# Patient Record
Sex: MALE | Race: WHITE | NOT HISPANIC OR LATINO | Employment: OTHER | URBAN - METROPOLITAN AREA
[De-identification: names, ages, dates, MRNs, and addresses within clinical notes are randomized per-mention and may not be internally consistent; named-entity substitution may affect disease eponyms.]

---

## 2017-01-10 ENCOUNTER — OFFICE VISIT (OUTPATIENT)
Dept: SURGERY | Facility: CLINIC | Age: 67
End: 2017-01-10

## 2017-01-10 VITALS — WEIGHT: 200 LBS | HEIGHT: 70 IN | HEART RATE: 71 BPM | BODY MASS INDEX: 28.63 KG/M2 | OXYGEN SATURATION: 98 %

## 2017-01-10 DIAGNOSIS — D35.1 PARATHYROID ADENOMA: Primary | ICD-10-CM

## 2017-01-10 PROCEDURE — 99203 OFFICE O/P NEW LOW 30 MIN: CPT | Performed by: SURGERY

## 2017-01-10 NOTE — MR AVS SNAPSHOT
[facility] GENERAL SURGERY  [phone]                    [facility] Val[facility]   1/10/2017 10:30 AM   Office Visit    Dept Phone:  [phone]   Encounter #:  [id number]    Provider:  Sarah Matos MD   Department:  [facility] GENERAL SURGERY                Your Full Care Plan              Today's Medication Changes          These changes are accurate as of: 1/10/17 11:35 AM.  If you have any questions, ask your nurse or doctor.               Stop taking medication(s)listed here:     methylphenidate 10 MG tablet   Commonly known as:  RITALIN   Stopped by:  aSrah Matos MD                      Your Updated Medication List          This list is accurate as of: 1/10/17 11:35 AM.  Always use your most recent med list.                aspirin 81 MG tablet       busPIRone 15 MG tablet   Commonly known as:  BUSPAR       ergocalciferol 55445 UNITS capsule   Commonly known as:  DRISDOL   Take 1 capsule by mouth 1 (One) Time Per Week.       fish oil 1000 MG capsule capsule       glucosamine-chondroitin 500-400 MG capsule capsule       lamoTRIgine 100 MG tablet   Commonly known as:  LaMICtal       levothyroxine 100 MCG tablet   Commonly known as:  SYNTHROID   Take 1 tablet by mouth Daily. On empty stomach       omeprazole 20 MG capsule   Commonly known as:  priLOSEC   TAKE ONE CAPSULE BY MOUTH DAILY       QUEtiapine 25 MG tablet   Commonly known as:  SEROquel       sertraline 100 MG tablet   Commonly known as:  ZOLOFT       TESTIM 50 MG/5GM (1%) gel gel   Generic drug:  testosterone   Place 100 mg on the skin Daily.               We Performed the Following     Basic Metabolic Panel     Case Request     CBC (No Diff)     Obtain Informed Consent     Provide NPO Instructions to Patient     PTH, Intact     US Thyroid       You Were Diagnosed With        Codes Comments    Parathyroid adenoma    -  Primary ICD-10-CM: D35.1  ICD-9-CM: 227.1       Instructions     None    Patient  Instructions History      Upcoming Appointments     Visit Type Date Time Department    OFFICE VISIT 1/10/2017 10:30 AM [facility]    OFFICE VISIT 2017  9:40 AM [facility]    [facility] 3/28/2017 10:10 AM [facility]    OFFICE VISIT 2017 10:40 AM [facility]    LAB 2017 10:00 AM [facility] CBC LAB [facility]    FOLLOW UP 1 UNIT 2017 11:20 AM [facility] CBC [facility]    VITALS ONLY 2017 11:10 AM [facility] CBC LAB [facility]      MyChart Signup     [facility] [employer] allows you to send messages to your doctor, view your test results, renew your prescriptions, schedule appointments, and more. To sign up, go to [email or web address] and click on the Sign Up Now link in the New User? box. Enter your [employer] Activation Code exactly as it appears below along with the last four digits of your Social Security Number and your Date of Birth () to complete the sign-up process. If you do not sign up before the expiration date, you must request a new code.    [employer] Activation Code: [email or web address]  Expires: 2017 11:35 AM    If you have questions, you can email [email or web address] or call [phone] to talk to our [employer] staff. Remember, [employer] is NOT to be used for urgent needs. For medical emergencies, dial 911.               Other Info from Your Visit           Your Appointments     2017  9:40 AM EST   Office Visit with Yousuf Greene MD   [facility] ENDOCRINOLOGY (--)    [address] [place]  [facility] [address] [phone]           Arrive 15 minutes prior to appointment.            Mar 28, 2017 10:10 AM EDT   [facility] with [facility], [facility]   [facility] ENDOCRINOLOGY (--)    [address] [place] [place] [address] [phone]            2017 10:40 AM EDT   Office Visit with MONIKA Prajapati   [facility] ENDOCRINOLOGY (--)    [address]  [address]  [facility] [address]   [phone]           Arrive 15 minutes prior to appointment.            Apr 25, 2017 10:00 AM EDT   Lab with LAB CHAIR 5 [facility] ONCOLOGY CBC LAB ([place])    [address] [place] [address] [address]  [facility] [id number] [phone]            May 02, 2017 11:20 AM EDT   FOLLOW UP with rAmani Painting MD   [facility] MEDICAL GROUP CBC GROUP: CONSULTANTS IN BLOOD DISORDERS AND CANCER ([facility])    [address] [facility] [address]  [facility] [id number] [phone]              Allergies     Codeine      nausea      Reason for Visit     Hyperparathyroidism           Vital Signs     Pulse Height Weight Oxygen Saturation Body Mass Index Smoking Status    71 70\" (177.8 cm) 200 lb (90.7 kg) 98% 28.7 kg/m2 Never Smoker      Problems and Diagnoses Noted     Parathyroid adenoma    -  Primary        "

## 2017-01-11 ENCOUNTER — OFFICE VISIT (OUTPATIENT)
Dept: ENDOCRINOLOGY | Age: 67
End: 2017-01-11

## 2017-01-11 VITALS
BODY MASS INDEX: 29.81 KG/M2 | HEIGHT: 70 IN | RESPIRATION RATE: 16 BRPM | DIASTOLIC BLOOD PRESSURE: 76 MMHG | WEIGHT: 208.2 LBS | SYSTOLIC BLOOD PRESSURE: 118 MMHG

## 2017-01-11 DIAGNOSIS — R53.82 CHRONIC FATIGUE: ICD-10-CM

## 2017-01-11 DIAGNOSIS — E78.1 HYPERTRIGLYCERIDEMIA: ICD-10-CM

## 2017-01-11 DIAGNOSIS — E21.0 PRIMARY HYPERPARATHYROIDISM (HCC): ICD-10-CM

## 2017-01-11 DIAGNOSIS — E03.9 ACQUIRED HYPOTHYROIDISM: Primary | ICD-10-CM

## 2017-01-11 DIAGNOSIS — E55.9 VITAMIN D DEFICIENCY: ICD-10-CM

## 2017-01-11 DIAGNOSIS — E83.52 HYPERCALCEMIA: ICD-10-CM

## 2017-01-11 PROCEDURE — 99214 OFFICE O/P EST MOD 30 MIN: CPT | Performed by: INTERNAL MEDICINE

## 2017-01-11 RX ORDER — ERGOCALCIFEROL 1.25 MG/1
50000 CAPSULE ORAL 2 TIMES WEEKLY
Qty: 26 CAPSULE | Refills: 3 | Status: SHIPPED | OUTPATIENT
Start: 2017-01-12 | End: 2017-01-31 | Stop reason: SDUPTHER

## 2017-01-11 NOTE — MR AVS SNAPSHOT
[facility] Val[facility]   1/11/2017 9:40 AM   Office Visit    Dept Phone:  [phone]   Encounter #:  [id number]    Provider:  Yousuf Greene MD   Department:  [facility] ENDOCRINOLOGY                Your Full Care Plan              Today's Medication Changes          These changes are accurate as of: 1/11/17 11:15 AM.  If you have any questions, ask your nurse or doctor.               New Medication(s)Ordered:     testosterone 4 MG/24HR patch 24 hour 24 hour patch   Commonly known as:  ANDRODERM   Place 2 patches on the skin Daily.   Replaces:  TESTIM 50 MG/5GM (1%) gel gel   Started by:  Yousuf Greene MD         Medication(s)that have changed:     ergocalciferol 42393 UNITS capsule   Commonly known as:  DRISDOL   Take 1 capsule by mouth 2 (Two) Times a Week.   Start taking on:  1/12/2017   What changed:  when to take this   Changed by:  Yousuf Greene MD         Stop taking medication(s)listed here:     TESTIM 50 MG/5GM (1%) gel gel   Generic drug:  testosterone   Replaced by:  testosterone 4 MG/24HR patch 24 hour 24 hour patch   Stopped by:  Yousuf Greene MD                Where to Get Your Medications      These medications were sent to [facility] [address] AT [address] [facility] - [phone]  - [phone] [place] [address]     Phone:  [phone]     ergocalciferol 48943 UNITS capsule         You can get these medications from any pharmacy     Bring a paper prescription for each of these medications     testosterone 4 MG/24HR patch 24 hour 24 hour patch                  Your Updated Medication List          This list is accurate as of: 1/11/17 11:15 AM.  Always use your most recent med list.                aspirin 81 MG tablet       busPIRone 15 MG tablet   Commonly known as:  BUSPAR       ergocalciferol 30355 UNITS capsule   Commonly known as:  DRISDOL   Take 1 capsule by mouth 2  (Two) Times a Week.   Start taking on:  1/12/2017       fish oil 1000 MG capsule capsule       glucosamine-chondroitin 500-400 MG capsule capsule       lamoTRIgine 100 MG tablet   Commonly known as:  LaMICtal       levothyroxine 100 MCG tablet   Commonly known as:  SYNTHROID   Take 1 tablet by mouth Daily. On empty stomach       omeprazole 20 MG capsule   Commonly known as:  priLOSEC   TAKE ONE CAPSULE BY MOUTH DAILY       QUEtiapine 25 MG tablet   Commonly known as:  SEROquel       sertraline 100 MG tablet   Commonly known as:  ZOLOFT       testosterone 4 MG/24HR patch 24 hour 24 hour patch   Commonly known as:  ANDRODERM   Place 2 patches on the skin Daily.               You Were Diagnosed With        Codes Comments    Acquired hypothyroidism    -  Primary ICD-10-CM: E03.9  ICD-9-CM: 244.9     Primary hyperparathyroidism     ICD-10-CM: E21.0  ICD-9-CM: 252.01     Hypertriglyceridemia     ICD-10-CM: E78.1  ICD-9-CM: 272.1     Hypercalcemia     ICD-10-CM: E83.52  ICD-9-CM: 275.42     Chronic fatigue     ICD-10-CM: R53.82  ICD-9-CM: 780.79     Vitamin D deficiency     ICD-10-CM: E55.9  ICD-9-CM: 268.9       Instructions     None    Patient Instructions History      Upcoming Appointments     Visit Type Date Time Department    OFFICE VISIT 1/11/2017  9:40 AM [facility]    [facility] THYROID 1/12/2017  1:00 PM [facility] US    [facility] 3/28/2017 10:10 AM [facility]    OFFICE VISIT 4/11/2017 10:40 AM [facility]    LAB 4/25/2017 10:00 AM [facility] CBC LAB [facility]    FOLLOW UP 1 UNIT 5/2/2017 11:20 AM [facility] CBC [facility]    VITALS ONLY 5/2/2017 11:10 AM [facility] CBC LAB [facility]      [employer] Signup     [employer] allows you to send messages to your doctor, view your test results, renew your prescriptions, schedule appointments, and more. To sign up, go to [email or web address] and click on the Sign Up Now link in the New User? box. Enter your [employer] Activation Code exactly as it appears below  "along with the last four digits of your Social Security Number and your Date of Birth () to complete the sign-up process. If you do not sign up before the expiration date, you must request a new code.    [employer] Activation Code: [email or web address]  Expires: 2017 11:35 AM    If you have questions, you can email [email or web address] or call [phone] to talk to our [employer] staff. Remember, [employer] is NOT to be used for urgent needs. For medical emergencies, dial 911.               Other Info from Your Visit           Your Appointments     2017  1:00 PM EST   US ragini thyroid with RAGINI US 2   [facility])    [address] [facility] [address] [phone]           No prep. Arrive 15 minutes before appointment. Bring current list of medications.            Mar 28, 2017 10:10 AM EDT   [facility] with [facility] JACKI HARVEY   [facility] ENDOCRINOLOGY (--)    [address] [facility]. [address] [place] [address] [phone]            2017 10:40 AM EDT   Office Visit with MONIKA Prajapati   [facility] ENDOCRINOLOGY (--)    [address] [facility]. [address] [place] [address] [phone]           Arrive 15 minutes prior to appointment.            2017 10:00 AM EDT   Lab with LAB CHAIR 5 [facility] ONCOLOGY CBC LAB ([facility]    [address] [address] [phone]            May 02, 2017 11:20 AM EDT   FOLLOW UP with Armani Painting MD   [facility] CBC GROUP: CONSULTANTS IN BLOOD DISORDERS AND CANCER ([facility] [place])    [address] [place] [address] [phone]              Allergies     Codeine      nausea      Vital Signs     Blood Pressure Respirations Height Weight Body Mass Index Smoking Status    118/76 16 70\" (177.8 cm) 208 lb 3.2 oz (94.4 kg) 29.87 kg/m2 Never Smoker      Problems and Diagnoses Noted     " Acquired underactive thyroid    Chronic fatigue    Serum calcium elevated    Hypertriglyceridemia    Parathyroid hormone excess    Vitamin D deficiency

## 2017-01-11 NOTE — LETTER
January 11, 2017     Jared Noriega MD  [address]  [address]  [place] [id number]    Patient: Kaiser Valente   YOB: 1950   Date of Visit: 1/11/2017       Dear Dr. Hari MD:    Kaiser Valente was in my office today. Below are the relevant portions of my assessment and plan of care.      Diagnoses and all orders for this visit:    Acquired hypothyroidism  -     T3, Free; Future  -     T4 & TSH ([facility]); Future  -     T4, Free; Future  -     TestT+TestF+SHBG; Future  -     Uric Acid; Future  -     Vitamin D 25 Hydroxy; Future  -     Comprehensive Metabolic Panel; Future  -     Follicle Stimulating Hormone; Future  -     Lipid Panel; Future  -     Luteinizing Hormone; Future  -     Calcium, Ionized; Future  -     Phosphorus; Future  -     PTH, Intact; Future    Primary hyperparathyroidism  -     T3, Free; Future  -     T4 & TSH ([facility]); Future  -     T4, Free; Future  -     TestT+TestF+SHBG; Future  -     Uric Acid; Future  -     Vitamin D 25 Hydroxy; Future  -     Comprehensive Metabolic Panel; Future  -     Follicle Stimulating Hormone; Future  -     Lipid Panel; Future  -     Luteinizing Hormone; Future  -     Calcium, Ionized; Future  -     Phosphorus; Future  -     PTH, Intact; Future    Hypertriglyceridemia  -     T3, Free; Future  -     T4 & TSH ([facility]); Future  -     T4, Free; Future  -     TestT+TestF+SHBG; Future  -     Uric Acid; Future  -     Vitamin D 25 Hydroxy; Future  -     Comprehensive Metabolic Panel; Future  -     Follicle Stimulating Hormone; Future  -     Lipid Panel; Future  -     Luteinizing Hormone; Future  -     Calcium, Ionized; Future  -     Phosphorus; Future  -     PTH, Intact; Future    Hypercalcemia  -     T3, Free; Future  -     T4 & TSH ([facility]); Future  -     T4, Free; Future  -     TestT+TestF+SHBG; Future  -     Uric Acid; Future  -     Vitamin D 25 Hydroxy; Future  -     Comprehensive Metabolic Panel; Future  -     Follicle Stimulating Hormone; Future  -      Lipid Panel; Future  -     Luteinizing Hormone; Future  -     Calcium, Ionized; Future  -     Phosphorus; Future  -     PTH, Intact; Future    Chronic fatigue  -     T3, Free; Future  -     T4 & TSH ([facility]); Future  -     T4, Free; Future  -     TestT+TestF+SHBG; Future  -     Uric Acid; Future  -     Vitamin D 25 Hydroxy; Future  -     Comprehensive Metabolic Panel; Future  -     Follicle Stimulating Hormone; Future  -     Lipid Panel; Future  -     Luteinizing Hormone; Future  -     Calcium, Ionized; Future  -     Phosphorus; Future  -     PTH, Intact; Future    Vitamin D deficiency  -     ergocalciferol (DRISDOL) 59063 UNITS capsule; Take 1 capsule by mouth 2 (Two) Times a Week.    Other orders  -     testosterone (ANDRODERM) 4 MG/24HR patch 24 hour 24 hour patch; Place 2 patches on the skin Daily.                  If you have questions, please do not hesitate to call me. I look forward to following Kaiser along with you.         Sincerely,        Yousuf Greene MD        CC: No Recipients

## 2017-01-11 NOTE — LETTER
January 11, 2017     Jared Noriega MD  [address]  [address]  [place] [id number]    Patient: Kaiser Valente   YOB: 1950   Date of Visit: 1/11/2017       Dear Dr. Hari MD:    Thank you for referring Kaiser Valente to me for evaluation. Below are the relevant portions of my assessment and plan of care.      Diagnoses and all orders for this visit:    Acquired hypothyroidism  -     T3, Free; Future  -     T4 & TSH ([facility]); Future  -     T4, Free; Future  -     TestT+TestF+SHBG; Future  -     Uric Acid; Future  -     Vitamin D 25 Hydroxy; Future  -     Comprehensive Metabolic Panel; Future  -     Follicle Stimulating Hormone; Future  -     Lipid Panel; Future  -     Luteinizing Hormone; Future  -     Calcium, Ionized; Future  -     Phosphorus; Future  -     PTH, Intact; Future    Primary hyperparathyroidism  -     T3, Free; Future  -     T4 & TSH ([facility]); Future  -     T4, Free; Future  -     TestT+TestF+SHBG; Future  -     Uric Acid; Future  -     Vitamin D 25 Hydroxy; Future  -     Comprehensive Metabolic Panel; Future  -     Follicle Stimulating Hormone; Future  -     Lipid Panel; Future  -     Luteinizing Hormone; Future  -     Calcium, Ionized; Future  -     Phosphorus; Future  -     PTH, Intact; Future    Hypertriglyceridemia  -     T3, Free; Future  -     T4 & TSH ([facility]); Future  -     T4, Free; Future  -     TestT+TestF+SHBG; Future  -     Uric Acid; Future  -     Vitamin D 25 Hydroxy; Future  -     Comprehensive Metabolic Panel; Future  -     Follicle Stimulating Hormone; Future  -     Lipid Panel; Future  -     Luteinizing Hormone; Future  -     Calcium, Ionized; Future  -     Phosphorus; Future  -     PTH, Intact; Future    Hypercalcemia  -     T3, Free; Future  -     T4 & TSH ([facility]); Future  -     T4, Free; Future  -     TestT+TestF+SHBG; Future  -     Uric Acid; Future  -     Vitamin D 25 Hydroxy; Future  -     Comprehensive Metabolic Panel; Future  -     Follicle  Stimulating Hormone; Future  -     Lipid Panel; Future  -     Luteinizing Hormone; Future  -     Calcium, Ionized; Future  -     Phosphorus; Future  -     PTH, Intact; Future    Chronic fatigue  -     T3, Free; Future  -     T4 & TSH ([facility]); Future  -     T4, Free; Future  -     TestT+TestF+SHBG; Future  -     Uric Acid; Future  -     Vitamin D 25 Hydroxy; Future  -     Comprehensive Metabolic Panel; Future  -     Follicle Stimulating Hormone; Future  -     Lipid Panel; Future  -     Luteinizing Hormone; Future  -     Calcium, Ionized; Future  -     Phosphorus; Future  -     PTH, Intact; Future    Vitamin D deficiency  -     ergocalciferol (DRISDOL) 81706 UNITS capsule; Take 1 capsule by mouth 2 (Two) Times a Week.    Other orders  -     testosterone (ANDRODERM) 4 MG/24HR patch 24 hour 24 hour patch; Place 2 patches on the skin Daily.                          If you have questions, please do not hesitate to call me. I look forward to following Kaiser along with you.         Sincerely,        Yousuf Greene MD        CC: No Recipients

## 2017-01-11 NOTE — PROGRESS NOTES
Subjective   Kaiser Valente is a 66 y.o. male seen for follow up for hypothyroidism, hyperparathyroidism, hypercalcemia, lab review. Patient denies any problems or concerns. Patient saw Dr. Matos 01/10/2017 and was told that his parathyroid growth was not malignant.      History of Present Illness is a 66-year-old gentleman known patient with primary hypothyroidism as well as hyperparathyroidism and hypercalcemia with hypogonadism and vitamin D deficiency.  He R the has seen a surgeon who will be arranging his parathyroidectomy surgery.  He also has not been able to start testosterone replacement therapy because of the very high cost.  He still complains of being tired and fatigued.    The following portions of the patient's history were reviewed and updated as appropriate: allergies, current medications, past family history, past medical history, past social history, past surgical history and problem list.    Review of Systems   Constitutional: Negative.    HENT: Negative.    Eyes: Negative.    Respiratory: Negative.    Cardiovascular: Negative.    Gastrointestinal: Negative.    Endocrine: Negative.    Genitourinary: Negative.    Musculoskeletal: Negative.    Skin: Negative.    Allergic/Immunologic: Negative.    Neurological: Negative.    Hematological: Negative.    Psychiatric/Behavioral: Negative.        Objective   Physical Exam   Constitutional: He is oriented to person, place, and time. He appears well-developed and well-nourished. No distress.   Somewhat pale and chronically ill looking   HENT:   Head: Normocephalic and atraumatic.   Right Ear: External ear normal.   Nose: Nose normal.   Mouth/Throat: Oropharynx is clear and moist. No oropharyngeal exudate.   Eyes: Conjunctivae and EOM are normal. Pupils are equal, round, and reactive to light. Right eye exhibits no discharge. Left eye exhibits no discharge. No scleral icterus.   Neck: Normal range of motion. Neck supple. No JVD present. No tracheal  deviation present. No thyromegaly present.   Cardiovascular: Normal rate, regular rhythm, normal heart sounds and intact distal pulses.  Exam reveals no gallop and no friction rub.    No murmur heard.  Pulmonary/Chest: Effort normal and breath sounds normal. No stridor. No respiratory distress. He has no wheezes. He has no rales. He exhibits no tenderness.   Abdominal: Soft. Bowel sounds are normal. He exhibits no distension and no mass. There is no tenderness. There is no rebound and no guarding. No hernia.   Musculoskeletal: Normal range of motion. He exhibits no edema, tenderness or deformity.   Healed the scar of the open reduction of a broken right wrist in the medial aspect of right lower forearm.   Lymphadenopathy:     He has no cervical adenopathy.   Neurological: He is alert and oriented to person, place, and time. He has normal reflexes. He displays normal reflexes. No cranial nerve deficit. He exhibits normal muscle tone. Coordination normal.   Skin: Skin is warm and dry. No rash noted. He is not diaphoretic. No erythema. No pallor.   Psychiatric: He has a normal mood and affect. His behavior is normal. Judgment and thought content normal.   Nursing note and vitals reviewed.        Assessment/Plan   Diagnoses and all orders for this visit:    Acquired hypothyroidism  -     T3, Free; Future  -     T4 & TSH ([facility]); Future  -     T4, Free; Future  -     TestT+TestF+SHBG; Future  -     Uric Acid; Future  -     Vitamin D 25 Hydroxy; Future  -     Comprehensive Metabolic Panel; Future  -     Follicle Stimulating Hormone; Future  -     Lipid Panel; Future  -     Luteinizing Hormone; Future  -     Calcium, Ionized; Future  -     Phosphorus; Future  -     PTH, Intact; Future    Primary hyperparathyroidism  -     T3, Free; Future  -     T4 & TSH ([facility]); Future  -     T4, Free; Future  -     TestT+TestF+SHBG; Future  -     Uric Acid; Future  -     Vitamin D 25 Hydroxy; Future  -     Comprehensive Metabolic  Panel; Future  -     Follicle Stimulating Hormone; Future  -     Lipid Panel; Future  -     Luteinizing Hormone; Future  -     Calcium, Ionized; Future  -     Phosphorus; Future  -     PTH, Intact; Future    Hypertriglyceridemia  -     T3, Free; Future  -     T4 & TSH ([facility]); Future  -     T4, Free; Future  -     TestT+TestF+SHBG; Future  -     Uric Acid; Future  -     Vitamin D 25 Hydroxy; Future  -     Comprehensive Metabolic Panel; Future  -     Follicle Stimulating Hormone; Future  -     Lipid Panel; Future  -     Luteinizing Hormone; Future  -     Calcium, Ionized; Future  -     Phosphorus; Future  -     PTH, Intact; Future    Hypercalcemia  -     T3, Free; Future  -     T4 & TSH ([facility]); Future  -     T4, Free; Future  -     TestT+TestF+SHBG; Future  -     Uric Acid; Future  -     Vitamin D 25 Hydroxy; Future  -     Comprehensive Metabolic Panel; Future  -     Follicle Stimulating Hormone; Future  -     Lipid Panel; Future  -     Luteinizing Hormone; Future  -     Calcium, Ionized; Future  -     Phosphorus; Future  -     PTH, Intact; Future    Chronic fatigue  -     T3, Free; Future  -     T4 & TSH ([facility]); Future  -     T4, Free; Future  -     TestT+TestF+SHBG; Future  -     Uric Acid; Future  -     Vitamin D 25 Hydroxy; Future  -     Comprehensive Metabolic Panel; Future  -     Follicle Stimulating Hormone; Future  -     Lipid Panel; Future  -     Luteinizing Hormone; Future  -     Calcium, Ionized; Future  -     Phosphorus; Future  -     PTH, Intact; Future    Vitamin D deficiency  -     ergocalciferol (DRISDOL) 73621 UNITS capsule; Take 1 capsule by mouth 2 (Two) Times a Week.    Other orders  -     testosterone (ANDRODERM) 4 MG/24HR patch 24 hour 24 hour patch; Place 2 patches on the skin Daily.              in summary I saw and examined this 66-year-old gentleman for above-mentioned problems.  I reviewed his laboratory evaluation of the 12/28/2016 and provided him with a hard copy of it.  I also  reviewed his a parathyroid scan that was performed at the [facility] on 11/22/2016.  He clearly suffering from primary hyperparathyroidism and and adenoma has been identified in the left lower thyroid pole.  She already has seen the surgeon and awaiting a date for parathyroidectomy.  He has not been able to start testosterone replacement therapy and therefore his testosterone level is very low with the elevated levels of LH and FSH consistent with primary hypogonadism.  We will go ahead and give him Androderm her milligrams 2 patches daily.  Also because of his low vitamin D we will increase his dose to 50,000 unit twice weekly.  He will see Ms. Vaishali De La Cruz in 4 months and myself in 8 months or sooner if needed with laboratory evaluation prior to each office visit.

## 2017-01-12 ENCOUNTER — HOSPITAL ENCOUNTER (OUTPATIENT)
Dept: ULTRASOUND IMAGING | Facility: HOSPITAL | Age: 67
Discharge: HOME | End: 2017-01-12
Attending: SURGERY | Admitting: SURGERY

## 2017-01-12 PROCEDURE — 76536 US EXAM OF HEAD AND NECK: CPT

## 2017-01-12 NOTE — PROGRESS NOTES
General Surgery    History and Physical    CC: Parathyroid adenoma, hypercalcemia    HPI: The patient is a pleasant 66 y.o. year-old gentleman who presents today to my office to discuss recent findings of hypercalcemia as well as hyperparathyroidism.  On routine labs in the past, he was found to have elevated calcium levels as high as 12.0, but most recently measured at 11.4.  Further workup revealed a normal phosphorus level, low vitamin D level at 21, and elevated parathyroid hormone level at 212.  I SPECT/CT scan revealed a 1.1 cm hyperfunctioning left inferior parathyroid adenoma adjacent to the esophagus.  He has been sent to see me for discussion of possible parathyroidectomy.  He denies any history of chronic abdominal pain, constipation, or kidney stones.  He does however have a history of chronic depression which has been ongoing for many years now.  He also has chronic joint pain within his bilateral shoulders and knees, and has right hand paresthesias intermittently due to a distal radius fracture.  He describes the tingling sensation occurring only in the lateral 3 fingers of the right hand.  He has a known history of irritable bowel syndrome and has mostly diarrhea predominant stools.  He denies any family history of any neuro endocrine tumors.  He has had no prior radiation to the neck.    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[employer], has 2 sons    ROS: A comprehensive 14-system review was conducted and significant only for  the following positives: Sleep apnea, hearing loss, constipation, diarrhea, reflux, urinary urgency, joint pain, back pain, agitation, decreased concentration, nervousness, anxiety, and depression    Physical Exam:  Vitals:    01/10/17 1026   Pulse: 71   SpO2: 98%     General: No acute distress, well-nourished & well-developed  HEAD: normocephalic, atraumatic  EYES: normal conjunctiva, sclera anicteric  EARS: grossly normal hearing  NECK: supple, no thyromegaly, no palpable thyroid nodules  CARDIOVASCULAR: regular rate and rhythm  RESPIRATORY: clear to auscultation bilaterally  GASTROINTESTINAL: soft, nontender, non-distended  PSYCHIATRIC: oriented x3, normal mood and affect    Laboratory analysis:  Calcium: 11.4 mg/dL  Phosphorus: 2.9 mg/dL  Vitamin D: 21 ng/mL  PTH: 212 pg/mL    Imaging studies:  Sestamibi SPECT/CT:   Parathyroid adenoma along the posterior aspect of the lower left thyroid pole and adjacent to the left margin of the esophagus measuring 1.1 cm in greatest dimension    ASSESSMENT & PLAN  66-year-old gentleman with primary hyperparathyroidism due to left inferior parathyroid adenoma    I personally reviewed his labs and imaging report from [facility], and think he would be a good candidate for left inferior parathyroidectomy with intraoperative PTH monitoring for control of his hypercalcemia.  I discussed the risks and benefits of the surgical procedure at length with the patient, and discussed the risk of bleeding, possible recurrent laryngeal nerve injury, possibility of multiple parathyroid adenomas being the etiology of his hypercalcemia which would then require a bilateral neck exploration.  He understands these risks, had time to ask questions which were all answered, and he has consented to proceed.  I would like to check a thyroid ultrasound preoperatively to ensure that there are no suspicious thyroid nodules requiring further workup.    Sarah Matos MD  General  and Endoscopic Surgery  [facility]    [address], [address]  [place], [place], [address]  P: [phone]  F: [phone]

## 2017-01-13 RX ORDER — SYRINGE W-NEEDLE,DISPOSAB,3 ML 25GX5/8"
SYRINGE, EMPTY DISPOSABLE MISCELLANEOUS
Qty: 10 EACH | Refills: 1 | Status: SHIPPED | OUTPATIENT
Start: 2017-01-13 | End: 2017-09-15 | Stop reason: SDUPTHER

## 2017-01-13 RX ORDER — TESTOSTERONE CYPIONATE 200 MG/ML
200 INJECTION, SOLUTION INTRAMUSCULAR
Qty: 2 ML | Refills: 5 | Status: SHIPPED | OUTPATIENT
Start: 2017-01-13 | End: 2017-01-31 | Stop reason: SDUPTHER

## 2017-01-16 ENCOUNTER — TELEPHONE (OUTPATIENT)
Dept: SURGERY | Facility: CLINIC | Age: 67
End: 2017-01-16

## 2017-01-16 NOTE — TELEPHONE ENCOUNTER
It is my understanding that we were waiting to schedule the surgery until we have a better understanding of the group's call schedule for February onward through 2017, and this is being discussed this evening at the group partners dinner/POC meeting. Shakira or Wendy will call him in the next 1-2 weeks to get the surgery scheduled for him once our partner's call schedule for February has been established. Could you call him and explain this to him, and let him know we will contact him as soon as the call schedule has been finalized to be able to schedule his parathyroidectomy surgery?

## 2017-01-31 ENCOUNTER — APPOINTMENT (OUTPATIENT)
Dept: PREADMISSION TESTING | Facility: HOSPITAL | Age: 67
End: 2017-01-31

## 2017-01-31 VITALS
BODY MASS INDEX: 29.2 KG/M2 | RESPIRATION RATE: 20 BRPM | DIASTOLIC BLOOD PRESSURE: 76 MMHG | HEART RATE: 62 BPM | TEMPERATURE: 97.6 F | OXYGEN SATURATION: 98 % | SYSTOLIC BLOOD PRESSURE: 137 MMHG | WEIGHT: 204 LBS | HEIGHT: 70 IN

## 2017-01-31 LAB
ANION GAP SERPL CALCULATED.3IONS-SCNC: 11.9 MMOL/L
BUN BLD-MCNC: 28 MG/DL (ref 8–23)
BUN/CREAT SERPL: 19.3 (ref 7–25)
CALCIUM SPEC-SCNC: 11.7 MG/DL (ref 8.6–10.5)
CHLORIDE SERPL-SCNC: 102 MMOL/L (ref 98–107)
CO2 SERPL-SCNC: 25.1 MMOL/L (ref 22–29)
CREAT BLD-MCNC: 1.45 MG/DL (ref 0.76–1.27)
DEPRECATED RDW RBC AUTO: 47.2 FL (ref 37–54)
ERYTHROCYTE [DISTWIDTH] IN BLOOD BY AUTOMATED COUNT: 14.5 % (ref 11.5–14.5)
GFR SERPL CREATININE-BSD FRML MDRD: 48 ML/MIN/1.73
GLUCOSE BLD-MCNC: 84 MG/DL (ref 65–99)
HCT VFR BLD AUTO: 40 % (ref 40.4–52.2)
HGB BLD-MCNC: 13.4 G/DL (ref 13.7–17.6)
MCH RBC QN AUTO: 30 PG (ref 27–32.7)
MCHC RBC AUTO-ENTMCNC: 33.5 G/DL (ref 32.6–36.4)
MCV RBC AUTO: 89.5 FL (ref 79.8–96.2)
PLATELET # BLD AUTO: 100 10*3/MM3 (ref 140–500)
PMV BLD AUTO: 12.5 FL (ref 6–12)
POTASSIUM BLD-SCNC: 3.8 MMOL/L (ref 3.5–5.2)
PTH-INTACT SERPL-MCNC: 142.5 PG/ML (ref 15–65)
RBC # BLD AUTO: 4.47 10*6/MM3 (ref 4.6–6)
SODIUM BLD-SCNC: 139 MMOL/L (ref 136–145)
WBC NRBC COR # BLD: 6.35 10*3/MM3 (ref 4.5–10.7)

## 2017-01-31 PROCEDURE — 36415 COLL VENOUS BLD VENIPUNCTURE: CPT | Performed by: SURGERY

## 2017-01-31 PROCEDURE — 93010 ELECTROCARDIOGRAM REPORT: CPT | Performed by: INTERNAL MEDICINE

## 2017-01-31 PROCEDURE — 93005 ELECTROCARDIOGRAM TRACING: CPT

## 2017-01-31 PROCEDURE — 80048 BASIC METABOLIC PNL TOTAL CA: CPT | Performed by: SURGERY

## 2017-01-31 PROCEDURE — 83970 ASSAY OF PARATHORMONE: CPT | Performed by: SURGERY

## 2017-01-31 PROCEDURE — 85027 COMPLETE CBC AUTOMATED: CPT | Performed by: SURGERY

## 2017-01-31 RX ORDER — OMEPRAZOLE 20 MG/1
20 CAPSULE, DELAYED RELEASE ORAL EVERY MORNING
COMMUNITY
End: 2017-09-25

## 2017-01-31 RX ORDER — ERGOCALCIFEROL 1.25 MG/1
50000 CAPSULE ORAL
COMMUNITY
End: 2018-03-29

## 2017-01-31 RX ORDER — LEVOTHYROXINE SODIUM 0.1 MG/1
100 TABLET ORAL EVERY MORNING
Status: ON HOLD | COMMUNITY
End: 2017-02-03

## 2017-01-31 RX ORDER — MODAFINIL 100 MG/1
150 TABLET ORAL EVERY MORNING
COMMUNITY
End: 2018-10-01 | Stop reason: ALTCHOICE

## 2017-01-31 RX ORDER — LAMOTRIGINE 100 MG/1
50 TABLET ORAL EVERY MORNING
COMMUNITY
End: 2017-04-21 | Stop reason: HOSPADM

## 2017-02-03 ENCOUNTER — ANESTHESIA (OUTPATIENT)
Dept: PERIOP | Facility: HOSPITAL | Age: 67
End: 2017-02-03

## 2017-02-03 ENCOUNTER — HOSPITAL ENCOUNTER (OUTPATIENT)
Facility: HOSPITAL | Age: 67
Setting detail: HOSPITAL OUTPATIENT SURGERY
Discharge: HOME | End: 2017-02-03
Attending: SURGERY | Admitting: SURGERY

## 2017-02-03 ENCOUNTER — ANESTHESIA EVENT (OUTPATIENT)
Dept: PERIOP | Facility: HOSPITAL | Age: 67
End: 2017-02-03

## 2017-02-03 VITALS
SYSTOLIC BLOOD PRESSURE: 143 MMHG | RESPIRATION RATE: 16 BRPM | HEART RATE: 60 BPM | HEIGHT: 70 IN | TEMPERATURE: 97.8 F | OXYGEN SATURATION: 97 % | WEIGHT: 202.31 LBS | DIASTOLIC BLOOD PRESSURE: 91 MMHG | BODY MASS INDEX: 28.96 KG/M2

## 2017-02-03 DIAGNOSIS — D35.1 PARATHYROID ADENOMA: ICD-10-CM

## 2017-02-03 LAB
PTH-INTACT SERPL-SCNC: 193.2 PG/ML (ref 15–65)
PTH-INTACT SERPL-SCNC: 43.7 PG/ML (ref 15–65)

## 2017-02-03 PROCEDURE — 25010000002 FENTANYL CITRATE (PF) 100 MCG/2ML SOLUTION: Performed by: NURSE ANESTHETIST, CERTIFIED REGISTERED

## 2017-02-03 PROCEDURE — 88331 PATH CONSLTJ SURG 1 BLK 1SPC: CPT | Performed by: SURGERY

## 2017-02-03 PROCEDURE — 25010000002 DEXAMETHASONE PER 1 MG: Performed by: NURSE ANESTHETIST, CERTIFIED REGISTERED

## 2017-02-03 PROCEDURE — 88305 TISSUE EXAM BY PATHOLOGIST: CPT | Performed by: SURGERY

## 2017-02-03 PROCEDURE — 83970 ASSAY OF PARATHORMONE: CPT | Performed by: SURGERY

## 2017-02-03 PROCEDURE — 25010000003 CEFAZOLIN IN DEXTROSE 2-4 GM/100ML-% SOLUTION

## 2017-02-03 PROCEDURE — 25010000002 FENTANYL CITRATE (PF) 100 MCG/2ML SOLUTION: Performed by: ANESTHESIOLOGY

## 2017-02-03 PROCEDURE — 25010000002 MIDAZOLAM PER 1 MG: Performed by: ANESTHESIOLOGY

## 2017-02-03 PROCEDURE — 25010000002 HYDROMORPHONE PER 4 MG: Performed by: NURSE ANESTHETIST, CERTIFIED REGISTERED

## 2017-02-03 PROCEDURE — 25010000002 PROPOFOL 10 MG/ML EMULSION: Performed by: NURSE ANESTHETIST, CERTIFIED REGISTERED

## 2017-02-03 PROCEDURE — 25010000002 SUCCINYLCHOLINE PER 20 MG: Performed by: NURSE ANESTHETIST, CERTIFIED REGISTERED

## 2017-02-03 PROCEDURE — 25010000002 ONDANSETRON PER 1 MG: Performed by: NURSE ANESTHETIST, CERTIFIED REGISTERED

## 2017-02-03 PROCEDURE — 60500 EXPLORE PARATHYROID GLANDS: CPT | Performed by: SURGERY

## 2017-02-03 RX ORDER — PROPOFOL 10 MG/ML
VIAL (ML) INTRAVENOUS AS NEEDED
Status: DISCONTINUED | OUTPATIENT
Start: 2017-02-03 | End: 2017-02-03 | Stop reason: SURG

## 2017-02-03 RX ORDER — NALOXONE HCL 0.4 MG/ML
0.2 VIAL (ML) INJECTION AS NEEDED
Status: DISCONTINUED | OUTPATIENT
Start: 2017-02-03 | End: 2017-02-03 | Stop reason: HOSPADM

## 2017-02-03 RX ORDER — MAGNESIUM HYDROXIDE 1200 MG/15ML
LIQUID ORAL AS NEEDED
Status: DISCONTINUED | OUTPATIENT
Start: 2017-02-03 | End: 2017-02-03 | Stop reason: HOSPADM

## 2017-02-03 RX ORDER — PROMETHAZINE HYDROCHLORIDE 25 MG/1
25 SUPPOSITORY RECTAL ONCE AS NEEDED
Status: DISCONTINUED | OUTPATIENT
Start: 2017-02-03 | End: 2017-02-03 | Stop reason: HOSPADM

## 2017-02-03 RX ORDER — ONDANSETRON 2 MG/ML
4 INJECTION INTRAMUSCULAR; INTRAVENOUS ONCE AS NEEDED
Status: DISCONTINUED | OUTPATIENT
Start: 2017-02-03 | End: 2017-02-03 | Stop reason: HOSPADM

## 2017-02-03 RX ORDER — HYDRALAZINE HYDROCHLORIDE 20 MG/ML
5 INJECTION INTRAMUSCULAR; INTRAVENOUS
Status: DISCONTINUED | OUTPATIENT
Start: 2017-02-03 | End: 2017-02-03 | Stop reason: HOSPADM

## 2017-02-03 RX ORDER — OXYCODONE HYDROCHLORIDE AND ACETAMINOPHEN 5; 325 MG/1; MG/1
1 TABLET ORAL EVERY 4 HOURS PRN
Qty: 40 TABLET | Refills: 0 | Status: SHIPPED | OUTPATIENT
Start: 2017-02-03 | End: 2017-02-14

## 2017-02-03 RX ORDER — ONDANSETRON 2 MG/ML
INJECTION INTRAMUSCULAR; INTRAVENOUS AS NEEDED
Status: DISCONTINUED | OUTPATIENT
Start: 2017-02-03 | End: 2017-02-03 | Stop reason: SURG

## 2017-02-03 RX ORDER — MIDAZOLAM HYDROCHLORIDE 1 MG/ML
2 INJECTION INTRAMUSCULAR; INTRAVENOUS
Status: DISCONTINUED | OUTPATIENT
Start: 2017-02-03 | End: 2017-02-03 | Stop reason: HOSPADM

## 2017-02-03 RX ORDER — SODIUM CHLORIDE 0.9 % (FLUSH) 0.9 %
1-10 SYRINGE (ML) INJECTION AS NEEDED
Status: DISCONTINUED | OUTPATIENT
Start: 2017-02-03 | End: 2017-02-03 | Stop reason: HOSPADM

## 2017-02-03 RX ORDER — ONDANSETRON 4 MG/1
4 TABLET, FILM COATED ORAL EVERY 6 HOURS PRN
Qty: 30 TABLET | Refills: 1 | Status: SHIPPED | OUTPATIENT
Start: 2017-02-03 | End: 2017-02-14

## 2017-02-03 RX ORDER — FENTANYL CITRATE 50 UG/ML
50 INJECTION, SOLUTION INTRAMUSCULAR; INTRAVENOUS
Status: DISCONTINUED | OUTPATIENT
Start: 2017-02-03 | End: 2017-02-03 | Stop reason: HOSPADM

## 2017-02-03 RX ORDER — CEFAZOLIN SODIUM 2 G/100ML
2 INJECTION, SOLUTION INTRAVENOUS
Status: DISCONTINUED | OUTPATIENT
Start: 2017-02-03 | End: 2017-02-03 | Stop reason: HOSPADM

## 2017-02-03 RX ORDER — HYDROMORPHONE HYDROCHLORIDE 1 MG/ML
0.5 INJECTION, SOLUTION INTRAMUSCULAR; INTRAVENOUS; SUBCUTANEOUS
Status: DISCONTINUED | OUTPATIENT
Start: 2017-02-03 | End: 2017-02-03 | Stop reason: HOSPADM

## 2017-02-03 RX ORDER — PROMETHAZINE HYDROCHLORIDE 25 MG/ML
12.5 INJECTION, SOLUTION INTRAMUSCULAR; INTRAVENOUS ONCE AS NEEDED
Status: DISCONTINUED | OUTPATIENT
Start: 2017-02-03 | End: 2017-02-03 | Stop reason: HOSPADM

## 2017-02-03 RX ORDER — LIDOCAINE HYDROCHLORIDE 40 MG/ML
SOLUTION TOPICAL AS NEEDED
Status: DISCONTINUED | OUTPATIENT
Start: 2017-02-03 | End: 2017-02-03 | Stop reason: SURG

## 2017-02-03 RX ORDER — LIDOCAINE HYDROCHLORIDE 20 MG/ML
INJECTION, SOLUTION INFILTRATION; PERINEURAL AS NEEDED
Status: DISCONTINUED | OUTPATIENT
Start: 2017-02-03 | End: 2017-02-03 | Stop reason: SURG

## 2017-02-03 RX ORDER — DEXAMETHASONE SODIUM PHOSPHATE 10 MG/ML
INJECTION INTRAMUSCULAR; INTRAVENOUS AS NEEDED
Status: DISCONTINUED | OUTPATIENT
Start: 2017-02-03 | End: 2017-02-03 | Stop reason: SURG

## 2017-02-03 RX ORDER — HYDROCODONE BITARTRATE AND ACETAMINOPHEN 7.5; 325 MG/1; MG/1
1 TABLET ORAL ONCE AS NEEDED
Status: DISCONTINUED | OUTPATIENT
Start: 2017-02-03 | End: 2017-02-03 | Stop reason: HOSPADM

## 2017-02-03 RX ORDER — DIPHENHYDRAMINE HYDROCHLORIDE 50 MG/ML
12.5 INJECTION INTRAMUSCULAR; INTRAVENOUS
Status: DISCONTINUED | OUTPATIENT
Start: 2017-02-03 | End: 2017-02-03 | Stop reason: HOSPADM

## 2017-02-03 RX ORDER — MIDAZOLAM HYDROCHLORIDE 1 MG/ML
1 INJECTION INTRAMUSCULAR; INTRAVENOUS
Status: DISCONTINUED | OUTPATIENT
Start: 2017-02-03 | End: 2017-02-03 | Stop reason: HOSPADM

## 2017-02-03 RX ORDER — PROMETHAZINE HYDROCHLORIDE 25 MG/1
25 TABLET ORAL ONCE AS NEEDED
Status: DISCONTINUED | OUTPATIENT
Start: 2017-02-03 | End: 2017-02-03 | Stop reason: HOSPADM

## 2017-02-03 RX ORDER — FAMOTIDINE 10 MG/ML
20 INJECTION, SOLUTION INTRAVENOUS ONCE
Status: COMPLETED | OUTPATIENT
Start: 2017-02-03 | End: 2017-02-03

## 2017-02-03 RX ORDER — CEFAZOLIN SODIUM 2 G/100ML
INJECTION, SOLUTION INTRAVENOUS
Status: COMPLETED
Start: 2017-02-03 | End: 2017-02-03

## 2017-02-03 RX ORDER — SUCCINYLCHOLINE CHLORIDE 20 MG/ML
INJECTION INTRAMUSCULAR; INTRAVENOUS AS NEEDED
Status: DISCONTINUED | OUTPATIENT
Start: 2017-02-03 | End: 2017-02-03 | Stop reason: SURG

## 2017-02-03 RX ORDER — SODIUM CHLORIDE, SODIUM LACTATE, POTASSIUM CHLORIDE, CALCIUM CHLORIDE 600; 310; 30; 20 MG/100ML; MG/100ML; MG/100ML; MG/100ML
9 INJECTION, SOLUTION INTRAVENOUS CONTINUOUS
Status: DISCONTINUED | OUTPATIENT
Start: 2017-02-03 | End: 2017-02-03 | Stop reason: HOSPADM

## 2017-02-03 RX ORDER — OXYCODONE AND ACETAMINOPHEN 7.5; 325 MG/1; MG/1
1 TABLET ORAL ONCE AS NEEDED
Status: COMPLETED | OUTPATIENT
Start: 2017-02-03 | End: 2017-02-03

## 2017-02-03 RX ORDER — HYDROMORPHONE HYDROCHLORIDE 1 MG/ML
0.25 INJECTION, SOLUTION INTRAMUSCULAR; INTRAVENOUS; SUBCUTANEOUS
Status: DISCONTINUED | OUTPATIENT
Start: 2017-02-03 | End: 2017-02-03 | Stop reason: HOSPADM

## 2017-02-03 RX ORDER — LABETALOL HYDROCHLORIDE 5 MG/ML
5 INJECTION, SOLUTION INTRAVENOUS
Status: DISCONTINUED | OUTPATIENT
Start: 2017-02-03 | End: 2017-02-03 | Stop reason: HOSPADM

## 2017-02-03 RX ORDER — FLUMAZENIL 0.1 MG/ML
0.2 INJECTION INTRAVENOUS AS NEEDED
Status: DISCONTINUED | OUTPATIENT
Start: 2017-02-03 | End: 2017-02-03 | Stop reason: HOSPADM

## 2017-02-03 RX ORDER — BUPIVACAINE HYDROCHLORIDE AND EPINEPHRINE 5; 5 MG/ML; UG/ML
INJECTION, SOLUTION PERINEURAL AS NEEDED
Status: DISCONTINUED | OUTPATIENT
Start: 2017-02-03 | End: 2017-02-03 | Stop reason: HOSPADM

## 2017-02-03 RX ORDER — PROMETHAZINE HYDROCHLORIDE 25 MG/1
12.5 TABLET ORAL ONCE AS NEEDED
Status: DISCONTINUED | OUTPATIENT
Start: 2017-02-03 | End: 2017-02-03 | Stop reason: HOSPADM

## 2017-02-03 RX ADMIN — FENTANYL CITRATE 50 MCG: 50 INJECTION INTRAMUSCULAR; INTRAVENOUS at 12:26

## 2017-02-03 RX ADMIN — MIDAZOLAM HYDROCHLORIDE 1 MG: 1 INJECTION, SOLUTION INTRAMUSCULAR; INTRAVENOUS at 12:55

## 2017-02-03 RX ADMIN — FENTANYL CITRATE 50 MCG: 50 INJECTION INTRAMUSCULAR; INTRAVENOUS at 14:47

## 2017-02-03 RX ADMIN — FAMOTIDINE 20 MG: 10 INJECTION, SOLUTION INTRAVENOUS at 11:21

## 2017-02-03 RX ADMIN — PROPOFOL 200 MG: 10 INJECTION, EMULSION INTRAVENOUS at 12:26

## 2017-02-03 RX ADMIN — ONDANSETRON 4 MG: 2 INJECTION INTRAMUSCULAR; INTRAVENOUS at 13:19

## 2017-02-03 RX ADMIN — FENTANYL CITRATE 50 MCG: 50 INJECTION INTRAMUSCULAR; INTRAVENOUS at 14:28

## 2017-02-03 RX ADMIN — HYDROMORPHONE HYDROCHLORIDE 0.5 MG: 1 INJECTION, SOLUTION INTRAMUSCULAR; INTRAVENOUS; SUBCUTANEOUS at 15:19

## 2017-02-03 RX ADMIN — FENTANYL CITRATE 50 MCG: 50 INJECTION INTRAMUSCULAR; INTRAVENOUS at 12:46

## 2017-02-03 RX ADMIN — SODIUM CHLORIDE, POTASSIUM CHLORIDE, SODIUM LACTATE AND CALCIUM CHLORIDE: 600; 310; 30; 20 INJECTION, SOLUTION INTRAVENOUS at 13:41

## 2017-02-03 RX ADMIN — SUCCINYLCHOLINE CHLORIDE 60 MG: 20 INJECTION, SOLUTION INTRAMUSCULAR; INTRAVENOUS; PARENTERAL at 12:26

## 2017-02-03 RX ADMIN — DEXAMETHASONE SODIUM PHOSPHATE 10 MG: 10 INJECTION INTRAMUSCULAR; INTRAVENOUS at 12:37

## 2017-02-03 RX ADMIN — LIDOCAINE HYDROCHLORIDE 1 EACH: 40 SPRAY LARYNGEAL; TRANSTRACHEAL at 12:31

## 2017-02-03 RX ADMIN — OXYCODONE HYDROCHLORIDE AND ACETAMINOPHEN 1 TABLET: 7.5; 325 TABLET ORAL at 15:26

## 2017-02-03 RX ADMIN — CEFAZOLIN SODIUM 2 G: 2 INJECTION, SOLUTION INTRAVENOUS at 12:24

## 2017-02-03 RX ADMIN — SODIUM CHLORIDE, POTASSIUM CHLORIDE, SODIUM LACTATE AND CALCIUM CHLORIDE: 600; 310; 30; 20 INJECTION, SOLUTION INTRAVENOUS at 12:22

## 2017-02-03 RX ADMIN — MIDAZOLAM 2 MG: 1 INJECTION INTRAMUSCULAR; INTRAVENOUS at 11:22

## 2017-02-03 RX ADMIN — HYDROMORPHONE HYDROCHLORIDE 0.5 MG: 1 INJECTION, SOLUTION INTRAMUSCULAR; INTRAVENOUS; SUBCUTANEOUS at 15:08

## 2017-02-03 RX ADMIN — SODIUM CHLORIDE, POTASSIUM CHLORIDE, SODIUM LACTATE AND CALCIUM CHLORIDE 9 ML/HR: 600; 310; 30; 20 INJECTION, SOLUTION INTRAVENOUS at 11:21

## 2017-02-03 RX ADMIN — LIDOCAINE HYDROCHLORIDE 80 MG: 20 INJECTION, SOLUTION INFILTRATION; PERINEURAL at 12:26

## 2017-02-03 NOTE — H&P (VIEW-ONLY)
General Surgery    History and Physical    CC: Parathyroid adenoma, hypercalcemia    HPI: The patient is a pleasant 66 y.o. year-old gentleman who presents today to my office to discuss recent findings of hypercalcemia as well as hyperparathyroidism.  On routine labs in the past, he was found to have elevated calcium levels as high as 12.0, but most recently measured at 11.4.  Further workup revealed a normal phosphorus level, low vitamin D level at 21, and elevated parathyroid hormone level at 212.  I SPECT/CT scan revealed a 1.1 cm hyperfunctioning left inferior parathyroid adenoma adjacent to the esophagus.  He has been sent to see me for discussion of possible parathyroidectomy.  He denies any history of chronic abdominal pain, constipation, or kidney stones.  He does however have a history of chronic depression which has been ongoing for many years now.  He also has chronic joint pain within his bilateral shoulders and knees, and has right hand paresthesias intermittently due to a distal radius fracture.  He describes the tingling sensation occurring only in the lateral 3 fingers of the right hand.  He has a known history of irritable bowel syndrome and has mostly diarrhea predominant stools.  He denies any family history of any neuro endocrine tumors.  He has had no prior radiation to the neck.    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[employer], has 2 sons    ROS: A comprehensive 14-system review was conducted and significant only for  the following positives: Sleep apnea, hearing loss, constipation, diarrhea, reflux, urinary urgency, joint pain, back pain, agitation, decreased concentration, nervousness, anxiety, and depression    Physical Exam:  Vitals:    01/10/17 1026   Pulse: 71   SpO2: 98%     General: No acute distress, well-nourished & well-developed  HEAD: normocephalic, atraumatic  EYES: normal conjunctiva, sclera anicteric  EARS: grossly normal hearing  NECK: supple, no thyromegaly, no palpable thyroid nodules  CARDIOVASCULAR: regular rate and rhythm  RESPIRATORY: clear to auscultation bilaterally  GASTROINTESTINAL: soft, nontender, non-distended  PSYCHIATRIC: oriented x3, normal mood and affect    Laboratory analysis:  Calcium: 11.4 mg/dL  Phosphorus: 2.9 mg/dL  Vitamin D: 21 ng/mL  PTH: 212 pg/mL    Imaging studies:  Sestamibi SPECT/CT:   Parathyroid adenoma along the posterior aspect of the lower left thyroid pole and adjacent to the left margin of the esophagus measuring 1.1 cm in greatest dimension    ASSESSMENT & PLAN  66-year-old gentleman with primary hyperparathyroidism due to left inferior parathyroid adenoma    I personally reviewed his labs and imaging report from [facility], and think he would be a good candidate for left inferior parathyroidectomy with intraoperative PTH monitoring for control of his hypercalcemia.  I discussed the risks and benefits of the surgical procedure at length with the patient, and discussed the risk of bleeding, possible recurrent laryngeal nerve injury, possibility of multiple parathyroid adenomas being the etiology of his hypercalcemia which would then require a bilateral neck exploration.  He understands these risks, had time to ask questions which were all answered, and he has consented to proceed.  I would like to check a thyroid ultrasound preoperatively to ensure that there are no suspicious thyroid nodules requiring further workup.    Sarah Matos MD  General  and Endoscopic Surgery  [facility]    [address], [address]  [place], [place], [address]  P: [phone]  F: [phone]

## 2017-02-03 NOTE — INTERVAL H&P NOTE
H&P reviewed. The patient was examined and there are no changes to the H&P. Hypercalcemia due to parathyroid adenoma, likely 1.1 cm left inferior parathyroid gland based on Sestamibi SPECT/CT. Plan for left inferior parathyroidectomy with intra-operative PTH monitoring. Baseline intra-op PTH value drawn already in pre-op for comparison. Patient understands all of the risks and benefits and has consented to proceed.    Sarah Matos MD  General and Endoscopic Surgery  [facility]    [address], [address]  [place], [place], [address]  P: [phone]  F: [phone]

## 2017-02-03 NOTE — ANESTHESIA POSTPROCEDURE EVALUATION
Patient: Kaiser Valente    Procedure Summary     Date Anesthesia Start Anesthesia Stop Room / Location    02/03/17 1222 1358 [facility] OR 20 / [facility] OR       Procedure Diagnosis Surgeon Provider    Left inferior parathyroidectomy with intraoperative PTH monitoring (Left Neck) Parathyroid adenoma  (Parathyroid adenoma [D35.1]) MD Ashish Lopez MD          Anesthesia Type: general  Last vitals  /84 (02/03/17 1540)    Temp 36.6 °C (97.8 °F) (02/03/17 1535)    Pulse 63 (02/03/17 1540)   Resp 16 (02/03/17 1540)    SpO2 95 % (02/03/17 1540)      Post Anesthesia Care and Evaluation    Patient location during evaluation: bedside  Pain management: adequate  Airway patency: patent  Anesthetic complications: No anesthetic complications    Cardiovascular status: acceptable  Respiratory status: acceptable  Hydration status: acceptable

## 2017-02-03 NOTE — OP NOTE
Operative Note :  Sarah Matos MD      Kaiser Valente  1950    Procedure Date: 02/03/17    Pre-op Diagnosis:  · Parathyroid adenoma, primary hyperparathyroidism with hypercalcemia    Post-op Diagnosis:  · Left inferior parathyroid adenoma, primary hyperparathyroidism with resulting hypercalcemia    Procedure:   · Left inferior parathyroidectomy with intraoperative PTH monitoring    Surgeon: Sarah Matos MD    Assistant: Anna Spence    Anesthesia:  General (general endotracheal tube)    Estimated Blood Loss: 10 cc    Specimens: Left inferior parathyroid gland, intraoperative PTH blood draw    Complications: None    Indications:  · The patient is a pleasant 67 year-old gentleman with incidentally discovered hypercalcemia and was found to have signs of hyperparathyroidism on laboratory analysis. Further workup revealed a left inferior parathyroid adenoma on Sestamibi SPECT/CT. He presents today for elective parathyroidectomy and has been counseled on the risks and benefits of the procedure.  In particular, he understands the risks of postoperative bleeding, possible wound infection, and possible recurrent laryngeal nerve injury.  Despite these risks, he has consented to proceed.  His preoperative PTH level was 193.    Findings:   · Hypercellular left inferior parathyroid gland    Description of procedure:  The patient was brought to the operating room placed on the OR table in supine position.  An endotracheal tube was inserted and general anesthesia was induced.  His arms were tucked at his side and a shoulder roll was positioned posterior to his scapulas.  His anterior neck and chest were prepped and draped in sterile fashion.  A surgical timeout was then completed.  A lower neck natural skin crease was identified and injected with 10 cc of half percent Marcaine with epinephrine transversely.  A corresponding skin incision was then made here and the underlying subcutaneous tissues were divided with  Bovie electrocautery down to the level of the platysma.  The platysma was guided with electrocautery and superior and inferior flaps were raised just beneath the muscle.  The strap muscles were then divided in the midline with electrocautery, revealing the thyroid gland beneath.  I began by dissecting out laterally to the left where the previously identified parathyroid adenoma was present on SPECT-CT.  The thyroid gland was raised off of the underlying pharyngeal muscles and soft tissues with 2 Allis clamps.  The middle thyroid vein was identified and preserved as was the recurrent laryngeal nerve.  A large, hypercellular appearing parathyroid gland was immediately evident just posterior to the midportion of the left thyroid lobe.  This was very carefully dissected away from its surrounding soft tissue attachments and easily eviscerated from its position within the left neck.  The vascular pedicle leading up to this parathyroid gland was easily dissected using the bipolar cautery device.  The removed gland was submerged in saline and immediately sunk to the bottom, consistent with a parathyroid gland as opposed to subcutaneous fat.  This gland was sent off to pathology as a frozen specimen labeled left inferior parathyroid gland.  The remainder of the left neck was inspected and again the recurrent laryngeal nerve was found to be free of harm and well away from the dissection plane.  Pathology did call back and approximately 20 minutes later to notify me that the frozen section was consistent with a hypercellular parathyroid adenoma.  20 minutes after the removal of the gland, 5 cc of venous blood was aspirated from the adjacent left internal jugular vein and passed off as an intraoperative PTH measurement.  Remainder of the neck was inspected and hemostasis was achieved with the bipolar device.  A piece of Surgicel was placed within the left neck after holding pressure for 10 minutes on the internal jugular vein.   Meticulous hemostasis was achieved.  The neck was then closed in multiple layers by reapproximating the strap muscles in the midline using a running 3-0 Vicryl stitch, reapproximating the platysma transversely with an additional 3-0 Vicryl running stitch, and closure of the skin with a running 4-0 Vicryl subcuticular suture.  Dermabond was then placed over the skin incision and the patient was extubated successfully.  He was then transferred to PACU in stable condition.  The intraoperative PTH measurement dropped to 43 following the removal of the hypercellular parathyroid gland, in comparison to 193 preoperative value.  All counts were correct per nursing at the completion of the case and the patient tolerated the procedure quite well.    Sarah Matos MD  General and Endoscopic Surgery  [facility]    [address] [place] [address], [address]  [place], [place], [address]  P: [phone]  F: [phone]

## 2017-02-03 NOTE — PERIOPERATIVE NURSING NOTE
SPOKE WITH DR. WANG . AWARE OF PLATELET COUNT 100 AND .2. STATED SHE WOULD PUT IN  ANTIBIOTIC ORDERS.

## 2017-02-03 NOTE — PLAN OF CARE
Problem: Patient Care Overview (Adult)  Goal: Plan of Care Review  Outcome: Ongoing (interventions implemented as appropriate)    02/03/17 1051   Coping/Psychosocial Response Interventions   Plan Of Care Reviewed With patient;spouse   Patient Care Overview   Progress progress toward functional goals as expected       Goal: Adult Individualization and Mutuality  Outcome: Ongoing (interventions implemented as appropriate)  Goal: Discharge Needs Assessment  Outcome: Ongoing (interventions implemented as appropriate)    Problem: Perioperative Period (Adult)  Goal: Signs and Symptoms of Listed Potential Problems Will be Absent or Manageable (Perioperative Period)  Outcome: Ongoing (interventions implemented as appropriate)    02/03/17 1051   Perioperative Period   Problems Assessed (Perioperative Period) all

## 2017-02-03 NOTE — ANESTHESIA PROCEDURE NOTES
Airway  Urgency: elective    Date/Time: 2/3/2017 12:31 PM  Airway not difficult    General Information and Staff    Patient location during procedure: OR  Anesthesiologist: GISEL MERCADO  CRNA: ALEX MCARTHUR    Indications and Patient Condition  Indications for airway management: airway protection    Preoxygenated: yes  Mask difficulty assessment: 1 - vent by mask    Final Airway Details  Final airway type: endotracheal airway      Successful airway: ETT and reinforced tube  Cuffed: yes   Successful intubation technique: direct laryngoscopy  Facilitating devices/methods: intubating stylet  Endotracheal tube insertion site: oral  Blade: Jiménez  Blade size: #2  ETT size: 8.0 mm  Cormack-Lehane Classification: grade I - full view of glottis  Placement verified by: chest auscultation   Cuff volume (mL): 7  Measured from: lips  ETT to lips (cm): 22  Number of attempts at approach: 1    Additional Comments  Teeth & mucosa intact

## 2017-02-03 NOTE — PLAN OF CARE
Problem: Patient Care Overview (Adult)  Goal: Plan of Care Review  Outcome: Outcome(s) achieved Date Met:  02/03/17 02/03/17 1616   Coping/Psychosocial Response Interventions   Plan Of Care Reviewed With patient;spouse   Patient Care Overview   Progress improving   Outcome Evaluation   Outcome Summary/Follow up Plan ready for discharge       Goal: Adult Individualization and Mutuality  Outcome: Outcome(s) achieved Date Met:  02/03/17  Goal: Discharge Needs Assessment  Outcome: Outcome(s) achieved Date Met:  02/03/17    Problem: Perioperative Period (Adult)  Goal: Signs and Symptoms of Listed Potential Problems Will be Absent or Manageable (Perioperative Period)  Outcome: Outcome(s) achieved Date Met:  02/03/17

## 2017-02-03 NOTE — ANESTHESIA PREPROCEDURE EVALUATION
Anesthesia Evaluation     Patient summary reviewed and Nursing notes reviewed    No history of anesthetic complications   Airway   Mallampati: II  TM distance: >3 FB  Neck ROM: full  no difficulty expected  Dental - normal exam     Pulmonary - normal exam   (+) sleep apnea on CPAP,   Cardiovascular - negative cardio ROS and normal exam  Exercise tolerance: good (4-7 METS)    ECG reviewed  Rhythm: regular  Rate: normal    Neuro/Psych- negative ROS  GI/Hepatic/Renal/Endo    (+)  GERD well controlled, hypothyroidism,     Musculoskeletal (-) negative ROS    Abdominal  - normal exam   Substance History - negative use     OB/GYN          Other - negative ROS                            Anesthesia Plan    ASA 2     general     intravenous induction   Anesthetic plan and risks discussed with patient.    Plan discussed with CRNA and attending.

## 2017-02-04 LAB
CYTO UR: NORMAL
LAB AP CASE REPORT: NORMAL
Lab: NORMAL
Lab: NORMAL
PATH REPORT.FINAL DX SPEC: NORMAL
PATH REPORT.GROSS SPEC: NORMAL

## 2017-02-07 ENCOUNTER — TELEPHONE (OUTPATIENT)
Dept: SURGERY | Facility: CLINIC | Age: 67
End: 2017-02-07

## 2017-02-07 NOTE — TELEPHONE ENCOUNTER
----- Message from Sarah Matos MD sent at 2/6/2017 10:10 PM EST -----  Regarding: Lab instructions  Please contact Kaiser Valente Tuesday 02/07 to remind him to come in to the [facility] lab at any point Wednesday 02/08 for labs to be drawn following his parathyroidectomy last week.

## 2017-02-08 ENCOUNTER — LAB (OUTPATIENT)
Dept: LAB | Facility: HOSPITAL | Age: 67
End: 2017-02-08

## 2017-02-08 DIAGNOSIS — D35.1 PARATHYROID ADENOMA: ICD-10-CM

## 2017-02-08 LAB
CALCIUM SPEC-SCNC: 9.2 MG/DL (ref 8.6–10.5)
MAGNESIUM SERPL-MCNC: 2.1 MG/DL (ref 1.6–2.4)
PHOSPHATE SERPL-MCNC: 2.8 MG/DL (ref 2.5–4.5)
PTH-INTACT SERPL-MCNC: 62.1 PG/ML (ref 15–65)

## 2017-02-08 PROCEDURE — 36415 COLL VENOUS BLD VENIPUNCTURE: CPT

## 2017-02-08 PROCEDURE — 82310 ASSAY OF CALCIUM: CPT

## 2017-02-08 PROCEDURE — 84100 ASSAY OF PHOSPHORUS: CPT

## 2017-02-08 PROCEDURE — 83970 ASSAY OF PARATHORMONE: CPT

## 2017-02-08 PROCEDURE — 83735 ASSAY OF MAGNESIUM: CPT

## 2017-02-09 ENCOUNTER — TELEPHONE (OUTPATIENT)
Dept: SURGERY | Facility: CLINIC | Age: 67
End: 2017-02-09

## 2017-02-09 PROBLEM — D35.1 PARATHYROID ADENOMA: Status: RESOLVED | Noted: 2017-02-03 | Resolved: 2017-02-09

## 2017-02-09 NOTE — TELEPHONE ENCOUNTER
I called and left a voicemail for Mr. Valente and instructed him to discontinue the Oscal-D supplementation following his parathyroid adenoma removal, as his Calcium levels are normal and PTH levels have decreased down to a normal range as well. I also told him to take 1-2 tablets of calcium via either Oscal-D or Tums in the future for any signs of hypoglycemia.

## 2017-02-14 ENCOUNTER — OFFICE VISIT (OUTPATIENT)
Dept: SURGERY | Facility: CLINIC | Age: 67
End: 2017-02-14

## 2017-02-14 DIAGNOSIS — D35.1 PARATHYROID ADENOMA: Primary | ICD-10-CM

## 2017-02-14 PROBLEM — Z86.39 HISTORY OF PRIMARY HYPERPARATHYROIDISM: Status: ACTIVE | Noted: 2017-02-14

## 2017-02-14 PROCEDURE — 99024 POSTOP FOLLOW-UP VISIT: CPT | Performed by: SURGERY

## 2017-02-15 NOTE — PROGRESS NOTES
"CHIEF COMPLAINT:   Chief Complaint   Patient presents with   • Post-op     Left inferior parathyroidectomy with intraoperative PTH monitoring 2/3/17       HISTORY OF PRESENT ILLNESS:  This is a 67 y.o. male  who presents for a post-operative visit after undergoing a parathyroidectomy on 2/3/17 for a left inferior parathyroid adenoma. His preop PTH values were as high as 212 and post-op dropped down to 62. His calcium levels post-op have remained normal (9.2 from 11.7 preop). He was discharged home on Oscal-d but I contacted him last week and instructed him to discontinue taking the calcium supplementation after his labs returned normal. He denies any perioral or distal fingertip paresthesias or tingling. He has a slightly hoarse/raspy voice that he says has worsened in the last 2 days due to a cold with congestion and a productive cough. He has had no trouble swallowing and no dyspnea. There is some persistent swelling of the lower aspect of his incision today but this has not enlarged.    Pathology:   \"LEFT INFERIOR PARATHYROID\":  ENLARGED HYPERCELLULAR PARATHYROID CONSISTENT WITH ADENOMA IF REMAINING  PARATHYROIDS ARE WITHIN NORMAL LIMITS.   DIMENSIONS: 2.6 X 1.5 X 1.0 CM.  WEIGHT: 2.17 GRAMS.    PHYSICAL EXAM:  Neck: Incision healing well with slight edema inferiorly  Lungs: Clear  Heart: RRR  ABD: soft, nontender  Ext: no significant edema, calves nontender    A/P:  This is a 67 y.o. male patient who is S/P left inferior parathyroidectomy for a parathyroid adenoma with resulting primary hyperparathyroidism.    His calcium levels are normal post-op and I have reinforced that from here on he only needs to take Oscal-D or Tums by mouth for any symptoms of hypocalcemia. 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 He knows to contact me in the future for any new neck swelling, dyspnea, or " signs of hypocalcemia. He can follow-up with me as needed.    Sarah Matos MD  General and Endoscopic Surgery  [facility]    [address], [address]  [place], [place], [address]  P: [phone]  F: [phone]

## 2017-03-22 DIAGNOSIS — E83.52 HYPERCALCEMIA: ICD-10-CM

## 2017-03-22 DIAGNOSIS — E03.9 ACQUIRED HYPOTHYROIDISM: Primary | ICD-10-CM

## 2017-03-22 DIAGNOSIS — Z86.39 HISTORY OF PRIMARY HYPERPARATHYROIDISM: ICD-10-CM

## 2017-03-28 DIAGNOSIS — K21.9 GASTROESOPHAGEAL REFLUX DISEASE WITHOUT ESOPHAGITIS: ICD-10-CM

## 2017-03-28 RX ORDER — OMEPRAZOLE 20 MG/1
CAPSULE, DELAYED RELEASE ORAL
Qty: 30 CAPSULE | Refills: 5 | Status: SHIPPED | OUTPATIENT
Start: 2017-03-28 | End: 2017-04-21 | Stop reason: HOSPADM

## 2017-04-11 ENCOUNTER — OFFICE VISIT (OUTPATIENT)
Dept: ENDOCRINOLOGY | Age: 67
End: 2017-04-11

## 2017-04-11 VITALS
RESPIRATION RATE: 16 BRPM | SYSTOLIC BLOOD PRESSURE: 128 MMHG | BODY MASS INDEX: 29.26 KG/M2 | WEIGHT: 204.4 LBS | DIASTOLIC BLOOD PRESSURE: 84 MMHG | HEIGHT: 70 IN

## 2017-04-11 DIAGNOSIS — M79.675 TOE PAIN, LEFT: ICD-10-CM

## 2017-04-11 DIAGNOSIS — E78.1 HYPERTRIGLYCERIDEMIA: ICD-10-CM

## 2017-04-11 DIAGNOSIS — E83.52 HYPERCALCEMIA: ICD-10-CM

## 2017-04-11 DIAGNOSIS — R53.82 CHRONIC FATIGUE: ICD-10-CM

## 2017-04-11 DIAGNOSIS — Z86.39 HISTORY OF PRIMARY HYPERPARATHYROIDISM: ICD-10-CM

## 2017-04-11 DIAGNOSIS — E29.1 HYPOGONADISM IN MALE: ICD-10-CM

## 2017-04-11 DIAGNOSIS — R60.0 LOCALIZED EDEMA: ICD-10-CM

## 2017-04-11 DIAGNOSIS — E03.9 ACQUIRED HYPOTHYROIDISM: Primary | ICD-10-CM

## 2017-04-11 PROCEDURE — 99214 OFFICE O/P EST MOD 30 MIN: CPT | Performed by: NURSE PRACTITIONER

## 2017-04-11 NOTE — PROGRESS NOTES
"Young Valente is a 67 y.o. male is here today for follow-up.  Chief Complaint   Patient presents with   • Hypothyroidism     nipple tenderness   • Abnormal Calcium     /84  Resp 16  Ht 70\" (177.8 cm)  Wt 204 lb 6.4 oz (92.7 kg)  BMI 29.33 kg/m2  Current Outpatient Prescriptions on File Prior to Visit   Medication Sig   • aspirin 81 MG tablet Take 81 mg by mouth Daily. HOLD PRIOR TO SURG   • busPIRone (BUSPAR) 15 MG tablet Take 30 mg by mouth 2 (Two) Times a Day.   • calcium-vitamin D (OSCAL 500/200 D-3) 500-200 MG-UNIT per tablet Take 2 tablets by mouth 2 (Two) Times a Day.   • ergocalciferol (ERGOCALCIFEROL) 07796 UNITS capsule Take 50,000 Units by mouth. TWO TIMES WEEK   • glucosamine-chondroitin 500-400 MG capsule capsule Take 1 capsule by mouth 3 (Three) Times a Day With Meals. HOLD PRIOR TO SURG   • lamoTRIgine (LaMICtal) 100 MG tablet Take 100 mg by mouth Every Evening.   • lamoTRIgine (LAMICTAL) 100 MG tablet Take 50 mg by mouth Every Morning.   • levothyroxine (SYNTHROID) 100 MCG tablet Take 1 tablet by mouth Daily. On empty stomach   • modafinil (PROVIGIL) 100 MG tablet Take 150 mg by mouth Every Morning.   • Omega-3 Fatty Acids (FISH OIL) 1000 MG capsule capsule Take 1,000 mg by mouth Daily With Breakfast. HOLD PRIOR TO SURG   • omeprazole (priLOSEC) 20 MG capsule Take 20 mg by mouth Every Morning.   • omeprazole (priLOSEC) 20 MG capsule TAKE ONE CAPSULE BY MOUTH DAILY   • QUEtiapine (SEROquel) 25 MG tablet Take 25 mg by mouth 3 (Three) Times a Day.   • sertraline (ZOLOFT) 100 MG tablet Take 200 mg by mouth Every Morning.   • Syringe 23G X 1\" 3 ML misc Use every 2 weeks for testosterone injection   • Testosterone Cypionate 200 MG/ML kit Inject 1 mL into the shoulder, thigh, or buttocks Every 14 (Fourteen) Days.   • TURMERIC PO Take 1 capsule by mouth Daily. HOLD PRIOR TO SURG     No current facility-administered medications on file prior to visit.      Family History   Problem " Relation Age of Onset   • Breast cancer Mother    • Heart disease Father    • Hypertension Father    • Colon cancer Sister      Social History   Substance Use Topics   • Smoking status: Never Smoker   • Smokeless tobacco: None   • Alcohol use Yes      Comment: 1 DAILY DRINK         History of Present Illness  Encounter Diagnoses   Name Primary?   • Acquired hypothyroidism Yes   • History of primary hyperparathyroidism    • Hypercalcemia    • Hypertriglyceridemia    • Hypogonadism in male    • Chronic fatigue    • Localized edema      This is a 67-year-old male patient here today for routine follow-up visit.  He is being seen for the above-mentioned problems.  He has a history of a recent parathyroidectomy.  I reviewed his previous labs and note in the chart.  He is taking all his medications as prescribed.  He is complaining of pain in his left fourth toe.  I examined both his feet and his left foot is red and swollen especially his fourth toe.  He states he does have a history of gouty arthritis.  He denies any previous injury to the foot.  He is a fifth degree  in [employer] however he states he has not been working out recently.  He is a had any recent labs done.  He does have a history of renal insufficiency and has to avoid non-steroidal's.  His blood pressure is in good range and his weight is stable at today's visit.  The following portions of the patient's history were reviewed and updated as appropriate: allergies, current medications, past family history, past medical history, past social history, past surgical history and problem list.     Review of Systems   Constitutional: Negative for fatigue and unexpected weight change.   Endocrine: Negative for cold intolerance and heat intolerance.   Psychiatric/Behavioral: Negative for confusion and decreased concentration.       Objective   Physical Exam   Constitutional: He is oriented to person, place, and time. He appears well-developed and  well-nourished. No distress.   Somewhat pale and chronically ill looking   HENT:   Head: Normocephalic and atraumatic.   Right Ear: External ear normal.   Nose: Nose normal.   Mouth/Throat: Oropharynx is clear and moist. No oropharyngeal exudate.   Eyes: Conjunctivae and EOM are normal. Pupils are equal, round, and reactive to light. Right eye exhibits no discharge. Left eye exhibits no discharge. No scleral icterus.   Neck: Normal range of motion. Neck supple. No JVD present. No tracheal deviation present. No thyromegaly present.   Cardiovascular: Normal rate, regular rhythm, normal heart sounds and intact distal pulses.  Exam reveals no gallop and no friction rub.    No murmur heard.  Pulmonary/Chest: Effort normal and breath sounds normal. No stridor. No respiratory distress. He has no wheezes. He has no rales. He exhibits no tenderness.   Abdominal: Soft. Bowel sounds are normal. He exhibits no distension and no mass. There is no tenderness. There is no rebound and no guarding. No hernia.   Musculoskeletal: Normal range of motion. He exhibits no edema, tenderness or deformity.   Healed the scar of the open reduction of a broken right wrist in the medial aspect of right lower forearm.   Lymphadenopathy:     He has no cervical adenopathy.   Neurological: He is alert and oriented to person, place, and time. He has normal reflexes. He displays normal reflexes. No cranial nerve deficit. He exhibits normal muscle tone. Coordination normal.   Skin: Skin is warm and dry. No rash noted. He is not diaphoretic. No erythema. No pallor.   Psychiatric: He has a normal mood and affect. His behavior is normal. Judgment and thought content normal.   Nursing note and vitals reviewed.    Lab Results   Component Value Date    GLUCOSE 84 01/31/2017    BUN 28 (H) 01/31/2017    CREATININE 1.45 (H) 01/31/2017    EGFRIFNONA 48 (L) 01/31/2017    EGFRIFAFRI 63 12/28/2016    BCR 19.3 01/31/2017    K 3.8 01/31/2017    CO2 25.1 01/31/2017     CALCIUM 9.2 02/08/2017    PROTENTOTREF 6.7 12/28/2016    ALBUMIN 4.60 12/28/2016    LABIL2 2.2 12/28/2016    AST 24 12/28/2016    ALT 26 12/28/2016     Lab Results   Component Value Date    TSH 1.610 12/28/2016     Lab Results   Component Value Date    PTH 62.1 02/08/2017    CALCIUM 9.2 02/08/2017    CAION 6.6 (C) 12/28/2016    PHOS 2.8 02/08/2017         Assessment/Plan   Kaiser was seen today for hypothyroidism and abnormal calcium.    Diagnoses and all orders for this visit:    Acquired hypothyroidism  -     Comprehensive Metabolic Panel  -     Comprehensive Thyroglobulin  -     T3, Free  -     T4, Free  -     Phosphorus  -     PTH, Intact  -     Thyroid Panel With TSH  -     Uric Acid  -     Vitamin D 25 Hydroxy  -     Lipid Panel  -     Calcium, Ionized  -     TestT+TestF+SHBG    History of primary hyperparathyroidism  -     Comprehensive Metabolic Panel  -     Comprehensive Thyroglobulin  -     T3, Free  -     T4, Free  -     Phosphorus  -     PTH, Intact  -     Thyroid Panel With TSH  -     Uric Acid  -     Vitamin D 25 Hydroxy  -     Lipid Panel  -     Calcium, Ionized  -     TestT+TestF+SHBG    Hypercalcemia  -     Comprehensive Metabolic Panel  -     Comprehensive Thyroglobulin  -     T3, Free  -     T4, Free  -     Phosphorus  -     PTH, Intact  -     Thyroid Panel With TSH  -     Uric Acid  -     Vitamin D 25 Hydroxy  -     Lipid Panel  -     Calcium, Ionized  -     TestT+TestF+SHBG    Hypertriglyceridemia  -     Comprehensive Metabolic Panel  -     Comprehensive Thyroglobulin  -     T3, Free  -     T4, Free  -     Phosphorus  -     PTH, Intact  -     Thyroid Panel With TSH  -     Uric Acid  -     Vitamin D 25 Hydroxy  -     Lipid Panel  -     Calcium, Ionized  -     TestT+TestF+SHBG    Hypogonadism in male  -     Comprehensive Metabolic Panel  -     Comprehensive Thyroglobulin  -     T3, Free  -     T4, Free  -     Phosphorus  -     PTH, Intact  -     Thyroid Panel With TSH  -     Uric Acid  -      Vitamin D 25 Hydroxy  -     Lipid Panel  -     Calcium, Ionized  -     TestT+TestF+SHBG    Chronic fatigue  -     Comprehensive Metabolic Panel  -     Comprehensive Thyroglobulin  -     T3, Free  -     T4, Free  -     Phosphorus  -     PTH, Intact  -     Thyroid Panel With TSH  -     Uric Acid  -     Vitamin D 25 Hydroxy  -     Lipid Panel  -     Calcium, Ionized  -     TestT+TestF+SHBG    Localized edema  -     Comprehensive Metabolic Panel  -     Comprehensive Thyroglobulin  -     T3, Free  -     T4, Free  -     Phosphorus  -     PTH, Intact  -     Thyroid Panel With TSH  -     Uric Acid  -     Vitamin D 25 Hydroxy  -     Lipid Panel  -     Calcium, Ionized  -     TestT+TestF+SHBG        In summary, patient was seen and examined.  He will have extensive laboratory evaluation done today and will be notified of the results.  He is to continue on his current medications as prescribed. I will evaluate his uric acid level.  No medications were changed at today's visit.  I've encouraged him to contact his primary care doctor for further follow-up on his foot pain and redness.

## 2017-04-13 ENCOUNTER — OFFICE VISIT (OUTPATIENT)
Dept: FAMILY MEDICINE CLINIC | Facility: CLINIC | Age: 67
End: 2017-04-13

## 2017-04-13 VITALS
TEMPERATURE: 98.2 F | OXYGEN SATURATION: 98 % | RESPIRATION RATE: 16 BRPM | DIASTOLIC BLOOD PRESSURE: 82 MMHG | WEIGHT: 206.9 LBS | HEART RATE: 91 BPM | HEIGHT: 70 IN | BODY MASS INDEX: 29.62 KG/M2 | SYSTOLIC BLOOD PRESSURE: 130 MMHG

## 2017-04-13 DIAGNOSIS — R21 RASH: Primary | ICD-10-CM

## 2017-04-13 LAB
HCT VFR BLDA CALC: 42.3 %
HGB BLDA-MCNC: 14 G/DL
MCH, POC: 29.4
MCHC, POC: 33.1
MCV, POC: 88.8
PLATELET # BLD AUTO: 107 10*3/MM3
PMV BLD: 8.3 FL
RBC, POC: 4.76
RDW, POC: 15.8
WBC # BLD: 12.4 10*3/UL

## 2017-04-13 PROCEDURE — 85027 COMPLETE CBC AUTOMATED: CPT | Performed by: FAMILY MEDICINE

## 2017-04-13 PROCEDURE — 36415 COLL VENOUS BLD VENIPUNCTURE: CPT | Performed by: FAMILY MEDICINE

## 2017-04-13 PROCEDURE — 99214 OFFICE O/P EST MOD 30 MIN: CPT | Performed by: FAMILY MEDICINE

## 2017-04-13 RX ORDER — CEPHALEXIN 500 MG/1
500 CAPSULE ORAL 4 TIMES DAILY
Qty: 28 CAPSULE | Refills: 0 | Status: SHIPPED | OUTPATIENT
Start: 2017-04-13 | End: 2017-04-21 | Stop reason: HOSPADM

## 2017-04-13 RX ORDER — PREDNISONE 20 MG/1
60 TABLET ORAL DAILY
Qty: 15 TABLET | Refills: 0 | Status: ON HOLD | OUTPATIENT
Start: 2017-04-13 | End: 2017-04-21

## 2017-04-13 NOTE — PATIENT INSTRUCTIONS
Take the cephalexin antibiotic 4 times a day as directed.    Take the prednisone 3 pills a day every day.  Start this afternoon and then again tomorrow morning.    See me tomorrow for follow-up.    To the emergency room with worsening symptoms.

## 2017-04-13 NOTE — PROGRESS NOTES
"Young Valente is a 67 y.o. male.     Chief Complaint   Patient presents with   • Gout        History of Present Illness    Patient states he has a history of gout.  No flare up of some years.  Few days ago, about 48 hours ago, noted swelling of the left fourth toe.  Now the entire forefoot is swollen and painful and red.  It seems to him like previous gout.  He states the joints are prickly painful.  There is been no fever or symptoms.  No flu symptoms.  And no recent injury.  He had a uric acid drawn by the endocrinologist recently.  Results are still pending.  Recent parathyroid adenoma surgery.      The following portions of the patient's history were reviewed and updated as appropriate: allergies, current medications, past family history, past medical history, past social history, past surgical history and problem list.          Review of Systems   Constitutional: Negative for fever.   Respiratory: Negative.    Cardiovascular: Negative.    Musculoskeletal: Positive for arthralgias and joint swelling.   Skin: Positive for color change and rash.   Neurological: Negative.    Psychiatric/Behavioral: Negative.        Objective   Blood pressure 130/82, pulse 91, temperature 98.2 °F (36.8 °C), temperature source Oral, resp. rate 16, height 70\" (177.8 cm), weight 206 lb 14.4 oz (93.8 kg), SpO2 98 %.  Physical Exam   Constitutional:   Looks uncomfortable because of pain while walking but otherwise nontoxic   Musculoskeletal:   Examination left foot reveals some generalized erythema of the dorsum of the forefoot only.  He the left fourth toe is erythematous and swollen.  He has some splotchy erythema throughout the rest of the forefoot.  It appears to be mostly over the joints.  In particular the great toe at the IP joint and the metatarsal phalangeal joint.  It is warm to touch but not hot.  There is no lymphangitis.  There is no obvious tendon involvement.  He does have some maceration of the skin between " the toes.  No abscess or sinus tract seen.     White blood cell count moderately elevated at 12,000.    Assessment/Plan   Kaiser was seen today for gout.    Diagnoses and all orders for this visit:    Rash  -     POC CBC    Other orders  -     predniSONE (DELTASONE) 20 MG tablet; Take 3 tablets by mouth Daily.  -     cephalexin (KEFLEX) 500 MG capsule; Take 1 capsule by mouth 4 (Four) Times a Day.      Probable gout.  However I cannot clearly rule out evolving cellulitis.  No evidence of lymphangitis.  The joint only nature of the rash and swelling is more consistent with gout.  However I'm recommending coverage with antibiotics.  Prednisone 60 mg a for 5 days.  Cephalexin 4 times a day.  I'm seeing him back tomorrow for recheck.  Patient understands to go to the emergency room with severe symptoms.  If not drastically improving tomorrow, to consider hospitalization.

## 2017-04-14 ENCOUNTER — OFFICE VISIT (OUTPATIENT)
Dept: FAMILY MEDICINE CLINIC | Facility: CLINIC | Age: 67
End: 2017-04-14

## 2017-04-14 ENCOUNTER — HOSPITAL ENCOUNTER (OUTPATIENT)
Dept: GENERAL RADIOLOGY | Facility: HOSPITAL | Age: 67
Discharge: HOME | End: 2017-04-14
Admitting: FAMILY MEDICINE

## 2017-04-14 VITALS
TEMPERATURE: 97.6 F | SYSTOLIC BLOOD PRESSURE: 130 MMHG | HEART RATE: 72 BPM | RESPIRATION RATE: 16 BRPM | DIASTOLIC BLOOD PRESSURE: 82 MMHG | WEIGHT: 207.5 LBS | OXYGEN SATURATION: 98 % | HEIGHT: 70 IN | BODY MASS INDEX: 29.71 KG/M2

## 2017-04-14 DIAGNOSIS — R21 RASH: Primary | ICD-10-CM

## 2017-04-14 DIAGNOSIS — M10.00 IDIOPATHIC GOUT, UNSPECIFIED CHRONICITY, UNSPECIFIED SITE: Primary | ICD-10-CM

## 2017-04-14 LAB
25(OH)D3+25(OH)D2 SERPL-MCNC: 33.6 NG/ML (ref 30–100)
ALBUMIN SERPL-MCNC: 4.7 G/DL (ref 3.5–5.2)
ALBUMIN/GLOB SERPL: 1.8 G/DL
ALP SERPL-CCNC: 117 U/L (ref 39–117)
ALT SERPL-CCNC: 15 U/L (ref 1–41)
AST SERPL-CCNC: 20 U/L (ref 1–40)
BILIRUB SERPL-MCNC: 0.3 MG/DL (ref 0.1–1.2)
BUN SERPL-MCNC: 24 MG/DL (ref 8–23)
BUN/CREAT SERPL: 16.6 (ref 7–25)
CA-I SERPL ISE-MCNC: 5.4 MG/DL (ref 4.5–5.6)
CALCIUM SERPL-MCNC: 9.7 MG/DL (ref 8.6–10.5)
CHLORIDE SERPL-SCNC: 102 MMOL/L (ref 98–107)
CHOLEST SERPL-MCNC: 226 MG/DL (ref 0–200)
CO2 SERPL-SCNC: 23.1 MMOL/L (ref 22–29)
CONV COMMENT: ABNORMAL
CREAT SERPL-MCNC: 1.45 MG/DL (ref 0.76–1.27)
FT4I SERPL CALC-MCNC: 0.9 (ref 1.2–4.9)
GLOBULIN SER CALC-MCNC: 2.6 GM/DL
GLUCOSE SERPL-MCNC: 99 MG/DL (ref 65–99)
HDLC SERPL-MCNC: 45 MG/DL (ref 40–60)
LDLC SERPL CALC-MCNC: 136 MG/DL (ref 0–100)
PHOSPHATE SERPL-MCNC: 2.9 MG/DL (ref 2.5–4.5)
POTASSIUM SERPL-SCNC: 4 MMOL/L (ref 3.5–5.2)
PROT SERPL-MCNC: 7.3 G/DL (ref 6–8.5)
PTH-INTACT SERPL-MCNC: 71 PG/ML (ref 15–65)
SHBG SERPL-SCNC: 87 NMOL/L (ref 19.3–76.4)
SODIUM SERPL-SCNC: 141 MMOL/L (ref 136–145)
T3FREE SERPL-MCNC: 2.5 PG/ML (ref 2–4.4)
T3RU NFR SERPL: 29 % (ref 24–39)
T4 FREE SERPL-MCNC: 0.72 NG/DL (ref 0.93–1.7)
T4 SERPL-MCNC: 3.2 UG/DL (ref 4.5–12)
TESTOST FREE SERPL-MCNC: 2.4 PG/ML (ref 6.6–18.1)
TESTOST SERPL-MCNC: 334 NG/DL (ref 348–1197)
THYROGLOB AB SERPL-ACNC: <1 IU/ML
THYROGLOB SERPL-MCNC: 5.1 NG/ML
THYROGLOB SERPL-MCNC: NORMAL NG/ML
TRIGL SERPL-MCNC: 224 MG/DL (ref 0–150)
TSH SERPL DL<=0.005 MIU/L-ACNC: 4.03 UIU/ML (ref 0.45–4.5)
URATE SERPL-MCNC: 7.5 MG/DL (ref 3.4–7)
VLDLC SERPL CALC-MCNC: 44.8 MG/DL (ref 5–40)

## 2017-04-14 PROCEDURE — 96372 THER/PROPH/DIAG INJ SC/IM: CPT | Performed by: FAMILY MEDICINE

## 2017-04-14 PROCEDURE — 99213 OFFICE O/P EST LOW 20 MIN: CPT | Performed by: FAMILY MEDICINE

## 2017-04-14 RX ORDER — CEFTRIAXONE 500 MG/1
500 INJECTION, POWDER, FOR SOLUTION INTRAMUSCULAR; INTRAVENOUS EVERY 24 HOURS
Status: COMPLETED | OUTPATIENT
Start: 2017-04-14 | End: 2017-04-14

## 2017-04-14 RX ADMIN — CEFTRIAXONE 500 MG: 500 INJECTION, POWDER, FOR SOLUTION INTRAMUSCULAR; INTRAVENOUS at 12:05

## 2017-04-14 NOTE — PROGRESS NOTES
"Subjective   Kaiser Valente is a 67 y.o. male.     Chief Complaint   Patient presents with   • Gout        History of Present Illness    Seen here yesterday for gout flareup versus cellulitis.  He was started on prednisone 60 mg a day.  Also cephalexin 4 times a day.  He is kept off of it more.  He states the redness seems like it's a little worse but the swelling is down and the pain is somewhat better.  He's had no fevers or chills or other systemic symptoms.  He is tolerating the prednisone which he took last night and this morning.      The following portions of the patient's history were reviewed and updated as appropriate: allergies, current medications, past family history, past medical history, past social history, past surgical history and problem list.          Review of Systems   Constitutional: Negative for chills and fever.   Musculoskeletal: Positive for joint swelling.   Skin: Positive for color change and rash.       Objective   Blood pressure 130/82, pulse 72, temperature 97.6 °F (36.4 °C), temperature source Oral, resp. rate 16, height 70\" (177.8 cm), weight 207 lb 8 oz (94.1 kg), SpO2 98 %.  Physical Exam   Constitutional: No distress.   Musculoskeletal:   Examination left foot reveals definite decreased swelling since yesterday.  The erythema overall is also less.  The first interphalangeal joint is more inflamed.  He has joint line tenderness there.  The fourth toe shows decreased erythema and decreased swelling.  There is no lymphangitis.  The foot is warm to the touch but not hot.  There is no tendon involvement.  He is still ambulating with some pain.       Assessment/Plan   Kaiser was seen today for gout.    Diagnoses and all orders for this visit:    Rash  -     cefTRIAXone (ROCEPHIN) injection 500 mg; Inject 500 mg into the shoulder, thigh, or buttocks Daily.    Gout versus cellulitis.  At this point still equivocal.  Overall I suspect this is gout.  The joint tenderness is now much " less.  However given the downside of having cellulitis worsen percent asleep, I do recommend continuing cephalexin and adding Rocephin 500 mg IM today.  I will see him back Monday for recheck.  He'll keep the foot elevated this weekend.  He will stay off it as much as possible.  He can use a heating pad.  With severe symptoms such as fever or chills red streaks or worsening discomfort, seek medical attention immediately at an emergency room.  He can also contact me with questions if needed.  I reviewed labs from the endocrinologist.  Recent uric acid was mildly elevated.  However uric acid levels do not always correlate with symptomatology.

## 2017-04-15 ENCOUNTER — HOSPITAL ENCOUNTER (INPATIENT)
Facility: HOSPITAL | Age: 67
LOS: 6 days | Discharge: HOME | End: 2017-04-21
Attending: HOSPITALIST | Admitting: HOSPITALIST

## 2017-04-15 ENCOUNTER — HOSPITAL ENCOUNTER (EMERGENCY)
Facility: HOSPITAL | Age: 67
Discharge: LEFT BEFORE DISCHARGE | End: 2017-04-15

## 2017-04-15 PROBLEM — F32.A DEPRESSION: Status: ACTIVE | Noted: 2017-04-15

## 2017-04-15 PROBLEM — L03.116 CELLULITIS OF LEFT LOWER EXTREMITY: Status: ACTIVE | Noted: 2017-04-15

## 2017-04-15 PROBLEM — L03.116 CELLULITIS OF FOOT, LEFT: Status: ACTIVE | Noted: 2017-04-15

## 2017-04-15 LAB
ALBUMIN SERPL-MCNC: 4 G/DL (ref 3.5–5.2)
ALBUMIN/GLOB SERPL: 1.2 G/DL
ALP SERPL-CCNC: 101 U/L (ref 39–117)
ALT SERPL W P-5'-P-CCNC: 23 U/L (ref 1–41)
ANION GAP SERPL CALCULATED.3IONS-SCNC: 12.4 MMOL/L
AST SERPL-CCNC: 22 U/L (ref 1–40)
BASOPHILS # BLD AUTO: 0.02 10*3/MM3 (ref 0–0.2)
BASOPHILS NFR BLD AUTO: 0.2 % (ref 0–1.5)
BILIRUB SERPL-MCNC: 0.2 MG/DL (ref 0.1–1.2)
BUN BLD-MCNC: 19 MG/DL (ref 8–23)
BUN/CREAT SERPL: 16.5 (ref 7–25)
CALCIUM SPEC-SCNC: 9.3 MG/DL (ref 8.6–10.5)
CHLORIDE SERPL-SCNC: 102 MMOL/L (ref 98–107)
CO2 SERPL-SCNC: 25.6 MMOL/L (ref 22–29)
CREAT BLD-MCNC: 1.15 MG/DL (ref 0.76–1.27)
DEPRECATED RDW RBC AUTO: 48.3 FL (ref 37–54)
EOSINOPHIL # BLD AUTO: 0.04 10*3/MM3 (ref 0–0.7)
EOSINOPHIL NFR BLD AUTO: 0.4 % (ref 0.3–6.2)
ERYTHROCYTE [DISTWIDTH] IN BLOOD BY AUTOMATED COUNT: 14.7 % (ref 11.5–14.5)
GFR SERPL CREATININE-BSD FRML MDRD: 63 ML/MIN/1.73
GLOBULIN UR ELPH-MCNC: 3.3 GM/DL
GLUCOSE BLD-MCNC: 114 MG/DL (ref 65–99)
HCT VFR BLD AUTO: 38.7 % (ref 40.4–52.2)
HGB BLD-MCNC: 13.1 G/DL (ref 13.7–17.6)
IMM GRANULOCYTES # BLD: 0.02 10*3/MM3 (ref 0–0.03)
IMM GRANULOCYTES NFR BLD: 0.2 % (ref 0–0.5)
LYMPHOCYTES # BLD AUTO: 0.68 10*3/MM3 (ref 0.9–4.8)
LYMPHOCYTES NFR BLD AUTO: 6.3 % (ref 19.6–45.3)
MCH RBC QN AUTO: 30.4 PG (ref 27–32.7)
MCHC RBC AUTO-ENTMCNC: 33.9 G/DL (ref 32.6–36.4)
MCV RBC AUTO: 89.8 FL (ref 79.8–96.2)
MONOCYTES # BLD AUTO: 0.2 10*3/MM3 (ref 0.2–1.2)
MONOCYTES NFR BLD AUTO: 1.9 % (ref 5–12)
NEUTROPHILS # BLD AUTO: 9.78 10*3/MM3 (ref 1.9–8.1)
NEUTROPHILS NFR BLD AUTO: 91 % (ref 42.7–76)
PLATELET # BLD AUTO: 115 10*3/MM3 (ref 140–500)
PMV BLD AUTO: 11.9 FL (ref 6–12)
POTASSIUM BLD-SCNC: 4.2 MMOL/L (ref 3.5–5.2)
PROT SERPL-MCNC: 7.3 G/DL (ref 6–8.5)
RBC # BLD AUTO: 4.31 10*6/MM3 (ref 4.6–6)
SODIUM BLD-SCNC: 140 MMOL/L (ref 136–145)
WBC NRBC COR # BLD: 10.74 10*3/MM3 (ref 4.5–10.7)

## 2017-04-15 PROCEDURE — 85025 COMPLETE CBC W/AUTO DIFF WBC: CPT | Performed by: HOSPITALIST

## 2017-04-15 PROCEDURE — 25010000002 VANCOMYCIN: Performed by: HOSPITALIST

## 2017-04-15 PROCEDURE — 25010000002 ENOXAPARIN PER 10 MG: Performed by: HOSPITALIST

## 2017-04-15 PROCEDURE — 80053 COMPREHEN METABOLIC PANEL: CPT | Performed by: HOSPITALIST

## 2017-04-15 PROCEDURE — 25010000002 PIPERACILLIN SOD-TAZOBACTAM PER 1 G: Performed by: HOSPITALIST

## 2017-04-15 RX ORDER — LAMOTRIGINE 100 MG/1
100 TABLET ORAL NIGHTLY
Status: DISCONTINUED | OUTPATIENT
Start: 2017-04-15 | End: 2017-04-21 | Stop reason: HOSPADM

## 2017-04-15 RX ORDER — MODAFINIL 100 MG/1
150 TABLET ORAL EVERY MORNING
Status: DISCONTINUED | OUTPATIENT
Start: 2017-04-16 | End: 2017-04-21 | Stop reason: HOSPADM

## 2017-04-15 RX ORDER — MORPHINE SULFATE 2 MG/ML
1 INJECTION, SOLUTION INTRAMUSCULAR; INTRAVENOUS EVERY 4 HOURS PRN
Status: DISCONTINUED | OUTPATIENT
Start: 2017-04-15 | End: 2017-04-21 | Stop reason: HOSPADM

## 2017-04-15 RX ORDER — BUSPIRONE HYDROCHLORIDE 15 MG/1
30 TABLET ORAL EVERY 12 HOURS SCHEDULED
Status: DISCONTINUED | OUTPATIENT
Start: 2017-04-15 | End: 2017-04-21 | Stop reason: HOSPADM

## 2017-04-15 RX ORDER — VANCOMYCIN HYDROCHLORIDE 1 G/200ML
1000 INJECTION, SOLUTION INTRAVENOUS EVERY 12 HOURS
Status: DISCONTINUED | OUTPATIENT
Start: 2017-04-16 | End: 2017-04-20

## 2017-04-15 RX ORDER — BUSPIRONE HYDROCHLORIDE 15 MG/1
30 TABLET ORAL 2 TIMES DAILY
Status: DISCONTINUED | OUTPATIENT
Start: 2017-04-15 | End: 2017-04-15

## 2017-04-15 RX ORDER — ONDANSETRON 2 MG/ML
4 INJECTION INTRAMUSCULAR; INTRAVENOUS EVERY 6 HOURS PRN
Status: DISCONTINUED | OUTPATIENT
Start: 2017-04-15 | End: 2017-04-21 | Stop reason: HOSPADM

## 2017-04-15 RX ORDER — QUETIAPINE FUMARATE 50 MG/1
50 TABLET, FILM COATED ORAL NIGHTLY
Status: DISCONTINUED | OUTPATIENT
Start: 2017-04-15 | End: 2017-04-21 | Stop reason: HOSPADM

## 2017-04-15 RX ORDER — ASPIRIN 81 MG/1
81 TABLET, CHEWABLE ORAL DAILY
Status: DISCONTINUED | OUTPATIENT
Start: 2017-04-15 | End: 2017-04-21 | Stop reason: HOSPADM

## 2017-04-15 RX ORDER — PANTOPRAZOLE SODIUM 40 MG/1
40 TABLET, DELAYED RELEASE ORAL
Status: DISCONTINUED | OUTPATIENT
Start: 2017-04-16 | End: 2017-04-21 | Stop reason: HOSPADM

## 2017-04-15 RX ORDER — SERTRALINE HYDROCHLORIDE 100 MG/1
200 TABLET, FILM COATED ORAL EVERY MORNING
Status: DISCONTINUED | OUTPATIENT
Start: 2017-04-16 | End: 2017-04-21 | Stop reason: HOSPADM

## 2017-04-15 RX ORDER — ASPIRIN 81 MG/1
81 TABLET, CHEWABLE ORAL DAILY
Status: DISCONTINUED | OUTPATIENT
Start: 2017-04-15 | End: 2017-04-15

## 2017-04-15 RX ORDER — LAMOTRIGINE 25 MG/1
50 TABLET ORAL EVERY MORNING
Status: DISCONTINUED | OUTPATIENT
Start: 2017-04-16 | End: 2017-04-21 | Stop reason: HOSPADM

## 2017-04-15 RX ORDER — HYDROCODONE BITARTRATE AND ACETAMINOPHEN 5; 325 MG/1; MG/1
1 TABLET ORAL EVERY 4 HOURS PRN
Status: DISCONTINUED | OUTPATIENT
Start: 2017-04-15 | End: 2017-04-21 | Stop reason: HOSPADM

## 2017-04-15 RX ORDER — QUETIAPINE FUMARATE 25 MG/1
25 TABLET, FILM COATED ORAL
Status: DISCONTINUED | OUTPATIENT
Start: 2017-04-16 | End: 2017-04-21 | Stop reason: HOSPADM

## 2017-04-15 RX ORDER — QUETIAPINE FUMARATE 25 MG/1
25 TABLET, FILM COATED ORAL 3 TIMES DAILY
Status: DISCONTINUED | OUTPATIENT
Start: 2017-04-15 | End: 2017-04-15

## 2017-04-15 RX ORDER — LAMOTRIGINE 25 MG/1
50 TABLET ORAL 2 TIMES DAILY
Status: DISCONTINUED | OUTPATIENT
Start: 2017-04-15 | End: 2017-04-15

## 2017-04-15 RX ORDER — NALOXONE HCL 0.4 MG/ML
0.4 VIAL (ML) INJECTION
Status: DISCONTINUED | OUTPATIENT
Start: 2017-04-15 | End: 2017-04-21 | Stop reason: HOSPADM

## 2017-04-15 RX ORDER — SODIUM CHLORIDE 0.9 % (FLUSH) 0.9 %
1-10 SYRINGE (ML) INJECTION AS NEEDED
Status: DISCONTINUED | OUTPATIENT
Start: 2017-04-15 | End: 2017-04-21 | Stop reason: HOSPADM

## 2017-04-15 RX ORDER — LAMOTRIGINE 25 MG/1
50 TABLET ORAL EVERY MORNING
Status: DISCONTINUED | OUTPATIENT
Start: 2017-04-16 | End: 2017-04-15

## 2017-04-15 RX ORDER — LEVOTHYROXINE SODIUM 0.1 MG/1
100 TABLET ORAL DAILY
Status: DISCONTINUED | OUTPATIENT
Start: 2017-04-15 | End: 2017-04-21 | Stop reason: HOSPADM

## 2017-04-15 RX ORDER — LAMOTRIGINE 25 MG/1
25 TABLET ORAL EVERY MORNING
Status: DISCONTINUED | OUTPATIENT
Start: 2017-04-16 | End: 2017-04-15

## 2017-04-15 RX ORDER — ACETAMINOPHEN 325 MG/1
650 TABLET ORAL EVERY 4 HOURS PRN
Status: DISCONTINUED | OUTPATIENT
Start: 2017-04-15 | End: 2017-04-21 | Stop reason: HOSPADM

## 2017-04-15 RX ADMIN — QUETIAPINE FUMARATE 50 MG: 50 TABLET, FILM COATED ORAL at 22:19

## 2017-04-15 RX ADMIN — HYDROCODONE BITARTRATE AND ACETAMINOPHEN 1 TABLET: 5; 325 TABLET ORAL at 19:48

## 2017-04-15 RX ADMIN — VANCOMYCIN HYDROCHLORIDE 1750 MG: 1 INJECTION, POWDER, LYOPHILIZED, FOR SOLUTION INTRAVENOUS at 19:48

## 2017-04-15 RX ADMIN — ASPIRIN 81 MG: 81 TABLET, CHEWABLE ORAL at 21:08

## 2017-04-15 RX ADMIN — TAZOBACTAM SODIUM AND PIPERACILLIN SODIUM 3.38 G: 375; 3 INJECTION, SOLUTION INTRAVENOUS at 15:41

## 2017-04-15 RX ADMIN — BUSPIRONE HYDROCHLORIDE 30 MG: 15 TABLET ORAL at 21:08

## 2017-04-15 RX ADMIN — ENOXAPARIN SODIUM 40 MG: 40 INJECTION SUBCUTANEOUS at 14:53

## 2017-04-15 RX ADMIN — LAMOTRIGINE 100 MG: 100 TABLET ORAL at 22:37

## 2017-04-15 RX ADMIN — HYDROCODONE BITARTRATE AND ACETAMINOPHEN 1 TABLET: 5; 325 TABLET ORAL at 23:33

## 2017-04-15 RX ADMIN — TAZOBACTAM SODIUM AND PIPERACILLIN SODIUM 3.38 G: 375; 3 INJECTION, SOLUTION INTRAVENOUS at 23:33

## 2017-04-15 RX ADMIN — HYDROCODONE BITARTRATE AND ACETAMINOPHEN 1 TABLET: 5; 325 TABLET ORAL at 14:53

## 2017-04-15 NOTE — PROGRESS NOTES
"Pharmacy to Dose Vancomycin IV    Day 1  Consult for Dr. TAMMIE Mclaughlin  Treating: SSTI/LLE cellulitis  Goal trough: 15-20 mcg/mL    IV Anti-Infectives     Ordered     Dose/Rate Route Frequency Start Stop    04/15/17 1329  piperacillin-tazobactam (ZOSYN) 3.375 g in iso-osmotic dextrose 50 ml (premix)     Ordering Provider:  Zelalem Mclaughlin MD    3.375 g Intravenous Every 6 Hours 04/15/17 1400      04/15/17 1329  Pharmacy to dose vancomycin     Ordering Provider:  Zelalem Mclaughlin MD     Does not apply Continuous PRN 04/15/17 1328        Relevant clinical data and objective history reviewed:  67 y.o. male 70\" (177.8 cm) 210 lb 14.4 oz (95.7 kg)  Body mass index is 30.26 kg/(m^2).    Results from last 7 days  Lab Units 04/11/17  1237   CREATININE mg/dL 1.45*     Estimated Creatinine Clearance: 55.8 mL/min (by C-G formula based on Cr of 1.45).    No results found for: WBC  Temp:  [97.1 °F (36.2 °C)] 97.1 °F (36.2 °C)  Heart Rate:  [73] 73  Resp:  [18] 18  BP: (158)/(86) 158/86  No intake or output data in the 24 hours ending 04/15/17 1423    Baseline cultures/labs/radiology:  4/14 XR Lt foot: No acute fracture or erosion is identified. Gouty tophi.    Assessment/Plan:   PMH: Anxiety; Depression; GERD (gastroesophageal reflux disease); Hypercalcemia; Hypothyroidism; Inverted T wave; Osteoarthritis; Parathyroid disease; PONV (postoperative nausea and vomiting); PTSD (post-traumatic stress disorder); and Sleep apnea.    Start Vancomycin 1750mg IV x 1 Loading dose (18 mg/Kg) then 1000 mg IV q12hrs (20.9 mg/Kg/day) to follow w levels.    PK parameters:  [place]den: 0.046 hr-1,  T1/2: 15.1 hr, Vd 67 L  (0.7 L/kg)    Thank you for your consult,    Rosa Delatorre, [facility]  04/15/17 2:23 PM                  "

## 2017-04-15 NOTE — ED NOTES
"Dr. Tomas from [facility] called report. He reported he has been treating patient for two days for \"cellulitis vs. Gout\" and he is not getting better. He attempted to make patient a direct admit but had not heard back from hospitalist yet.      Ariadne Calderón RN  04/15/17 1143    "

## 2017-04-15 NOTE — PLAN OF CARE
Problem: Patient Care Overview (Adult)  Goal: Adult Individualization and Mutuality  Outcome: Ongoing (interventions implemented as appropriate)  Goal: Discharge Needs Assessment  Outcome: Ongoing (interventions implemented as appropriate)    Problem: Pain, Acute (Adult)  Goal: Identify Related Risk Factors and Signs and Symptoms  Outcome: Ongoing (interventions implemented as appropriate)  Goal: Acceptable Pain Control/Comfort Level  Outcome: Ongoing (interventions implemented as appropriate)

## 2017-04-15 NOTE — H&P
HISTORY AND PHYSICAL   [facility]        Patient Identification:  Name: Kaiser Valente  Age: 67 y.o.  Sex: male  :  1950  MRN: [id number]                     Primary Care Physician: Jared Noriega MD    Chief Complaint:  Left foot pain    History of Present Illness:        The patient is 67-year-old white male with history of anxiety and depression, GERD, hypothyroidism, history of recent parathyroidectomy and PTSD who is admitted with a few day history of having some pain swelling and redness in the left foot.  He been to see his primary care doctor in thought it may have been gout.  He taken some steroids and return for follow-up and it was not getting any better.  He was started on some oral antibiotics and had another follow-up visit with worsening pain and he was referred in for direct admission for probable cellulitis and left foot.  He not had any fevers or chills.  He denies  chest pain or shortness of air.  He's not had any nausea or vomiting.    Past Medical History:  Past Medical History:   Diagnosis Date   • Anxiety    • Depression    • GERD (gastroesophageal reflux disease)    • Hypercalcemia    • Hypothyroidism    • Inverted T wave     PT HAD STRESS ECHO DONE 4 YR AGO AND WAS NORMAL   • Osteoarthritis     Knees and ankles   • Parathyroid disease    • PONV (postoperative nausea and vomiting)    • PTSD (post-traumatic stress disorder)    • Sleep apnea     NO MACHINE     Past Surgical History:  Past Surgical History:   Procedure Laterality Date   • COLONOSCOPY  2014   • ENDOSCOPY      several years ago, normal   • HEMORRHOIDECTOMY     • THYROIDECTOMY Left 2/3/2017    Procedure: Left inferior parathyroidectomy with intraoperative PTH monitoring;  Surgeon: Sarah Matos MD;  Location: [facility];  Service:    • WRIST FUSION Right       Home Meds:  Prescriptions Prior to Admission   Medication Sig Dispense Refill Last Dose   • aspirin 81 MG tablet Take 81 mg by mouth  "Every Night. HOLD PRIOR TO SURG   Taking   • busPIRone (BUSPAR) 15 MG tablet Take 30 mg by mouth 2 (Two) Times a Day.   Taking   • cephalexin (KEFLEX) 500 MG capsule Take 1 capsule by mouth 4 (Four) Times a Day. 28 capsule 0 Taking   • ergocalciferol (ERGOCALCIFEROL) 43798 UNITS capsule Take 50,000 Units by mouth. TWO TIMES WEEK   Taking   • glucosamine-chondroitin 500-400 MG capsule capsule Take 1 capsule by mouth 2 (Two) Times a Day With Meals. HOLD PRIOR TO SURG   Taking   • lamoTRIgine (LaMICtal) 100 MG tablet Take 50 mg by mouth 2 (Two) Times a Day.   Taking   • levothyroxine (SYNTHROID) 100 MCG tablet Take 1 tablet by mouth Daily. On empty stomach 30 tablet 5 Taking   • modafinil (PROVIGIL) 100 MG tablet Take 150 mg by mouth Every Morning.   Taking   • Omega-3 Fatty Acids (FISH OIL) 1000 MG capsule capsule Take 1,000 mg by mouth Daily With Breakfast. HOLD PRIOR TO SURG   Taking   • omeprazole (priLOSEC) 20 MG capsule Take 20 mg by mouth Every Morning.   Taking   • omeprazole (priLOSEC) 20 MG capsule TAKE ONE CAPSULE BY MOUTH DAILY 30 capsule 5 Taking   • predniSONE (DELTASONE) 20 MG tablet Take 3 tablets by mouth Daily. 15 tablet 0 Taking   • QUEtiapine (SEROquel) 25 MG tablet Take 25 mg by mouth 3 (Three) Times a Day.   Taking   • sertraline (ZOLOFT) 100 MG tablet Take 200 mg by mouth Every Morning.   Taking   • Syringe 23G X 1\" 3 ML misc Use every 2 weeks for testosterone injection 10 each 1 Taking   • Testosterone Cypionate 200 MG/ML kit Inject 1 mL into the shoulder, thigh, or buttocks Every 14 (Fourteen) Days.   Taking   • TURMERIC PO Take 1 capsule by mouth 2 (Two) Times a Day. HOLD PRIOR TO SURG    Taking   • calcium-vitamin D (OSCAL 500/200 D-3) 500-200 MG-UNIT per tablet Take 2 tablets by mouth 2 (Two) Times a Day. 180 tablet 3 Taking   • lamoTRIgine (LAMICTAL) 100 MG tablet Take 50 mg by mouth Every Morning.   Taking       Allergies:  Allergies   Allergen Reactions   • Codeine      nausea "     Immunizations:  Immunization History   Administered Date(s) Administered   • Pneumococcal Conjugate 13-Valent 2016     Social History:   Social History     Social History Narrative     Social History     Social History   • Marital status:      Spouse name: N/A   • Number of children: N/A   • Years of education: N/A     Occupational History   •  Retired     Social History Main Topics   • Smoking status: Never Smoker   • Smokeless tobacco: Not on file   • Alcohol use Yes      Comment: 1 DAILY DRINK   • Drug use: No   • Sexual activity: Not on file     Other Topics Concern   • Not on file     Social History Narrative       Family History:  Family History   Problem Relation Age of Onset   • Breast cancer Mother    • Heart disease Father    • Hypertension Father    • Colon cancer Sister         Review of Systems  See history of present illness and past medical history.  Patient denies headache, dizziness, syncope, falls, trauma, change in vision, change in hearing, change in taste, changes in weight, changes in appetite, focal weakness, numbness, or paresthesia.  Patient denies chest pain, palpitations, dyspnea, orthopnea, PND, cough, sinus pressure, rhinorrhea, epistaxis, hemoptysis, nausea, vomiting,hematemesis, diarrhea, constipation or hematchezia.  Denies cold or heat intolerance, polydipsia, polyuria, polyphagia. Denies hematuria, pyuria, dysuria, hesitancy, frequency or urgency. Denies consumption of raw and under cooked meats foods or change in water source.  Denies fever, chills, sweats, night sweats.  Denies missing any routine medications. Remainder of ROS is negative.    Objective:  tMax 24 hrs: Temp (24hrs), Av.1 °F (36.2 °C), Min:97.1 °F (36.2 °C), Max:97.1 °F (36.2 °C)    Vitals Ranges:   Temp:  [97.1 °F (36.2 °C)] 97.1 °F (36.2 °C)  Heart Rate:  [73] 73  Resp:  [18] 18  BP: (158)/(86) 158/86      Exam:  /86 (BP Location: Right arm, Patient Position: Lying)   "Pulse 73  Temp 97.1 °F (36.2 °C) (Oral)   Resp 18  Ht 70\" (177.8 cm)  Wt 210 lb 14.4 oz (95.7 kg)  SpO2 98%  BMI 30.26 kg/m2    General Appearance:    Alert, cooperative, no distress, appears stated age   Head:    Normocephalic, without obvious abnormality, atraumatic   Eyes:    PERRL, conjunctiva/corneas clear, EOM's intact, both eyes   Ears:    Normal external ear canals, both ears   Nose:   Nares normal, septum midline, mucosa normal, no drainage    or sinus tenderness   Throat:   Lips, mucosa, and tongue normal   Neck:   Supple, symmetrical, trachea midline, no adenopathy;     thyroid:  no enlargement/tenderness/nodules; no carotid    bruit or JVD   Back:     Symmetric, no curvature, ROM normal, no CVA tenderness   Lungs:     Clear to auscultation bilaterally, respirations unlabored   Chest Wall:    No tenderness or deformity    Heart:    Regular rate and rhythm, S1 and S2 normal, no murmur, rub   or gallop   Abdomen:     Soft, non-tender, bowel sounds active all four quadrants,     no masses, no hepatomegaly, no splenomegaly   Extremities:   Extremities normal, atraumatic, no cyanosis , left foot red and swollen with cellulitis   Pulses:   2+ and symmetric all extremities   Skin:   Skin color, texture, turgor normal, no rashes or lesions   Lymph nodes:   Cervical, supraclavicular, and axillary nodes normal   Neurologic:   CNII-XII intact, normal strength, sensation intact throughout      .    Data Review:  Lab Results (last 72 hours)     ** No results found for the last 72 hours. **            Results from last 7 days  Lab Units 04/11/17  1237   TSH uIU/mL 4.030   T3 FREE pg/mL 2.5   FREE T4 ng/dL 0.72*          Imaging Results (all)     None        Patient Active Problem List   Diagnosis Code   • Acquired hypothyroidism E03.9   • Osteoarthritis of both knees M17.0   • Gastroesophageal reflux disease without esophagitis K21.9   • Hypertriglyceridemia E78.1   • Monoclonal gammopathy of undetermined " significance D47.2   • Hypercalcemia E83.52   • Chronic fatigue R53.82   • Thrombocytopenia D69.6   • History of primary hyperparathyroidism Z86.39   • Hypogonadism in male E29.1   • Localized edema R60.0   • Toe pain, left M79.675   • Cellulitis of left lower extremity L03.116   • Depression F32.9   • Cellulitis of foot, left L03.116       Assessment:  Principal Problem:    Cellulitis of left lower extremity  Active Problems:    Acquired hypothyroidism    Osteoarthritis of both knees    Gastroesophageal reflux disease without esophagitis    Hypertriglyceridemia    Monoclonal gammopathy of undetermined significance    Chronic fatigue    History of primary hyperparathyroidism    Depression    Cellulitis of foot, left      Plan:  The patient's admitted to hospital and will start him on some broad-spectrum IV antibiotics for cellulitis since he has failed outpatient treatment.    Zelalem Mclaughlin MD  4/15/2017  1:32 PM

## 2017-04-16 PROBLEM — M10.9 GOUT: Status: ACTIVE | Noted: 2017-04-16

## 2017-04-16 LAB
ANION GAP SERPL CALCULATED.3IONS-SCNC: 9.9 MMOL/L
BASOPHILS # BLD AUTO: 0.01 10*3/MM3 (ref 0–0.2)
BASOPHILS NFR BLD AUTO: 0.1 % (ref 0–1.5)
BUN BLD-MCNC: 17 MG/DL (ref 8–23)
BUN/CREAT SERPL: 15.5 (ref 7–25)
CALCIUM SPEC-SCNC: 8.7 MG/DL (ref 8.6–10.5)
CHLORIDE SERPL-SCNC: 106 MMOL/L (ref 98–107)
CO2 SERPL-SCNC: 26.1 MMOL/L (ref 22–29)
CREAT BLD-MCNC: 1.1 MG/DL (ref 0.76–1.27)
DEPRECATED RDW RBC AUTO: 49.5 FL (ref 37–54)
EOSINOPHIL # BLD AUTO: 0.06 10*3/MM3 (ref 0–0.7)
EOSINOPHIL NFR BLD AUTO: 0.7 % (ref 0.3–6.2)
ERYTHROCYTE [DISTWIDTH] IN BLOOD BY AUTOMATED COUNT: 14.6 % (ref 11.5–14.5)
GFR SERPL CREATININE-BSD FRML MDRD: 66 ML/MIN/1.73
GLUCOSE BLD-MCNC: 108 MG/DL (ref 65–99)
HCT VFR BLD AUTO: 37.4 % (ref 40.4–52.2)
HGB BLD-MCNC: 12.1 G/DL (ref 13.7–17.6)
IMM GRANULOCYTES # BLD: 0.03 10*3/MM3 (ref 0–0.03)
IMM GRANULOCYTES NFR BLD: 0.3 % (ref 0–0.5)
LYMPHOCYTES # BLD AUTO: 1.41 10*3/MM3 (ref 0.9–4.8)
LYMPHOCYTES NFR BLD AUTO: 15.4 % (ref 19.6–45.3)
MCH RBC QN AUTO: 30 PG (ref 27–32.7)
MCHC RBC AUTO-ENTMCNC: 32.4 G/DL (ref 32.6–36.4)
MCV RBC AUTO: 92.6 FL (ref 79.8–96.2)
MONOCYTES # BLD AUTO: 0.48 10*3/MM3 (ref 0.2–1.2)
MONOCYTES NFR BLD AUTO: 5.2 % (ref 5–12)
NEUTROPHILS # BLD AUTO: 7.19 10*3/MM3 (ref 1.9–8.1)
NEUTROPHILS NFR BLD AUTO: 78.3 % (ref 42.7–76)
PLATELET # BLD AUTO: 114 10*3/MM3 (ref 140–500)
PMV BLD AUTO: 12.4 FL (ref 6–12)
POTASSIUM BLD-SCNC: 4.4 MMOL/L (ref 3.5–5.2)
RBC # BLD AUTO: 4.04 10*6/MM3 (ref 4.6–6)
SODIUM BLD-SCNC: 142 MMOL/L (ref 136–145)
WBC NRBC COR # BLD: 9.18 10*3/MM3 (ref 4.5–10.7)

## 2017-04-16 PROCEDURE — 25010000002 VANCOMYCIN PER 500 MG: Performed by: HOSPITALIST

## 2017-04-16 PROCEDURE — 25010000002 PIPERACILLIN SOD-TAZOBACTAM PER 1 G: Performed by: HOSPITALIST

## 2017-04-16 PROCEDURE — 25010000002 ENOXAPARIN PER 10 MG: Performed by: HOSPITALIST

## 2017-04-16 PROCEDURE — 85025 COMPLETE CBC W/AUTO DIFF WBC: CPT | Performed by: HOSPITALIST

## 2017-04-16 PROCEDURE — 80048 BASIC METABOLIC PNL TOTAL CA: CPT | Performed by: HOSPITALIST

## 2017-04-16 RX ORDER — COLCHICINE 0.6 MG/1
0.6 TABLET ORAL EVERY 12 HOURS SCHEDULED
Status: DISCONTINUED | OUTPATIENT
Start: 2017-04-16 | End: 2017-04-21 | Stop reason: HOSPADM

## 2017-04-16 RX ADMIN — PANTOPRAZOLE SODIUM 40 MG: 40 TABLET, DELAYED RELEASE ORAL at 07:27

## 2017-04-16 RX ADMIN — BUSPIRONE HYDROCHLORIDE 30 MG: 15 TABLET ORAL at 21:00

## 2017-04-16 RX ADMIN — HYDROCODONE BITARTRATE AND ACETAMINOPHEN 1 TABLET: 5; 325 TABLET ORAL at 09:29

## 2017-04-16 RX ADMIN — VANCOMYCIN HYDROCHLORIDE 1000 MG: 1 INJECTION, SOLUTION INTRAVENOUS at 16:39

## 2017-04-16 RX ADMIN — TAZOBACTAM SODIUM AND PIPERACILLIN SODIUM 3.38 G: 375; 3 INJECTION, SOLUTION INTRAVENOUS at 12:39

## 2017-04-16 RX ADMIN — QUETIAPINE FUMARATE 25 MG: 50 TABLET, FILM COATED ORAL at 09:30

## 2017-04-16 RX ADMIN — TAZOBACTAM SODIUM AND PIPERACILLIN SODIUM 3.38 G: 375; 3 INJECTION, SOLUTION INTRAVENOUS at 07:27

## 2017-04-16 RX ADMIN — LAMOTRIGINE 100 MG: 100 TABLET ORAL at 20:59

## 2017-04-16 RX ADMIN — HYDROCODONE BITARTRATE AND ACETAMINOPHEN 1 TABLET: 5; 325 TABLET ORAL at 20:59

## 2017-04-16 RX ADMIN — QUETIAPINE FUMARATE 50 MG: 50 TABLET, FILM COATED ORAL at 20:59

## 2017-04-16 RX ADMIN — VANCOMYCIN HYDROCHLORIDE 1000 MG: 1 INJECTION, SOLUTION INTRAVENOUS at 05:12

## 2017-04-16 RX ADMIN — HYDROCODONE BITARTRATE AND ACETAMINOPHEN 1 TABLET: 5; 325 TABLET ORAL at 16:35

## 2017-04-16 RX ADMIN — COLCHICINE 0.6 MG: 0.6 TABLET, FILM COATED ORAL at 16:35

## 2017-04-16 RX ADMIN — ENOXAPARIN SODIUM 40 MG: 40 INJECTION SUBCUTANEOUS at 09:30

## 2017-04-16 RX ADMIN — MODAFINIL 150 MG: 100 TABLET ORAL at 07:27

## 2017-04-16 RX ADMIN — TAZOBACTAM SODIUM AND PIPERACILLIN SODIUM 3.38 G: 375; 3 INJECTION, SOLUTION INTRAVENOUS at 20:59

## 2017-04-16 RX ADMIN — BUSPIRONE HYDROCHLORIDE 30 MG: 15 TABLET ORAL at 09:30

## 2017-04-16 RX ADMIN — SERTRALINE 200 MG: 100 TABLET, FILM COATED ORAL at 07:26

## 2017-04-16 RX ADMIN — LAMOTRIGINE 50 MG: 25 TABLET ORAL at 07:26

## 2017-04-16 RX ADMIN — LEVOTHYROXINE SODIUM 100 MCG: 100 TABLET ORAL at 09:30

## 2017-04-16 NOTE — PROGRESS NOTES
"DAILY PROGRESS NOTE  [facility]    Patient Identification:  Name: Kaiser Valente  Age: 67 y.o.  Sex: male  :  1950  MRN: [id number]         Primary Care Physician: Jared Noriega MD    Subjective:  Interval History:Pain in left foot better.    Objective:    Scheduled Meds:    aspirin 81 mg Oral Daily   busPIRone 30 mg Oral Q12H   colchicine 0.6 mg Oral Q12H   enoxaparin 40 mg Subcutaneous Daily   lamoTRIgine 100 mg Oral Nightly   lamoTRIgine 50 mg Oral QAM   levothyroxine 100 mcg Oral Daily   modafinil 150 mg Oral QAM   pantoprazole 40 mg Oral Q AM   piperacillin-tazobactam 3.375 g Intravenous Q6H   QUEtiapine 25 mg Oral Daily With Breakfast   QUEtiapine 50 mg Oral Nightly   sertraline 200 mg Oral QAM   vancomycin 1,000 mg Intravenous Q12H     Continuous Infusions:    Pharmacy to dose vancomycin        Vital signs in last 24 hours:  Temp:  [97.1 °F (36.2 °C)-97.5 °F (36.4 °C)] 97.4 °F (36.3 °C)  Heart Rate:  [72-73] 73  Resp:  [18] 18  BP: (143-158)/(79-86) 143/82    Intake/Output:    Intake/Output Summary (Last 24 hours) at 17 1122  Last data filed at 17 0727   Gross per 24 hour   Intake              800 ml   Output              300 ml   Net              500 ml       Exam:  /82 (BP Location: Right arm, Patient Position: Lying)  Pulse 73  Temp 97.4 °F (36.3 °C) (Oral)   Resp 18  Ht 70\" (177.8 cm)  Wt 210 lb 14.4 oz (95.7 kg)  SpO2 97%  BMI 30.26 kg/m2    General Appearance:    Alert, cooperative, no distress   Head:    Normocephalic, without obvious abnormality, atraumatic   Eyes:       Throat:   Lips, tongue, gums normal   Neck:   Supple, symmetrical, trachea midline, no JVD   Lungs:     Clear to auscultation bilaterally, respirations unlabored   Chest Wall:    No tenderness or deformity    Heart:    Regular rate and rhythm, S1 and S2 normal, no murmur,no  Rub or gallop   Abdomen:     Soft, non-tender, bowel sounds active, no masses, no organomegaly  "   Extremities:   Extremities normal, atraumatic, no cyanosis , cellulitis and edema left foot   Pulses:      Skin:   Skin is warm and dry,  no rashes or palpable lesions   Neurologic:   no focal deficits noted      [unfilled]  Data Review:    Results from last 7 days  Lab Units 04/16/17  0608 04/15/17  1458  04/11/17  1237   SODIUM mmol/L 142 140  --  141   POTASSIUM mmol/L 4.4 4.2  --  4.0   CHLORIDE mmol/L 106 102  --  102   TOTAL CO2 mmol/L 26.1 25.6  --   --    TOTAL CO2, ARTERIAL mmol/L  --   --   --  23.1   BUN mg/dL 17 19  --  24*   CREATININE mg/dL 1.10 1.15  --  1.45*   GLUCOSE mg/dL 108* 114*  < >  --    CALCIUM mg/dL 8.7 9.3  --  9.7   < > = values in this interval not displayed.    Results from last 7 days  Lab Units 04/16/17  0608 04/15/17  1458 04/13/17  1116   WBC 10*3/mm3 9.18 10.74*  --    HEMOGLOBIN g/dL 12.1* 13.1*  --    HEMOGLOBIN, POC g/dL  --   --  14.0   HEMATOCRIT % 37.4* 38.7*  --    HEMATOCRIT POC %  --   --  42.3   PLATELETS 10*3/mm3 114* 115*  --        Results from last 7 days  Lab Units 04/11/17  1237   TSH uIU/mL 4.030   T3 FREE pg/mL 2.5   FREE T4 ng/dL 0.72*         No results found for: TROPONINT    Results from last 7 days  Lab Units 04/11/17  1237   TRIGLYCERIDES mg/dL 224*   HDL CHOL mg/dL 45   LDL CHOL mg/dL 136*       Results from last 7 days  Lab Units 04/15/17  1458 04/11/17  1237   ALK PHOS U/L 101 117   BILIRUBIN mg/dL 0.2 0.3   ALT (SGPT) U/L 23 15   AST (SGOT) U/L 22 20       Results from last 7 days  Lab Units 04/11/17  1237   TSH uIU/mL 4.030   T3 FREE pg/mL 2.5   FREE T4 ng/dL 0.72*         No results found for: POCGLU        Patient Active Problem List   Diagnosis Code   • Acquired hypothyroidism E03.9   • Osteoarthritis of both knees M17.0   • Gastroesophageal reflux disease without esophagitis K21.9   • Hypertriglyceridemia E78.1   • Monoclonal gammopathy of undetermined significance D47.2   • Hypercalcemia E83.52   • Chronic fatigue R53.82   • Thrombocytopenia  D69.6   • History of primary hyperparathyroidism Z86.39   • Hypogonadism in male E29.1   • Localized edema R60.0   • Toe pain, left M79.675   • Cellulitis of left lower extremity L03.116   • Depression F32.9   • Cellulitis of foot, left L03.116   • Gout M10.9       Assessment:  Principal Problem:    Cellulitis of left lower extremity  Active Problems:    Acquired hypothyroidism    Osteoarthritis of both knees    Gastroesophageal reflux disease without esophagitis    Hypertriglyceridemia    Monoclonal gammopathy of undetermined significance    Chronic fatigue    History of primary hyperparathyroidism    Depression    Cellulitis of foot, left    Gout      Plan:  Continue antibiotics and add colchicine    Zelalem Mclaughlin MD  4/16/2017  11:22 AM

## 2017-04-16 NOTE — PLAN OF CARE
Problem: Patient Care Overview (Adult)  Goal: Plan of Care Review  Outcome: Ongoing (interventions implemented as appropriate)    04/16/17 1443   Outcome Evaluation   Outcome Summary/Follow up Plan cont antibiotics good pain relief with lortab started on cochaine today        Goal: Adult Individualization and Mutuality  Outcome: Ongoing (interventions implemented as appropriate)  Goal: Discharge Needs Assessment  Outcome: Ongoing (interventions implemented as appropriate)    Problem: Pain, Acute (Adult)  Goal: Acceptable Pain Control/Comfort Level  Outcome: Ongoing (interventions implemented as appropriate)

## 2017-04-16 NOTE — PLAN OF CARE
Problem: Patient Care Overview (Adult)  Goal: Plan of Care Review  Outcome: Ongoing (interventions implemented as appropriate)    04/16/17 0147   Coping/Psychosocial Response Interventions   Plan Of Care Reviewed With patient   Patient Care Overview   Progress no change   Outcome Evaluation   Outcome Summary/Follow up Plan Iv Antibiotics continue. Norco given x 2 tonight for foot pain. Lovenox vte protocal         Problem: Pain, Acute (Adult)  Goal: Identify Related Risk Factors and Signs and Symptoms  Outcome: Ongoing (interventions implemented as appropriate)

## 2017-04-17 LAB
ANION GAP SERPL CALCULATED.3IONS-SCNC: 13.4 MMOL/L
BASOPHILS # BLD AUTO: 0.04 10*3/MM3 (ref 0–0.2)
BASOPHILS NFR BLD AUTO: 0.5 % (ref 0–1.5)
BUN BLD-MCNC: 16 MG/DL (ref 8–23)
BUN/CREAT SERPL: 13.1 (ref 7–25)
CALCIUM SPEC-SCNC: 8.5 MG/DL (ref 8.6–10.5)
CHLORIDE SERPL-SCNC: 104 MMOL/L (ref 98–107)
CO2 SERPL-SCNC: 23.6 MMOL/L (ref 22–29)
CREAT BLD-MCNC: 1.22 MG/DL (ref 0.76–1.27)
DEPRECATED RDW RBC AUTO: 49.3 FL (ref 37–54)
EOSINOPHIL # BLD AUTO: 0.13 10*3/MM3 (ref 0–0.7)
EOSINOPHIL NFR BLD AUTO: 1.5 % (ref 0.3–6.2)
ERYTHROCYTE [DISTWIDTH] IN BLOOD BY AUTOMATED COUNT: 14.5 % (ref 11.5–14.5)
GFR SERPL CREATININE-BSD FRML MDRD: 59 ML/MIN/1.73
GLUCOSE BLD-MCNC: 94 MG/DL (ref 65–99)
HCT VFR BLD AUTO: 37.9 % (ref 40.4–52.2)
HGB BLD-MCNC: 12.4 G/DL (ref 13.7–17.6)
IMM GRANULOCYTES # BLD: 0.03 10*3/MM3 (ref 0–0.03)
IMM GRANULOCYTES NFR BLD: 0.3 % (ref 0–0.5)
LYMPHOCYTES # BLD AUTO: 1.86 10*3/MM3 (ref 0.9–4.8)
LYMPHOCYTES NFR BLD AUTO: 21.5 % (ref 19.6–45.3)
MCH RBC QN AUTO: 30.1 PG (ref 27–32.7)
MCHC RBC AUTO-ENTMCNC: 32.7 G/DL (ref 32.6–36.4)
MCV RBC AUTO: 92 FL (ref 79.8–96.2)
MONOCYTES # BLD AUTO: 0.41 10*3/MM3 (ref 0.2–1.2)
MONOCYTES NFR BLD AUTO: 4.7 % (ref 5–12)
NEUTROPHILS # BLD AUTO: 6.2 10*3/MM3 (ref 1.9–8.1)
NEUTROPHILS NFR BLD AUTO: 71.5 % (ref 42.7–76)
PLATELET # BLD AUTO: 108 10*3/MM3 (ref 140–500)
PMV BLD AUTO: 11.5 FL (ref 6–12)
POTASSIUM BLD-SCNC: 4.3 MMOL/L (ref 3.5–5.2)
RBC # BLD AUTO: 4.12 10*6/MM3 (ref 4.6–6)
SODIUM BLD-SCNC: 141 MMOL/L (ref 136–145)
VANCOMYCIN TROUGH SERPL-MCNC: 10.4 MCG/ML (ref 5–20)
WBC NRBC COR # BLD: 8.67 10*3/MM3 (ref 4.5–10.7)

## 2017-04-17 PROCEDURE — 63710000001 PREDNISONE PER 1 MG: Performed by: HOSPITALIST

## 2017-04-17 PROCEDURE — 85025 COMPLETE CBC W/AUTO DIFF WBC: CPT | Performed by: HOSPITALIST

## 2017-04-17 PROCEDURE — 25010000002 VANCOMYCIN PER 500 MG: Performed by: HOSPITALIST

## 2017-04-17 PROCEDURE — 25010000002 ENOXAPARIN PER 10 MG: Performed by: HOSPITALIST

## 2017-04-17 PROCEDURE — 80202 ASSAY OF VANCOMYCIN: CPT | Performed by: HOSPITALIST

## 2017-04-17 PROCEDURE — 80048 BASIC METABOLIC PNL TOTAL CA: CPT | Performed by: HOSPITALIST

## 2017-04-17 PROCEDURE — 25010000002 PIPERACILLIN SOD-TAZOBACTAM PER 1 G

## 2017-04-17 PROCEDURE — 25010000002 PIPERACILLIN SOD-TAZOBACTAM PER 1 G: Performed by: HOSPITALIST

## 2017-04-17 RX ORDER — LEVOTHYROXINE SODIUM 0.1 MG/1
TABLET ORAL
Qty: 30 TABLET | Refills: 4 | Status: SHIPPED | OUTPATIENT
Start: 2017-04-17 | End: 2017-04-19

## 2017-04-17 RX ORDER — PREDNISONE 20 MG/1
40 TABLET ORAL
Status: DISCONTINUED | OUTPATIENT
Start: 2017-04-17 | End: 2017-04-21 | Stop reason: HOSPADM

## 2017-04-17 RX ADMIN — MODAFINIL 150 MG: 100 TABLET ORAL at 06:54

## 2017-04-17 RX ADMIN — PANTOPRAZOLE SODIUM 40 MG: 40 TABLET, DELAYED RELEASE ORAL at 06:54

## 2017-04-17 RX ADMIN — HYDROCODONE BITARTRATE AND ACETAMINOPHEN 1 TABLET: 5; 325 TABLET ORAL at 02:57

## 2017-04-17 RX ADMIN — ASPIRIN 81 MG: 81 TABLET, CHEWABLE ORAL at 10:17

## 2017-04-17 RX ADMIN — HYDROCODONE BITARTRATE AND ACETAMINOPHEN 1 TABLET: 5; 325 TABLET ORAL at 18:36

## 2017-04-17 RX ADMIN — COLCHICINE 0.6 MG: 0.6 TABLET, FILM COATED ORAL at 21:07

## 2017-04-17 RX ADMIN — LAMOTRIGINE 50 MG: 25 TABLET ORAL at 06:53

## 2017-04-17 RX ADMIN — COLCHICINE 0.6 MG: 0.6 TABLET, FILM COATED ORAL at 10:16

## 2017-04-17 RX ADMIN — BUSPIRONE HYDROCHLORIDE 30 MG: 15 TABLET ORAL at 10:16

## 2017-04-17 RX ADMIN — LEVOTHYROXINE SODIUM 100 MCG: 100 TABLET ORAL at 10:16

## 2017-04-17 RX ADMIN — BUSPIRONE HYDROCHLORIDE 30 MG: 15 TABLET ORAL at 21:07

## 2017-04-17 RX ADMIN — ENOXAPARIN SODIUM 40 MG: 40 INJECTION SUBCUTANEOUS at 10:22

## 2017-04-17 RX ADMIN — LAMOTRIGINE 100 MG: 100 TABLET ORAL at 21:07

## 2017-04-17 RX ADMIN — TAZOBACTAM SODIUM AND PIPERACILLIN SODIUM 3.38 G: 375; 3 INJECTION, SOLUTION INTRAVENOUS at 12:51

## 2017-04-17 RX ADMIN — SERTRALINE 200 MG: 100 TABLET, FILM COATED ORAL at 06:53

## 2017-04-17 RX ADMIN — TAZOBACTAM SODIUM AND PIPERACILLIN SODIUM 3.38 G: 375; 3 INJECTION, SOLUTION INTRAVENOUS at 21:06

## 2017-04-17 RX ADMIN — TAZOBACTAM SODIUM AND PIPERACILLIN SODIUM 3.38 G: 375; 3 INJECTION, SOLUTION INTRAVENOUS at 02:57

## 2017-04-17 RX ADMIN — PREDNISONE 40 MG: 20 TABLET ORAL at 18:16

## 2017-04-17 RX ADMIN — HYDROCODONE BITARTRATE AND ACETAMINOPHEN 1 TABLET: 5; 325 TABLET ORAL at 10:23

## 2017-04-17 RX ADMIN — VANCOMYCIN HYDROCHLORIDE 1000 MG: 1 INJECTION, SOLUTION INTRAVENOUS at 18:16

## 2017-04-17 RX ADMIN — QUETIAPINE FUMARATE 25 MG: 50 TABLET, FILM COATED ORAL at 10:15

## 2017-04-17 RX ADMIN — QUETIAPINE FUMARATE 50 MG: 50 TABLET, FILM COATED ORAL at 21:07

## 2017-04-17 RX ADMIN — HYDROCODONE BITARTRATE AND ACETAMINOPHEN 1 TABLET: 5; 325 TABLET ORAL at 06:54

## 2017-04-17 RX ADMIN — VANCOMYCIN HYDROCHLORIDE 1000 MG: 1 INJECTION, SOLUTION INTRAVENOUS at 07:19

## 2017-04-17 NOTE — PROGRESS NOTES
"DAILY PROGRESS NOTE  [facility]    Patient Identification:  Name: Kaiser Valente  Age: 67 y.o.  Sex: male  :  1950  MRN: [id number]         Primary Care Physician: Jared Noriega MD    Subjective:  Interval History:Pain in left foot better.    Objective:    Scheduled Meds:    aspirin 81 mg Oral Daily   busPIRone 30 mg Oral Q12H   colchicine 0.6 mg Oral Q12H   enoxaparin 40 mg Subcutaneous Daily   lamoTRIgine 100 mg Oral Nightly   lamoTRIgine 50 mg Oral QAM   levothyroxine 100 mcg Oral Daily   modafinil 150 mg Oral QAM   pantoprazole 40 mg Oral Q AM   piperacillin-tazobactam 3.375 g Intravenous Q8H   QUEtiapine 25 mg Oral Daily With Breakfast   QUEtiapine 50 mg Oral Nightly   sertraline 200 mg Oral QAM   vancomycin 1,000 mg Intravenous Q12H     Continuous Infusions:    Pharmacy to dose vancomycin        Vital signs in last 24 hours:  Temp:  [97 °F (36.1 °C)-97.9 °F (36.6 °C)] 97 °F (36.1 °C)  Heart Rate:  [66-75] 66  Resp:  [18] 18  BP: (122-164)/(68-97) 149/97    Intake/Output:    Intake/Output Summary (Last 24 hours) at 17 1256  Last data filed at 17 1028   Gross per 24 hour   Intake              728 ml   Output              650 ml   Net               78 ml       Exam:  /97 (BP Location: Right arm, Patient Position: Lying)  Pulse 66  Temp 97 °F (36.1 °C) (Oral)   Resp 18  Ht 70\" (177.8 cm)  Wt 210 lb 14.4 oz (95.7 kg)  SpO2 97%  BMI 30.26 kg/m2    General Appearance:    Alert, cooperative, no distress   Head:    Normocephalic, without obvious abnormality, atraumatic   Eyes:       Throat:   Lips, tongue, gums normal   Neck:   Supple, symmetrical, trachea midline, no JVD   Lungs:     Clear to auscultation bilaterally, respirations unlabored   Chest Wall:    No tenderness or deformity    Heart:    Regular rate and rhythm, S1 and S2 normal, no murmur,no  Rub or gallop   Abdomen:     Soft, non-tender, bowel sounds active, no masses, no organomegaly    Extremities:   " Extremities normal, atraumatic, no cyanosis , cellulitis and edema left foot   Pulses:      Skin:   Skin is warm and dry,  no rashes or palpable lesions   Neurologic:   no focal deficits noted      [unfilled]  Data Review:    Results from last 7 days  Lab Units 04/17/17  0534 04/16/17  0608 04/15/17  1458   SODIUM mmol/L 141 142 140   POTASSIUM mmol/L 4.3 4.4 4.2   CHLORIDE mmol/L 104 106 102   TOTAL CO2 mmol/L 23.6 26.1 25.6   BUN mg/dL 16 17 19   CREATININE mg/dL 1.22 1.10 1.15   GLUCOSE mg/dL 94 108* 114*   CALCIUM mg/dL 8.5* 8.7 9.3       Results from last 7 days  Lab Units 04/17/17  0534 04/16/17  0608 04/15/17  1458   WBC 10*3/mm3 8.67 9.18 10.74*   HEMOGLOBIN g/dL 12.4* 12.1* 13.1*   HEMATOCRIT % 37.9* 37.4* 38.7*   PLATELETS 10*3/mm3 108* 114* 115*       Results from last 7 days  Lab Units 04/11/17  1237   TSH uIU/mL 4.030   T3 FREE pg/mL 2.5   FREE T4 ng/dL 0.72*         No results found for: TROPONINT    Results from last 7 days  Lab Units 04/11/17  1237   TRIGLYCERIDES mg/dL 224*   HDL CHOL mg/dL 45   LDL CHOL mg/dL 136*       Results from last 7 days  Lab Units 04/15/17  1458 04/11/17  1237   ALK PHOS U/L 101 117   BILIRUBIN mg/dL 0.2 0.3   ALT (SGPT) U/L 23 15   AST (SGOT) U/L 22 20       Results from last 7 days  Lab Units 04/11/17  1237   TSH uIU/mL 4.030   T3 FREE pg/mL 2.5   FREE T4 ng/dL 0.72*         No results found for: POCGLU        Patient Active Problem List   Diagnosis Code   • Acquired hypothyroidism E03.9   • Osteoarthritis of both knees M17.0   • Gastroesophageal reflux disease without esophagitis K21.9   • Hypertriglyceridemia E78.1   • Monoclonal gammopathy of undetermined significance D47.2   • Hypercalcemia E83.52   • Chronic fatigue R53.82   • Thrombocytopenia D69.6   • History of primary hyperparathyroidism Z86.39   • Hypogonadism in male E29.1   • Localized edema R60.0   • Toe pain, left M79.675   • Cellulitis of left lower extremity L03.116   • Depression F32.9   • Cellulitis of  foot, left L03.116   • Gout M10.9       Assessment:  Principal Problem:    Cellulitis of left lower extremity  Active Problems:    Acquired hypothyroidism    Osteoarthritis of both knees    Gastroesophageal reflux disease without esophagitis    Hypertriglyceridemia    Monoclonal gammopathy of undetermined significance    Chronic fatigue    History of primary hyperparathyroidism    Depression    Cellulitis of foot, left    Gout      Plan:  Continue antibiotics and  Colchicine. Add prednisone.    Zelalem Mclaughlin MD  4/17/2017  12:56 PM

## 2017-04-17 NOTE — PROGRESS NOTES
"Pharmacokinetic Consult - Vancomycin Dosing (Follow-up Note)    Kaiser Valente is a 67 y.o. male who is on day 3 pharmacy to dose vancomycin for SSTI/ LLE cellulitis. Was on keflex prior to admission for (2-3 days before being admitted)  Pharmacy dosing vancomycin per Dr. Mclaughlin's request.   Other antimicrobials: zosyn q8h extended infusion  Goal trough: 10-20 mg/L    Current Vancomycin dose:1000mg q12h    Relevant clinical data and objective history reviewed:  70\" (177.8 cm)  210 lb 14.4 oz (95.7 kg)  Body mass index is 30.26 kg/(m^2).     He  has a past medical history of Anxiety; Depression; GERD (gastroesophageal reflux disease); Hypercalcemia; Hypothyroidism; Inverted T wave; Osteoarthritis; Parathyroid disease; PONV (postoperative nausea and vomiting); PTSD (post-traumatic stress disorder); and Sleep apnea.    Allergies as of 04/15/2017 - Robby as Reviewed 04/15/2017   Allergen Reaction Noted   • Codeine  03/21/2016     Vital Signs (last 24 hours)       04/16 0700  -  04/17 0659 04/17 0700  -  04/17 0915   Most Recent    Temp (°F) 97 -  97.9       97 (36.1)    Heart Rate 67 -  75       67    Resp   18       18    /68 -  164/78       164/78    SpO2 (%) 96 -  97       97          Estimated Creatinine Clearance: 66.4 mL/min (by C-G formula based on Cr of 1.22).    Results from last 7 days  Lab Units 04/17/17  0534 04/16/17  0608 04/15/17  1458   CREATININE mg/dL 1.22 1.10 1.15     Lab Results   Component Value Date    WBC 8.67 04/17/2017       Labs:  4/17: Scr 1.22  4/17 WBC 8.67    4/17 @ 0534: vancomycin trough: 10.4mg/L   -last dose is 1639 (~11hrs apart); pt is s/p loading dose and 2 doses of 1000mg q12h (not at steady state)    Assessment/Plan  67 y.o.,male, 210 lb 14.4 oz (95.7 kg), treating SSTI/cellulitis with vancomycin and zosyn. Pt trough this morning was within goal of 10-20mg/L for cellulitis. Trough was borderline in goal however it was not at steady state and expect to have more " accumulation. Will plan to continue same dose for now. Continue to monitor renal function closely. If patient still on vancomycin or renal function changes will draw a trough in 2-3 days.     1. Vancomycin 1000mg q12h  2. Daily bmp already ordered      Pharmacy will continue to follow daily while on vancomycin and adjust as needed.     Clyde Claudio, Pharm.D  4/17/2017 9:15 AM

## 2017-04-17 NOTE — PLAN OF CARE
Problem: Patient Care Overview (Adult)  Goal: Plan of Care Review  Outcome: Ongoing (interventions implemented as appropriate)    04/17/17 0219   Coping/Psychosocial Response Interventions   Plan Of Care Reviewed With patient   Patient Care Overview   Progress improving   Outcome Evaluation   Outcome Summary/Follow up Plan VSS. walks w/cane. pain managed w/lortab. lortab x 1 at bedtime. cont IV zosyn & vanc.         Problem: Pain, Acute (Adult)  Goal: Identify Related Risk Factors and Signs and Symptoms  Outcome: Outcome(s) achieved Date Met:  04/17/17  Goal: Acceptable Pain Control/Comfort Level  Outcome: Ongoing (interventions implemented as appropriate)

## 2017-04-17 NOTE — PROGRESS NOTES
Discharge Planning Assessment  [facility]     Patient Name: Kaiser Valente  MRN: [id number]  Today's Date: 4/17/2017    Admit Date: 4/15/2017          Discharge Needs Assessment       04/17/17 1635    Living Environment    Lives With spouse    Living Arrangements house    Primary Care Provided By spouse/significant other    Quality Of Family Relationships supportive;helpful;involved    Able to Return to Prior Living Arrangements yes    Discharge Needs Assessment    Concerns To Be Addressed no discharge needs identified;denies needs/concerns at this time    Community Agency Name(S) Denies using home health or rehab in the past.    Equipment Currently Used at Home none    Equipment Needed After Discharge none    Transportation Available car;family or friend will provide    Discharge Disposition home or self-care    Discharge Contact Information if Applicable Tri Valente/wife  ([phone]).    Discharge Planning Comments Spoke with patient & wife at bedside. Confirmed information on facesheet. IMM  given 4/17/17.            Discharge Plan       04/17/17 1657    Case Management/Social Work Plan    Plan Plans dc home with spouse. No needs noted. Continue to follow.    Patient/Family In Agreement With Plan yes        Discharge Placement     No information found                Demographic Summary       04/17/17 1620    Referral Information    Admission Type inpatient    Referral Source admission list    Reason For Consult discharge planning            Functional Status       04/17/17 1626    Functional Status Current    Ambulation 2-->assistive person    Transferring 2-->assistive person    Toileting 2-->assistive person    Bathing 2-->assistive person    Dressing 2-->assistive person    Eating 0-->independent    Communication 0-->understands/communicates without difficulty    Swallowing (if score 2 or more for any item, consult Rehab Services) 0-->swallows foods/liquids without difficulty    Change in Functional  Status Since Onset of Current Illness/Injury no    Functional Status Prior    Ambulation 0-->independent    Transferring 0-->independent    Toileting 0-->independent    Bathing 0-->independent    Dressing 0-->independent    Eating 0-->independent    Communication 0-->understands/communicates without difficulty    Swallowing 0-->swallows foods/liquids without difficulty    IADL    Medications independent    Meal Preparation independent    Housekeeping independent    Laundry independent    Shopping independent    Oral Care independent    Activity Tolerance    Current Activity Limitations none    Usual Activity Tolerance good    Current Activity Tolerance moderate            Psychosocial     None            Abuse/Neglect     None            Legal     None            Substance Abuse     None            Patient Forms     None          Swati Le, RN

## 2017-04-18 ENCOUNTER — APPOINTMENT (OUTPATIENT)
Dept: MRI IMAGING | Facility: HOSPITAL | Age: 67
End: 2017-04-18
Attending: HOSPITALIST

## 2017-04-18 LAB
ANION GAP SERPL CALCULATED.3IONS-SCNC: 13.3 MMOL/L
BASOPHILS # BLD AUTO: 0.01 10*3/MM3 (ref 0–0.2)
BASOPHILS NFR BLD AUTO: 0.1 % (ref 0–1.5)
BUN BLD-MCNC: 14 MG/DL (ref 8–23)
BUN/CREAT SERPL: 11.6 (ref 7–25)
CALCIUM SPEC-SCNC: 9.3 MG/DL (ref 8.6–10.5)
CHLORIDE SERPL-SCNC: 102 MMOL/L (ref 98–107)
CO2 SERPL-SCNC: 26.7 MMOL/L (ref 22–29)
CREAT BLD-MCNC: 1.21 MG/DL (ref 0.76–1.27)
DEPRECATED RDW RBC AUTO: 47.3 FL (ref 37–54)
EOSINOPHIL # BLD AUTO: 0.02 10*3/MM3 (ref 0–0.7)
EOSINOPHIL NFR BLD AUTO: 0.2 % (ref 0.3–6.2)
ERYTHROCYTE [DISTWIDTH] IN BLOOD BY AUTOMATED COUNT: 14.3 % (ref 11.5–14.5)
GFR SERPL CREATININE-BSD FRML MDRD: 59 ML/MIN/1.73
GLUCOSE BLD-MCNC: 161 MG/DL (ref 65–99)
HCT VFR BLD AUTO: 41 % (ref 40.4–52.2)
HGB BLD-MCNC: 13.3 G/DL (ref 13.7–17.6)
IMM GRANULOCYTES # BLD: 0.03 10*3/MM3 (ref 0–0.03)
IMM GRANULOCYTES NFR BLD: 0.3 % (ref 0–0.5)
LYMPHOCYTES # BLD AUTO: 0.79 10*3/MM3 (ref 0.9–4.8)
LYMPHOCYTES NFR BLD AUTO: 8.1 % (ref 19.6–45.3)
MCH RBC QN AUTO: 29.6 PG (ref 27–32.7)
MCHC RBC AUTO-ENTMCNC: 32.4 G/DL (ref 32.6–36.4)
MCV RBC AUTO: 91.3 FL (ref 79.8–96.2)
MONOCYTES # BLD AUTO: 0.26 10*3/MM3 (ref 0.2–1.2)
MONOCYTES NFR BLD AUTO: 2.7 % (ref 5–12)
NEUTROPHILS # BLD AUTO: 8.7 10*3/MM3 (ref 1.9–8.1)
NEUTROPHILS NFR BLD AUTO: 88.6 % (ref 42.7–76)
PLATELET # BLD AUTO: 134 10*3/MM3 (ref 140–500)
PMV BLD AUTO: 12 FL (ref 6–12)
POTASSIUM BLD-SCNC: 4.3 MMOL/L (ref 3.5–5.2)
RBC # BLD AUTO: 4.49 10*6/MM3 (ref 4.6–6)
SODIUM BLD-SCNC: 142 MMOL/L (ref 136–145)
WBC NRBC COR # BLD: 9.81 10*3/MM3 (ref 4.5–10.7)

## 2017-04-18 PROCEDURE — 25010000002 ENOXAPARIN PER 10 MG: Performed by: HOSPITALIST

## 2017-04-18 PROCEDURE — 85025 COMPLETE CBC W/AUTO DIFF WBC: CPT | Performed by: HOSPITALIST

## 2017-04-18 PROCEDURE — 0 GADOBENATE DIMEGLUMINE 529 MG/ML SOLUTION: Performed by: HOSPITALIST

## 2017-04-18 PROCEDURE — A9577 INJ MULTIHANCE: HCPCS | Performed by: HOSPITALIST

## 2017-04-18 PROCEDURE — 80048 BASIC METABOLIC PNL TOTAL CA: CPT | Performed by: HOSPITALIST

## 2017-04-18 PROCEDURE — 25010000002 VANCOMYCIN PER 500 MG: Performed by: HOSPITALIST

## 2017-04-18 PROCEDURE — 63710000001 PREDNISONE PER 1 MG: Performed by: HOSPITALIST

## 2017-04-18 PROCEDURE — 25010000002 PIPERACILLIN SOD-TAZOBACTAM PER 1 G

## 2017-04-18 PROCEDURE — 73720 MRI LWR EXTREMITY W/O&W/DYE: CPT

## 2017-04-18 RX ADMIN — PANTOPRAZOLE SODIUM 40 MG: 40 TABLET, DELAYED RELEASE ORAL at 07:06

## 2017-04-18 RX ADMIN — BUSPIRONE HYDROCHLORIDE 30 MG: 15 TABLET ORAL at 09:07

## 2017-04-18 RX ADMIN — TAZOBACTAM SODIUM AND PIPERACILLIN SODIUM 3.38 G: 375; 3 INJECTION, SOLUTION INTRAVENOUS at 19:37

## 2017-04-18 RX ADMIN — GADOBENATE DIMEGLUMINE 19 ML: 529 INJECTION, SOLUTION INTRAVENOUS at 16:00

## 2017-04-18 RX ADMIN — GADOBENATE DIMEGLUMINE 19 ML: 529 INJECTION, SOLUTION INTRAVENOUS at 16:45

## 2017-04-18 RX ADMIN — MODAFINIL 150 MG: 100 TABLET ORAL at 07:06

## 2017-04-18 RX ADMIN — BUSPIRONE HYDROCHLORIDE 30 MG: 15 TABLET ORAL at 22:42

## 2017-04-18 RX ADMIN — SERTRALINE 200 MG: 100 TABLET, FILM COATED ORAL at 07:06

## 2017-04-18 RX ADMIN — ASPIRIN 81 MG: 81 TABLET, CHEWABLE ORAL at 09:07

## 2017-04-18 RX ADMIN — COLCHICINE 0.6 MG: 0.6 TABLET, FILM COATED ORAL at 09:08

## 2017-04-18 RX ADMIN — VANCOMYCIN HYDROCHLORIDE 1000 MG: 1 INJECTION, SOLUTION INTRAVENOUS at 07:06

## 2017-04-18 RX ADMIN — QUETIAPINE FUMARATE 25 MG: 50 TABLET, FILM COATED ORAL at 09:07

## 2017-04-18 RX ADMIN — VANCOMYCIN HYDROCHLORIDE 1000 MG: 1 INJECTION, SOLUTION INTRAVENOUS at 17:41

## 2017-04-18 RX ADMIN — COLCHICINE 0.6 MG: 0.6 TABLET, FILM COATED ORAL at 22:43

## 2017-04-18 RX ADMIN — LEVOTHYROXINE SODIUM 100 MCG: 100 TABLET ORAL at 09:07

## 2017-04-18 RX ADMIN — TAZOBACTAM SODIUM AND PIPERACILLIN SODIUM 3.38 G: 375; 3 INJECTION, SOLUTION INTRAVENOUS at 03:32

## 2017-04-18 RX ADMIN — TAZOBACTAM SODIUM AND PIPERACILLIN SODIUM 3.38 G: 375; 3 INJECTION, SOLUTION INTRAVENOUS at 11:51

## 2017-04-18 RX ADMIN — ENOXAPARIN SODIUM 40 MG: 40 INJECTION SUBCUTANEOUS at 09:08

## 2017-04-18 RX ADMIN — LAMOTRIGINE 100 MG: 100 TABLET ORAL at 22:42

## 2017-04-18 RX ADMIN — PREDNISONE 40 MG: 20 TABLET ORAL at 09:08

## 2017-04-18 RX ADMIN — QUETIAPINE FUMARATE 50 MG: 50 TABLET, FILM COATED ORAL at 22:42

## 2017-04-18 RX ADMIN — LAMOTRIGINE 50 MG: 25 TABLET ORAL at 07:07

## 2017-04-18 NOTE — PLAN OF CARE
Problem: Patient Care Overview (Adult)  Goal: Plan of Care Review  Outcome: Ongoing (interventions implemented as appropriate)    04/18/17 0426 04/18/17 1801   Coping/Psychosocial Response Interventions   Plan Of Care Reviewed With --  patient   Patient Care Overview   Progress improving --    Outcome Evaluation   Outcome Summary/Follow up Plan --  Left foot has remained red, warm and tender to touch. Continues to receive IV abx. MRI done to left foot. Results pending.

## 2017-04-18 NOTE — PLAN OF CARE
Problem: Patient Care Overview (Adult)  Goal: Adult Individualization and Mutuality  Outcome: Ongoing (interventions implemented as appropriate)    Problem: Pain, Acute (Adult)  Goal: Acceptable Pain Control/Comfort Level  Outcome: Ongoing (interventions implemented as appropriate)

## 2017-04-18 NOTE — PLAN OF CARE
Problem: Patient Care Overview (Adult)  Goal: Plan of Care Review  Outcome: Ongoing (interventions implemented as appropriate)    04/18/17 0426   Coping/Psychosocial Response Interventions   Plan Of Care Reviewed With patient   Patient Care Overview   Progress improving   Outcome Evaluation   Outcome Summary/Follow up Plan Lortab for pain. Slept well. Left foot very red and sore.         Problem: Pain, Acute (Adult)  Goal: Acceptable Pain Control/Comfort Level  Outcome: Ongoing (interventions implemented as appropriate)

## 2017-04-18 NOTE — PLAN OF CARE
Problem: Pain, Acute (Adult)  Goal: Acceptable Pain Control/Comfort Level  Outcome: Ongoing (interventions implemented as appropriate)    04/17/17 2139   Pain, Acute (Adult)   Acceptable Pain Control/Comfort Level making progress toward outcome

## 2017-04-18 NOTE — PROGRESS NOTES
"DAILY PROGRESS NOTE  [facility]    Patient Identification:  Name: Kaiser Valente  Age: 67 y.o.  Sex: male  :  1950  MRN: [id number]         Primary Care Physician: Jared Noriega MD    Subjective:  Interval History:Pain in left foot better.Still some swelling    Objective:    Scheduled Meds:    aspirin 81 mg Oral Daily   busPIRone 30 mg Oral Q12H   colchicine 0.6 mg Oral Q12H   enoxaparin 40 mg Subcutaneous Daily   lamoTRIgine 100 mg Oral Nightly   lamoTRIgine 50 mg Oral QAM   levothyroxine 100 mcg Oral Daily   modafinil 150 mg Oral QAM   pantoprazole 40 mg Oral Q AM   piperacillin-tazobactam 3.375 g Intravenous Q8H   predniSONE 40 mg Oral Daily With Breakfast   QUEtiapine 25 mg Oral Daily With Breakfast   QUEtiapine 50 mg Oral Nightly   sertraline 200 mg Oral QAM   vancomycin 1,000 mg Intravenous Q12H     Continuous Infusions:    Pharmacy to dose vancomycin        Vital signs in last 24 hours:  Temp:  [97.1 °F (36.2 °C)-98.2 °F (36.8 °C)] 97.1 °F (36.2 °C)  Heart Rate:  [69-97] 97  Resp:  [20] 20  BP: (127-164)/(77-95) 127/77    Intake/Output:    Intake/Output Summary (Last 24 hours) at 17 1159  Last data filed at 17 0518   Gross per 24 hour   Intake              420 ml   Output             1200 ml   Net             -780 ml       Exam:  /77 (BP Location: Right arm, Patient Position: Lying)  Pulse 97  Temp 97.1 °F (36.2 °C) (Oral)   Resp 20  Ht 70\" (177.8 cm)  Wt 210 lb 14.4 oz (95.7 kg)  SpO2 98%  BMI 30.26 kg/m2    General Appearance:    Alert, cooperative, no distress   Head:    Normocephalic, without obvious abnormality, atraumatic   Eyes:       Throat:   Lips, tongue, gums normal   Neck:   Supple, symmetrical, trachea midline, no JVD   Lungs:     Clear to auscultation bilaterally, respirations unlabored   Chest Wall:    No tenderness or deformity    Heart:    Regular rate and rhythm, S1 and S2 normal, no murmur,no  Rub or gallop   Abdomen:     Soft, " non-tender, bowel sounds active, no masses, no organomegaly    Extremities:   Extremities normal, atraumatic, no cyanosis , cellulitis and edema left foot   Pulses:      Skin:   Skin is warm and dry,  no rashes or palpable lesions   Neurologic:   no focal deficits noted      [unfilled]  Data Review:    Results from last 7 days  Lab Units 04/18/17  0709 04/17/17  0534 04/16/17  0608   SODIUM mmol/L 142 141 142   POTASSIUM mmol/L 4.3 4.3 4.4   CHLORIDE mmol/L 102 104 106   TOTAL CO2 mmol/L 26.7 23.6 26.1   BUN mg/dL 14 16 17   CREATININE mg/dL 1.21 1.22 1.10   GLUCOSE mg/dL 161* 94 108*   CALCIUM mg/dL 9.3 8.5* 8.7       Results from last 7 days  Lab Units 04/18/17  0709 04/17/17  0534 04/16/17  0608   WBC 10*3/mm3 9.81 8.67 9.18   HEMOGLOBIN g/dL 13.3* 12.4* 12.1*   HEMATOCRIT % 41.0 37.9* 37.4*   PLATELETS 10*3/mm3 134* 108* 114*       Results from last 7 days  Lab Units 04/11/17  1237   TSH uIU/mL 4.030   T3 FREE pg/mL 2.5   FREE T4 ng/dL 0.72*         No results found for: TROPONINT    Results from last 7 days  Lab Units 04/11/17  1237   TRIGLYCERIDES mg/dL 224*   HDL CHOL mg/dL 45   LDL CHOL mg/dL 136*       Results from last 7 days  Lab Units 04/15/17  1458 04/11/17  1237   ALK PHOS U/L 101 117   BILIRUBIN mg/dL 0.2 0.3   ALT (SGPT) U/L 23 15   AST (SGOT) U/L 22 20       Results from last 7 days  Lab Units 04/11/17  1237   TSH uIU/mL 4.030   T3 FREE pg/mL 2.5   FREE T4 ng/dL 0.72*         No results found for: POCGLU        Patient Active Problem List   Diagnosis Code   • Acquired hypothyroidism E03.9   • Osteoarthritis of both knees M17.0   • Gastroesophageal reflux disease without esophagitis K21.9   • Hypertriglyceridemia E78.1   • Monoclonal gammopathy of undetermined significance D47.2   • Hypercalcemia E83.52   • Chronic fatigue R53.82   • Thrombocytopenia D69.6   • History of primary hyperparathyroidism Z86.39   • Hypogonadism in male E29.1   • Localized edema R60.0   • Toe pain, left M79.675   •  Cellulitis of left lower extremity L03.116   • Depression F32.9   • Cellulitis of foot, left L03.116   • Gout M10.9       Assessment:  Principal Problem:    Cellulitis of left lower extremity  Active Problems:    Acquired hypothyroidism    Osteoarthritis of both knees    Gastroesophageal reflux disease without esophagitis    Hypertriglyceridemia    Monoclonal gammopathy of undetermined significance    Chronic fatigue    History of primary hyperparathyroidism    Depression    Cellulitis of foot, left    Gout      Plan:  Continue antibiotics and  Colchicine. Add prednisone.  Check MRI of left foor    Zelalem Mclaughlin MD  4/18/2017  11:59 AM

## 2017-04-19 LAB
ANION GAP SERPL CALCULATED.3IONS-SCNC: 12.9 MMOL/L
BASOPHILS # BLD AUTO: 0.02 10*3/MM3 (ref 0–0.2)
BASOPHILS NFR BLD AUTO: 0.2 % (ref 0–1.5)
BUN BLD-MCNC: 18 MG/DL (ref 8–23)
BUN/CREAT SERPL: 15.3 (ref 7–25)
CALCIUM SPEC-SCNC: 9 MG/DL (ref 8.6–10.5)
CHLORIDE SERPL-SCNC: 101 MMOL/L (ref 98–107)
CO2 SERPL-SCNC: 26.1 MMOL/L (ref 22–29)
CREAT BLD-MCNC: 1.18 MG/DL (ref 0.76–1.27)
DEPRECATED RDW RBC AUTO: 48.7 FL (ref 37–54)
EOSINOPHIL # BLD AUTO: 0.11 10*3/MM3 (ref 0–0.7)
EOSINOPHIL NFR BLD AUTO: 1.2 % (ref 0.3–6.2)
ERYTHROCYTE [DISTWIDTH] IN BLOOD BY AUTOMATED COUNT: 14.5 % (ref 11.5–14.5)
GFR SERPL CREATININE-BSD FRML MDRD: 61 ML/MIN/1.73
GLUCOSE BLD-MCNC: 133 MG/DL (ref 65–99)
HCT VFR BLD AUTO: 39.5 % (ref 40.4–52.2)
HGB BLD-MCNC: 12.8 G/DL (ref 13.7–17.6)
IMM GRANULOCYTES # BLD: 0.04 10*3/MM3 (ref 0–0.03)
IMM GRANULOCYTES NFR BLD: 0.4 % (ref 0–0.5)
LYMPHOCYTES # BLD AUTO: 1.41 10*3/MM3 (ref 0.9–4.8)
LYMPHOCYTES NFR BLD AUTO: 15.4 % (ref 19.6–45.3)
MCH RBC QN AUTO: 29.6 PG (ref 27–32.7)
MCHC RBC AUTO-ENTMCNC: 32.4 G/DL (ref 32.6–36.4)
MCV RBC AUTO: 91.2 FL (ref 79.8–96.2)
MONOCYTES # BLD AUTO: 0.27 10*3/MM3 (ref 0.2–1.2)
MONOCYTES NFR BLD AUTO: 2.9 % (ref 5–12)
NEUTROPHILS # BLD AUTO: 7.33 10*3/MM3 (ref 1.9–8.1)
NEUTROPHILS NFR BLD AUTO: 79.9 % (ref 42.7–76)
PLATELET # BLD AUTO: 127 10*3/MM3 (ref 140–500)
PMV BLD AUTO: 11.5 FL (ref 6–12)
POTASSIUM BLD-SCNC: 3.8 MMOL/L (ref 3.5–5.2)
RBC # BLD AUTO: 4.33 10*6/MM3 (ref 4.6–6)
SODIUM BLD-SCNC: 140 MMOL/L (ref 136–145)
VANCOMYCIN TROUGH SERPL-MCNC: 13.4 MCG/ML (ref 5–20)
WBC NRBC COR # BLD: 9.18 10*3/MM3 (ref 4.5–10.7)

## 2017-04-19 PROCEDURE — 63710000001 PREDNISONE PER 1 MG: Performed by: HOSPITALIST

## 2017-04-19 PROCEDURE — 80048 BASIC METABOLIC PNL TOTAL CA: CPT | Performed by: HOSPITALIST

## 2017-04-19 PROCEDURE — 25010000002 VANCOMYCIN PER 500 MG: Performed by: HOSPITALIST

## 2017-04-19 PROCEDURE — 25010000002 PIPERACILLIN SOD-TAZOBACTAM PER 1 G

## 2017-04-19 PROCEDURE — 85025 COMPLETE CBC W/AUTO DIFF WBC: CPT | Performed by: HOSPITALIST

## 2017-04-19 PROCEDURE — 80202 ASSAY OF VANCOMYCIN: CPT | Performed by: HOSPITALIST

## 2017-04-19 PROCEDURE — 25010000002 ENOXAPARIN PER 10 MG: Performed by: HOSPITALIST

## 2017-04-19 RX ORDER — LEVOTHYROXINE SODIUM 0.12 MG/1
125 TABLET ORAL DAILY
Qty: 30 TABLET | Refills: 5 | Status: SHIPPED | OUTPATIENT
Start: 2017-04-19 | End: 2017-10-16

## 2017-04-19 RX ORDER — ATORVASTATIN CALCIUM 10 MG/1
10 TABLET, FILM COATED ORAL DAILY
Qty: 30 TABLET | Refills: 5 | Status: SHIPPED | OUTPATIENT
Start: 2017-04-19 | End: 2017-04-28

## 2017-04-19 RX ORDER — TESTOSTERONE CYPIONATE 200 MG/ML
INJECTION, SOLUTION INTRAMUSCULAR
Qty: 10 ML | Refills: 0
Start: 2017-04-19 | End: 2017-06-02 | Stop reason: SDUPTHER

## 2017-04-19 RX ORDER — ALLOPURINOL 100 MG/1
100 TABLET ORAL DAILY
Qty: 30 TABLET | Refills: 5 | Status: SHIPPED | OUTPATIENT
Start: 2017-04-19 | End: 2017-10-14 | Stop reason: SDUPTHER

## 2017-04-19 RX ADMIN — HYDROCODONE BITARTRATE AND ACETAMINOPHEN 1 TABLET: 5; 325 TABLET ORAL at 09:59

## 2017-04-19 RX ADMIN — QUETIAPINE FUMARATE 50 MG: 50 TABLET, FILM COATED ORAL at 20:05

## 2017-04-19 RX ADMIN — HYDROCODONE BITARTRATE AND ACETAMINOPHEN 1 TABLET: 5; 325 TABLET ORAL at 20:05

## 2017-04-19 RX ADMIN — BUSPIRONE HYDROCHLORIDE 30 MG: 15 TABLET ORAL at 09:59

## 2017-04-19 RX ADMIN — ENOXAPARIN SODIUM 40 MG: 40 INJECTION SUBCUTANEOUS at 09:59

## 2017-04-19 RX ADMIN — COLCHICINE 0.6 MG: 0.6 TABLET, FILM COATED ORAL at 09:59

## 2017-04-19 RX ADMIN — SERTRALINE 200 MG: 100 TABLET, FILM COATED ORAL at 06:58

## 2017-04-19 RX ADMIN — TAZOBACTAM SODIUM AND PIPERACILLIN SODIUM 3.38 G: 375; 3 INJECTION, SOLUTION INTRAVENOUS at 11:53

## 2017-04-19 RX ADMIN — TAZOBACTAM SODIUM AND PIPERACILLIN SODIUM 3.38 G: 375; 3 INJECTION, SOLUTION INTRAVENOUS at 03:27

## 2017-04-19 RX ADMIN — PREDNISONE 40 MG: 20 TABLET ORAL at 09:59

## 2017-04-19 RX ADMIN — LAMOTRIGINE 100 MG: 100 TABLET ORAL at 20:05

## 2017-04-19 RX ADMIN — HYDROCODONE BITARTRATE AND ACETAMINOPHEN 1 TABLET: 5; 325 TABLET ORAL at 04:21

## 2017-04-19 RX ADMIN — BUSPIRONE HYDROCHLORIDE 30 MG: 15 TABLET ORAL at 20:05

## 2017-04-19 RX ADMIN — COLCHICINE 0.6 MG: 0.6 TABLET, FILM COATED ORAL at 20:05

## 2017-04-19 RX ADMIN — TAZOBACTAM SODIUM AND PIPERACILLIN SODIUM 3.38 G: 375; 3 INJECTION, SOLUTION INTRAVENOUS at 18:03

## 2017-04-19 RX ADMIN — QUETIAPINE FUMARATE 25 MG: 50 TABLET, FILM COATED ORAL at 09:59

## 2017-04-19 RX ADMIN — LEVOTHYROXINE SODIUM 100 MCG: 100 TABLET ORAL at 09:59

## 2017-04-19 RX ADMIN — LAMOTRIGINE 50 MG: 25 TABLET ORAL at 06:59

## 2017-04-19 RX ADMIN — PANTOPRAZOLE SODIUM 40 MG: 40 TABLET, DELAYED RELEASE ORAL at 06:58

## 2017-04-19 RX ADMIN — VANCOMYCIN HYDROCHLORIDE 1000 MG: 1 INJECTION, SOLUTION INTRAVENOUS at 09:59

## 2017-04-19 RX ADMIN — VANCOMYCIN HYDROCHLORIDE 1000 MG: 1 INJECTION, SOLUTION INTRAVENOUS at 18:04

## 2017-04-19 RX ADMIN — MODAFINIL 150 MG: 100 TABLET ORAL at 06:58

## 2017-04-19 RX ADMIN — ASPIRIN 81 MG: 81 TABLET, CHEWABLE ORAL at 09:59

## 2017-04-19 NOTE — PROGRESS NOTES
"DAILY PROGRESS NOTE  [facility]    Patient Identification:  Name: Kaiser Valente  Age: 67 y.o.  Sex: male  :  1950  MRN: [id number]         Primary Care Physician: Jared Noriega MD    Subjective:  Interval History:Pain in left foot better.  Still some swelling. No new complaints    Objective:    Scheduled Meds:    aspirin 81 mg Oral Daily   busPIRone 30 mg Oral Q12H   colchicine 0.6 mg Oral Q12H   enoxaparin 40 mg Subcutaneous Daily   lamoTRIgine 100 mg Oral Nightly   lamoTRIgine 50 mg Oral QAM   levothyroxine 100 mcg Oral Daily   modafinil 150 mg Oral QAM   pantoprazole 40 mg Oral Q AM   piperacillin-tazobactam 3.375 g Intravenous Q8H   predniSONE 40 mg Oral Daily With Breakfast   QUEtiapine 25 mg Oral Daily With Breakfast   QUEtiapine 50 mg Oral Nightly   sertraline 200 mg Oral QAM   vancomycin 1,000 mg Intravenous Q12H     Continuous Infusions:    Pharmacy to dose vancomycin        Vital signs in last 24 hours:  Temp:  [96.8 °F (36 °C)-98.3 °F (36.8 °C)] 96.8 °F (36 °C)  Heart Rate:  [65-79] 65  Resp:  [18-20] 18  BP: (120-161)/(68-90) 158/90    Intake/Output:    Intake/Output Summary (Last 24 hours) at 17 1142  Last data filed at 17 0327   Gross per 24 hour   Intake              410 ml   Output                0 ml   Net              410 ml       Exam:  /90 (BP Location: Right arm, Patient Position: Sitting)  Pulse 65  Temp 96.8 °F (36 °C) (Oral)   Resp 18  Ht 70\" (177.8 cm)  Wt 210 lb 14.4 oz (95.7 kg)  SpO2 98%  BMI 30.26 kg/m2    General Appearance:    Alert, cooperative, no distress   Head:    Normocephalic, without obvious abnormality, atraumatic   Eyes:       Throat:   Lips, tongue, gums normal   Neck:   Supple, symmetrical, trachea midline, no JVD   Lungs:     Clear to auscultation bilaterally, respirations unlabored   Chest Wall:    No tenderness or deformity    Heart:    Regular rate and rhythm, S1 and S2 normal, no murmur,no  Rub or gallop   Abdomen: "     Soft, non-tender, bowel sounds active, no masses, no organomegaly    Extremities:   Extremities normal, atraumatic, no cyanosis , cellulitis and edema left foot is better   Pulses:      Skin:   Skin is warm and dry,  no rashes or palpable lesions   Neurologic:   no focal deficits noted      [unfilled]  Data Review:    Results from last 7 days  Lab Units 04/19/17  0545 04/18/17  0709 04/17/17  0534   SODIUM mmol/L 140 142 141   POTASSIUM mmol/L 3.8 4.3 4.3   CHLORIDE mmol/L 101 102 104   TOTAL CO2 mmol/L 26.1 26.7 23.6   BUN mg/dL 18 14 16   CREATININE mg/dL 1.18 1.21 1.22   GLUCOSE mg/dL 133* 161* 94   CALCIUM mg/dL 9.0 9.3 8.5*       Results from last 7 days  Lab Units 04/19/17  0545 04/18/17  0709 04/17/17  0534   WBC 10*3/mm3 9.18 9.81 8.67   HEMOGLOBIN g/dL 12.8* 13.3* 12.4*   HEMATOCRIT % 39.5* 41.0 37.9*   PLATELETS 10*3/mm3 127* 134* 108*             No results found for: TROPONINT        Results from last 7 days  Lab Units 04/15/17  1458   ALK PHOS U/L 101   BILIRUBIN mg/dL 0.2   ALT (SGPT) U/L 23   AST (SGOT) U/L 22             No results found for: POCGLU        Patient Active Problem List   Diagnosis Code   • Acquired hypothyroidism E03.9   • Osteoarthritis of both knees M17.0   • Gastroesophageal reflux disease without esophagitis K21.9   • Hypertriglyceridemia E78.1   • Monoclonal gammopathy of undetermined significance D47.2   • Hypercalcemia E83.52   • Chronic fatigue R53.82   • Thrombocytopenia D69.6   • History of primary hyperparathyroidism Z86.39   • Hypogonadism in male E29.1   • Localized edema R60.0   • Toe pain, left M79.675   • Cellulitis of left lower extremity L03.116   • Depression F32.9   • Cellulitis of foot, left L03.116   • Gout M10.9       Assessment:  Principal Problem:    Cellulitis of left lower extremity  Active Problems:    Acquired hypothyroidism    Osteoarthritis of both knees    Gastroesophageal reflux disease without esophagitis    Hypertriglyceridemia    Monoclonal  gammopathy of undetermined significance    Chronic fatigue    History of primary hyperparathyroidism    Depression    Cellulitis of foot, left    Gout      Plan:  Continue antibiotics and  Colchicine and prednisone.  Await results of MRI foot    Zelalem Mclaughlin MD  4/19/2017  11:42 AM

## 2017-04-19 NOTE — PLAN OF CARE
Problem: Patient Care Overview (Adult)  Goal: Plan of Care Review  Outcome: Ongoing (interventions implemented as appropriate)    04/19/17 1326   Outcome Evaluation   Outcome Summary/Follow up Plan pain control, iv antibiotics, prednisone, cochicine,        Goal: Adult Individualization and Mutuality  Outcome: Ongoing (interventions implemented as appropriate)  Goal: Discharge Needs Assessment  Outcome: Ongoing (interventions implemented as appropriate)    Problem: Pain, Acute (Adult)  Goal: Acceptable Pain Control/Comfort Level  Outcome: Ongoing (interventions implemented as appropriate)    04/19/17 1326   Pain, Acute (Adult)   Acceptable Pain Control/Comfort Level making progress toward outcome

## 2017-04-19 NOTE — PROGRESS NOTES
"Pharmacokinetic Consult - Vancomycin Dosing (Follow-up Note)    Kaiser Valente is a 67 y.o. male who is on day 5 pharmacy to dose vancomycin for SSTI/LLE cellulitis Was on Keflex prior to admission (2-3 days before being admitted).  Pharmacy dosing vancomycin per Dr. Mclaughlin's request.   Other antimicrobials: Zosyn q8h extended infusion  Goal trough: 10-20 mg/L    Current Vancomycin dose: 1000mg q12h       Relevant clinical data and objective history reviewed:  70\" (177.8 cm)  210 lb 14.4 oz (95.7 kg)  Body mass index is 30.26 kg/(m^2).     He has a past medical history of Anxiety; Depression; GERD (gastroesophageal reflux disease); Hypercalcemia; Hypothyroidism; Inverted T wave; Osteoarthritis; Parathyroid disease; PONV (postoperative nausea and vomiting); PTSD (post-traumatic stress disorder); and Sleep apnea.    Allergies as of 04/15/2017 - Robby as Reviewed 04/15/2017   Allergen Reaction Noted   • Codeine  03/21/2016     Vital Signs (last 24 hours)       04/18 0700  -  04/19 0659 04/19 0700  -  04/19 0822   Most Recent    Temp (°F) 96.9 -  98.3       96.9 (36.1)    Heart Rate 65 -  79       65    Resp   20       20    /68 -  161/85       161/85    SpO2 (%) 95 -  98       95        Estimated Creatinine Clearance: 68.6 mL/min (by C-G formula based on Cr of 1.18).    Results from last 7 days  Lab Units 04/19/17  0545 04/18/17  0709 04/17/17  0534   CREATININE mg/dL 1.18 1.21 1.22       Results from last 7 days  Lab Units 04/19/17  0545 04/18/17  0709 04/17/17  0534   WBC 10*3/mm3 9.18 9.81 8.67       Imaging:  MRI done 4/18, results pending    Labs:  Scr 1.18 (stable)    IV Anti-Infectives     Ordered     Dose/Rate Route Frequency Start Stop    04/17/17 0913  piperacillin-tazobactam (ZOSYN) 3.375 g in iso-osmotic dextrose 50 ml (premix)     Ordering Provider:  Clyde Claudio RPH    3.375 g  over 4 Hours Intravenous Every 8 Hours 04/17/17 1100      04/15/17 1431  vancomycin (VANCOCIN) in iso-osmotic dextrose " IVPB 1 g (premix) 200 mL     Ordering Provider:  Zelalem Mclaughlin MD    1,000 mg  over 90 Minutes Intravenous Every 12 Hours 04/16/17 0600      04/15/17 1431  vancomycin 1750 mg/500 mL 0.9% NS IVPB ([facility])     Ordering Provider:  Zelalem Mclaughlin MD    1,750 mg  over 180 Minutes Intravenous Once 04/15/17 1515 04/15/17 2248    04/15/17 1329  Pharmacy to dose vancomycin     Ordering Provider:  Zelalem Mclaughlin MD     Does not apply Continuous PRN 04/15/17 1328           Lab Results   Component Value Date    [facility] 13.40 04/19/2017       Assessment/Plan  1) Vancomycin trough 13.4 drawn appropriately 12 hours after previous dose. Within goal range of 10-20 mg/L. Continue vancomycin 1000mg q12h.  2) Will continue to monitor for clinical signs of improvement. Pt afebrile with WBC <10, but still with erythema and pain. MRI performed 4/18 but results still pending. May adjust goal trough to 15-20 mg/L if no signs of clinical improvement.  3)Renal function is currently stable with CrCl ~68 mL/min. Will continue to monitor and make adjustments as needed.     Pharmacy will continue to follow daily while on vancomycin and adjust as needed.     Thanks, Rhonda Noe, PharmD, MPA, BCPS

## 2017-04-19 NOTE — PLAN OF CARE
Problem: Patient Care Overview (Adult)  Goal: Plan of Care Review  Outcome: Ongoing (interventions implemented as appropriate)    04/19/17 0510   Coping/Psychosocial Response Interventions   Plan Of Care Reviewed With patient   Patient Care Overview   Progress no change   Outcome Evaluation   Outcome Summary/Follow up Plan Left foot still red and warm to touch. Norco given x1. IV abx given.          Problem: Pain, Acute (Adult)  Goal: Acceptable Pain Control/Comfort Level  Outcome: Ongoing (interventions implemented as appropriate)

## 2017-04-20 LAB
ANION GAP SERPL CALCULATED.3IONS-SCNC: 12.2 MMOL/L
BASOPHILS # BLD AUTO: 0.03 10*3/MM3 (ref 0–0.2)
BASOPHILS NFR BLD AUTO: 0.3 % (ref 0–1.5)
BUN BLD-MCNC: 17 MG/DL (ref 8–23)
BUN/CREAT SERPL: 16.3 (ref 7–25)
CALCIUM SPEC-SCNC: 8.9 MG/DL (ref 8.6–10.5)
CHLORIDE SERPL-SCNC: 105 MMOL/L (ref 98–107)
CO2 SERPL-SCNC: 23.8 MMOL/L (ref 22–29)
CREAT BLD-MCNC: 1.04 MG/DL (ref 0.76–1.27)
CRP SERPL-MCNC: 1.71 MG/DL (ref 0–0.5)
DEPRECATED RDW RBC AUTO: 49 FL (ref 37–54)
EOSINOPHIL # BLD AUTO: 0.08 10*3/MM3 (ref 0–0.7)
EOSINOPHIL NFR BLD AUTO: 0.9 % (ref 0.3–6.2)
ERYTHROCYTE [DISTWIDTH] IN BLOOD BY AUTOMATED COUNT: 14.6 % (ref 11.5–14.5)
ERYTHROCYTE [SEDIMENTATION RATE] IN BLOOD: 47 MM/HR (ref 0–20)
GFR SERPL CREATININE-BSD FRML MDRD: 71 ML/MIN/1.73
GLUCOSE BLD-MCNC: 96 MG/DL (ref 65–99)
HCT VFR BLD AUTO: 38.1 % (ref 40.4–52.2)
HGB BLD-MCNC: 12.5 G/DL (ref 13.7–17.6)
IMM GRANULOCYTES # BLD: 0.06 10*3/MM3 (ref 0–0.03)
IMM GRANULOCYTES NFR BLD: 0.7 % (ref 0–0.5)
LYMPHOCYTES # BLD AUTO: 1.5 10*3/MM3 (ref 0.9–4.8)
LYMPHOCYTES NFR BLD AUTO: 16.6 % (ref 19.6–45.3)
MCH RBC QN AUTO: 30 PG (ref 27–32.7)
MCHC RBC AUTO-ENTMCNC: 32.8 G/DL (ref 32.6–36.4)
MCV RBC AUTO: 91.4 FL (ref 79.8–96.2)
MONOCYTES # BLD AUTO: 0.54 10*3/MM3 (ref 0.2–1.2)
MONOCYTES NFR BLD AUTO: 6 % (ref 5–12)
NEUTROPHILS # BLD AUTO: 6.83 10*3/MM3 (ref 1.9–8.1)
NEUTROPHILS NFR BLD AUTO: 75.5 % (ref 42.7–76)
PLATELET # BLD AUTO: 129 10*3/MM3 (ref 140–500)
PMV BLD AUTO: 11.3 FL (ref 6–12)
POTASSIUM BLD-SCNC: 4 MMOL/L (ref 3.5–5.2)
RBC # BLD AUTO: 4.17 10*6/MM3 (ref 4.6–6)
SODIUM BLD-SCNC: 141 MMOL/L (ref 136–145)
URATE SERPL-MCNC: 3.9 MG/DL (ref 3.4–7)
WBC NRBC COR # BLD: 9.04 10*3/MM3 (ref 4.5–10.7)

## 2017-04-20 PROCEDURE — 80048 BASIC METABOLIC PNL TOTAL CA: CPT | Performed by: HOSPITALIST

## 2017-04-20 PROCEDURE — 85652 RBC SED RATE AUTOMATED: CPT | Performed by: HOSPITALIST

## 2017-04-20 PROCEDURE — 84550 ASSAY OF BLOOD/URIC ACID: CPT | Performed by: HOSPITALIST

## 2017-04-20 PROCEDURE — 63710000001 PREDNISONE PER 1 MG: Performed by: HOSPITALIST

## 2017-04-20 PROCEDURE — 86140 C-REACTIVE PROTEIN: CPT | Performed by: HOSPITALIST

## 2017-04-20 PROCEDURE — 25010000002 VANCOMYCIN PER 500 MG: Performed by: HOSPITALIST

## 2017-04-20 PROCEDURE — 85025 COMPLETE CBC W/AUTO DIFF WBC: CPT | Performed by: HOSPITALIST

## 2017-04-20 PROCEDURE — 25010000002 PIPERACILLIN SOD-TAZOBACTAM PER 1 G

## 2017-04-20 PROCEDURE — 25010000002 ENOXAPARIN PER 10 MG: Performed by: HOSPITALIST

## 2017-04-20 RX ADMIN — TAZOBACTAM SODIUM AND PIPERACILLIN SODIUM 3.38 G: 375; 3 INJECTION, SOLUTION INTRAVENOUS at 03:36

## 2017-04-20 RX ADMIN — BUSPIRONE HYDROCHLORIDE 30 MG: 15 TABLET ORAL at 09:05

## 2017-04-20 RX ADMIN — COLCHICINE 0.6 MG: 0.6 TABLET, FILM COATED ORAL at 21:03

## 2017-04-20 RX ADMIN — ASPIRIN 81 MG: 81 TABLET, CHEWABLE ORAL at 09:05

## 2017-04-20 RX ADMIN — ENOXAPARIN SODIUM 40 MG: 40 INJECTION SUBCUTANEOUS at 09:05

## 2017-04-20 RX ADMIN — SERTRALINE 200 MG: 100 TABLET, FILM COATED ORAL at 06:52

## 2017-04-20 RX ADMIN — LAMOTRIGINE 100 MG: 100 TABLET ORAL at 21:03

## 2017-04-20 RX ADMIN — PREDNISONE 40 MG: 20 TABLET ORAL at 09:05

## 2017-04-20 RX ADMIN — LAMOTRIGINE 50 MG: 25 TABLET ORAL at 06:52

## 2017-04-20 RX ADMIN — MODAFINIL 150 MG: 100 TABLET ORAL at 06:53

## 2017-04-20 RX ADMIN — HYDROCODONE BITARTRATE AND ACETAMINOPHEN 1 TABLET: 5; 325 TABLET ORAL at 07:05

## 2017-04-20 RX ADMIN — TAZOBACTAM SODIUM AND PIPERACILLIN SODIUM 3.38 G: 375; 3 INJECTION, SOLUTION INTRAVENOUS at 12:22

## 2017-04-20 RX ADMIN — VANCOMYCIN HYDROCHLORIDE 1000 MG: 1 INJECTION, SOLUTION INTRAVENOUS at 06:51

## 2017-04-20 RX ADMIN — BUSPIRONE HYDROCHLORIDE 30 MG: 15 TABLET ORAL at 21:03

## 2017-04-20 RX ADMIN — LEVOTHYROXINE SODIUM 100 MCG: 100 TABLET ORAL at 09:04

## 2017-04-20 RX ADMIN — QUETIAPINE FUMARATE 25 MG: 50 TABLET, FILM COATED ORAL at 09:04

## 2017-04-20 RX ADMIN — PANTOPRAZOLE SODIUM 40 MG: 40 TABLET, DELAYED RELEASE ORAL at 06:51

## 2017-04-20 RX ADMIN — HYDROCODONE BITARTRATE AND ACETAMINOPHEN 1 TABLET: 5; 325 TABLET ORAL at 00:15

## 2017-04-20 RX ADMIN — QUETIAPINE FUMARATE 50 MG: 50 TABLET, FILM COATED ORAL at 21:03

## 2017-04-20 RX ADMIN — COLCHICINE 0.6 MG: 0.6 TABLET, FILM COATED ORAL at 09:05

## 2017-04-20 NOTE — PLAN OF CARE
Problem: Patient Care Overview (Adult)  Goal: Plan of Care Review  Outcome: Ongoing (interventions implemented as appropriate)    04/20/17 1340   Outcome Evaluation   Outcome Summary/Follow up Plan iv antibiotics, pain control id to see       Goal: Adult Individualization and Mutuality  Outcome: Ongoing (interventions implemented as appropriate)  Goal: Discharge Needs Assessment  Outcome: Ongoing (interventions implemented as appropriate)    Problem: Pain, Acute (Adult)  Goal: Acceptable Pain Control/Comfort Level  Outcome: Ongoing (interventions implemented as appropriate)    04/20/17 1340   Pain, Acute (Adult)   Acceptable Pain Control/Comfort Level making progress toward outcome

## 2017-04-20 NOTE — CONSULTS
CONSULT NOTE    Infectious Diseases - Brigid York MD  [facility]       Patient Identification:  Name: Kaiser Valente  Age: 67 y.o.  Sex: male  :  1950  MRN: [id number]             Date of Consultation:  2017       Primary Care Physician: Jared Noriega MD                               Requesting Physician: Dr. Mclaughlin  Reason for Consultation: Cellulitis versus gout of the left foot.    Impression: 67-year-old male with conjugated past medical history has been battling with left foot pain swelling and redness since Monday the  of this month.  Has been managed for 2 clinical diagnosis with concomitant antibiotic and steroid treatment since the  of this month with no underlying systemic symptoms of fever chills or decrease in appetite or night sweats during or few weeks prior to the onset of left foot pain and vital on the regimen of steroids and antibiotics shows some improvement in the redness but still have swelling and discomfort upon bearing weight which gets better with activity.  Erythema also gets better with elevation and gets worse with dependent position of the foot.  This presentation in the above context is concerning for:  1-gout versus pseudogout with possible left fourth toe distal interphalangeal joint involvement.  2-possible cellulitis with septic arthritis of the fourth DIP, status post 7 days of aggressive antibiotic regimen consisting of initially oral Keflex followed by IM often and continued use of vancomycin and Zosyn during this hospitalization.  3-history of athlete's feet involving both feet with no obvious evidence of it present at this time  4-other diagnosis:  · Acquired hypothyroidism  · Osteoarthritis of both knees  · Gastroesophageal reflux disease without esophagitis  · Hypertriglyceridemia  · Monoclonal gammopathy of undetermined significance  · Chronic fatigue  · History of primary hyperparathyroidism  · Depression      Recommendations/Discussions: This is a very difficult situation.  On one hand he does not have any systemic signs and symptoms of sepsis during or prior to the onset of discomfort in the left foot.  The fluctuating erythema based on the posture and his inability to bear weight on the left foot which gets better with subsequent movement and activities argues against active infection in the foot in general and specifically to the left fourth toe.  This could very well be due to the effect of steroid and partial treatment with antibiotics and was noted to be sure if it is not infected on infected at this juncture of his clinical course because of the concomitant use of steroid and antibiotics. My recs:     A- The best course of action would be :   -  to discontinue antibiotics now    -  and treat aggressively for gout and pseudogout and    -  Monitor him very closely for any worsening of pain, erythema, and development of any systemic signs and symptoms of sepsis.      B- If he develops any of the signs and symptoms of evolving infection while being aggressively treated for gout and pseudogout , then    -  he needs to be brought back to the hospital for    -  blood cultures repeat MRI of the foot and    -  consideration for arthrocentesis and orthopedic evaluation before initiating antibiotic therapy.    In the meantime I have recommended him to use Ace wrap on his foot when he is ambulating intravenous with dependent and it when he is reclining and elevating his left foot.  He can be discharged on 4/21/2017 with instructions to follow-up with his primary care physician Dr. Tomas as well as follow-up with me on 4/26/2017.  I have given him a phone number to call my office to make appointment which is [phone].  I have instructed him to call me thru my answering service if his condition deteriorates in terms of fever chills increasing redness and swelling of the foot.  Thank you Dr. Mclaughlin for letting me be the  part of the patient care please see above impression and recommendation        History of Present Illness:   Patient is 67-year-old male who has complicated past medical history as detailed below was in his usual state of his health when he woke up Monday morning felt that his left foot was hurting.  Initially it was manageable but in the subsequent 48 hours it became very painful,  he eventually saw his primary care physician and was started on treatment for acute gout of the left fourth toe with concomitant cellulitis.  Steroids and Keflex.  He was instructed to follow-up next day which he did and his symptoms were worse she was given IM injection of Rocephin 500 mg and instructed to continue steroid and antibiotic therapy.  He called his primary care physician on Saturday the 15th of this month was complaining to him that he is not improved and that resulted in him being hospitalized.  Since his admission to the hospital and 15 he has been on IV vancomycin and Zosyn, as well as steroids.  He has some improvement in terms of erythema but he still have pain and discomfort when he bears weight.  Patient is at times vague in his description of his symptoms but his wife and him overall agreed that his redness of the foot is better even though it is still swollen and tender.  During this time and few weeks prior patient never had any systemic fever chills night sweats decrease in appetite or sense of ill health.    Patient does taekowando but she doesn't recall hurting his foot.      Past Medical History:  Past Medical History:   Diagnosis Date   • Anxiety    • Depression    • GERD (gastroesophageal reflux disease)    • Hypercalcemia    • Hypothyroidism    • Inverted T wave     PT HAD STRESS ECHO DONE 4 YR AGO AND WAS NORMAL   • Osteoarthritis     Knees and ankles   • Parathyroid disease    • PONV (postoperative nausea and vomiting)    • PTSD (post-traumatic stress disorder)    • Sleep apnea     NO MACHINE     Past  "Surgical History:  Past Surgical History:   Procedure Laterality Date   • COLONOSCOPY  07/2014   • ENDOSCOPY      several years ago, normal   • HEMORRHOIDECTOMY     • THYROIDECTOMY Left 2/3/2017    Procedure: Left inferior parathyroidectomy with intraoperative PTH monitoring;  Surgeon: Sarah Matos MD;  Location: [facility];  Service:    • WRIST FUSION Right       Home Meds:  Prescriptions Prior to Admission   Medication Sig Dispense Refill Last Dose   • aspirin 81 MG tablet Take 81 mg by mouth Every Night. HOLD PRIOR TO SURG   Taking   • busPIRone (BUSPAR) 15 MG tablet Take 30 mg by mouth 2 (Two) Times a Day.   Taking   • cephalexin (KEFLEX) 500 MG capsule Take 1 capsule by mouth 4 (Four) Times a Day. 28 capsule 0 Taking   • ergocalciferol (ERGOCALCIFEROL) 20896 UNITS capsule Take 50,000 Units by mouth. TWO TIMES WEEK   Taking   • glucosamine-chondroitin 500-400 MG capsule capsule Take 1 capsule by mouth 2 (Two) Times a Day With Meals. HOLD PRIOR TO SURG   Taking   • lamoTRIgine (LaMICtal) 100 MG tablet Take 50 mg by mouth 2 (Two) Times a Day.   Taking   • modafinil (PROVIGIL) 100 MG tablet Take 150 mg by mouth Every Morning.   Taking   • Omega-3 Fatty Acids (FISH OIL) 1000 MG capsule capsule Take 1,000 mg by mouth Daily With Breakfast. HOLD PRIOR TO SURG   Taking   • omeprazole (priLOSEC) 20 MG capsule Take 20 mg by mouth Every Morning.   Taking   • omeprazole (priLOSEC) 20 MG capsule TAKE ONE CAPSULE BY MOUTH DAILY 30 capsule 5 Taking   • predniSONE (DELTASONE) 20 MG tablet Take 3 tablets by mouth Daily. 15 tablet 0 Taking   • QUEtiapine (SEROquel) 25 MG tablet Take 25 mg by mouth 3 (Three) Times a Day.   Taking   • sertraline (ZOLOFT) 100 MG tablet Take 200 mg by mouth Every Morning.   Taking   • Syringe 23G X 1\" 3 ML misc Use every 2 weeks for testosterone injection 10 each 1 Taking   • TURMERIC PO Take 1 capsule by mouth 2 (Two) Times a Day. HOLD PRIOR TO SURG    Taking   • calcium-vitamin D (OSCAL " 500/200 D-3) 500-200 MG-UNIT per tablet Take 2 tablets by mouth 2 (Two) Times a Day. 180 tablet 3 Taking   • lamoTRIgine (LAMICTAL) 100 MG tablet Take 50 mg by mouth Every Morning.   Taking     Current Meds:     Current Facility-Administered Medications:   •  acetaminophen (TYLENOL) tablet 650 mg, 650 mg, Oral, Q4H PRN, Zelalem Mclaughlin MD  •  aspirin chewable tablet 81 mg, 81 mg, Oral, Daily, Zelalem Mclaughlin MD, 81 mg at 04/20/17 0905  •  busPIRone (BUSPAR) tablet 30 mg, 30 mg, Oral, Q12H, Zelalem Mclaughlin MD, 30 mg at 04/20/17 0905  •  colchicine tablet 0.6 mg, 0.6 mg, Oral, Q12H, Zelalem Mclaughlin MD, 0.6 mg at 04/20/17 0905  •  enoxaparin (LOVENOX) syringe 40 mg, 40 mg, Subcutaneous, Daily, Zelalem Mclaughlin MD, 40 mg at 04/20/17 0905  •  HYDROcodone-acetaminophen (NORCO) 5-325 MG per tablet 1 tablet, 1 tablet, Oral, Q4H PRN, Zelalem Mclaughlin MD, 1 tablet at 04/20/17 0705  •  lamoTRIgine (LaMICtal) tablet 100 mg, 100 mg, Oral, Nightly, Zelalem Mclaughlin MD, 100 mg at 04/19/17 2005  •  lamoTRIgine (LaMICtal) tablet 50 mg, 50 mg, Oral, QAM, Zelalem Mclaughlin MD, 50 mg at 04/20/17 0652  •  levothyroxine (SYNTHROID, LEVOTHROID) tablet 100 mcg, 100 mcg, Oral, Daily, Zelalem Mclaughlin MD, 100 mcg at 04/20/17 0904  •  modafinil (PROVIGIL) tablet 150 mg, 150 mg, Oral, QAM, Zelalem Mclaughlin MD, 150 mg at 04/20/17 0653  •  morphine injection 1 mg, 1 mg, Intravenous, Q4H PRN **AND** naloxone (NARCAN) injection 0.4 mg, 0.4 mg, Intravenous, Q5 Min PRN, Zelalem Mclaughlin MD  •  ondansetron (ZOFRAN) injection 4 mg, 4 mg, Intravenous, Q6H PRN, Zelalem Mclaughlin MD  •  pantoprazole (PROTONIX) EC tablet 40 mg, 40 mg, Oral, Q AM, Zelalem Mclaughlin MD, 40 mg at 04/20/17 0651  •  Pharmacy to dose vancomycin, , Does not apply, Continuous PRN, Zelalem Mclaughlin MD  •  piperacillin-tazobactam (ZOSYN) 3.375 g in iso-osmotic dextrose 50 ml (premix), 3.375 g, Intravenous, Q8H, Clyde Claudio [facility], 3.375 g at 04/20/17 1222  •  predniSONE (DELTASONE) tablet 40 mg, 40 mg, Oral, Daily With  "Breakfast, Zelalem Mclaughlin MD, 40 mg at 04/20/17 0905  •  QUEtiapine (SEROquel) tablet 25 mg, 25 mg, Oral, Daily With Breakfast, Zeallem Mclaughlin MD, 25 mg at 04/20/17 0904  •  QUEtiapine (SEROquel) tablet 50 mg, 50 mg, Oral, Nightly, Zelalem Mclaughlin MD, 50 mg at 04/19/17 2005  •  sertraline (ZOLOFT) tablet 200 mg, 200 mg, Oral, QAM, Zelalem Mclaughlin MD, 200 mg at 04/20/17 0652  •  sodium chloride 0.9 % flush 1-10 mL, 1-10 mL, Intravenous, PRN, Zelalem Mclaughlin MD  •  vancomycin 1250 mg/250 mL 0.9% NS IVPB ([facility]), 1,250 mg, Intravenous, Q12H, Zelalem Mclaughlin MD  Allergies:  Allergies   Allergen Reactions   • Codeine      nausea     Social History:   Social History   Substance Use Topics   • Smoking status: Never Smoker   • Smokeless tobacco: Not on file   • Alcohol use Yes      Comment: 1 DAILY DRINK      Family History:  Family History   Problem Relation Age of Onset   • Breast cancer Mother    • Heart disease Father    • Hypertension Father    • Colon cancer Sister           Review of Systems  See history of present illness and past medical history.  Patient denies headache, dizziness, syncope, falls, trauma, change in vision, change in hearing, change in taste, changes in weight, changes in appetite, focal weakness, numbness, or paresthesia.  Patient denies chest pain, palpitations, dyspnea, orthopnea, PND, cough, sinus pressure, rhinorrhea, epistaxis, hemoptysis, nausea, vomiting,hematemesis, diarrhea, constipation or hematchezia.  Denies cold or heat intolerance, polydipsia, polyuria, polyphagia. Denies hematuria, pyuria, dysuria, hesitancy, frequency or urgency. Denies consumption of raw and under cooked meats foods or change in water source.  Denies fever, chills, sweats, night sweats.  Denies missing any routine medications. Remainder of ROS is negative.      Vitals:   /65 (BP Location: Right arm, Patient Position: Sitting)  Pulse 64  Temp 97.2 °F (36.2 °C) (Oral)   Resp 16  Ht 70\" (177.8 cm)  Wt 210 lb 14.4 oz " (95.7 kg)  SpO2 97%  BMI 30.26 kg/m2  I/O:   Intake/Output Summary (Last 24 hours) at 04/20/17 1314  Last data filed at 04/20/17 1036   Gross per 24 hour   Intake              410 ml   Output              900 ml   Net             -490 ml     Exam:  General Appearance:    Alert, cooperative, no distress, appears stated age   Head:    Normocephalic, without obvious abnormality, atraumatic   Eyes:    PERRL, conjunctiva/corneas clear, EOM's intact, both eyes   Ears:    Normal external ear canals, both ears   Nose:   Nares normal, septum midline, mucosa normal, no drainage    or sinus tenderness   Throat:   Lips, tongue, gums normal; oral mucosa pink and moist   Neck:   Supple, symmetrical, trachea midline, no adenopathy;     thyroid:  no enlargement/tenderness/nodules; no carotid    bruit or JVD   Back:     Symmetric, no curvature, ROM normal, no CVA tenderness   Lungs:     Clear to auscultation bilaterally, respirations unlabored   Chest Wall:    No tenderness or deformity    Heart:    Regular rate and rhythm, S1 and S2 normal, no murmur, rub   or gallop   Abdomen:     Soft, non-tender, bowel sounds active all four quadrants,     no masses, no hepatomegaly, no splenomegaly   Extremities:   left forefoot is slightly swollen with dependent erythema but gets better after elevation of the foot.  Mild tenderness of the left fourth toe DIP with range of motion of the metatarsophalangeal joint of all the toes intact.  He has mild erythema on the medial aspect of the first metatarsophalangeal joint of the left foot.     Pulses:   Pulses palpable in all extremities; symmetric all extremities   Skin:   Skin color normal, Skin is warm and dry,  no rashes or palpable lesions   Neurologic:   CNII-XII intact, motor strength grossly intact, sensation grossly intact to light touch, no focal deficits noted       Data Review:    I reviewed the patient's new clinical results.    Results from last 7 days  Lab Units 04/20/17  0086  04/19/17  0545 04/18/17  0709 04/17/17  0534 04/16/17  0608 04/15/17  1458   WBC 10*3/mm3 9.04 9.18 9.81 8.67 9.18 10.74*   HEMOGLOBIN g/dL 12.5* 12.8* 13.3* 12.4* 12.1* 13.1*   PLATELETS 10*3/mm3 129* 127* 134* 108* 114* 115*       Results from last 7 days  Lab Units 04/20/17  0706 04/19/17  0545 04/18/17  0709 04/17/17  0534 04/16/17  0608 04/15/17  1458   SODIUM mmol/L 141 140 142 141 142 140   POTASSIUM mmol/L 4.0 3.8 4.3 4.3 4.4 4.2   CHLORIDE mmol/L 105 101 102 104 106 102   TOTAL CO2 mmol/L 23.8 26.1 26.7 23.6 26.1 25.6   BUN mg/dL 17 18 14 16 17 19   CREATININE mg/dL 1.04 1.18 1.21 1.22 1.10 1.15   CALCIUM mg/dL 8.9 9.0 9.3 8.5* 8.7 9.3   GLUCOSE mg/dL 96 133* 161* 94 108* 114*       Assessment:  Principal Problem:    Cellulitis of left lower extremity  Active Problems:    Acquired hypothyroidism    Osteoarthritis of both knees    Gastroesophageal reflux disease without esophagitis    Hypertriglyceridemia    Monoclonal gammopathy of undetermined significance    Chronic fatigue    History of primary hyperparathyroidism    Depression    Cellulitis of foot, left    Gout      Plan:  See above  Brigid Charlton MD   4/20/2017  1:14 PM    Much of this encounter note is an electronic transcription/translation of spoken language to printed text. The electronic translation of spoken language may permit erroneous, or at times, nonsensical words or phrases to be inadvertently transcribed; Although I have reviewed the note for such errors, some may still exist

## 2017-04-20 NOTE — PROGRESS NOTES
"Pharmacokinetic Consult - Vancomycin Dosing (Follow-up Note)    Kaiser Valente is a 67 y.o. male who is on day 6 pharmacy to dose vancomycin for SSTI/cellulitis.  Pharmacy dosing vancomycin per Dr. Mclaughlin's request.   Other antimicrobials: zosyn q8h extended infusion  Goal trough: 10-20 mg/L    Current Vancomycin dose:1000mg q12h    Relevant clinical data and objective history reviewed:  70\" (177.8 cm)  210 lb 14.4 oz (95.7 kg)  Body mass index is 30.26 kg/(m^2).     He  has a past medical history of Anxiety; Depression; GERD (gastroesophageal reflux disease); Hypercalcemia; Hypothyroidism; Inverted T wave; Osteoarthritis; Parathyroid disease; PONV (postoperative nausea and vomiting); PTSD (post-traumatic stress disorder); and Sleep apnea.    Allergies as of 04/15/2017 - Robby as Reviewed 04/15/2017   Allergen Reaction Noted   • Codeine  03/21/2016     Vital Signs (last 24 hours)       04/19 0700  -  04/20 0659 04/20 0700  -  04/20 1239   Most Recent    Temp (°F) 96.8 -  97.1      97.2     97.2 (36.2)    Heart Rate 65 -  76      64     64    Resp 16 -  18      16     16    /76 -  158/90      156/65     156/65    SpO2 (%) 95 -  98      97     97          Estimated Creatinine Clearance: 77.8 mL/min (by C-G formula based on Cr of 1.04).    Results from last 7 days  Lab Units 04/20/17  0706 04/19/17  0545 04/18/17  0709   CREATININE mg/dL 1.04 1.18 1.21     Lab Results   Component Value Date    WBC 9.04 04/20/2017       Imaging: MRI foot on 4/19  Impression:         1. Bone marrow edema and enhancement surrounding the fourth DIP joint.  This may be associated with active gout arthropathy though septic  arthritis is also in the differential diagnosis. There is no cortical  loss. Followup MRI may be helpful to evaluate for interval change. There  is generalized edema within the subcutaneous fat of the midfoot and  forefoot consistent with cellulitis.   2. Faint soft tissue mineralization medial to the first, second " and  third MTP joints consistent with gouty tophi.     This report was finalized on 4/19/2017 4:56 PM by Dr. Doug Saab MD.            Labs:  Scr 1.04  CRP 1.71  SED 47  4/19 trough: 13.4mg/L- dosing was continue same since it was within goal for SSTI/cellulitis      Assessment/Plan  67 y.o.,male, 210 lb 14.4 oz (95.7 kg), treating SSTI/cellulitis with vancomycin and zosyn. ID has been consulted. MRI mention septic arthritis on differential diagnosis, CRP mild elevated. Pt has been stable, VSS, AF. However given MRI, will increase to 1250mg q12h until further investigation by MDs r/o septic arthritis, which at that point likely can decrease dose back down. New goal trough range is 15-20mg/L. Continue to monitor renal function closely. Trough 4/22 @ 0530     1. Vancomycin 1250mg q12h  2. Vancomycin trough 4/22 @ 0530  3. Bmp already ordered for am      Pharmacy will continue to follow daily while on vancomycin and adjust as needed.     Clyde Claudio, Pharm.D  4/20/2017 12:39 PM

## 2017-04-20 NOTE — PROGRESS NOTES
[facility]     LOS: 5 days     Subjective:  No cp  No soa  Eating  Still with foot better   erythema better    Objective:    Vital Signs:  Temp:  [96.7 °F (35.9 °C)-97.2 °F (36.2 °C)] 96.7 °F (35.9 °C)  Heart Rate:  [64-76] 74  Resp:  [16] 16  BP: (135-156)/(65-90) 152/89    General Appearance: no acute distress, appears comfortable  Heart: regular rate and rhythm  Lungs: clear to auscultation bilaterally, respirations unlabored, good air entry  Abdomen: soft, non-tender, no guarding, no rebound, non-distended  Extremities: no edema, no cyanosis  Neurology: speech normal, CN grossly normal  Psychiatric: normal mood and affect  Left Foot- erythema very mild, 4th toe with erythema  Results Review:    Glucose   Date Value Ref Range Status   04/20/2017 96 65 - 99 mg/dL Final   04/19/2017 133 (H) 65 - 99 mg/dL Final   04/18/2017 161 (H) 65 - 99 mg/dL Final       Results from last 7 days  Lab Units 04/20/17  0706   WBC 10*3/mm3 9.04   HEMOGLOBIN g/dL 12.5*   HEMATOCRIT % 38.1*   PLATELETS 10*3/mm3 129*       Results from last 7 days  Lab Units 04/20/17  0706  04/15/17  1458   SODIUM mmol/L 141  < > 140   POTASSIUM mmol/L 4.0  < > 4.2   CHLORIDE mmol/L 105  < > 102   TOTAL CO2 mmol/L 23.8  < > 25.6   BUN mg/dL 17  < > 19   CREATININE mg/dL 1.04  < > 1.15   CALCIUM mg/dL 8.9  < > 9.3   BILIRUBIN mg/dL  --   --  0.2   ALK PHOS U/L  --   --  101   ALT (SGPT) U/L  --   --  23   AST (SGOT) U/L  --   --  22   GLUCOSE mg/dL 96  < > 114*   < > = values in this interval not displayed.                             I have reviewed daily medications and changes in CPOE    Scheduled meds    aspirin 81 mg Oral Daily   busPIRone 30 mg Oral Q12H   colchicine 0.6 mg Oral Q12H   enoxaparin 40 mg Subcutaneous Daily   lamoTRIgine 100 mg Oral Nightly   lamoTRIgine 50 mg Oral QAM   levothyroxine 100 mcg Oral Daily   modafinil 150 mg Oral QAM   pantoprazole 40 mg Oral Q AM   predniSONE 40 mg Oral Daily With Breakfast    QUEtiapine 25 mg Oral Daily With Breakfast   QUEtiapine 50 mg Oral Nightly   sertraline 200 mg Oral QAM         Pharmacy to dose vancomycin      PRN meds  •  acetaminophen  •  HYDROcodone-acetaminophen  •  Morphine **AND** naloxone  •  ondansetron  •  Pharmacy to dose vancomycin  •  sodium chloride    Assessment:        Cellulitis of left lower extremity- less likely, maybe d/c tomorrow    Acquired hypothyroidism- levothyroxine      Osteoarthritis of both knees      Gastroesophageal reflux disease without esophagitis      Hypertriglyceridemia      Monoclonal gammopathy of undetermined significance      History of primary hyperparathyroidism-feb had parathyroid surgery      Depression-zoloft, buspar, lamotrigine, seroquel, provigil      Cellulitis of foot, left      Gout        Be Iraheta MD  04/20/17  4:26 PM

## 2017-04-20 NOTE — DISCHARGE INSTRUCTIONS
Please call if if you have any fever chills or night sweats and increasing swelling redness and pain of the left foot.  Please call [phone] for these symptoms.  Please call [phone] see me in the office on 4/26/2017.  Follow-up with Dr. Jared Noriega as per his recommendation.

## 2017-04-20 NOTE — PLAN OF CARE
Problem: Patient Care Overview (Adult)  Goal: Plan of Care Review  Outcome: Ongoing (interventions implemented as appropriate)    04/20/17 0344   Coping/Psychosocial Response Interventions   Plan Of Care Reviewed With patient   Patient Care Overview   Progress no change   Outcome Evaluation   Outcome Summary/Follow up Plan Pt given Norco for pain. IV abx and all scheduled meds given. Infectious disease to see pt soon.          Problem: Pain, Acute (Adult)  Goal: Acceptable Pain Control/Comfort Level  Outcome: Ongoing (interventions implemented as appropriate)

## 2017-04-21 VITALS
WEIGHT: 210.9 LBS | HEIGHT: 70 IN | RESPIRATION RATE: 16 BRPM | SYSTOLIC BLOOD PRESSURE: 160 MMHG | OXYGEN SATURATION: 96 % | BODY MASS INDEX: 30.19 KG/M2 | HEART RATE: 70 BPM | DIASTOLIC BLOOD PRESSURE: 89 MMHG | TEMPERATURE: 97.8 F

## 2017-04-21 LAB
ANION GAP SERPL CALCULATED.3IONS-SCNC: 13.8 MMOL/L
BASOPHILS # BLD AUTO: 0.03 10*3/MM3 (ref 0–0.2)
BASOPHILS NFR BLD AUTO: 0.3 % (ref 0–1.5)
BUN BLD-MCNC: 16 MG/DL (ref 8–23)
BUN/CREAT SERPL: 13.6 (ref 7–25)
CALCIUM SPEC-SCNC: 9.1 MG/DL (ref 8.6–10.5)
CHLORIDE SERPL-SCNC: 103 MMOL/L (ref 98–107)
CO2 SERPL-SCNC: 26.2 MMOL/L (ref 22–29)
CREAT BLD-MCNC: 1.18 MG/DL (ref 0.76–1.27)
CRP SERPL-MCNC: 1.04 MG/DL (ref 0–0.5)
DEPRECATED RDW RBC AUTO: 48.8 FL (ref 37–54)
EOSINOPHIL # BLD AUTO: 0.09 10*3/MM3 (ref 0–0.7)
EOSINOPHIL NFR BLD AUTO: 1 % (ref 0.3–6.2)
ERYTHROCYTE [DISTWIDTH] IN BLOOD BY AUTOMATED COUNT: 14.4 % (ref 11.5–14.5)
GFR SERPL CREATININE-BSD FRML MDRD: 61 ML/MIN/1.73
GLUCOSE BLD-MCNC: 92 MG/DL (ref 65–99)
HCT VFR BLD AUTO: 39.5 % (ref 40.4–52.2)
HGB BLD-MCNC: 12.9 G/DL (ref 13.7–17.6)
IMM GRANULOCYTES # BLD: 0.06 10*3/MM3 (ref 0–0.03)
IMM GRANULOCYTES NFR BLD: 0.7 % (ref 0–0.5)
LYMPHOCYTES # BLD AUTO: 1.87 10*3/MM3 (ref 0.9–4.8)
LYMPHOCYTES NFR BLD AUTO: 20.5 % (ref 19.6–45.3)
MCH RBC QN AUTO: 29.9 PG (ref 27–32.7)
MCHC RBC AUTO-ENTMCNC: 32.7 G/DL (ref 32.6–36.4)
MCV RBC AUTO: 91.4 FL (ref 79.8–96.2)
MONOCYTES # BLD AUTO: 0.55 10*3/MM3 (ref 0.2–1.2)
MONOCYTES NFR BLD AUTO: 6 % (ref 5–12)
NEUTROPHILS # BLD AUTO: 6.51 10*3/MM3 (ref 1.9–8.1)
NEUTROPHILS NFR BLD AUTO: 71.5 % (ref 42.7–76)
PLATELET # BLD AUTO: 140 10*3/MM3 (ref 140–500)
PMV BLD AUTO: 11.3 FL (ref 6–12)
POTASSIUM BLD-SCNC: 3.9 MMOL/L (ref 3.5–5.2)
RBC # BLD AUTO: 4.32 10*6/MM3 (ref 4.6–6)
SODIUM BLD-SCNC: 143 MMOL/L (ref 136–145)
WBC NRBC COR # BLD: 9.11 10*3/MM3 (ref 4.5–10.7)

## 2017-04-21 PROCEDURE — 86140 C-REACTIVE PROTEIN: CPT | Performed by: HOSPITALIST

## 2017-04-21 PROCEDURE — 25010000002 ENOXAPARIN PER 10 MG: Performed by: HOSPITALIST

## 2017-04-21 PROCEDURE — 63710000001 PREDNISONE PER 1 MG: Performed by: HOSPITALIST

## 2017-04-21 PROCEDURE — 85025 COMPLETE CBC W/AUTO DIFF WBC: CPT | Performed by: HOSPITALIST

## 2017-04-21 PROCEDURE — 80048 BASIC METABOLIC PNL TOTAL CA: CPT | Performed by: HOSPITALIST

## 2017-04-21 RX ORDER — HYDROCODONE BITARTRATE AND ACETAMINOPHEN 5; 325 MG/1; MG/1
1 TABLET ORAL EVERY 4 HOURS PRN
Qty: 10 TABLET | Refills: 0 | Status: SHIPPED | OUTPATIENT
Start: 2017-04-21 | End: 2017-04-25

## 2017-04-21 RX ORDER — PREDNISONE 20 MG/1
20 TABLET ORAL DAILY
Qty: 5 TABLET | Refills: 0 | Status: SHIPPED | OUTPATIENT
Start: 2017-04-21 | End: 2017-04-28

## 2017-04-21 RX ADMIN — PREDNISONE 40 MG: 20 TABLET ORAL at 08:02

## 2017-04-21 RX ADMIN — HYDROCODONE BITARTRATE AND ACETAMINOPHEN 1 TABLET: 5; 325 TABLET ORAL at 06:00

## 2017-04-21 RX ADMIN — LAMOTRIGINE 50 MG: 25 TABLET ORAL at 06:56

## 2017-04-21 RX ADMIN — PANTOPRAZOLE SODIUM 40 MG: 40 TABLET, DELAYED RELEASE ORAL at 06:00

## 2017-04-21 RX ADMIN — SERTRALINE 200 MG: 100 TABLET, FILM COATED ORAL at 06:56

## 2017-04-21 RX ADMIN — COLCHICINE 0.6 MG: 0.6 TABLET, FILM COATED ORAL at 08:02

## 2017-04-21 RX ADMIN — ASPIRIN 81 MG: 81 TABLET, CHEWABLE ORAL at 08:02

## 2017-04-21 RX ADMIN — LEVOTHYROXINE SODIUM 100 MCG: 100 TABLET ORAL at 08:02

## 2017-04-21 RX ADMIN — MODAFINIL 150 MG: 100 TABLET ORAL at 06:55

## 2017-04-21 RX ADMIN — ENOXAPARIN SODIUM 40 MG: 40 INJECTION SUBCUTANEOUS at 08:02

## 2017-04-21 RX ADMIN — QUETIAPINE FUMARATE 25 MG: 50 TABLET, FILM COATED ORAL at 08:02

## 2017-04-21 RX ADMIN — BUSPIRONE HYDROCHLORIDE 30 MG: 15 TABLET ORAL at 08:02

## 2017-04-21 NOTE — PROGRESS NOTES
"  Infectious Diseases Progress Note    Brigid Charlton MD     [facility]  Los: 6 days  Patient Identification:  Name: Kaiser Valente  Age: 67 y.o.  Sex: male  :  1950  MRN: [id number]         Primary Care Physician: Jaerd Noriega MD            Subjective: No specific complaint left foot feels slightly better or about the same.  He was able to bear weight on it and walk on it.  Interval History: Consultation note    Objective:    Scheduled Meds:  aspirin 81 mg Oral Daily   busPIRone 30 mg Oral Q12H   colchicine 0.6 mg Oral Q12H   enoxaparin 40 mg Subcutaneous Daily   lamoTRIgine 100 mg Oral Nightly   lamoTRIgine 50 mg Oral QAM   levothyroxine 100 mcg Oral Daily   modafinil 150 mg Oral QAM   pantoprazole 40 mg Oral Q AM   predniSONE 40 mg Oral Daily With Breakfast   QUEtiapine 25 mg Oral Daily With Breakfast   QUEtiapine 50 mg Oral Nightly   sertraline 200 mg Oral QAM     Continuous Infusions:     Vital signs in last 24 hours:  Temp:  [96.7 °F (35.9 °C)-98.9 °F (37.2 °C)] 97.7 °F (36.5 °C)  Heart Rate:  [64-76] 64  Resp:  [16] 16  BP: (152-165)/(82-97) 165/97    Intake/Output:    Intake/Output Summary (Last 24 hours) at 17 0944  Last data filed at 17 1036   Gross per 24 hour   Intake              360 ml   Output                0 ml   Net              360 ml       Exam:  /97 (BP Location: Right arm, Patient Position: Sitting)  Pulse 64  Temp 97.7 °F (36.5 °C) (Oral)   Resp 16  Ht 70\" (177.8 cm)  Wt 210 lb 14.4 oz (95.7 kg)  SpO2 98%  BMI 30.26 kg/m2    General Appearance:    Alert, cooperative, no distress, AAOx3                          Head:    Normocephalic, without obvious abnormality, atraumatic                           Eyes:    PERRL, conjunctiva/corneas clear, EOM's intact, both eyes                         Throat:   Lips, tongue, gums normal; oral mucosa pink and moist                           Neck:   Supple, symmetrical, trachea midline, no JVD                 "         Lungs:    Clear to auscultation bilaterally, respirations unlabored                 Chest Wall:    No tenderness or deformity                          Heart:    Regular rate and rhythm, S1 and S2 normal, no murmur,no  Rub                                      or gallop                  Abdomen:     Soft, non-tender, bowel sounds active, no masses, no                                                        organomegaly                  Extremities:   Left Foot swelling is decreased erythema is mainly dependent and disappears when foot is elevated above the level of the heart.  There is no warmth or significant tenderness noted most of the discomfort it is on the medial aspect of the great toe.                        Pulses:   Pulses palpable in all extremities                            Skin:   Skin is warm and dry,  no rashes or palpable lesions                  Neurologic:   CNII-XII intact, motor strength grossly intact, sensation grossly                                         intact to light touch, no focal deficits noted       Data Review:    I reviewed the patient's new clinical results.    Results from last 7 days  Lab Units 04/21/17  0549 04/20/17  0706 04/19/17  0545 04/18/17  0709 04/17/17  0534 04/16/17  0608 04/15/17  1458   WBC 10*3/mm3 9.11 9.04 9.18 9.81 8.67 9.18 10.74*   HEMOGLOBIN g/dL 12.9* 12.5* 12.8* 13.3* 12.4* 12.1* 13.1*   PLATELETS 10*3/mm3 140 129* 127* 134* 108* 114* 115*       Results from last 7 days  Lab Units 04/21/17  0549 04/20/17  0706 04/19/17  0545 04/18/17  0709 04/17/17  0534 04/16/17  0608 04/15/17  1458   SODIUM mmol/L 143 141 140 142 141 142 140   POTASSIUM mmol/L 3.9 4.0 3.8 4.3 4.3 4.4 4.2   CHLORIDE mmol/L 103 105 101 102 104 106 102   TOTAL CO2 mmol/L 26.2 23.8 26.1 26.7 23.6 26.1 25.6   BUN mg/dL 16 17 18 14 16 17 19   CREATININE mg/dL 1.18 1.04 1.18 1.21 1.22 1.10 1.15   CALCIUM mg/dL 9.1 8.9 9.0 9.3 8.5* 8.7 9.3   GLUCOSE mg/dL 92 96 133* 161* 94 108* 114*          Assessment:  Principal Problem:    Cellulitis of left lower extremity  Active Problems:    Acquired hypothyroidism    Osteoarthritis of both knees    Gastroesophageal reflux disease without esophagitis    Hypertriglyceridemia    Monoclonal gammopathy of undetermined significance    Chronic fatigue    History of primary hyperparathyroidism    Depression    Cellulitis of foot, left    Gout      Plan:  Okay to discharge from infectious disease standpoint on treatment for gout.  I have instructed patient to call and make appointment to see me in the office on 4/26/2017.  I have also given him a number for my answering service to call me if he develops systemic fever chills increasing redness and progressive discomfort and worsening of his symptoms on his left foot.  Patient is to follow-up with his primary care physician Dr. Tomas as per his recommendation.  Treatment for gout per primary service.    Brigid Charlton MD  4/21/2017  9:44 AM    Much of this encounter note is an electronic transcription/translation of spoken language to printed text. The electronic translation of spoken language may permit erroneous, or at times, nonsensical words or phrases to be inadvertently transcribed; Although I have reviewed the note for such errors, some may still exist

## 2017-04-21 NOTE — PLAN OF CARE
Problem: Patient Care Overview (Adult)  Goal: Plan of Care Review  Outcome: Outcome(s) achieved Date Met:  04/21/17  Goal: Adult Individualization and Mutuality  Outcome: Ongoing (interventions implemented as appropriate)  Goal: Discharge Needs Assessment  Outcome: Outcome(s) achieved Date Met:  04/21/17    Problem: Pain, Acute (Adult)  Goal: Acceptable Pain Control/Comfort Level  Outcome: Outcome(s) achieved Date Met:  04/21/17

## 2017-04-21 NOTE — DISCHARGE SUMMARY
DISCHARGE DIAGNOSES:   1.  Pseudogout versus gout of the left foot involving the 4th toe. Unable to confirm that it is pseudogout, so it is still possible that this is gout. Involvement of the joint is quite small, so I did not attempt drainage of the distal interphalangeal joint.   2.  Concern for the possibility of cellulitis and possible septic arthritis of the 4th distal interphalangeal joint; however, given the patient's completely normal temperature curve, normal white blood cell counts, on review by Infectious Disease, it is felt observation off of antibiotics is most appropriate.   3.  Hypothyroidism for which the patient is on replacement.    4.  History of osteoarthritis.    5.  Reflux esophagitis.    6.  Hypertriglyceridemia.    7.  Monoclonal gammopathy of undetermined significance.    8.  Depression.    9.  History of primary hyperparathyroidism for which the patient recently underwent removal of 1 parathyroid gland. Preoperative parathyroid hormone levels were 100+. Postoperative parathyroid hormone levels were closer to 50. Preoperative calcium levels were 10+ and calcium throughout the hospitalization had been about 9 here.      HISTORY/HOSPITAL COURSE: This is a 67-year-old gentleman who comes to the hospital because of pain in his left foot and there was concern for the possibility of cellulitis. The patient was given antibiotics and steroids, and patient was continued on antibiotics and steroids during his hospitalization here at [facility]. The patient was admitted on 04/15/2017, as the foot was not improving dramatically and there is still some redness even 4 days into the process. Dr. Charlton was asked to see the patient in consultation. Dr. Charlton felt this was most consistent with gout or pseudogout, so he  recommended stopping the antibiotics. The patient continues to be afebrile off of antibiotics and at this point, the foot is doing much better, so the plan is for the  patient to be discharged on a tapering dose of steroids. We are going to keep the patient off of colchicine for now, but consideration could be was given to reinitiating the colchicine for possible treatment of gout and pseudogout if symptoms recur.     I have discussed with the patient about following up with Orthopedics if his symptoms continue and I have given him the names of Dr. Pop and Dr. Shirley.     CONSULTANT: Brigid Charlton MD from Infectious Disease      DIAGNOSTIC DATA: Radiographic studies done during hospitalization included an x-ray of the foot, and the x-ray of the foot shows no acute fractures or erosions, but it does show gouty tophi. MRI of the left foot shows marrow edema and enhancement surrounding the 4th DIP joint. This may be associated with active gouty arthropathy, though septic arthritis is also in the differential. There is no cortical loss. Follow up MRI may be helpful to evaluate for interval change. There was generalized edema within the subcutaneous foot and the midfoot consistent with cellulitis. Faint soft tissue mineralization medial to the 1st, 2nd, and 3rd MTP joints consistent with gouty tophi.     Micro - None. BMP today sodium 134, potassium 3.9. Liver enzymes are normal during the hospitalization. White blood cell count on admission was 10 and it has remained normal. Hemoglobin is 12.9, platelets 140,000. Differential is unremarkable, except it does show a slight increase in immature cells at 0.06. Sedimentation rate was 47. CRP levels are trending down.      DISCHARGE MEDICATIONS: Would like for the patient to stay on the medications that he came in on with the addition of steroids.   1.  Provigil 150 q.a.m.   2.  Glucosamine.    3.  Tumeric.    4.  Tylenol.    5.  Aspirin 81 p.o. daily.    6.  Norco 5/325, take 1 every 4 hours as needed for pain, #10 have been given to the patient. [facility] report is reviewed.   7.  Testosterone injections.   8.  Buspirone 30 b.i.d.    9.   Lamictal 50 b.i.d.    10.  Zoloft 200 p.o. daily.   11.  Lipitor 10 p.o. daily.   12.  Seroquel 25 t.i.d.   13.  Prednisone 20 mg daily x3 days, then 10 mg p.o. daily x3 days, and then stop.   14.  Allopurinol restart at 100 mg p.o. daily, and I would restart the allopurinol in 1 week as to not precipitate further flare.    15.  Levothyroxine 125 p.o. daily.    16.  Prilosec 20 p.o. daily.    17.  Ergocalciferol 50,000 by mouth 2 times per week.    18.  Fish oil p.o. daily.    19.  Calcium plus vitamin D, take 2 tablets b.i.d.     FOLLOWUP:  1.  Follow up with primary care doctor in a week.    2.  Follow up with Dr. Pop or Dr. Shirley outpatient.     Greater than 30 minutes were spent in the discharge planning.       EMILY Celestin:mattie  D:   04/21/2017 16:07:05  T:   04/21/2017 16:41:52  Job ID:   [id number]  Document ID:   [id number]  cc:

## 2017-04-21 NOTE — PLAN OF CARE
Problem: Patient Care Overview (Adult)  Goal: Plan of Care Review  Outcome: Ongoing (interventions implemented as appropriate)    04/21/17 1403   Outcome Evaluation   Outcome Summary/Follow up Plan pain control, ace wrap to foot when up, home today?       Goal: Adult Individualization and Mutuality  Outcome: Ongoing (interventions implemented as appropriate)  Goal: Discharge Needs Assessment  Outcome: Ongoing (interventions implemented as appropriate)    Problem: Pain, Acute (Adult)  Goal: Acceptable Pain Control/Comfort Level  Outcome: Ongoing (interventions implemented as appropriate)    04/21/17 1403   Pain, Acute (Adult)   Acceptable Pain Control/Comfort Level making progress toward outcome

## 2017-04-21 NOTE — PROGRESS NOTES
Continued Stay Note  [facility]     Patient Name: Kaiser Valente  MRN: [id number]  Today's Date: 4/21/2017    Admit Date: 4/15/2017          Discharge Plan       04/21/17 1508    Case Management/Social Work Plan    Plan Discharge plans unchanged. Plans dc home with spouse. No needs noted. Continue to follow.    Patient/Family In Agreement With Plan yes              Discharge Codes     None            Swati Le RN

## 2017-04-21 NOTE — PLAN OF CARE
Problem: Patient Care Overview (Adult)  Goal: Plan of Care Review    04/20/17 0344 04/20/17 2000 04/21/17 0347   Coping/Psychosocial Response Interventions   Plan Of Care Reviewed With --  patient --    Patient Care Overview   Progress no change --  --    Outcome Evaluation   Outcome Summary/Follow up Plan --  --  Left foot slightly inflammed . Pink color, edematous. On antibiotic. Ace wrap to left foot. , no request for pain meds.        Goal: Adult Individualization and Mutuality  Outcome: Ongoing (interventions implemented as appropriate)  Goal: Discharge Needs Assessment  Outcome: Ongoing (interventions implemented as appropriate)    Problem: Pain, Acute (Adult)  Goal: Acceptable Pain Control/Comfort Level  Outcome: Ongoing (interventions implemented as appropriate)

## 2017-04-22 ENCOUNTER — RESULTS ENCOUNTER (OUTPATIENT)
Dept: ENDOCRINOLOGY | Age: 67
End: 2017-04-22

## 2017-04-22 DIAGNOSIS — Z86.39 HISTORY OF PRIMARY HYPERPARATHYROIDISM: ICD-10-CM

## 2017-04-22 DIAGNOSIS — E03.9 ACQUIRED HYPOTHYROIDISM: ICD-10-CM

## 2017-04-22 DIAGNOSIS — E83.52 HYPERCALCEMIA: ICD-10-CM

## 2017-04-25 ENCOUNTER — APPOINTMENT (OUTPATIENT)
Dept: LAB | Facility: HOSPITAL | Age: 67
End: 2017-04-25

## 2017-04-26 ENCOUNTER — DOCUMENTATION (OUTPATIENT)
Dept: INTERNAL MEDICINE | Facility: HOSPITAL | Age: 67
End: 2017-04-26

## 2017-04-26 ENCOUNTER — OFFICE VISIT (OUTPATIENT)
Dept: ORTHOPEDIC SURGERY | Facility: CLINIC | Age: 67
End: 2017-04-26

## 2017-04-26 VITALS — TEMPERATURE: 98.7 F | BODY MASS INDEX: 29.35 KG/M2 | WEIGHT: 205 LBS | HEIGHT: 70 IN

## 2017-04-26 DIAGNOSIS — M10.9 ACUTE GOUT INVOLVING TOE OF LEFT FOOT, UNSPECIFIED CAUSE: ICD-10-CM

## 2017-04-26 DIAGNOSIS — M79.672 LEFT FOOT PAIN: Primary | ICD-10-CM

## 2017-04-26 PROCEDURE — 73630 X-RAY EXAM OF FOOT: CPT | Performed by: ORTHOPAEDIC SURGERY

## 2017-04-26 PROCEDURE — 99204 OFFICE O/P NEW MOD 45 MIN: CPT | Performed by: ORTHOPAEDIC SURGERY

## 2017-04-26 NOTE — PROGRESS NOTES
Infectious Diseases Progress Note    Brigid Charlton MD     [facility]  Los: 0 days  Patient Identification:  Name: Kaiser Valente  Age: 67 y.o.  Sex: male  :  1950  MRN: [id number]         Primary Care Physician: Jared Noriega MD            Subjective: Feeling about the same no worsening or improvement of his left foot.  Still hurts upon walking.  Denies any fever and chills.  Interval History: Here for the follow-up on the left foot swelling and redness started as discomfort in the left fourth toe and then subsequently spread to involve the great toe.  This progression occurred while being on IV antibiotic therapy consisting of vancomycin and Zosyn as well as concomitant steroid for the treatment of suspected cellulitis and acute gouty arthritis.  Patient was seen in the hospital regarding recommendations for antibiotic therapy and was discharged after infectious disease recommendation is to stop antibiotics and treat underlying gout with close monitoring of his clinical status.  For further details see my in-hospital consultation note.     Objective:    Scheduled Meds:  Continuous Infusions:  No current facility-administered medications for this visit.     Vital signs in last 24 hours:  Temp:  [98.7 °F (37.1 °C)] 98.7 °F (37.1 °C)    Exam:  There were no vitals taken for this visit.    General Appearance:    Alert, cooperative, no distress, AAOx3 and does not appear to be toxic or septic.                          Head:    Normocephalic, without obvious abnormality, atraumatic                           Eyes:    PERRL, conjunctiva/corneas clear, EOM's intact, both eyes                         Throat:   Lips, tongue, gums normal; oral mucosa pink and moist                           Neck:   Supple, symmetrical, trachea midline, no JVD                         Lungs:    Clear to auscultation bilaterally, respirations unlabored                 Chest Wall:    No tenderness or deformity                           Heart:    Regular rate and rhythm, S1 and S2 normal,                  Abdomen:     Soft, non-tender, bowel sounds active, no masses,                  Extremities:   Left forefoot is erythematous and warm and slightly tender with prominence of abnormality on the first metatarsophalangeal joint.                        Pulses:   Pulses palpable in all extremities                            Skin:   Skin is warm and dry,  no rashes or palpable lesions                  Neurologic:   CNII-XII intact, motor strength grossly intact, sensation grossly                                           intact to light touch, no focal deficits noted       Data Review:    I reviewed the patient's new clinical results.    Results from last 7 days  Lab Units 04/21/17  0549 04/20/17  0706   WBC 10*3/mm3 9.11 9.04   HEMOGLOBIN g/dL 12.9* 12.5*   PLATELETS 10*3/mm3 140 129*       Results from last 7 days  Lab Units 04/21/17  0549 04/20/17  0706   SODIUM mmol/L 143 141   POTASSIUM mmol/L 3.9 4.0   CHLORIDE mmol/L 103 105   TOTAL CO2 mmol/L 26.2 23.8   BUN mg/dL 16 17   CREATININE mg/dL 1.18 1.04   CALCIUM mg/dL 9.1 8.9   GLUCOSE mg/dL 92 96         Assessment:  1-gout versus pseudogout with possible left fourth toe distal interphalangeal joint involvement.  2-possible cellulitis with ?septic arthritis of the fourth DIP, and first metatarsophalangeal joint status post aggressive antibiotic regimen consisting of initially oral Keflex followed by IM often and continued use of vancomycin and Zosyn during this hospitalization.  3-history of athlete's feet involving both feet with no obvious evidence of it present at this time  4-other diagnosis:  · Acquired hypothyroidism  · Osteoarthritis of both knees  · Gastroesophageal reflux disease without esophagitis  · Hypertriglyceridemia  · Monoclonal gammopathy of undetermined significance  · Chronic fatigue  · History of primary hyperparathyroidism  · Depression     Plan:  Orthopedic  follow up. Discussed with Dr. Jaramillo, who is gracious and accepted to see him in the office today. In the mean time NO antibiotics and continue course of steroidUntil finished. May need repeat MRI and possible IR guided aspiration of joint - 1st MTP of left foot for culture and crystal studies- will defer to Dr. jaramillo.Further follow-up with myself would depend upon assessment by Dr. Jaramillo.  I have provided Mr. Valente with my answering service number [phone] to call me if his status changes.    Brigid Charlton MD  4/26/2017  12:46 PM    Much of this encounter note is an electronic transcription/translation of spoken language to printed text. The electronic translation of spoken language may permit erroneous, or at times, nonsensical words or phrases to be inadvertently transcribed; Although I have reviewed the note for such errors, some may still exist

## 2017-04-27 NOTE — PROGRESS NOTES
New Patient Complaint      Patient: Kaiser Valente  YOB: 1950 67 y.o. male  Medical Record Number: [id number]    Chief Complaints: My foot hurts    History of Present Illness:  Patient has about a 2 week history of onset of pain swelling and redness in the left forefoot.  It began on his fourth toe which she said now feels much better the pain swelling and redness that extended across the forefoot now to around the first MTP joint.  He was recently hospitalized for this with her was question of inflammatory arthropathy versus cellulitis versus septic arthritis.  He was started on steroids as well as antibiotics.  He was discharged last week and followed up with  and is seen today.  He's been off antibiotics since 4/20/17 and is had no worsening of his symptoms but really hasn't had significant improvement.  He has had a history of gout in the right foot in the past and takes allopurinol.        HPI    Allergies:   Allergies   Allergen Reactions   • Codeine      nausea       Medications:   Current Outpatient Prescriptions on File Prior to Visit   Medication Sig   • allopurinol (ZYLOPRIM) 100 MG tablet Take 1 tablet by mouth Daily.   • aspirin 81 MG tablet Take 81 mg by mouth Every Night. HOLD PRIOR TO SURG   • atorvastatin (LIPITOR) 10 MG tablet Take 1 tablet by mouth Daily.   • busPIRone (BUSPAR) 15 MG tablet Take 30 mg by mouth 2 (Two) Times a Day.   • calcium-vitamin D (OSCAL 500/200 D-3) 500-200 MG-UNIT per tablet Take 2 tablets by mouth 2 (Two) Times a Day.   • ergocalciferol (ERGOCALCIFEROL) 12824 UNITS capsule Take 50,000 Units by mouth. TWO TIMES WEEK   • glucosamine-chondroitin 500-400 MG capsule capsule Take 1 capsule by mouth 2 (Two) Times a Day With Meals. HOLD PRIOR TO SURG   • lamoTRIgine (LaMICtal) 100 MG tablet Take 50 mg by mouth 2 (Two) Times a Day.   • levothyroxine (SYNTHROID) 125 MCG tablet Take 1 tablet by mouth Daily.   • modafinil (PROVIGIL) 100 MG tablet Take 150 mg by  "mouth Every Morning.   • Omega-3 Fatty Acids (FISH OIL) 1000 MG capsule capsule Take 1,000 mg by mouth Daily With Breakfast. HOLD PRIOR TO SURG   • omeprazole (priLOSEC) 20 MG capsule Take 20 mg by mouth Every Morning.   • predniSONE (DELTASONE) 20 MG tablet Take 1 tablet by mouth Daily.   • QUEtiapine (SEROquel) 25 MG tablet Take 25 mg by mouth 3 (Three) Times a Day.   • sertraline (ZOLOFT) 100 MG tablet Take 200 mg by mouth Every Morning.   • Syringe 23G X 1\" 3 ML misc Use every 2 weeks for testosterone injection   • Testosterone Cypionate (DEPOTESTOTERONE CYPIONATE) 200 MG/ML injection 1 cc IM every 10 days   • TURMERIC PO Take 1 capsule by mouth 2 (Two) Times a Day. HOLD PRIOR TO SURG      No current facility-administered medications on file prior to visit.        Past Medical History:   Diagnosis Date   • Anxiety    • Depression    • GERD (gastroesophageal reflux disease)    • Hypercalcemia    • Hypothyroidism    • Inverted T wave     PT HAD STRESS ECHO DONE 4 YR AGO AND WAS NORMAL   • Osteoarthritis     Knees and ankles   • Parathyroid disease    • PONV (postoperative nausea and vomiting)    • PTSD (post-traumatic stress disorder)    • Sleep apnea     NO MACHINE     Past Surgical History:   Procedure Laterality Date   • COLONOSCOPY  07/2014   • ENDOSCOPY      several years ago, normal   • HEMORRHOIDECTOMY     • THYROIDECTOMY Left 2/3/2017    Procedure: Left inferior parathyroidectomy with intraoperative PTH monitoring;  Surgeon: Sarah Matos MD;  Location: [facility];  Service:    • WRIST FUSION Right      Social History     Occupational History   •  Retired     Social History Main Topics   • Smoking status: Never Smoker   • Smokeless tobacco: Not on file   • Alcohol use Yes      Comment: 1 DAILY DRINK   • Drug use: No   • Sexual activity: Not on file      Social History     Social History Narrative     Family History   Problem Relation Age of Onset   • Breast cancer Mother    • " "Heart disease Father    • Hypertension Father    • Colon cancer Sister        Review of Systems: 14 point review of systems performed, positive pertinent findings identified in HPI. All remaining systems negative No fevers chills chest pain or shortness of breath    Review of Systems      Physical Exam:   Vitals:    04/26/17 1108   Temp: 98.7 °F (37.1 °C)   Weight: 205 lb (93 kg)   Height: 70\" (177.8 cm)     Physical Exam   Constitutional: pleasant, well developed   Eyes: sclera non icteric  Hearing : adequate for exam  Cardiovascular: palpable pulses in foot, calf/ thigh NT without sign of DVT  Respiratoy: breathing unlabored   Neurological: grossly sensate to LT throughout LE  Psychiatric: oriented with normal mood and affect.   Lymphatic: No palpable popliteal lymphadenopathy  Skin: intact throughout leg/foot  Musculoskeletal:Left foot shows some mild erythema and swelling around the first MTP joint and toe with tenderness to palpation.  He has about 50° dorsiflexion 30° of plantarflexion with some discomfort.  There is some mild swelling to the forefoot the fourth toe is nontender there was mild discomfort to the second and third MTP joints to palpation.  There was no ascending erythema  Physical Exam  Ortho Exam    Radiology: 3 views of the left foot were reviewed on the [employer] system from 4/14/17 and were repeated today and reviewed.  There does appear to be some soft tissue calcifications consistent with tophi around the third second and first MTP joint and don't see any obvious erosions.  MRI left foot from 4/18/17 was reviewed and showed some enhancement around the fourth DIP joint as well as some apparent mineralization around the first second and third MTP joints.    Assessment/Plan: Left foot pain and swelling.  I really don't feel like this is an infectious process.  Patient is not immunocompromised has had no open wounds or systemic illness and is nondiabetic.  He has a history of known gout in the " other foot I really think this is an exacerbation of an inflammatory arthropathy either gout or pseudogout.  He has had thyroid surgery.  He will continue use of crutches for protected ambulation he's going to finish his steroids don't see any need for antibiotics at this time.  Were going to order a repeat MRI of his left foot as recommended per radiology and we'll also see if we can get a fluoroscopic-guided aspiration of his first MTP joint hopefully to at least check for some crystals and possibly get enough for cell count and Gram stain to helf delineate this one where the other.  Discussed with him that he may need to follow-up with his primary care physician for further management of this inflammatory arthropathy and possible referral by his PCP to a rheumatologist.  I will see him back in one week

## 2017-04-28 ENCOUNTER — OFFICE VISIT (OUTPATIENT)
Dept: FAMILY MEDICINE CLINIC | Facility: CLINIC | Age: 67
End: 2017-04-28

## 2017-04-28 VITALS
SYSTOLIC BLOOD PRESSURE: 130 MMHG | TEMPERATURE: 98 F | BODY MASS INDEX: 29.49 KG/M2 | OXYGEN SATURATION: 98 % | WEIGHT: 206 LBS | HEIGHT: 70 IN | HEART RATE: 78 BPM | DIASTOLIC BLOOD PRESSURE: 74 MMHG

## 2017-04-28 DIAGNOSIS — M11.279 PSEUDOGOUT OF FOOT, LEFT: Primary | ICD-10-CM

## 2017-04-28 PROBLEM — R60.0 LOCALIZED EDEMA: Status: RESOLVED | Noted: 2017-04-11 | Resolved: 2017-04-28

## 2017-04-28 PROBLEM — L03.116 CELLULITIS OF FOOT, LEFT: Status: RESOLVED | Noted: 2017-04-15 | Resolved: 2017-04-28

## 2017-04-28 PROBLEM — L03.116 CELLULITIS OF LEFT LOWER EXTREMITY: Status: RESOLVED | Noted: 2017-04-15 | Resolved: 2017-04-28

## 2017-04-28 PROCEDURE — 99214 OFFICE O/P EST MOD 30 MIN: CPT | Performed by: FAMILY MEDICINE

## 2017-04-28 RX ORDER — COLCHICINE 0.6 MG/1
TABLET ORAL
Qty: 21 TABLET | Refills: 0 | Status: SHIPPED | OUTPATIENT
Start: 2017-04-28 | End: 2017-04-29 | Stop reason: SDUPTHER

## 2017-04-28 RX ORDER — PREDNISONE 10 MG/1
10 TABLET ORAL DAILY
Qty: 7 TABLET | Refills: 0 | Status: SHIPPED | OUTPATIENT
Start: 2017-04-28 | End: 2017-05-19

## 2017-04-28 NOTE — PROGRESS NOTES
"Subjective   Kaiser Valente is a 67 y.o. male.     Chief Complaint   Patient presents with   • Gout        History of Present Illness    A follow-up hospitalization.  Patient had severe foot pain.  At the time we are concerned whether it was gout or sialitis.  He was treated conservatively with steroids and antibiotics.  He then called me on Saturday.  The pain got much worse.  We then directly admitted him for possible cellulitis.  He was in the hospital about 5 days.  He did not respond to treatment with antibiotics.  He was seen by a infectious disease specialist.  Final diagnosis was gout versus pseudogout.  He is here for follow-up.  Uric acid 8 days ago was 3.9.  Kidney function normal.  Discharge white blood cell count 9000.  C-reactive protein was slightly elevated at 1.04.He was sent home on prednisone taper.  Also taking oxycodone.  The pain continues.  Still some redness.  However no fevers chills no myalgias to she symptoms no red streaks.  History of hyperparathyroidism.  And recent parathyroid surgery.      The following portions of the patient's history were reviewed and updated as appropriate: allergies, current medications, past family history, past medical history, past social history, past surgical history and problem list.          Review of Systems   Constitutional: Negative for fever.   HENT: Negative.    Respiratory: Negative.    Musculoskeletal: Positive for joint swelling.   Skin: Positive for color change. Negative for rash.   Neurological: Negative.    Hematological: Negative for adenopathy. Does not bruise/bleed easily.   Psychiatric/Behavioral: Negative.        Objective   Blood pressure 130/74, pulse 78, temperature 98 °F (36.7 °C), temperature source Oral, height 70\" (177.8 cm), weight 206 lb (93.4 kg), SpO2 98 %.  Physical Exam   Constitutional:   Looks uncomfortable but nontoxic   Musculoskeletal:   Examination left foot reveals continuing erythema along the dorsum.  The first " metatarsal tarsal joint is still swollen.  The fourth toe looks less swollen.  There is no tendon involvement.  There is no lymphangitis.  Still painful to walk.  Warm but not hot to the touch.       Assessment/Plan   [facility] was seen today for gout.    Diagnoses and all orders for this visit:    Pseudogout of foot, left  -     Ambulatory Referral to Rheumatology  -     Magnesium    Other orders  -     colchicine 0.6 MG tablet; One po q 6 hours prn gout pain. Max three a day. Stop or reduce dosage with stomach distress  -     predniSONE (DELTASONE) 10 MG tablet; Take 1 tablet by mouth Daily.      Probable pseudogout versus gout.  Less likely infection.  He has been off antibiotic for some time with no worsening of symptoms.  Continue prednisone taper and then when that is finished continue prednisone 10 mg a day for 7 more days.  I'm recommending colchicine 0.6 mg up to 3 times a day.  Patient is aware to stop or reduce dose with stomach distress.  Also recommending referral to rheumatology.  He does have an appointment coming up with a orthotic surgeon.  They're going to repeat a foot MRI.    The history of hyperparathyroidism may be a causative factor of the pseudogout.  He now has normal calcium levels.  I do recommend checking a magnesium level.    He'll see me back next week if not improving.

## 2017-04-28 NOTE — PATIENT INSTRUCTIONS
Continue current prednisone prescription.  Then start the new 10 mg a day prescription for 7 days.  Start colchicine up to 3 times a day.  Stop or reduce dose with stomach upset or diarrhea.  I have referred you to a rheumatologist.  Are referral clerk will contact you.    Calcium Pyrophosphate Deposition   Calcium pyrophosphate deposition (CPPD), which is also called pseudogout, is a type of arthritis that causes pain, swelling, and inflammation in a joint. The joint pain can be severe and may last for days. If it is not treated, the pain may last much longer. Attacks of CPPD may come and go. This condition usually affects one joint at a time. The joints that are affected most commonly are the knees, but this condition can also affect the wrists, elbows, shoulders, or ankles.  CPPD is similar to gout. Both conditions result from the buildup of crystals in the joint. However, CPPD is caused by a type of crystal that is different than the crystals that cause gout.  CAUSES  This condition is caused by the buildup of calcium pyrophosphate dihydrate crystals in the joint. The reason why this buildup occurs is not known. The condition may be passed down from parent to child (hereditary).  RISK FACTORS  This condition is more likely to develop in people who:  · Are over 60 years old.  · Have a family history of the condition.  · Have had joint replacement surgery.  · Have had a recent injury.  · Have certain medical conditions, such as hemophilia, ochronosis, amyloidosis, or hormonal disorders.  · Have low blood magnesium levels.  SYMPTOMS  Symptoms of this condition include:  · Pain in a joint. The pain may:    Be intense and constant.    Come on quickly.    Get worse with movement.    Last from several days to a few weeks.  · Redness, swelling, and warmth at the joint.  · Stiffness of the joint.  DIAGNOSIS  To diagnose this condition, your health care provider will use a needle to remove fluid from the joint. The fluid  will be examined under a microscope to check for the crystals that cause CPPD. You may also have imaging tests, such as:  · X-rays.  · Ultrasound.  TREATMENT  There is no way to remove the crystals from the joint and no way to cure this condition. However, treatment can relieve symptoms and improve joint function. Treatment may include:  · Nonsteroidal anti-inflammatory drugs (NSAIDs) to reduce inflammation and pain.  · Medicines to help prevent attacks.  · Injections of medicine (cortisone) into the joint to reduce pain and swelling.  · Physical therapy to improve joint function.  HOME CARE INSTRUCTIONS  · Take medicines only as directed by your health care provider.  · Rest the affected joints until your symptoms start to go away.  · Keep your affected joints raised (elevated) when possible. This will help to reduce swelling.  · If directed, apply ice to the affected area:    Put ice in a plastic bag.    Place a towel between your skin and the bag.    Leave the ice on for 20 minutes, 2-3 times per day.  · If the painful joint is in your leg, use crutches as directed by your health care provider.  · When your symptoms start to go away, begin to exercise regularly or do physical therapy. Talk with your health care provider or physical therapist about what types of exercise are safe for you. Low-impact exercise may be best. This includes walking, swimming, bicycling, and water aerobics.  · Maintain a healthy weight so your joints do not need to bear more weight than necessary.  SEEK MEDICAL CARE IF:  · You have an increase in joint pain that is not relieved with medicine.  · Your joint becomes more red, swollen, or stiff.  · You have a fever.  · You have a skin rash.     This information is not intended to replace advice given to you by your health care provider. Make sure you discuss any questions you have with your health care provider.     Document Released: 09/09/2005 Document Revised: 05/03/2016 Document  Reviewed: 11/25/2015  [employer] Patient Education ©2016 [employer]

## 2017-04-29 LAB — MAGNESIUM SERPL-MCNC: 2.2 MG/DL (ref 1.6–2.4)

## 2017-05-01 RX ORDER — COLCHICINE 0.6 MG/1
TABLET ORAL
Qty: 21 TABLET | Refills: 0 | Status: SHIPPED | OUTPATIENT
Start: 2017-05-01 | End: 2017-05-07 | Stop reason: SDUPTHER

## 2017-05-02 ENCOUNTER — APPOINTMENT (OUTPATIENT)
Dept: LAB | Facility: HOSPITAL | Age: 67
End: 2017-05-02

## 2017-05-02 ENCOUNTER — APPOINTMENT (OUTPATIENT)
Dept: ONCOLOGY | Facility: CLINIC | Age: 67
End: 2017-05-02

## 2017-05-03 ENCOUNTER — RESULTS ENCOUNTER (OUTPATIENT)
Dept: ENDOCRINOLOGY | Age: 67
End: 2017-05-03

## 2017-05-03 ENCOUNTER — HOSPITAL ENCOUNTER (OUTPATIENT)
Dept: MRI IMAGING | Facility: HOSPITAL | Age: 67
Discharge: HOME | End: 2017-05-03
Attending: ORTHOPAEDIC SURGERY | Admitting: ORTHOPAEDIC SURGERY

## 2017-05-03 DIAGNOSIS — E21.0 PRIMARY HYPERPARATHYROIDISM (HCC): ICD-10-CM

## 2017-05-03 DIAGNOSIS — E78.1 HYPERTRIGLYCERIDEMIA: ICD-10-CM

## 2017-05-03 DIAGNOSIS — R53.82 CHRONIC FATIGUE: ICD-10-CM

## 2017-05-03 DIAGNOSIS — M10.9 ACUTE GOUT INVOLVING TOE OF LEFT FOOT, UNSPECIFIED CAUSE: ICD-10-CM

## 2017-05-03 DIAGNOSIS — E83.52 HYPERCALCEMIA: ICD-10-CM

## 2017-05-03 DIAGNOSIS — E03.9 ACQUIRED HYPOTHYROIDISM: ICD-10-CM

## 2017-05-03 PROCEDURE — 73720 MRI LWR EXTREMITY W/O&W/DYE: CPT

## 2017-05-03 PROCEDURE — A9577 INJ MULTIHANCE: HCPCS | Performed by: ORTHOPAEDIC SURGERY

## 2017-05-03 PROCEDURE — 82565 ASSAY OF CREATININE: CPT

## 2017-05-03 PROCEDURE — 0 GADOBENATE DIMEGLUMINE 529 MG/ML SOLUTION: Performed by: ORTHOPAEDIC SURGERY

## 2017-05-03 RX ADMIN — GADOBENATE DIMEGLUMINE 20 ML: 529 INJECTION, SOLUTION INTRAVENOUS at 21:15

## 2017-05-04 ENCOUNTER — OFFICE VISIT (OUTPATIENT)
Dept: ORTHOPEDIC SURGERY | Facility: CLINIC | Age: 67
End: 2017-05-04

## 2017-05-04 VITALS — TEMPERATURE: 97.9 F | HEIGHT: 70 IN | WEIGHT: 205.2 LBS | BODY MASS INDEX: 29.38 KG/M2

## 2017-05-04 DIAGNOSIS — M10.9 ACUTE GOUT OF LEFT FOOT, UNSPECIFIED CAUSE: Primary | ICD-10-CM

## 2017-05-04 LAB — CREAT BLDA-MCNC: 1.1 MG/DL (ref 0.6–1.3)

## 2017-05-04 PROCEDURE — 99213 OFFICE O/P EST LOW 20 MIN: CPT | Performed by: ORTHOPAEDIC SURGERY

## 2017-05-08 ENCOUNTER — DOCUMENTATION (OUTPATIENT)
Dept: FAMILY MEDICINE CLINIC | Facility: CLINIC | Age: 67
End: 2017-05-08

## 2017-05-08 RX ORDER — COLCHICINE 0.6 MG/1
TABLET ORAL
Qty: 21 TABLET | Refills: 0 | Status: SHIPPED | OUTPATIENT
Start: 2017-05-08 | End: 2017-05-19 | Stop reason: SDUPTHER

## 2017-05-19 ENCOUNTER — TELEPHONE (OUTPATIENT)
Dept: FAMILY MEDICINE CLINIC | Facility: CLINIC | Age: 67
End: 2017-05-19

## 2017-05-19 DIAGNOSIS — M11.279 PSEUDOGOUT OF JOINT OF LEFT FOOT: Primary | ICD-10-CM

## 2017-05-19 PROBLEM — M10.9 GOUT: Status: RESOLVED | Noted: 2017-04-16 | Resolved: 2017-05-19

## 2017-05-19 RX ORDER — COLCHICINE 0.6 MG/1
0.6 TABLET ORAL 3 TIMES DAILY PRN
Qty: 30 TABLET | Refills: 0 | Status: SHIPPED | OUTPATIENT
Start: 2017-05-19 | End: 2017-06-12 | Stop reason: SDUPTHER

## 2017-05-19 RX ORDER — PREDNISONE 20 MG/1
TABLET ORAL
Qty: 30 TABLET | Refills: 0 | Status: SHIPPED | OUTPATIENT
Start: 2017-05-19 | End: 2017-10-01 | Stop reason: SDUPTHER

## 2017-05-19 RX ORDER — PREDNISONE 20 MG/1
TABLET ORAL
Qty: 30 TABLET | Refills: 0 | Status: SHIPPED | OUTPATIENT
Start: 2017-05-19 | End: 2017-05-19 | Stop reason: SDUPTHER

## 2017-05-19 RX ORDER — COLCHICINE 0.6 MG/1
0.6 TABLET ORAL 3 TIMES DAILY PRN
Qty: 30 TABLET | Refills: 0 | Status: SHIPPED | OUTPATIENT
Start: 2017-05-19 | End: 2017-05-19 | Stop reason: SDUPTHER

## 2017-05-22 RX ORDER — PREDNISONE 10 MG/1
TABLET ORAL
Qty: 7 TABLET | Refills: 0 | OUTPATIENT
Start: 2017-05-22

## 2017-06-02 RX ORDER — TESTOSTERONE CYPIONATE 200 MG/ML
INJECTION, SOLUTION INTRAMUSCULAR
Refills: 0 | Status: CANCELLED | OUTPATIENT
Start: 2017-06-02

## 2017-06-02 RX ORDER — TESTOSTERONE CYPIONATE 200 MG/ML
INJECTION, SOLUTION INTRAMUSCULAR
Qty: 10 ML | Refills: 0 | OUTPATIENT
Start: 2017-06-02 | End: 2017-12-11 | Stop reason: SDUPTHER

## 2017-06-12 ENCOUNTER — DOCUMENTATION (OUTPATIENT)
Dept: FAMILY MEDICINE CLINIC | Facility: CLINIC | Age: 67
End: 2017-06-12

## 2017-06-12 ENCOUNTER — TELEPHONE (OUTPATIENT)
Dept: FAMILY MEDICINE CLINIC | Facility: CLINIC | Age: 67
End: 2017-06-12

## 2017-06-12 DIAGNOSIS — M11.279 PSEUDOGOUT OF JOINT OF LEFT FOOT: Primary | ICD-10-CM

## 2017-06-12 RX ORDER — COLCHICINE 0.6 MG/1
0.6 TABLET ORAL 3 TIMES DAILY PRN
Qty: 45 TABLET | Refills: 0 | Status: SHIPPED | OUTPATIENT
Start: 2017-06-12 | End: 2017-08-11 | Stop reason: SDUPTHER

## 2017-06-12 NOTE — TELEPHONE ENCOUNTER
Pt stated he is having a gout flare up. He is taking allopurinol 100 mg TID he started having diarrhea with it. Pt wants to know if you can prescribe something else or either give him a lower dose. Please advise

## 2017-06-12 NOTE — PROGRESS NOTES
Phone call patient.  Exacerbation of gout versus pseudogout left foot.  He is finishing a prednisone taper.  The colchicine causes GI upset.  He saw her rheumatologist.  They're not sure whether it's gout or pseudogout.  He is on allopurinol.  He was sent to infectious disease specialist.  Previously seen by ID in the hospital, thought not to be an infection.  At this point and place a referral for orthotics surgery.  The patient needs to have a joint aspiration.  He refused when he was in the hospital.  Needs aspiration with microscopic crystal examination.  I sent in the colchicine.  He has been on a decent amount of prednisone lately.  I don't want to go up on that dose.  He will seek medical attention with fever or other concerns.

## 2017-06-26 ENCOUNTER — OFFICE VISIT (OUTPATIENT)
Dept: ORTHOPEDIC SURGERY | Facility: CLINIC | Age: 67
End: 2017-06-26

## 2017-06-26 VITALS — TEMPERATURE: 98.3 F | HEIGHT: 70 IN | BODY MASS INDEX: 28.63 KG/M2 | WEIGHT: 200 LBS

## 2017-06-26 DIAGNOSIS — M1A.0720 CHRONIC GOUT OF LEFT FOOT, UNSPECIFIED CAUSE: Primary | ICD-10-CM

## 2017-06-26 PROCEDURE — 99213 OFFICE O/P EST LOW 20 MIN: CPT | Performed by: ORTHOPAEDIC SURGERY

## 2017-06-26 NOTE — PROGRESS NOTES
"Foot Follow Up      Patient: Kaiser Valente    YOB: 1950 67 y.o. male    Chief Complaints: Foot doing better again    History of Present Illness: Patient was last seen 5/4/17 with several week history kilos of swelling and redness to his left forefoot.  Fitted settled down quite a bit when I last saw him.  Evidently had a recurrent flare areas primary care physician has been a see him again to see whether about aspirating any joints for more definitive diagnosis.  Since then he been on colchicine and is currently off prednisone and symptoms have resolved.  He is also seen rheumatology and asked about a synovial biopsy.  HPI    ROS: No Foot pain  Past Medical History:   Diagnosis Date   • Anxiety    • Depression    • GERD (gastroesophageal reflux disease)    • Hypercalcemia    • Hypothyroidism    • Inverted T wave     PT HAD STRESS ECHO DONE 4 YR AGO AND WAS NORMAL   • Osteoarthritis     Knees and ankles   • Parathyroid disease    • PONV (postoperative nausea and vomiting)    • PTSD (post-traumatic stress disorder)    • Sleep apnea     NO MACHINE     Physical Exam:   Vitals:    06/26/17 1008   Temp: 98.3 °F (36.8 °C)   Weight: 200 lb (90.7 kg)   Height: 70\" (177.8 cm)     Well developed with normal mood.Nonantalgic gait.  Left foot showed no warmth erythema or appreciable effusions or irritability to the MTP joints or TMT joints.      Radiology: None performed      Assessment/Plan:  Left foot intermittent bouts of inflammatory arthropathy.  Most likely suspect this is gout as it has responded to colchicine.  I really don't see any significant effusions or irritability today.  Discussed with him that any type of aspiration would need to be done under fluoroscopy guidance at the hospital and currently is asymptomatic and really don't feel he tibial to get enough fluid out of the joints at this time for any type of definitive diagnosis.  We'll hold off on this at this time and I don't feel that they " could do a synovial biopsy but will try to discuss that with radiologist and with his primary care physician.  Only thing I would know to be an open synovial biopsy which obviously would like to avoid any type of surgical treatment on this patient.  I would think this really be better done if he is having a flare and would be ordered to radiology department the hospital.  I will discuss this with his primary care physician.    He is also seeing infectious disease later this week on the outside chance it could be some type of atypical fungal infection.

## 2017-06-28 ENCOUNTER — OFFICE VISIT (OUTPATIENT)
Dept: INFECTIOUS DISEASES | Facility: CLINIC | Age: 67
End: 2017-06-28

## 2017-06-28 ENCOUNTER — APPOINTMENT (OUTPATIENT)
Dept: LAB | Facility: HOSPITAL | Age: 67
End: 2017-06-28

## 2017-06-28 VITALS
WEIGHT: 210 LBS | BODY MASS INDEX: 30.06 KG/M2 | HEIGHT: 70 IN | TEMPERATURE: 97.6 F | DIASTOLIC BLOOD PRESSURE: 87 MMHG | SYSTOLIC BLOOD PRESSURE: 149 MMHG | HEART RATE: 69 BPM | RESPIRATION RATE: 12 BRPM

## 2017-06-28 DIAGNOSIS — M25.572 ARTHRALGIA OF LEFT FOOT: Primary | ICD-10-CM

## 2017-06-28 DIAGNOSIS — M25.475 SWELLING OF FOOT JOINT, LEFT: ICD-10-CM

## 2017-06-28 LAB
BASOPHILS # BLD AUTO: 0.02 10*3/MM3 (ref 0–0.2)
BASOPHILS NFR BLD AUTO: 0.2 % (ref 0–1.5)
DEPRECATED RDW RBC AUTO: 47.8 FL (ref 37–54)
EOSINOPHIL # BLD AUTO: 0.23 10*3/MM3 (ref 0–0.7)
EOSINOPHIL NFR BLD AUTO: 2.5 % (ref 0.3–6.2)
ERYTHROCYTE [DISTWIDTH] IN BLOOD BY AUTOMATED COUNT: 14.7 % (ref 11.5–14.5)
HCT VFR BLD AUTO: 42.9 % (ref 40.4–52.2)
HGB BLD-MCNC: 14.5 G/DL (ref 13.7–17.6)
IMM GRANULOCYTES # BLD: 0.03 10*3/MM3 (ref 0–0.03)
IMM GRANULOCYTES NFR BLD: 0.3 % (ref 0–0.5)
LYMPHOCYTES # BLD AUTO: 2.28 10*3/MM3 (ref 0.9–4.8)
LYMPHOCYTES NFR BLD AUTO: 24.9 % (ref 19.6–45.3)
MCH RBC QN AUTO: 30.1 PG (ref 27–32.7)
MCHC RBC AUTO-ENTMCNC: 33.8 G/DL (ref 32.6–36.4)
MCV RBC AUTO: 89 FL (ref 79.8–96.2)
MONOCYTES # BLD AUTO: 0.45 10*3/MM3 (ref 0.2–1.2)
MONOCYTES NFR BLD AUTO: 4.9 % (ref 5–12)
NEUTROPHILS # BLD AUTO: 6.15 10*3/MM3 (ref 1.9–8.1)
NEUTROPHILS NFR BLD AUTO: 67.2 % (ref 42.7–76)
PLATELET # BLD AUTO: 109 10*3/MM3 (ref 140–500)
PMV BLD AUTO: 11.6 FL (ref 6–12)
PROCALCITONIN SERPL-MCNC: 0.05 NG/ML (ref 0.1–0.25)
RBC # BLD AUTO: 4.82 10*6/MM3 (ref 4.6–6)
WBC NRBC COR # BLD: 9.16 10*3/MM3 (ref 4.5–10.7)

## 2017-06-28 PROCEDURE — 87449 NOS EACH ORGANISM AG IA: CPT | Performed by: INTERNAL MEDICINE

## 2017-06-28 PROCEDURE — 84145 PROCALCITONIN (PCT): CPT | Performed by: INTERNAL MEDICINE

## 2017-06-28 PROCEDURE — 99204 OFFICE O/P NEW MOD 45 MIN: CPT | Performed by: INTERNAL MEDICINE

## 2017-06-28 PROCEDURE — 36415 COLL VENOUS BLD VENIPUNCTURE: CPT | Performed by: INTERNAL MEDICINE

## 2017-06-28 PROCEDURE — 85025 COMPLETE CBC W/AUTO DIFF WBC: CPT | Performed by: INTERNAL MEDICINE

## 2017-06-28 NOTE — PROGRESS NOTES
Referring Provider: Krystle Leblanc, APRN  [address] [address]  Reason for clinic visits: Initial infectious disease clinic visit for concern for septi arthritis     HPI: Kaiser Valente is a 67 y.o. male who presents to the infectious disease clinic today for evaluation for possible septic arthritis.  The patient had a thyroidectomy in February 2017.  He states about a month or so after that he developed a painful red and swollen left great toe.  He was seen by his primary care provider and his presentation was thought consistent with gout.  He was prescribed steroids as well as Keflex.  Despite this treatment the swelling pain and erythema worsened and he was admitted to the hospital in April.  In the hospital he was empirically started on vancomycin and Zosyn given concern for septic arthritis.  Unfortunately the patient refused arthrocentesis.  He was seen by Dr. Gotti (infectious diseases) Coumadin not think his presentation was consistent with septic arthritis or gout or pseudogout.  The patient was not discharged in a antibiotics.  He was prescribed colchicine which resolved his symptoms.  Unfortunately had a lot of GI difficulties from the colchicine and discontinued the medicine.  He once again started experiencing pain and swelling of the toes of his left foot.  He had an MRI done in May with findings consistent with gout or pseudogout.  He was started on steroids.  He was also referred to orthopedic surgery.  When he was seen in their clinic on May 4 his symptoms were significantly improved and there was no indication for an arthrocentesis.  He is also seen by rheumatology who were concerned about possible atypical fungal infection and referred him to our clinic.  Currently the patient states that his foot is mildly swollen.  He denies any erythema.  He states there is some numbness over the dorsal aspect of his foot as well as some minimal pain on movement of his ankle.  He  denies any fevers chills or night sweats and states he never had any fevers.  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  He denies any abdominal pain nausea vomiting.  He denies any shortness of breath cough rhinorrhea or sore throat.  He denies any rashes or skin lesions.    Past Medical History:   Diagnosis Date   • Anxiety    • Depression    • GERD (gastroesophageal reflux disease)    • Hypercalcemia    • Hypothyroidism    • Osteoarthritis    • Parathyroid disease    • PTSD (post-traumatic stress disorder)    • Sleep apnea        Past Surgical History:   Procedure Laterality Date   • HEMORRHOIDECTOMY     • THYROIDECTOMY Left 2/3/2017    Procedure: Left inferior parathyroidectomy with intraoperative PTH monitoring;  Surgeon: Sarah Matos MD;  Location: [facility];  Service:    • WRIST FUSION Right        Social History   reports that he has never smoked. He does not have any smokeless tobacco history on file. He reports that he drinks alcohol. He reports that he does not use illicit drugs.  No unusual exposures including water exposures, or animal exposures.  Retired     Family History  family history includes Breast cancer in his mother; Colon cancer in his sister; Heart disease in his father; Hypertension in his father.    Allergies   Allergen Reactions   • Codeine      nausea     The medication list has been reviewed and updated.     Review of Systems  Pertinent items are noted in HPI, all other systems reviewed and negative    Vital Signs   Temp:  [97.6 °F (36.4 °C)] 97.6 °F (36.4 °C)  Heart Rate:  [69] 69  Resp:  [12] 12  BP: (149)/(87) 149/87    Physical Exam:   General: In no acute distress  HEENT: Normocephalic, atraumatic, PERRL, EOMI, no scleral icterus. Oropharynx is clear and moist  Neck: Supple, trachea is midline  Cardiovascular: Normal rate, regular rhythm, tashi  S1 and S2, no murmurs, rubs, or gallops    Respiratory: Lungs are clear to ascultation bilaterally, no wheezing   GI: Abdomen is soft, non-tender, non-distended, positive bowel sounds bilaterally, no masses  Musculoskeletal: Normal range of motion, no edema, tenderness or deformity, left foot with really no appreciable swelling, erythema no pain over any of the joints no purulence  Skin: No rashes or lesions  LE: no E/C/C  Neurological: Alert and oriented, cranial nerves 2-12 intact, motor strength 5/5 in all four extremities  Psychiatric: Normal mood and affect     Lab Results   Component Value Date    WBC 9.11 04/21/2017    HGB 12.9 (L) 04/21/2017    HCT 39.5 (L) 04/21/2017    MCV 91.4 04/21/2017     04/21/2017       Lab Results   Component Value Date    GLUCOSE 92 04/21/2017    BUN 16 04/21/2017    CREATININE 1.10 05/03/2017    EGFRIFNONA 61 04/21/2017    EGFRIFAFRI 58 (L) 04/11/2017    BCR 13.6 04/21/2017    CO2 26.2 04/21/2017    CALCIUM 9.1 04/21/2017    PROTENTOTREF 7.3 04/11/2017    ALBUMIN 4.00 04/15/2017    LABIL2 1.2 04/15/2017    AST 22 04/15/2017    ALT 23 04/15/2017       Lab Results   Component Value Date    SEDRATE 47 (H) 04/20/2017       Lab Results   Component Value Date    CRP 1.04 (H) 04/21/2017     5/3 MRI of the left foot with synovitis at the inter pharyngeal joint of the great toe and fourth toe.  Some synovitis first and second tarsometatarsal joints.  Mentation of findings most compatible with inflammatory arthritis such as gout.    Assessment:  This is a 67 y.o. male who presents to clinic today with concerns for possible septic arthritis.  His clinical picture is not consistent with septic arthritis either a bacterial or fungal in etiology.  Infection does not have a waxing and waning course.  If this was a bacterial or fungal infection he would have persistent symptoms of joint pain, erythema, and swelling which most likely would be getting worse since this has been going on since  April.  His current physical exam is not consistent with any infection.  Due to concern for possible infection I can certainly order a CBC with differential, procalcitonin, and fungitell levels to complete the workup.  Finally the only way to get a true diagnosis would be to do an arthrocentesis.  I agree with Dr. Cotton that this can only be done when the patient has active joint inflammation.  At that time certainly cell count and differential, bacterial, and fungal cultures can be obtained.  If any of this workup is consistent with infection I'm certainly happy to see the patient again in the infectious disease clinic.  I have discussed this extensively with patient and answered all of his questions.    Plan:   1.  Obtain a CBC with differential  2.  Obtain a serum pro-calcitonin  3.  Obtain a serum fungitell  Return to Infectious Disease clinic on an as-needed basis    Time: More than 50% of time spent in counseling and coordination of care:  Total face-to-face/floor time 45 min.  Time spent in counseling 45 min. Counseling included the following topics: see assesment

## 2017-06-30 LAB — RESULT: NORMAL PG/ML

## 2017-06-30 RX ORDER — COLCHICINE 0.6 MG/1
TABLET ORAL
Qty: 21 TABLET | Refills: 0 | Status: SHIPPED | OUTPATIENT
Start: 2017-06-30 | End: 2017-08-11

## 2017-07-12 RX ORDER — COLCHICINE 0.6 MG/1
TABLET ORAL
Qty: 21 TABLET | Refills: 0 | Status: SHIPPED | OUTPATIENT
Start: 2017-07-12 | End: 2017-08-11

## 2017-07-17 RX ORDER — COLCHICINE 0.6 MG/1
TABLET ORAL
Qty: 21 TABLET | Refills: 0 | OUTPATIENT
Start: 2017-07-17

## 2017-08-01 RX ORDER — COLCHICINE 0.6 MG/1
TABLET ORAL
Qty: 21 TABLET | Refills: 0 | OUTPATIENT
Start: 2017-08-01

## 2017-08-03 RX ORDER — COLCHICINE 0.6 MG/1
TABLET ORAL
Qty: 21 TABLET | Refills: 0 | OUTPATIENT
Start: 2017-08-03

## 2017-08-11 RX ORDER — COLCHICINE 0.6 MG/1
TABLET ORAL
Qty: 21 TABLET | Refills: 0 | OUTPATIENT
Start: 2017-08-11

## 2017-08-11 RX ORDER — COLCHICINE 0.6 MG/1
0.6 TABLET ORAL DAILY PRN
Qty: 45 TABLET | Refills: 0 | Status: SHIPPED | OUTPATIENT
Start: 2017-08-11 | End: 2017-08-24 | Stop reason: SDUPTHER

## 2017-08-25 RX ORDER — COLCHICINE 0.6 MG/1
TABLET ORAL
Qty: 30 TABLET | Refills: 0 | Status: SHIPPED | OUTPATIENT
Start: 2017-08-25 | End: 2017-10-03 | Stop reason: SDUPTHER

## 2017-09-16 RX ORDER — SYRINGE WITH NEEDLE, 1 ML 25GX5/8"
SYRINGE, EMPTY DISPOSABLE MISCELLANEOUS
Qty: 10 EACH | Refills: 0 | Status: SHIPPED | OUTPATIENT
Start: 2017-09-16 | End: 2018-03-29 | Stop reason: SDUPTHER

## 2017-09-18 RX ORDER — COLCHICINE 0.6 MG/1
TABLET ORAL
Qty: 30 TABLET | Refills: 0 | OUTPATIENT
Start: 2017-09-18

## 2017-09-19 DIAGNOSIS — E03.9 ACQUIRED HYPOTHYROIDISM: ICD-10-CM

## 2017-09-19 DIAGNOSIS — E29.1 HYPOGONADISM IN MALE: Primary | ICD-10-CM

## 2017-09-19 DIAGNOSIS — M1A.0720 CHRONIC GOUT OF LEFT FOOT, UNSPECIFIED CAUSE: ICD-10-CM

## 2017-09-19 DIAGNOSIS — E55.9 VITAMIN D DEFICIENCY: ICD-10-CM

## 2017-09-19 DIAGNOSIS — E83.52 HYPERCALCEMIA: ICD-10-CM

## 2017-09-19 DIAGNOSIS — N40.1 BENIGN NON-NODULAR PROSTATIC HYPERPLASIA WITH LOWER URINARY TRACT SYMPTOMS: ICD-10-CM

## 2017-09-19 DIAGNOSIS — E78.1 HYPERTRIGLYCERIDEMIA: ICD-10-CM

## 2017-09-23 DIAGNOSIS — K21.9 GASTROESOPHAGEAL REFLUX DISEASE WITHOUT ESOPHAGITIS: ICD-10-CM

## 2017-09-25 RX ORDER — OMEPRAZOLE 20 MG/1
CAPSULE, DELAYED RELEASE ORAL
Qty: 30 CAPSULE | Refills: 4 | Status: SHIPPED | OUTPATIENT
Start: 2017-09-25 | End: 2018-03-06 | Stop reason: SDUPTHER

## 2017-10-01 RX ORDER — PREDNISONE 10 MG/1
TABLET ORAL
Qty: 50 TABLET | Refills: 0 | Status: SHIPPED | OUTPATIENT
Start: 2017-10-01 | End: 2017-10-11

## 2017-10-01 RX ORDER — PREDNISONE 20 MG/1
TABLET ORAL
Qty: 30 TABLET | Refills: 0 | Status: SHIPPED | OUTPATIENT
Start: 2017-10-01 | End: 2017-10-01

## 2017-10-01 NOTE — PROGRESS NOTES
Contact with patient this weekend. C/O typical gout flair this weekend. No atypical Sx. Starting prednisone taper. Because of hx recurrent gout with prolonged acute flairs, Rx 20 day taper. Pt will call if not improving soon. Continue allopurinol.

## 2017-10-02 ENCOUNTER — TELEPHONE (OUTPATIENT)
Dept: FAMILY MEDICINE CLINIC | Facility: CLINIC | Age: 67
End: 2017-10-02

## 2017-10-02 NOTE — TELEPHONE ENCOUNTER
I sent in a prednisone taper.  I would like him to continue the colchicine also.  Okay to send in refill.  Okay for parking sticker.

## 2017-10-02 NOTE — TELEPHONE ENCOUNTER
Pt said that he had a Gout flare up over the weekend. He was taking Colchicine he was wondering if you still wanted him to take this medication. If you do he will need a refill sent over to the pharmacy. Also he is wanting to know if he can get a new handicap sticker his had  in . Please advise.

## 2017-10-03 RX ORDER — COLCHICINE 0.6 MG/1
TABLET ORAL
Qty: 30 TABLET | Refills: 0 | OUTPATIENT
Start: 2017-10-03

## 2017-10-03 RX ORDER — COLCHICINE 0.6 MG/1
0.6 TABLET ORAL DAILY
Qty: 30 TABLET | Refills: 3 | Status: SHIPPED | OUTPATIENT
Start: 2017-10-03 | End: 2017-12-22 | Stop reason: SDUPTHER

## 2017-10-11 ENCOUNTER — OFFICE VISIT (OUTPATIENT)
Dept: ENDOCRINOLOGY | Age: 67
End: 2017-10-11

## 2017-10-11 VITALS
BODY MASS INDEX: 30.21 KG/M2 | SYSTOLIC BLOOD PRESSURE: 126 MMHG | DIASTOLIC BLOOD PRESSURE: 74 MMHG | WEIGHT: 211 LBS | HEIGHT: 70 IN

## 2017-10-11 DIAGNOSIS — Z86.39 HISTORY OF PRIMARY HYPERPARATHYROIDISM: ICD-10-CM

## 2017-10-11 DIAGNOSIS — E29.1 HYPOGONADISM IN MALE: ICD-10-CM

## 2017-10-11 DIAGNOSIS — E78.1 HYPERTRIGLYCERIDEMIA: ICD-10-CM

## 2017-10-11 DIAGNOSIS — E03.9 ACQUIRED HYPOTHYROIDISM: ICD-10-CM

## 2017-10-11 DIAGNOSIS — E83.52 HYPERCALCEMIA: Primary | ICD-10-CM

## 2017-10-11 PROCEDURE — 99214 OFFICE O/P EST MOD 30 MIN: CPT | Performed by: NURSE PRACTITIONER

## 2017-10-11 NOTE — PROGRESS NOTES
"Young Valente is a 67 y.o. male is here today for follow-up.  Chief Complaint   Patient presents with   • Hypothyroidism     no recent labs   • Hyperlipidemia   • Hypogonadism   • Gout   • Abnormal Calcium   • Vitamin D Deficiency     /74  Ht 70\" (177.8 cm)  Wt 211 lb (95.7 kg)  BMI 30.28 kg/m2  Current Outpatient Prescriptions on File Prior to Visit   Medication Sig   • allopurinol (ZYLOPRIM) 100 MG tablet Take 1 tablet by mouth Daily.   • aspirin 81 MG tablet Take 81 mg by mouth Every Night. HOLD PRIOR TO SURG   • B-D 3CC LUER-JACY SYR 23GX1\" 23G X 1\" 3 ML misc USE EVERY  2 WEEKS FOR TESTOSTERONE INJECTION   • busPIRone (BUSPAR) 15 MG tablet Take 30 mg by mouth 2 (Two) Times a Day.   • colchicine 0.6 MG tablet Take 1 tablet by mouth Daily.   • ergocalciferol (ERGOCALCIFEROL) 07720 UNITS capsule Take 50,000 Units by mouth. TWO TIMES WEEK   • glucosamine-chondroitin 500-400 MG capsule capsule Take 1 capsule by mouth 2 (Two) Times a Day With Meals. HOLD PRIOR TO SURG   • lamoTRIgine (LaMICtal) 100 MG tablet Take 50 mg by mouth 2 (Two) Times a Day.   • levothyroxine (SYNTHROID) 125 MCG tablet Take 1 tablet by mouth Daily.   • modafinil (PROVIGIL) 100 MG tablet Take 150 mg by mouth Every Morning.   • Omega-3 Fatty Acids (FISH OIL) 1000 MG capsule capsule Take 1,000 mg by mouth Daily With Breakfast. HOLD PRIOR TO SURG   • omeprazole (priLOSEC) 20 MG capsule TAKE ONE CAPSULE BY MOUTH DAILY   • QUEtiapine (SEROquel) 25 MG tablet Take 25 mg by mouth 3 (Three) Times a Day.   • sertraline (ZOLOFT) 100 MG tablet Take 200 mg by mouth Every Morning.   • Testosterone Cypionate (DEPOTESTOTERONE CYPIONATE) 200 MG/ML injection 1 cc IM every 10 days   • TURMERIC PO Take 1 capsule by mouth 2 (Two) Times a Day. HOLD PRIOR TO SURG    • [DISCONTINUED] predniSONE (DELTASONE) 10 MG tablet 4 po qd for 5 days, then 3 po qd for 5d, then 2 po qd for 5d, then 1 po qd for 5d     No current facility-administered " medications on file prior to visit.      Family History   Problem Relation Age of Onset   • Breast cancer Mother    • Heart disease Father    • Hypertension Father    • Colon cancer Sister      Social History   Substance Use Topics   • Smoking status: Never Smoker   • Smokeless tobacco: None   • Alcohol use Yes      Comment: 1 DAILY DRINK     Allergies   Allergen Reactions   • Codeine      nausea         History of Present Illness  Encounter Diagnoses   Name Primary?   • Hypertriglyceridemia    • Acquired hypothyroidism    • Hypogonadism in male    • Hypercalcemia Yes   • History of primary hyperparathyroidism    This is a 67-year-old male patient here today for routine follow-up visit for the above-mentioned problems.  He states he has good energy.  He attributes this to taking the testosterone.  He also is followed by the [facility] upstairs office however he states he has not followed up in a while.  He states his last appointment that he was in the hospital he had cancel.  She has not had any recent labs.  ALONSO Arnold was reviewed at today's visit.  He states he likes the injectable testosterone because he is able to more easily afford it.  He's been recently treated for pseudogout  Energy good taking test every 10 days last injection was the 7 days ago      The following portions of the patient's history were reviewed and updated as appropriate: allergies, current medications, past family history, past medical history, past social history, past surgical history and problem list.    Review of Systems   Constitutional: Negative for fatigue.   HENT: Negative for trouble swallowing.    Eyes: Negative for visual disturbance.   Respiratory: Negative for shortness of breath.    Cardiovascular: Negative for leg swelling.   Endocrine: Negative for polyuria.   Skin: Negative for wound.   Neurological: Negative for numbness.       Objective   Physical Exam   Constitutional: He is oriented to person, place, and time. He  appears well-developed and well-nourished. No distress.   Somewhat pale and chronically ill looking   HENT:   Head: Normocephalic and atraumatic.   Right Ear: External ear normal.   Nose: Nose normal.   Mouth/Throat: Oropharynx is clear and moist. No oropharyngeal exudate.   Eyes: Conjunctivae and EOM are normal. Pupils are equal, round, and reactive to light. Right eye exhibits no discharge. Left eye exhibits no discharge. No scleral icterus.   Neck: Normal range of motion. Neck supple. No JVD present. No tracheal deviation present. No thyromegaly present.   Cardiovascular: Normal rate, regular rhythm, normal heart sounds and intact distal pulses.  Exam reveals no gallop and no friction rub.    No murmur heard.  Pulmonary/Chest: Effort normal and breath sounds normal. No stridor. No respiratory distress. He has no wheezes. He has no rales. He exhibits no tenderness.   Abdominal: Soft. Bowel sounds are normal. He exhibits no distension and no mass. There is no tenderness. There is no rebound and no guarding. No hernia.   Musculoskeletal: Normal range of motion. He exhibits no edema, tenderness or deformity.   Healed the scar of the open reduction of a broken right wrist in the medial aspect of right lower forearm.   Lymphadenopathy:     He has no cervical adenopathy.   Neurological: He is alert and oriented to person, place, and time. He has normal reflexes. He displays normal reflexes. No cranial nerve deficit. He exhibits normal muscle tone. Coordination normal.   Skin: Skin is warm and dry. No rash noted. He is not diaphoretic. No erythema. No pallor.   Psychiatric: He has a normal mood and affect. His behavior is normal. Judgment and thought content normal.   Nursing note and vitals reviewed.    Office Visit on 06/28/2017   Component Date Value Ref Range Status   • Procalcitonin 06/28/2017 0.05* 0.10 - 0.25 ng/mL Final   • Result 06/28/2017 Comment  <80 pg/mL Final    Comment: See Comment**  **LESS THAN 80 PG/ML  OF B-D-GLUCAN WAS DETECTED IN THIS  SPECIMEN.  SPECIMEN SUBMITTED WAS LIPEMIC.  HIGH  CONCENTRATIONS OF TRIGLYCERIDES HAVE BEEN REPORTED IN THE  PEER REVIEWED SCIENTIFIC LITERATURE TO INHIBIT B-D-GLUCAN  DETECTION WHICH POTENTIALLY MAY CAUSE FALSE NEGATIVE  RESULTS (J. CLIN. MICROBIOL. 43:5950). INTERPRET RESULTS  WITH CAUTION. ADDITIONAL TESTING WITH A NEW SAMPLE IS  RECOMMENDED.  Interpretation: The Fungitell assay does not detect certain  fungal species such as the genus Cryptococcus (Caitlin et  al. 1991) which produces very low levels of  (1-3)-Beta-D-Glucan. The assay also does not detect the  Zygomycetes such as Absidia, Mucor and Rhizopus (Erinn et  al. 1994) which are not known to produce  (1-3)-Beta-D-Glucan. In addition, the yeast phase of  Blastomyces dermatitidis produces little  (1-3)-Beta-D-Glucan and may not be detected by the assay  (Kristian et al. 2007).  Reference Range:  Less than 60 pg/mL. Glucan values of less than 60 pg/mL                            are  interpreted as negative.  Glucan values of 60 to 79 pg/mL are interpreted as  indeterminate, and suggest a possible fungal infection.  Additional sampling and testing of sera is required to  interpret the results.  Glucan values of greater than or equal to 80 pg/mL are  interpreted as positive.  Due to the potential for environmental contamination when  transferred to pour-off tubes, which can lead to false  positive results, interpret positive results from samples  provided in pour-off tubes with caution.  Results should be  used in conjunction with clinical findings, and should not  form the sole basis for a diagnosis or treatment decision.  The Fungitell test is approved or cleared for in vitro  diagnostic use by the U.[place] Food and Drug Administration.  Modifications to the approved package insert have been made  and the performance characteristics for these modifications  were determined by [employer].   • WBC 06/28/2017 9.16  4.50 -  10.70 10*3/mm3 Final   • RBC 06/28/2017 4.82  4.60 - 6.00 10*6/mm3 Final   • Hemoglobin 06/28/2017 14.5  13.7 - 17.6 g/dL Final   • Hematocrit 06/28/2017 42.9  40.4 - 52.2 % Final   • MCV 06/28/2017 89.0  79.8 - 96.2 fL Final   • MCH 06/28/2017 30.1  27.0 - 32.7 pg Final   • MCHC 06/28/2017 33.8  32.6 - 36.4 g/dL Final   • RDW 06/28/2017 14.7* 11.5 - 14.5 % Final   • RDW-SD 06/28/2017 47.8  37.0 - 54.0 fl Final   • MPV 06/28/2017 11.6  6.0 - 12.0 fL Final   • Platelets 06/28/2017 109* 140 - 500 10*3/mm3 Final   • Neutrophil % 06/28/2017 67.2  42.7 - 76.0 % Final   • Lymphocyte % 06/28/2017 24.9  19.6 - 45.3 % Final   • Monocyte % 06/28/2017 4.9* 5.0 - 12.0 % Final   • Eosinophil % 06/28/2017 2.5  0.3 - 6.2 % Final   • Basophil % 06/28/2017 0.2  0.0 - 1.5 % Final   • Immature Grans % 06/28/2017 0.3  0.0 - 0.5 % Final   • Neutrophils, Absolute 06/28/2017 6.15  1.90 - 8.10 10*3/mm3 Final   • Lymphocytes, Absolute 06/28/2017 2.28  0.90 - 4.80 10*3/mm3 Final   • Monocytes, Absolute 06/28/2017 0.45  0.20 - 1.20 10*3/mm3 Final   • Eosinophils, Absolute 06/28/2017 0.23  0.00 - 0.70 10*3/mm3 Final   • Basophils, Absolute 06/28/2017 0.02  0.00 - 0.20 10*3/mm3 Final   • Immature Grans, Absolute 06/28/2017 0.03  0.00 - 0.03 10*3/mm3 Final     Lab Results   Component Value Date    GLUCOSE 92 04/21/2017    BUN 16 04/21/2017    CREATININE 1.10 05/03/2017    EGFRIFNONA 61 04/21/2017    EGFRIFAFRI 58 (L) 04/11/2017    BCR 13.6 04/21/2017    K 3.9 04/21/2017    CO2 26.2 04/21/2017    CALCIUM 9.1 04/21/2017    PROTENTOTREF 7.3 04/11/2017    ALBUMIN 4.00 04/15/2017    LABIL2 1.2 04/15/2017    AST 22 04/15/2017    ALT 23 04/15/2017     No results found for: CHOL  Lab Results   Component Value Date    TRIG 224 (H) 04/11/2017    TRIG 290 (H) 12/28/2016    TRIG 168 (H) 10/11/2016     Lab Results   Component Value Date    HDL 45 04/11/2017    HDL 45 12/28/2016    HDL 50 10/11/2016     No results found for: LDLCALC  Lab Results   Component  Value Date     (H) 04/11/2017     (H) 12/28/2016    LDL 95 10/11/2016     No results found for: HDLLDLRATIO  No components found for: CHOLHDL  .last    Assessment/Plan   Problems Addressed this Visit        Cardiovascular and Mediastinum    Hypertriglyceridemia       Endocrine    Acquired hypothyroidism    Hypogonadism in male       Other    Hypercalcemia - Primary    History of primary hyperparathyroidism          In summary, patient was seen and examined.  He will have extensive laboratory evaluation done at today's visit on be notified of the results.  He will continue on his current medications including his testosterone as prescribed.  Metabolically he is stable.  ALONSO Arnold was reviewed at today's visit.  He is not needing any refills on his medications today.  His weight blood pressure is stable.  Once his test results are reviewed he will be notified of any further recommendations regarding his medications.  I've encouraged him to contact the office should any questions or concerns prior to then.  He is to follow-up with Dr. Greene in 6 months.

## 2017-10-13 LAB
25(OH)D3+25(OH)D2 SERPL-MCNC: 72 NG/ML (ref 30–100)
ALBUMIN SERPL-MCNC: 4.7 G/DL (ref 3.5–5.2)
ALBUMIN/GLOB SERPL: 1.7 G/DL
ALP SERPL-CCNC: 80 U/L (ref 39–117)
ALT SERPL-CCNC: 22 U/L (ref 1–41)
AST SERPL-CCNC: 18 U/L (ref 1–40)
BASOPHILS # BLD AUTO: 0.02 10*3/MM3 (ref 0–0.2)
BASOPHILS NFR BLD AUTO: 0.2 % (ref 0–1.5)
BILIRUB SERPL-MCNC: 0.3 MG/DL (ref 0.1–1.2)
BUN SERPL-MCNC: 23 MG/DL (ref 8–23)
BUN/CREAT SERPL: 17.3 (ref 7–25)
CA-I SERPL ISE-MCNC: 5.3 MG/DL (ref 4.5–5.6)
CALCIUM SERPL-MCNC: 9.7 MG/DL (ref 8.6–10.5)
CHLORIDE SERPL-SCNC: 99 MMOL/L (ref 98–107)
CHOLEST SERPL-MCNC: 195 MG/DL (ref 0–200)
CO2 SERPL-SCNC: 25 MMOL/L (ref 22–29)
CREAT SERPL-MCNC: 1.33 MG/DL (ref 0.76–1.27)
EOSINOPHIL # BLD AUTO: 0.03 10*3/MM3 (ref 0–0.7)
EOSINOPHIL NFR BLD AUTO: 0.2 % (ref 0.3–6.2)
ERYTHROCYTE [DISTWIDTH] IN BLOOD BY AUTOMATED COUNT: 13.5 % (ref 11.5–14.5)
FT4I SERPL CALC-MCNC: 1.1 (ref 1.2–4.9)
GLOBULIN SER CALC-MCNC: 2.8 GM/DL
GLUCOSE SERPL-MCNC: 129 MG/DL (ref 65–99)
HCT VFR BLD AUTO: 46 % (ref 40.4–52.2)
HDLC SERPL-MCNC: 49 MG/DL (ref 40–60)
HGB BLD-MCNC: 15.1 G/DL (ref 13.7–17.6)
IMM GRANULOCYTES # BLD: 0.08 10*3/MM3 (ref 0–0.03)
IMM GRANULOCYTES NFR BLD: 0.6 % (ref 0–0.5)
LDLC SERPL CALC-MCNC: 103 MG/DL (ref 0–100)
LYMPHOCYTES # BLD AUTO: 1.3 10*3/MM3 (ref 0.9–4.8)
LYMPHOCYTES NFR BLD AUTO: 10.3 % (ref 19.6–45.3)
MCH RBC QN AUTO: 29.2 PG (ref 27–32.7)
MCHC RBC AUTO-ENTMCNC: 32.8 G/DL (ref 32.6–36.4)
MCV RBC AUTO: 89 FL (ref 79.8–96.2)
MONOCYTES # BLD AUTO: 0.35 10*3/MM3 (ref 0.2–1.2)
MONOCYTES NFR BLD AUTO: 2.8 % (ref 5–12)
NEUTROPHILS # BLD AUTO: 10.79 10*3/MM3 (ref 1.9–8.1)
NEUTROPHILS NFR BLD AUTO: 85.9 % (ref 42.7–76)
PHOSPHATE SERPL-MCNC: 2.4 MG/DL (ref 2.5–4.5)
PLATELET # BLD AUTO: 137 10*3/MM3 (ref 140–500)
POTASSIUM SERPL-SCNC: 4.3 MMOL/L (ref 3.5–5.2)
PROT SERPL-MCNC: 7.5 G/DL (ref 6–8.5)
PSA SERPL-MCNC: 1.3 NG/ML (ref 0–4)
PTH-INTACT SERPL-MCNC: 65 PG/ML (ref 15–65)
RBC # BLD AUTO: 5.17 10*6/MM3 (ref 4.6–6)
SHBG SERPL-SCNC: 43.4 NMOL/L (ref 19.3–76.4)
SODIUM SERPL-SCNC: 140 MMOL/L (ref 136–145)
T3FREE SERPL-MCNC: 2.3 PG/ML (ref 2–4.4)
T3RU NFR SERPL: 27 % (ref 24–39)
T4 FREE SERPL-MCNC: 0.92 NG/DL (ref 0.93–1.7)
T4 SERPL-MCNC: 3.9 UG/DL (ref 4.5–12)
TESTOST FREE SERPL-MCNC: 15.8 PG/ML (ref 6.6–18.1)
TESTOST SERPL-MCNC: 1008 NG/DL (ref 264–916)
TRIGL SERPL-MCNC: 217 MG/DL (ref 0–150)
TSH SERPL DL<=0.005 MIU/L-ACNC: 1.71 UIU/ML (ref 0.45–4.5)
VLDLC SERPL CALC-MCNC: 43.4 MG/DL (ref 5–40)
WBC # BLD AUTO: 12.57 10*3/MM3 (ref 4.5–10.7)

## 2017-10-16 ENCOUNTER — TELEPHONE (OUTPATIENT)
Dept: ENDOCRINOLOGY | Age: 67
End: 2017-10-16

## 2017-10-16 PROBLEM — N28.9 RENAL INSUFFICIENCY: Status: ACTIVE | Noted: 2017-10-16

## 2017-10-16 RX ORDER — ALLOPURINOL 100 MG/1
TABLET ORAL
Qty: 30 TABLET | Refills: 4 | Status: SHIPPED | OUTPATIENT
Start: 2017-10-16 | End: 2018-03-14 | Stop reason: SDUPTHER

## 2017-10-16 RX ORDER — LEVOTHYROXINE SODIUM 137 UG/1
137 TABLET ORAL DAILY
Qty: 90 TABLET | Refills: 1 | Status: SHIPPED | OUTPATIENT
Start: 2017-10-16 | End: 2018-03-29 | Stop reason: SDUPTHER

## 2017-10-16 NOTE — TELEPHONE ENCOUNTER
----- Message from MONIKA Prajapati sent at 10/16/2017 11:27 AM EDT -----  Copy labs to [facility] and let pt know about med change      Patient has been notified of med change and labs have been faxed to [facility].

## 2017-10-19 ENCOUNTER — RESULTS ENCOUNTER (OUTPATIENT)
Dept: ENDOCRINOLOGY | Age: 67
End: 2017-10-19

## 2017-10-19 DIAGNOSIS — N40.1 BENIGN NON-NODULAR PROSTATIC HYPERPLASIA WITH LOWER URINARY TRACT SYMPTOMS: ICD-10-CM

## 2017-10-19 DIAGNOSIS — M1A.0720 CHRONIC GOUT OF LEFT FOOT, UNSPECIFIED CAUSE: ICD-10-CM

## 2017-10-19 DIAGNOSIS — E78.1 HYPERTRIGLYCERIDEMIA: ICD-10-CM

## 2017-10-19 DIAGNOSIS — E03.9 ACQUIRED HYPOTHYROIDISM: ICD-10-CM

## 2017-10-19 DIAGNOSIS — E55.9 VITAMIN D DEFICIENCY: ICD-10-CM

## 2017-10-19 DIAGNOSIS — E83.52 HYPERCALCEMIA: ICD-10-CM

## 2017-10-19 DIAGNOSIS — E29.1 HYPOGONADISM IN MALE: ICD-10-CM

## 2017-11-17 RX ORDER — PREDNISONE 20 MG/1
20 TABLET ORAL 3 TIMES DAILY
Qty: 15 TABLET | Refills: 0 | Status: SHIPPED | OUTPATIENT
Start: 2017-11-17 | End: 2017-12-22

## 2017-11-17 NOTE — TELEPHONE ENCOUNTER
Pt is calling he is having foot problems now in his right foot and needs Prednisone for it. Pt is out of town and in [facility] and would like it called into there at [facility] at [phone].  please advise.

## 2017-11-17 NOTE — TELEPHONE ENCOUNTER
Prednisone 20 mg tablets.  3 pills a day for 5 days.  He is going need to see me in the office if it continues when he is home

## 2017-12-11 RX ORDER — TESTOSTERONE CYPIONATE 200 MG/ML
INJECTION, SOLUTION INTRAMUSCULAR
Qty: 10 ML | Refills: 0 | OUTPATIENT
Start: 2017-12-11 | End: 2017-12-12 | Stop reason: SDUPTHER

## 2017-12-12 RX ORDER — TESTOSTERONE CYPIONATE 200 MG/ML
INJECTION, SOLUTION INTRAMUSCULAR
Qty: 3 ML | Refills: 0 | OUTPATIENT
Start: 2017-12-12 | End: 2018-02-15 | Stop reason: SDUPTHER

## 2017-12-22 ENCOUNTER — OFFICE VISIT (OUTPATIENT)
Dept: FAMILY MEDICINE CLINIC | Facility: CLINIC | Age: 67
End: 2017-12-22

## 2017-12-22 VITALS
DIASTOLIC BLOOD PRESSURE: 80 MMHG | HEART RATE: 100 BPM | WEIGHT: 209.5 LBS | OXYGEN SATURATION: 96 % | TEMPERATURE: 97 F | HEIGHT: 70 IN | BODY MASS INDEX: 29.99 KG/M2 | SYSTOLIC BLOOD PRESSURE: 147 MMHG

## 2017-12-22 DIAGNOSIS — M11.271: Primary | ICD-10-CM

## 2017-12-22 PROCEDURE — 99213 OFFICE O/P EST LOW 20 MIN: CPT | Performed by: FAMILY MEDICINE

## 2017-12-22 RX ORDER — COLCHICINE 0.6 MG/1
0.6 TABLET ORAL 2 TIMES DAILY PRN
Qty: 60 TABLET | Refills: 3 | Status: SHIPPED | OUTPATIENT
Start: 2017-12-22 | End: 2018-10-01 | Stop reason: ALTCHOICE

## 2017-12-22 RX ORDER — PREDNISONE 20 MG/1
TABLET ORAL
Qty: 18 TABLET | Refills: 0 | Status: SHIPPED | OUTPATIENT
Start: 2017-12-22 | End: 2018-08-09

## 2017-12-22 NOTE — PROGRESS NOTES
"Subjective   Kaiser Valente is a 67 y.o. male.     Chief Complaint   Patient presents with   • Gout     pt said pseudo gout both feet last time was more in the right in november        History of Present Illness    Probable exacerbation of pseudogout.  Little bit on the left foot.  More in the lateral right foot now.  Some mild erythema and swelling.  No fever.  He's had this before.  Uric acid levels have been normal.  He has on allopurinol prescribed by endocrinology.  He's on multiple medications.  Including both endocrinology and psychiatric medications.      The following portions of the patient's history were reviewed and updated as appropriate: allergies, current medications, past family history, past medical history, past social history, past surgical history and problem list.          Review of Systems   Constitutional: Negative for fever.   Musculoskeletal: Positive for arthralgias and joint swelling.       Objective   Blood pressure 147/80, pulse 100, temperature 97 °F (36.1 °C), height 177.8 cm (70\"), weight 95 kg (209 lb 8 oz), SpO2 96 %.  Physical Exam   Constitutional: No distress.   Looks mildly uncomfortable but nontoxic   Musculoskeletal:   Right foot reveals inflammation and pain to palpation at the distal right fifth metatarsal phalangeal joint.   Skin: No rash noted.       Assessment/Plan   Kaiser was seen today for gout.    Diagnoses and all orders for this visit:    Pseudogout of joint of right foot    Other orders  -     predniSONE (DELTASONE) 20 MG tablet; 3 po qd for 3 days then 2 po qd for 3 days then 1 po qd for 3 days then stop  -     colchicine 0.6 MG tablet; Take 1 tablet by mouth 2 (Two) Times a Day As Needed (pseudogout pain).    Exacerbation of pseudogout.  Prednisone taper for 9 days.  Increase colchicine to twice a day, decrease with GI upset.  With worsening symptoms please call.         "

## 2018-01-03 ENCOUNTER — TELEPHONE (OUTPATIENT)
Dept: ENDOCRINOLOGY | Age: 68
End: 2018-01-03

## 2018-01-03 NOTE — TELEPHONE ENCOUNTER
----- Message from Parul Landa MA sent at 12/29/2017  2:43 PM EST -----  Patient saw Vaishali lobo and was given a refill of his testosterone. His refill states 1ML every 14 days but he has been taking it 1ML every 10 days and wants to know why this was changed or fixed if the RX was called in wrong.       Spoke with patient, let him know that the prescriptionin his chart is correct and can be called in as 1 Ml every 10 days when he is ready for a refill. He expressed understanding.

## 2018-01-19 DIAGNOSIS — E55.9 VITAMIN D DEFICIENCY: ICD-10-CM

## 2018-01-22 RX ORDER — ERGOCALCIFEROL 1.25 MG/1
CAPSULE ORAL
Qty: 26 CAPSULE | Refills: 0 | Status: SHIPPED | OUTPATIENT
Start: 2018-01-22 | End: 2018-03-29 | Stop reason: SDUPTHER

## 2018-02-16 RX ORDER — TESTOSTERONE CYPIONATE 200 MG/ML
INJECTION, SOLUTION INTRAMUSCULAR
Qty: 2 ML | Refills: 0 | OUTPATIENT
Start: 2018-02-16 | End: 2018-02-22 | Stop reason: SDUPTHER

## 2018-02-22 RX ORDER — TESTOSTERONE CYPIONATE 200 MG/ML
INJECTION, SOLUTION INTRAMUSCULAR
Qty: 3 ML | Refills: 0 | OUTPATIENT
Start: 2018-02-22 | End: 2018-03-12 | Stop reason: SDUPTHER

## 2018-03-06 DIAGNOSIS — K21.9 GASTROESOPHAGEAL REFLUX DISEASE WITHOUT ESOPHAGITIS: ICD-10-CM

## 2018-03-06 RX ORDER — OMEPRAZOLE 20 MG/1
CAPSULE, DELAYED RELEASE ORAL
Qty: 30 CAPSULE | Refills: 0 | Status: SHIPPED | OUTPATIENT
Start: 2018-03-06 | End: 2018-04-05 | Stop reason: SDUPTHER

## 2018-03-16 RX ORDER — TESTOSTERONE CYPIONATE 200 MG/ML
INJECTION, SOLUTION INTRAMUSCULAR
Qty: 2 ML | Refills: 0 | OUTPATIENT
Start: 2018-03-16 | End: 2018-03-26 | Stop reason: SDUPTHER

## 2018-03-16 RX ORDER — ALLOPURINOL 100 MG/1
TABLET ORAL
Qty: 30 TABLET | Refills: 0 | Status: SHIPPED | OUTPATIENT
Start: 2018-03-16 | End: 2018-03-29 | Stop reason: SDUPTHER

## 2018-03-22 DIAGNOSIS — E29.1 HYPOGONADISM IN MALE: ICD-10-CM

## 2018-03-22 DIAGNOSIS — R53.82 CHRONIC FATIGUE: ICD-10-CM

## 2018-03-22 DIAGNOSIS — E78.1 HYPERTRIGLYCERIDEMIA: Primary | ICD-10-CM

## 2018-03-22 DIAGNOSIS — E03.9 ACQUIRED HYPOTHYROIDISM: ICD-10-CM

## 2018-03-22 DIAGNOSIS — Z86.39 HISTORY OF PRIMARY HYPERPARATHYROIDISM: ICD-10-CM

## 2018-03-22 DIAGNOSIS — E83.52 HYPERCALCEMIA: ICD-10-CM

## 2018-03-27 RX ORDER — TESTOSTERONE CYPIONATE 200 MG/ML
INJECTION, SOLUTION INTRAMUSCULAR
Qty: 3 ML | Refills: 0 | OUTPATIENT
Start: 2018-03-27 | End: 2018-03-29 | Stop reason: SDUPTHER

## 2018-03-29 ENCOUNTER — OFFICE VISIT (OUTPATIENT)
Dept: ENDOCRINOLOGY | Age: 68
End: 2018-03-29

## 2018-03-29 VITALS
BODY MASS INDEX: 30.13 KG/M2 | DIASTOLIC BLOOD PRESSURE: 78 MMHG | WEIGHT: 210 LBS | SYSTOLIC BLOOD PRESSURE: 128 MMHG | RESPIRATION RATE: 16 BRPM

## 2018-03-29 DIAGNOSIS — Z86.39 HISTORY OF PRIMARY HYPERPARATHYROIDISM: ICD-10-CM

## 2018-03-29 DIAGNOSIS — E83.52 HYPERCALCEMIA: ICD-10-CM

## 2018-03-29 DIAGNOSIS — E29.1 HYPOGONADISM IN MALE: ICD-10-CM

## 2018-03-29 DIAGNOSIS — E03.9 ACQUIRED HYPOTHYROIDISM: Primary | ICD-10-CM

## 2018-03-29 DIAGNOSIS — E55.9 VITAMIN D DEFICIENCY: ICD-10-CM

## 2018-03-29 DIAGNOSIS — R73.9 HYPERGLYCEMIA: ICD-10-CM

## 2018-03-29 DIAGNOSIS — R53.82 CHRONIC FATIGUE: ICD-10-CM

## 2018-03-29 DIAGNOSIS — E78.1 HYPERTRIGLYCERIDEMIA: ICD-10-CM

## 2018-03-29 DIAGNOSIS — N40.0 BENIGN PROSTATIC HYPERPLASIA WITHOUT LOWER URINARY TRACT SYMPTOMS: ICD-10-CM

## 2018-03-29 PROCEDURE — 99214 OFFICE O/P EST MOD 30 MIN: CPT | Performed by: INTERNAL MEDICINE

## 2018-03-29 RX ORDER — LEVOTHYROXINE SODIUM 137 UG/1
137 TABLET ORAL DAILY
Qty: 90 TABLET | Refills: 3 | Status: SHIPPED | OUTPATIENT
Start: 2018-03-29 | End: 2019-03-25 | Stop reason: SDUPTHER

## 2018-03-29 RX ORDER — TESTOSTERONE CYPIONATE 200 MG/ML
INJECTION, SOLUTION INTRAMUSCULAR
Qty: 3 ML | Refills: 5 | Status: SHIPPED | OUTPATIENT
Start: 2018-03-29 | End: 2018-10-03 | Stop reason: SDUPTHER

## 2018-03-29 RX ORDER — ERGOCALCIFEROL 1.25 MG/1
50000 CAPSULE ORAL 2 TIMES WEEKLY
Qty: 26 CAPSULE | Refills: 3 | Status: SHIPPED | OUTPATIENT
Start: 2018-03-29 | End: 2019-03-25 | Stop reason: SDUPTHER

## 2018-03-29 RX ORDER — ALLOPURINOL 100 MG/1
100 TABLET ORAL DAILY
Qty: 30 TABLET | Refills: 5 | Status: SHIPPED | OUTPATIENT
Start: 2018-03-29 | End: 2018-10-17 | Stop reason: SDUPTHER

## 2018-03-29 NOTE — PROGRESS NOTES
"Subjective   Kaiser Valente is a 68 y.o. male seen for follow up for hypothyroidism, hypogonadism, hypercalcemia. No lab review.     History of Present Illness this is a 68-year-old gentleman known patient with hypothyroidism as well as hypogonadism and vitamin D deficiency with dyslipidemia and hyperuricemia.  Over the course of last 6 months he has had no significant health problems for which to go to the emergency room or hospital.  /78   Resp 16   Wt 95.3 kg (210 lb)   BMI 30.13 kg/m²   Allergies   Allergen Reactions   • Codeine      nausea       Current Outpatient Prescriptions:   •  allopurinol (ZYLOPRIM) 100 MG tablet, TAKE ONE TABLET BY MOUTH DAILY, Disp: 30 tablet, Rfl: 0  •  aspirin 81 MG tablet, Take 81 mg by mouth Every Night. HOLD PRIOR TO SURG, Disp: , Rfl:   •  B-D 3CC LUER-JACY SYR 23GX1\" 23G X 1\" 3 ML misc, USE EVERY  2 WEEKS FOR TESTOSTERONE INJECTION, Disp: 10 each, Rfl: 0  •  busPIRone (BUSPAR) 15 MG tablet, Take 30 mg by mouth 2 (Two) Times a Day., Disp: , Rfl:   •  colchicine 0.6 MG tablet, Take 1 tablet by mouth 2 (Two) Times a Day As Needed (pseudogout pain)., Disp: 60 tablet, Rfl: 3  •  ergocalciferol (ERGOCALCIFEROL) 49238 UNITS capsule, Take 50,000 Units by mouth. TWO TIMES WEEK, Disp: , Rfl:   •  glucosamine-chondroitin 500-400 MG capsule capsule, Take 1 capsule by mouth 2 (Two) Times a Day With Meals. HOLD PRIOR TO SURG, Disp: , Rfl:   •  lamoTRIgine (LaMICtal) 100 MG tablet, Take 150 mg by mouth Daily., Disp: , Rfl:   •  levothyroxine (SYNTHROID, LEVOTHROID) 137 MCG tablet, Take 1 tablet by mouth Daily., Disp: 90 tablet, Rfl: 1  •  modafinil (PROVIGIL) 100 MG tablet, Take 150 mg by mouth Every Morning., Disp: , Rfl:   •  Omega-3 Fatty Acids (FISH OIL) 1000 MG capsule capsule, Take 1,000 mg by mouth Daily With Breakfast. HOLD PRIOR TO SURG, Disp: , Rfl:   •  omeprazole (priLOSEC) 20 MG capsule, TAKE ONE CAPSULE BY MOUTH DAILY, Disp: 30 capsule, Rfl: 0  •  predniSONE (DELTASONE) " 20 MG tablet, 3 po qd for 3 days then 2 po qd for 3 days then 1 po qd for 3 days then stop, Disp: 18 tablet, Rfl: 0  •  QUEtiapine (SEROquel) 25 MG tablet, Take 25 mg by mouth 3 (Three) Times a Day., Disp: , Rfl:   •  sertraline (ZOLOFT) 100 MG tablet, Take 200 mg by mouth Every Morning., Disp: , Rfl:   •  Testosterone Cypionate (DEPOTESTOTERONE CYPIONATE) 200 MG/ML injection, INJECT ONE MILLILITER INTRAMUSCULARLY EVER 10 DAYS, Disp: 3 mL, Rfl: 0  •  TURMERIC PO, Take 1 capsule by mouth 2 (Two) Times a Day. HOLD PRIOR TO SURG , Disp: , Rfl:   •  vitamin D (ERGOCALCIFEROL) 15341 units capsule capsule, TAKE ONE CAPSULE BY MOUTH TWO TIMES A WEEK, Disp: 26 capsule, Rfl: 0      The following portions of the patient's history were reviewed and updated as appropriate: allergies, current medications, past family history, past medical history, past social history, past surgical history and problem list.    Review of Systems   Constitutional: Negative.    HENT: Negative.    Eyes: Negative.    Respiratory: Negative.    Cardiovascular: Negative.    Gastrointestinal: Negative.    Endocrine: Negative.    Genitourinary: Negative.    Musculoskeletal: Negative.    Skin: Negative.    Allergic/Immunologic: Negative.    Neurological: Negative.    Hematological: Negative.    Psychiatric/Behavioral: Negative.        Objective   Physical Exam   Constitutional: He is oriented to person, place, and time. He appears well-developed and well-nourished. No distress.   Somewhat pale and chronically ill looking   HENT:   Head: Normocephalic and atraumatic.   Right Ear: External ear normal.   Nose: Nose normal.   Mouth/Throat: Oropharynx is clear and moist. No oropharyngeal exudate.   Eyes: Conjunctivae and EOM are normal. Pupils are equal, round, and reactive to light. Right eye exhibits no discharge. Left eye exhibits no discharge. No scleral icterus.   Neck: Normal range of motion. Neck supple. No JVD present. No tracheal deviation present. No  thyromegaly present.   Cardiovascular: Normal rate, regular rhythm, normal heart sounds and intact distal pulses.  Exam reveals no gallop and no friction rub.    No murmur heard.  Pulmonary/Chest: Effort normal and breath sounds normal. No stridor. No respiratory distress. He has no wheezes. He has no rales. He exhibits no tenderness.   Abdominal: Soft. Bowel sounds are normal. He exhibits no distension and no mass. There is no tenderness. There is no rebound and no guarding. No hernia.   Musculoskeletal: Normal range of motion. He exhibits no edema, tenderness or deformity.   Healed the scar of the open reduction of a broken right wrist in the medial aspect of right lower forearm.   Lymphadenopathy:     He has no cervical adenopathy.   Neurological: He is alert and oriented to person, place, and time. He has normal reflexes. He displays normal reflexes. No cranial nerve deficit. He exhibits normal muscle tone. Coordination normal.   Skin: Skin is warm and dry. No rash noted. He is not diaphoretic. No erythema. No pallor.   Psychiatric: He has a normal mood and affect. His behavior is normal. Judgment and thought content normal.   Nursing note and vitals reviewed.        Assessment/Plan   Diagnoses and all orders for this visit:    Acquired hypothyroidism  -     T3, Free  -     T4 & TSH ([facility])  -     T4, Free  -     TestT+TestF+SHBG  -     Thyroglobulin With Anti-TG  -     Uric Acid  -     Vitamin D 25 Hydroxy  -     Comprehensive Metabolic Panel  -     C-Peptide  -     Follicle Stimulating Hormone  -     Hemoglobin A1c  -     Lipid Panel  -     Luteinizing Hormone  -     PSA DIAGNOSTIC  -     Hemoglobin & Hematocrit, Blood    Hypogonadism in male  -     T3, Free  -     T4 & TSH ([facility])  -     T4, Free  -     TestT+TestF+SHBG  -     Thyroglobulin With Anti-TG  -     Uric Acid  -     Vitamin D 25 Hydroxy  -     Comprehensive Metabolic Panel  -     C-Peptide  -     Follicle Stimulating Hormone  -     Hemoglobin  A1c  -     Lipid Panel  -     Luteinizing Hormone  -     PSA DIAGNOSTIC  -     Hemoglobin & Hematocrit, Blood    Hypertriglyceridemia  -     T3, Free  -     T4 & TSH ([facility])  -     T4, Free  -     TestT+TestF+SHBG  -     Thyroglobulin With Anti-TG  -     Uric Acid  -     Vitamin D 25 Hydroxy  -     Comprehensive Metabolic Panel  -     C-Peptide  -     Follicle Stimulating Hormone  -     Hemoglobin A1c  -     Lipid Panel  -     Luteinizing Hormone  -     PSA DIAGNOSTIC  -     Hemoglobin & Hematocrit, Blood    Hypercalcemia  -     T3, Free  -     T4 & TSH ([facility])  -     T4, Free  -     TestT+TestF+SHBG  -     Thyroglobulin With Anti-TG  -     Uric Acid  -     Vitamin D 25 Hydroxy  -     Comprehensive Metabolic Panel  -     C-Peptide  -     Follicle Stimulating Hormone  -     Hemoglobin A1c  -     Lipid Panel  -     Luteinizing Hormone  -     PSA DIAGNOSTIC  -     Hemoglobin & Hematocrit, Blood    Chronic fatigue  -     T3, Free  -     T4 & TSH ([facility])  -     T4, Free  -     TestT+TestF+SHBG  -     Thyroglobulin With Anti-TG  -     Uric Acid  -     Vitamin D 25 Hydroxy  -     Comprehensive Metabolic Panel  -     C-Peptide  -     Follicle Stimulating Hormone  -     Hemoglobin A1c  -     Lipid Panel  -     Luteinizing Hormone  -     PSA DIAGNOSTIC  -     Hemoglobin & Hematocrit, Blood    History of primary hyperparathyroidism  -     T3, Free  -     T4 & TSH ([facility])  -     T4, Free  -     TestT+TestF+SHBG  -     Thyroglobulin With Anti-TG  -     Uric Acid  -     Vitamin D 25 Hydroxy  -     Comprehensive Metabolic Panel  -     C-Peptide  -     Follicle Stimulating Hormone  -     Hemoglobin A1c  -     Lipid Panel  -     Luteinizing Hormone  -     PSA DIAGNOSTIC  -     Hemoglobin & Hematocrit, Blood    Hyperglycemia  -     T3, Free  -     T4 & TSH ([facility])  -     T4, Free  -     TestT+TestF+SHBG  -     Thyroglobulin With Anti-TG  -     Uric Acid  -     Vitamin D 25 Hydroxy  -     Comprehensive Metabolic Panel  -      "C-Peptide  -     Follicle Stimulating Hormone  -     Hemoglobin A1c  -     Lipid Panel  -     Luteinizing Hormone  -     PSA DIAGNOSTIC  -     Hemoglobin & Hematocrit, Blood    Benign prostatic hyperplasia without lower urinary tract symptoms  -     T3, Free  -     T4 & TSH ([facility])  -     T4, Free  -     TestT+TestF+SHBG  -     Thyroglobulin With Anti-TG  -     Uric Acid  -     Vitamin D 25 Hydroxy  -     Comprehensive Metabolic Panel  -     C-Peptide  -     Follicle Stimulating Hormone  -     Hemoglobin A1c  -     Lipid Panel  -     Luteinizing Hormone  -     PSA DIAGNOSTIC  -     Hemoglobin & Hematocrit, Blood    Vitamin D deficiency  -     vitamin D (ERGOCALCIFEROL) 61422 units capsule capsule; Take 1 capsule by mouth 2 (Two) Times a Week.    Other orders  -     levothyroxine (SYNTHROID, LEVOTHROID) 137 MCG tablet; Take 1 tablet by mouth Daily.  -     allopurinol (ZYLOPRIM) 100 MG tablet; Take 1 tablet by mouth Daily.  -     Testosterone Cypionate (DEPOTESTOTERONE CYPIONATE) 200 MG/ML injection; 200 mg once every 10 days  -     Syringe/Needle, Disp, (B-D 3CC LUER-JACY SYR 23GX1\") 23G X 1\" 3 ML misc; For testosterone injection once every 10 days.             in summary I saw and examined this 68-year-old gentleman for above-mentioned problems.  I reviewed his laboratory evaluations of 10/11/2017 and at this time we will go ahead and order additional laboratory evaluations and once the results come back we will go ahead and call for any possible modification or new medications.  He will see Ms. Vaishali De La Cruz in 6 months or sooner if needed with laboratory evaluation prior to each office visit.    "

## 2018-03-31 LAB
25(OH)D3+25(OH)D2 SERPL-MCNC: 76.3 NG/ML (ref 30–100)
ALBUMIN SERPL-MCNC: 4.4 G/DL (ref 3.5–5.2)
ALBUMIN/GLOB SERPL: 1.8 G/DL
ALP SERPL-CCNC: 69 U/L (ref 39–117)
ALT SERPL-CCNC: 22 U/L (ref 1–41)
AST SERPL-CCNC: 23 U/L (ref 1–40)
BILIRUB SERPL-MCNC: 0.5 MG/DL (ref 0.1–1.2)
BUN SERPL-MCNC: 18 MG/DL (ref 8–23)
BUN/CREAT SERPL: 13.7 (ref 7–25)
C PEPTIDE SERPL-MCNC: 5.7 NG/ML (ref 1.1–4.4)
CALCIUM SERPL-MCNC: 9.7 MG/DL (ref 8.6–10.5)
CHLORIDE SERPL-SCNC: 101 MMOL/L (ref 98–107)
CHOLEST SERPL-MCNC: 186 MG/DL (ref 0–200)
CO2 SERPL-SCNC: 25.1 MMOL/L (ref 22–29)
CREAT SERPL-MCNC: 1.31 MG/DL (ref 0.76–1.27)
FSH SERPL-ACNC: 0.5 MIU/ML (ref 1.5–12.4)
GFR SERPLBLD CREATININE-BSD FMLA CKD-EPI: 54 ML/MIN/1.73
GFR SERPLBLD CREATININE-BSD FMLA CKD-EPI: 66 ML/MIN/1.73
GLOBULIN SER CALC-MCNC: 2.5 GM/DL
GLUCOSE SERPL-MCNC: 63 MG/DL (ref 65–99)
HBA1C MFR BLD: 5 % (ref 4.8–5.6)
HCT VFR BLD AUTO: 47.3 % (ref 40.4–52.2)
HDLC SERPL-MCNC: 36 MG/DL (ref 40–60)
HGB BLD-MCNC: 15.7 G/DL (ref 13.7–17.6)
INTERPRETATION: NORMAL
LDLC SERPL CALC-MCNC: 103 MG/DL (ref 0–100)
LH SERPL-ACNC: 0.2 MIU/ML (ref 1.7–8.6)
Lab: NORMAL
POTASSIUM SERPL-SCNC: 4.1 MMOL/L (ref 3.5–5.2)
PROT SERPL-MCNC: 6.9 G/DL (ref 6–8.5)
PSA SERPL-MCNC: 0.84 NG/ML (ref 0–4)
SHBG SERPL-SCNC: 50.3 NMOL/L (ref 19.3–76.4)
SODIUM SERPL-SCNC: 140 MMOL/L (ref 136–145)
T3FREE SERPL-MCNC: 2.6 PG/ML (ref 2–4.4)
T4 FREE SERPL-MCNC: 1.06 NG/DL (ref 0.93–1.7)
T4 SERPL-MCNC: 5.02 MCG/DL (ref 4.5–11.7)
TESTOST FREE SERPL-MCNC: 19.3 PG/ML (ref 6.6–18.1)
TESTOST SERPL-MCNC: 1107 NG/DL (ref 264–916)
THYROGLOB AB SERPL-ACNC: <1 IU/ML (ref 0–0.9)
THYROGLOB SERPL-MCNC: 7.7 NG/ML (ref 1.4–29.2)
TRIGL SERPL-MCNC: 235 MG/DL (ref 0–150)
TSH SERPL DL<=0.005 MIU/L-ACNC: 0.74 MIU/ML (ref 0.27–4.2)
URATE SERPL-MCNC: 5.6 MG/DL (ref 3.4–7)
VLDLC SERPL CALC-MCNC: 47 MG/DL (ref 5–40)

## 2018-04-02 RX ORDER — PITAVASTATIN CALCIUM 2.09 MG/1
2 TABLET, FILM COATED ORAL NIGHTLY
Qty: 30 TABLET | Refills: 5 | Status: SHIPPED | OUTPATIENT
Start: 2018-04-02 | End: 2018-08-09

## 2018-04-05 DIAGNOSIS — K21.9 GASTROESOPHAGEAL REFLUX DISEASE WITHOUT ESOPHAGITIS: ICD-10-CM

## 2018-04-05 RX ORDER — OMEPRAZOLE 20 MG/1
CAPSULE, DELAYED RELEASE ORAL
Qty: 30 CAPSULE | Refills: 0 | Status: SHIPPED | OUTPATIENT
Start: 2018-04-05 | End: 2018-05-02 | Stop reason: SDUPTHER

## 2018-04-22 ENCOUNTER — RESULTS ENCOUNTER (OUTPATIENT)
Dept: ENDOCRINOLOGY | Age: 68
End: 2018-04-22

## 2018-04-22 DIAGNOSIS — E03.9 ACQUIRED HYPOTHYROIDISM: ICD-10-CM

## 2018-04-22 DIAGNOSIS — E78.1 HYPERTRIGLYCERIDEMIA: ICD-10-CM

## 2018-04-22 DIAGNOSIS — E83.52 HYPERCALCEMIA: ICD-10-CM

## 2018-04-22 DIAGNOSIS — E29.1 HYPOGONADISM IN MALE: ICD-10-CM

## 2018-04-22 DIAGNOSIS — R53.82 CHRONIC FATIGUE: ICD-10-CM

## 2018-04-22 DIAGNOSIS — Z86.39 HISTORY OF PRIMARY HYPERPARATHYROIDISM: ICD-10-CM

## 2018-05-02 DIAGNOSIS — K21.9 GASTROESOPHAGEAL REFLUX DISEASE WITHOUT ESOPHAGITIS: ICD-10-CM

## 2018-05-04 RX ORDER — OMEPRAZOLE 20 MG/1
CAPSULE, DELAYED RELEASE ORAL
Qty: 30 CAPSULE | Refills: 5 | Status: SHIPPED | OUTPATIENT
Start: 2018-05-04 | End: 2018-08-09

## 2018-08-09 ENCOUNTER — OFFICE VISIT (OUTPATIENT)
Dept: FAMILY MEDICINE CLINIC | Facility: CLINIC | Age: 68
End: 2018-08-09

## 2018-08-09 ENCOUNTER — APPOINTMENT (OUTPATIENT)
Dept: CT IMAGING | Facility: HOSPITAL | Age: 68
End: 2018-08-09

## 2018-08-09 ENCOUNTER — TELEPHONE (OUTPATIENT)
Dept: FAMILY MEDICINE CLINIC | Facility: CLINIC | Age: 68
End: 2018-08-09

## 2018-08-09 ENCOUNTER — HOSPITAL ENCOUNTER (EMERGENCY)
Facility: HOSPITAL | Age: 68
Discharge: HOME | End: 2018-08-09
Attending: EMERGENCY MEDICINE | Admitting: EMERGENCY MEDICINE

## 2018-08-09 VITALS
HEART RATE: 62 BPM | DIASTOLIC BLOOD PRESSURE: 89 MMHG | SYSTOLIC BLOOD PRESSURE: 163 MMHG | TEMPERATURE: 97.5 F | HEIGHT: 70 IN | OXYGEN SATURATION: 98 % | WEIGHT: 208.9 LBS | BODY MASS INDEX: 29.91 KG/M2

## 2018-08-09 VITALS
RESPIRATION RATE: 18 BRPM | WEIGHT: 208 LBS | TEMPERATURE: 97.9 F | DIASTOLIC BLOOD PRESSURE: 80 MMHG | OXYGEN SATURATION: 97 % | BODY MASS INDEX: 29.78 KG/M2 | HEART RATE: 63 BPM | SYSTOLIC BLOOD PRESSURE: 152 MMHG | HEIGHT: 70 IN

## 2018-08-09 DIAGNOSIS — R10.13 EPIGASTRIC PAIN: Primary | ICD-10-CM

## 2018-08-09 DIAGNOSIS — K21.9 GASTROESOPHAGEAL REFLUX DISEASE WITHOUT ESOPHAGITIS: ICD-10-CM

## 2018-08-09 DIAGNOSIS — R10.9 ABDOMINAL PAIN, UNSPECIFIED ABDOMINAL LOCATION: Primary | ICD-10-CM

## 2018-08-09 LAB
ALBUMIN SERPL-MCNC: 4.8 G/DL (ref 3.5–5.2)
ALBUMIN/GLOB SERPL: 1.5 G/DL
ALP SERPL-CCNC: 63 U/L (ref 39–117)
ALT SERPL W P-5'-P-CCNC: 20 U/L (ref 1–41)
ANION GAP SERPL CALCULATED.3IONS-SCNC: 12.8 MMOL/L
AST SERPL-CCNC: 18 U/L (ref 1–40)
BASOPHILS # BLD AUTO: 0.01 10*3/MM3 (ref 0–0.2)
BASOPHILS NFR BLD AUTO: 0.1 % (ref 0–1.5)
BILIRUB SERPL-MCNC: 0.7 MG/DL (ref 0.1–1.2)
BILIRUB UR QL STRIP: NEGATIVE
BUN BLD-MCNC: 24 MG/DL (ref 8–23)
BUN/CREAT SERPL: 15.8 (ref 7–25)
CALCIUM SPEC-SCNC: 10.2 MG/DL (ref 8.6–10.5)
CHLORIDE SERPL-SCNC: 101 MMOL/L (ref 98–107)
CLARITY UR: CLEAR
CO2 SERPL-SCNC: 26.2 MMOL/L (ref 22–29)
COLOR UR: YELLOW
CREAT BLD-MCNC: 1.52 MG/DL (ref 0.76–1.27)
DEPRECATED RDW RBC AUTO: 46.5 FL (ref 37–54)
EOSINOPHIL # BLD AUTO: 0.01 10*3/MM3 (ref 0–0.7)
EOSINOPHIL NFR BLD AUTO: 0.1 % (ref 0.3–6.2)
ERYTHROCYTE [DISTWIDTH] IN BLOOD BY AUTOMATED COUNT: 14.7 % (ref 11.5–14.5)
GFR SERPL CREATININE-BSD FRML MDRD: 46 ML/MIN/1.73
GLOBULIN UR ELPH-MCNC: 3.3 GM/DL
GLUCOSE BLD-MCNC: 134 MG/DL (ref 65–99)
GLUCOSE UR STRIP-MCNC: NEGATIVE MG/DL
HCT VFR BLD AUTO: 50.8 % (ref 40.4–52.2)
HGB BLD-MCNC: 16.9 G/DL (ref 13.7–17.6)
HGB UR QL STRIP.AUTO: NEGATIVE
HOLD SPECIMEN: NORMAL
HOLD SPECIMEN: NORMAL
IMM GRANULOCYTES # BLD: 0.03 10*3/MM3 (ref 0–0.03)
IMM GRANULOCYTES NFR BLD: 0.2 % (ref 0–0.5)
KETONES UR QL STRIP: ABNORMAL
LEUKOCYTE ESTERASE UR QL STRIP.AUTO: NEGATIVE
LIPASE SERPL-CCNC: 23 U/L (ref 13–60)
LYMPHOCYTES # BLD AUTO: 0.56 10*3/MM3 (ref 0.9–4.8)
LYMPHOCYTES NFR BLD AUTO: 4.4 % (ref 19.6–45.3)
MCH RBC QN AUTO: 28.5 PG (ref 27–32.7)
MCHC RBC AUTO-ENTMCNC: 33.3 G/DL (ref 32.6–36.4)
MCV RBC AUTO: 85.7 FL (ref 79.8–96.2)
MONOCYTES # BLD AUTO: 0.41 10*3/MM3 (ref 0.2–1.2)
MONOCYTES NFR BLD AUTO: 3.2 % (ref 5–12)
NEUTROPHILS # BLD AUTO: 11.65 10*3/MM3 (ref 1.9–8.1)
NEUTROPHILS NFR BLD AUTO: 92.2 % (ref 42.7–76)
NITRITE UR QL STRIP: NEGATIVE
NRBC BLD MANUAL-RTO: 0 /100 WBC (ref 0–0)
PH UR STRIP.AUTO: <=5 [PH] (ref 5–8)
PLATELET # BLD AUTO: 108 10*3/MM3 (ref 140–500)
PMV BLD AUTO: 12.1 FL (ref 6–12)
POTASSIUM BLD-SCNC: 3.9 MMOL/L (ref 3.5–5.2)
PROT SERPL-MCNC: 8.1 G/DL (ref 6–8.5)
PROT UR QL STRIP: ABNORMAL
RBC # BLD AUTO: 5.93 10*6/MM3 (ref 4.6–6)
SODIUM BLD-SCNC: 140 MMOL/L (ref 136–145)
SP GR UR STRIP: 1.03 (ref 1–1.03)
UROBILINOGEN UR QL STRIP: ABNORMAL
WBC NRBC COR # BLD: 12.64 10*3/MM3 (ref 4.5–10.7)
WHOLE BLOOD HOLD SPECIMEN: NORMAL
WHOLE BLOOD HOLD SPECIMEN: NORMAL

## 2018-08-09 PROCEDURE — 96360 HYDRATION IV INFUSION INIT: CPT

## 2018-08-09 PROCEDURE — 25010000002 DICYCLOMINE PER 20 MG: Performed by: EMERGENCY MEDICINE

## 2018-08-09 PROCEDURE — 99284 EMERGENCY DEPT VISIT MOD MDM: CPT

## 2018-08-09 PROCEDURE — 81003 URINALYSIS AUTO W/O SCOPE: CPT | Performed by: EMERGENCY MEDICINE

## 2018-08-09 PROCEDURE — 74176 CT ABD & PELVIS W/O CONTRAST: CPT

## 2018-08-09 PROCEDURE — 96361 HYDRATE IV INFUSION ADD-ON: CPT

## 2018-08-09 PROCEDURE — 83690 ASSAY OF LIPASE: CPT | Performed by: EMERGENCY MEDICINE

## 2018-08-09 PROCEDURE — 80053 COMPREHEN METABOLIC PANEL: CPT | Performed by: EMERGENCY MEDICINE

## 2018-08-09 PROCEDURE — 85025 COMPLETE CBC W/AUTO DIFF WBC: CPT | Performed by: EMERGENCY MEDICINE

## 2018-08-09 PROCEDURE — 96372 THER/PROPH/DIAG INJ SC/IM: CPT

## 2018-08-09 PROCEDURE — 99214 OFFICE O/P EST MOD 30 MIN: CPT | Performed by: FAMILY MEDICINE

## 2018-08-09 RX ORDER — SUCRALFATE ORAL 1 G/10ML
1 SUSPENSION ORAL 4 TIMES DAILY
Qty: 160 ML | Refills: 0 | Status: SHIPPED | OUTPATIENT
Start: 2018-08-09 | End: 2018-08-13

## 2018-08-09 RX ORDER — SODIUM CHLORIDE 0.9 % (FLUSH) 0.9 %
10 SYRINGE (ML) INJECTION AS NEEDED
Status: DISCONTINUED | OUTPATIENT
Start: 2018-08-09 | End: 2018-08-09 | Stop reason: HOSPADM

## 2018-08-09 RX ORDER — ALUMINA, MAGNESIA, AND SIMETHICONE 2400; 2400; 240 MG/30ML; MG/30ML; MG/30ML
15 SUSPENSION ORAL ONCE
Status: COMPLETED | OUTPATIENT
Start: 2018-08-09 | End: 2018-08-09

## 2018-08-09 RX ORDER — SODIUM CHLORIDE 9 MG/ML
125 INJECTION, SOLUTION INTRAVENOUS CONTINUOUS
Status: DISCONTINUED | OUTPATIENT
Start: 2018-08-09 | End: 2018-08-09 | Stop reason: HOSPADM

## 2018-08-09 RX ORDER — DICYCLOMINE HYDROCHLORIDE 10 MG/ML
20 INJECTION INTRAMUSCULAR ONCE
Status: COMPLETED | OUTPATIENT
Start: 2018-08-09 | End: 2018-08-09

## 2018-08-09 RX ORDER — POLYETHYLENE GLYCOL 3350 17 G/17G
17 POWDER, FOR SOLUTION ORAL DAILY PRN
Qty: 12 EACH | Refills: 0 | Status: SHIPPED | OUTPATIENT
Start: 2018-08-09 | End: 2018-10-01 | Stop reason: ALTCHOICE

## 2018-08-09 RX ORDER — OMEPRAZOLE 20 MG/1
40 CAPSULE, DELAYED RELEASE ORAL DAILY
Qty: 30 CAPSULE | Refills: 0 | Status: SHIPPED | OUTPATIENT
Start: 2018-08-09 | End: 2018-10-30 | Stop reason: SDUPTHER

## 2018-08-09 RX ADMIN — ALUMINUM HYDROXIDE, MAGNESIUM HYDROXIDE, AND DIMETHICONE 15 ML: 400; 400; 40 SUSPENSION ORAL at 16:14

## 2018-08-09 RX ADMIN — SODIUM CHLORIDE 1000 ML: 9 INJECTION, SOLUTION INTRAVENOUS at 13:35

## 2018-08-09 RX ADMIN — DICYCLOMINE HYDROCHLORIDE 20 MG: 20 INJECTION, SOLUTION INTRAMUSCULAR at 16:18

## 2018-08-09 RX ADMIN — LIDOCAINE HYDROCHLORIDE 15 ML: 20 SOLUTION ORAL; TOPICAL at 16:15

## 2018-08-09 RX ADMIN — SODIUM CHLORIDE 125 ML/HR: 9 INJECTION, SOLUTION INTRAVENOUS at 13:35

## 2018-08-09 NOTE — ED NOTES
Patient report received from Dr. Carlson office at [place].  Sending patient in for evaluation of abdominal pain, n/v and constipation for 7 days.       Ariadne Castano RN  08/09/18 1758

## 2018-08-09 NOTE — DISCHARGE INSTRUCTIONS
You are advised to follow closely with Dr Noriega and Dr Amador as needed in 2-3 days for recheck, final results of lab work and imaging testing, and further testing/treatment as needed.    Drink plenty of fluids  [place] diet  Lots of fruit and vegetables    Please return to the emergency department immediately with chest pain different than usual for you, shortness of air, persistent abdominal pain, persistent vomiting/fever, blood in emesis or stool, lightheadedness/fainting, problems with speech, one sided weakness/numbness, new incontinence, problems with vision,  or for worsening of symptoms or other concerns.

## 2018-08-09 NOTE — PROGRESS NOTES
"Subjective   Kaiser Valente is a 68 y.o. male.     Chief Complaint   Patient presents with   • Abdominal Pain     it started mon, it went away but then started again last night he got the shingles vaccine on fri   • Nausea   • Constipation   • Gas        History of Present Illness    Intermittent, worsening.  Umbilical epigastric abdominal pain.  Started Monday, 4 days ago.  Got better.  Then last night the pain came on again.  Very severe.  8 out of 10.  Constant.  Nausea.  Some belching but no vomiting.  Decreased appetite.  Constipated.  No bowel movements for couple days.  Is passing some gas.  Feels bloated.  Some increased psychosocial stressors.  History of irritable bowel syndrome.  Usually diarrhea predominant.  Multiple psychiatric medications.  He is on testosterone replacement therapy prescribed by endocrinology.  History of pseudogout.  He's not been taking colchicine.  Too expensive.  No recent flareups pseudogout or gout.  The patient presents in the office today for evaluation.  The pain continues.  No sick contacts.  He continues omeprazole daily.  He describes some feverish chills.    The following portions of the patient's history were reviewed and updated as appropriate: allergies, current medications, past family history, past medical history, past social history, past surgical history and problem list.          Review of Systems   Constitutional: Positive for fatigue and fever.   HENT: Negative.    Respiratory: Negative.    Cardiovascular: Negative.    Gastrointestinal: Positive for abdominal distention, abdominal pain, constipation and nausea. Negative for anal bleeding, blood in stool, diarrhea, rectal pain and vomiting.   Genitourinary: Negative.    Musculoskeletal: Negative.    Skin: Negative.    Neurological: Negative.    Psychiatric/Behavioral: Negative.        Objective   Blood pressure 163/89, pulse 62, temperature 97.5 °F (36.4 °C), temperature source Oral, height 177.8 cm (70\"), " weight 94.8 kg (208 lb 14.4 oz), SpO2 98 %.  Physical Exam   Constitutional: He is oriented to person, place, and time.   The patient looks uncomfortable and tired.  Not his normal health.   HENT:   Head: Atraumatic.   Mouth/Throat: Oropharynx is clear and moist.   Eyes: Conjunctivae are normal.   Neck: Normal range of motion. Neck supple.   Cardiovascular: Normal rate and normal heart sounds.    Patient is not tachycardic.   Pulmonary/Chest: Effort normal and breath sounds normal.   Abdominal: Soft.   Bowel sounds are markedly diminished.  There is slight distention.  He is moderately tender to palpation at the epigastrium.  No palpable mass.  No rebound tenderness.  No organomegaly.    Musculoskeletal: Normal range of motion.   Neurological: He is alert and oriented to person, place, and time.   Skin: No erythema. No pallor.   Psychiatric: He has a normal mood and affect. His behavior is normal.       Assessment/Plan   Kaiser was seen today for abdominal pain, nausea, constipation and gas.    Diagnoses and all orders for this visit:    Epigastric pain      68-year-old male with abdominal pain on and off this week.  Worsening.  8 out of 10 pain.  I'm concerned about possible evolving small bowel obstruction.  Differential diagnosis includes peptic ulcer disease, pancreatitis, viral gastritis, worsening of irritable bowel syndrome.  He is hemodynamically stable and not tachycardic.  However his blood pressure is elevated today.  We discussed treatment options.  At this point the best course of action is direct transfer to the emergency room.  The patient is currently stable and son will drive.  We will call with report.  Needs probable CT scan and further blood work along with IV fluids.    Pseudogout.  Not taking colchicine recently.  This would not be causing GI upset.    He continues his omeprazole.    He is on multiple psychiatric medications but there is been no change lately.    He is on testosterone  replacement therapy.  This could cause increased risk of mesenteric thrombosis.  Need CT.    Monoclonal gammopathy of undetermined significance.  He has been seen by hematology.    Thrombocytopenia.  Chronic.

## 2018-08-09 NOTE — TELEPHONE ENCOUNTER
I called and gave report to YAMILE about the patient that was seen in the office today and that we was sending him over to the ER.

## 2018-08-09 NOTE — ED PROVIDER NOTES
EMERGENCY DEPARTMENT ENCOUNTER    CHIEF COMPLAINT  Chief Complaint: abdominal pain  History given by: pt  History limited by: none  Room Number: 14/14  PMD: Jared Noriega MD      HPI:  Pt is a 68 y.o. male who presents complaining of upper abdominal pain that began 4 days ago. Pt states that pain went away and reappeared 2 days ago with worsening pain. Pt describes pain as constant and cramping. Pt states his pain is worse with movement. Admits to N/V, constipation (reports no BM for the last 4 days), but denies testicle/scrotal pain, diarrhea, fever, cough, CP, SOA, rash, leg swelling, rash or tick bite. Pt denies any illicit drug use. Pt reports a hx of pseudogout.    Duration:  5 days  Onset: gradual  Timing: constant   Location: upper middle abdomen  Quality: cramping  Intensity/Severity: moderate  Progression: unchanging  Associated Symptoms: N/V, constipation  Aggravating Factors: movement  Alleviating Factors: none  Previous Episodes: Pt denies a prior hx of these sx  Treatment before arrival: none    PAST MEDICAL HISTORY  Active Ambulatory Problems     Diagnosis Date Noted   • Acquired hypothyroidism 03/21/2016   • Osteoarthritis of both knees 03/21/2016   • Gastroesophageal reflux disease without esophagitis 03/21/2016   • Hypertriglyceridemia 03/21/2016   • Monoclonal gammopathy of undetermined significance 09/26/2016   • Hypercalcemia 09/26/2016   • Chronic fatigue 10/11/2016   • Thrombocytopenia (CMS/HCC) 10/17/2016   • History of primary hyperparathyroidism 02/14/2017   • Hypogonadism in male 04/11/2017   • Toe pain, left 04/11/2017   • Depression 04/15/2017   • Pseudogout of joint of right foot 05/19/2017   • Chronic gout of left foot 06/26/2017   • Renal insufficiency 10/16/2017   • Hyperglycemia 03/29/2018   • Benign prostatic hyperplasia without lower urinary tract symptoms 03/29/2018     Resolved Ambulatory Problems     Diagnosis Date Noted   • Abnormal EKG 03/21/2016   • Primary  hyperparathyroidism (CMS/HCC) 10/11/2016   • Parathyroid adenoma 02/03/2017   • Localized edema 04/11/2017   • Cellulitis of left lower extremity 04/15/2017   • Cellulitis of foot, left 04/15/2017   • Gout 04/16/2017     Past Medical History:   Diagnosis Date   • Anxiety    • Depression    • GERD (gastroesophageal reflux disease)    • Hypercalcemia    • Hypothyroidism    • Inverted T wave    • Osteoarthritis    • Parathyroid disease (CMS/HCC)    • PONV (postoperative nausea and vomiting)    • PTSD (post-traumatic stress disorder)    • Sleep apnea        PAST SURGICAL HISTORY  Past Surgical History:   Procedure Laterality Date   • COLONOSCOPY  07/2014   • ENDOSCOPY      several years ago, normal   • HEMORRHOIDECTOMY     • THYROIDECTOMY Left 2/3/2017    Procedure: Left inferior parathyroidectomy with intraoperative PTH monitoring;  Surgeon: Sarah Matos MD;  Location: [facility] OR;  Service:    • WRIST FUSION Right        FAMILY HISTORY  Family History   Problem Relation Age of Onset   • Breast cancer Mother    • Heart disease Father    • Hypertension Father    • Colon cancer Sister        SOCIAL HISTORY  Social History     Social History   • Marital status:      Spouse name: N/A   • Number of children: N/A   • Years of education: N/A     Occupational History   •  Retired     Social History Main Topics   • Smoking status: Never Smoker   • Smokeless tobacco: Never Used   • Alcohol use Yes      Comment: 1 DAILY DRINK   • Drug use: No   • Sexual activity: Not on file     Other Topics Concern   • Not on file     Social History Narrative   • No narrative on file       ALLERGIES  Codeine    REVIEW OF SYSTEMS  Review of Systems   Constitutional: Negative for chills and fever.   HENT: Negative for sore throat and trouble swallowing.    Eyes: Negative for visual disturbance.   Respiratory: Negative for cough and shortness of breath.    Cardiovascular: Negative for chest pain and leg swelling.    Gastrointestinal: Positive for abdominal pain (upper abd pain), constipation, nausea and vomiting. Negative for diarrhea.   Endocrine: Negative.    Genitourinary: Negative for decreased urine volume and frequency.   Musculoskeletal: Negative for neck pain.   Allergic/Immunologic: Negative.    Neurological: Negative for weakness and numbness.   Hematological: Negative.    Psychiatric/Behavioral: Negative.    All other systems reviewed and are negative.      PHYSICAL EXAM  ED Triage Vitals [08/09/18 1233]   Temp Heart Rate Resp BP SpO2   97.9 °F (36.6 °C) 66 -- -- 98 %      Temp src Heart Rate Source Patient Position BP Location FiO2 (%)   Tympanic Monitor -- -- --       Physical Exam   Constitutional: He is oriented to person, place, and time. He appears distressed.   HENT:   Head: Normocephalic and atraumatic.   Eyes: EOM are normal.   Neck: Normal range of motion.   Cardiovascular: Normal rate, regular rhythm and normal heart sounds.    No murmur heard.  Pulses:       Posterior tibial pulses are 2+ on the right side, and 2+ on the left side.   Pulmonary/Chest: Effort normal and breath sounds normal. No respiratory distress. He has no wheezes.   Abdominal: Soft. Bowel sounds are normal. There is tenderness in the epigastric area. There is no rebound, no guarding and negative Mejia's sign.   Musculoskeletal: Normal range of motion. He exhibits no edema.   Neurological: He is alert and oriented to person, place, and time.   Skin: Skin is warm and dry. No rash noted.   Psychiatric: Affect normal.   Nursing note and vitals reviewed.      LAB RESULTS  Lab Results (last 24 hours)     Procedure Component Value Units Date/Time    CBC & Differential [[id number]] Collected:  08/09/18 1259    Specimen:  Blood Updated:  08/09/18 1321    Narrative:       The following orders were created for panel order CBC & Differential.  Procedure                               Abnormality         Status                     ---------                                -----------         ------                     Scan Slide[[id number]]                                                                  CBC Auto Differential[[id number]]        Abnormal            Final result                 Please view results for these tests on the individual orders.    Comprehensive Metabolic Panel [[id number]]  (Abnormal) Collected:  08/09/18 1259    Specimen:  Blood Updated:  08/09/18 1338     Glucose 134 (H) mg/dL      BUN 24 (H) mg/dL      Creatinine 1.52 (H) mg/dL      Sodium 140 mmol/L      Potassium 3.9 mmol/L      Chloride 101 mmol/L      CO2 26.2 mmol/L      Calcium 10.2 mg/dL      Total Protein 8.1 g/dL      Albumin 4.80 g/dL      ALT (SGPT) 20 U/L      AST (SGOT) 18 U/L      Alkaline Phosphatase 63 U/L      Total Bilirubin 0.7 mg/dL      eGFR Non African Amer 46 (L) mL/min/1.73      Globulin 3.3 gm/dL      A/G Ratio 1.5 g/dL      BUN/Creatinine Ratio 15.8     Anion Gap 12.8 mmol/L     Lipase [[id number]]  (Normal) Collected:  08/09/18 1259    Specimen:  Blood Updated:  08/09/18 1338     Lipase 23 U/L     CBC Auto Differential [[id number]]  (Abnormal) Collected:  08/09/18 1259    Specimen:  Blood Updated:  08/09/18 1321     WBC 12.64 (H) 10*3/mm3      RBC 5.93 10*6/mm3      Hemoglobin 16.9 g/dL      Hematocrit 50.8 %      MCV 85.7 fL      MCH 28.5 pg      MCHC 33.3 g/dL      RDW 14.7 (H) %      RDW-SD 46.5 fl      MPV 12.1 (H) fL      Platelets 108 (L) 10*3/mm3      Neutrophil % 92.2 (H) %      Lymphocyte % 4.4 (L) %      Monocyte % 3.2 (L) %      Eosinophil % 0.1 (L) %      Basophil % 0.1 %      Immature Grans % 0.2 %      Neutrophils, Absolute 11.65 (H) 10*3/mm3      Lymphocytes, Absolute 0.56 (L) 10*3/mm3      Monocytes, Absolute 0.41 10*3/mm3      Eosinophils, Absolute 0.01 10*3/mm3      Basophils, Absolute 0.01 10*3/mm3      Immature Grans, Absolute 0.03 10*3/mm3      nRBC 0.0 /100 WBC     Urinalysis With Microscopic If Indicated (No Culture) - Urine, Clean Catch  [[id number]]  (Abnormal) Collected:  08/09/18 1456    Specimen:  Urine from Urine, Clean Catch Updated:  08/09/18 1507     Color, UA Yellow     Appearance, UA Clear     pH, UA <=5.0     Specific Gravity, UA 1.027     Glucose, UA Negative     Ketones, UA Trace (A)     Bilirubin, UA Negative     Blood, UA Negative     Protein, UA Trace (A)     Leuk Esterase, UA Negative     Nitrite, UA Negative     Urobilinogen, UA 1.0 E.U./dL    Narrative:       Urine microscopic not indicated.          I ordered the above labs and reviewed the results    RADIOLOGY  CT Abdomen Pelvis Stone Protocol   Final Result   Mildly enlarged prostate gland. Otherwise unremarkable CT   scan of the abdomen and pelvis. No acute process is identified.       This report was finalized on 8/9/2018 2:53 PM by Dr. Fady Piña M.D.               I ordered the above noted radiological studies. Interpreted by radiologist. Reviewed by me in PACS.       PROCEDURES  Procedures      PROGRESS AND CONSULTS   1252 Ordered blood work, UA, and CT Abdomen for further evaluation. IVF ordered.    1525 - GI cocktail ordered.     MEDICAL DECISION MAKING  Results were reviewed/discussed with the patient and they were also made aware of online access. Pt also made aware that some labs, such as cultures, will not be resulted during ER visit and follow up with PMD is necessary.     MDM  Number of Diagnoses or Management Options  Abdominal pain, unspecified abdominal location:      Amount and/or Complexity of Data Reviewed  Clinical lab tests: ordered and reviewed (WBC - 12.64  Lipase - 23)  Tests in the radiology section of CPT®: ordered and reviewed (CT abd/pelvis - mildly enlarged prostate, otherwise negative acute)  Review and summarize past medical records: yes           DIAGNOSIS  Final diagnoses:   Abdominal pain, unspecified abdominal location       DISPOSITION  DISCHARGE    Patient discharged in stable condition.    Reviewed implications of results, diagnosis,  meds, responsibility to follow up, warning signs and symptoms of possible worsening, potential complications and reasons to return to ER.    Patient/Family voiced understanding of above instructions.    Discussed plan for discharge, as there is no emergent indication for admission. Patient referred to primary care provider for BP management due to today's BP. Pt/family is agreeable and understands need for follow up and repeat testing.  Pt is aware that discharge does not mean that nothing is wrong but it indicates no emergency is present that requires admission and they must continue care with follow-up as given below or physician of their choice.     FOLLOW-UP  DISCHARGE    Patient discharged in stable condition.    Reviewed implications of results, diagnosis, meds, responsibility to follow up, warning signs and symptoms of possible worsening, potential complications and reasons to return to ER.    Patient/Family voiced understanding of above instructions.    Discussed plan for discharge, as there is no emergent indication for admission. Patient referred to primary care provider for BP management due to today's BP. Pt/family is agreeable and understands need for follow up and repeat testing.  Pt is aware that discharge does not mean that nothing is wrong but it indicates no emergency is present that requires admission and they must continue care with follow-up as given below or physician of their choice.     FOLLOW-UP  Jared Noriega MD  [address]    [facility] [id number] [phone]    Schedule an appointment as soon as possible for a visit in 3 days  EVEN IF WELL    Wendy Amador MD  [address]  [facility] [id number] [phone]    Schedule an appointment as soon as possible for a visit in 3 days           Medication List      New Prescriptions    polyethylene glycol packet  Commonly known as:  MIRALAX  Take 17 g by mouth Daily As Needed (constipation).     sucralfate 1  GM/10ML suspension  Commonly known as:  CARAFATE  Take 10 mL by mouth 4 (Four) Times a Day for 4 days.        Changed    omeprazole 20 MG capsule  Commonly known as:  priLOSEC  Take 2 capsules by mouth Daily.  What changed:  See the new instructions.          Latest Documented Vital Signs:  As of 3:38 PM  BP- 162/86 HR- 68 Temp- 97.9 °F (36.6 °C) (Tympanic) O2 sat- 98%    --  Documentation assistance provided by karlene Maria and Bonny Hunt for Dr. Cherry.  Information recorded by the scribe was done at my direction and has been verified and validated by me.     Bonny Hunt  08/09/18 1530    Edmundo Maria  08/09/18 7102       Geri Cherry MD  08/13/18 [id number]

## 2018-08-10 ENCOUNTER — TELEPHONE (OUTPATIENT)
Dept: SOCIAL WORK | Facility: HOSPITAL | Age: 68
End: 2018-08-10

## 2018-08-10 NOTE — TELEPHONE ENCOUNTER
ED f/u phone call: states he is taking prescribed meds, feels a little better and has upcoming f/u reese't w/ gastroenterologist. No questions/concerns

## 2018-08-13 ENCOUNTER — TELEPHONE (OUTPATIENT)
Dept: FAMILY MEDICINE CLINIC | Facility: CLINIC | Age: 68
End: 2018-08-13

## 2018-08-13 NOTE — TELEPHONE ENCOUNTER
PT IS SCHEDULED FOR F/U ON 21ST / PT REQUESTING TO BE SEEN THIS WEEK. NO AVAILABLE OPENINGS. IF HE CAN BE WORKED IN SOMETIME THIS WEEK/ PLEASE CALL PT TO R/S /    THANK YOU .  NOHEMY

## 2018-08-14 ENCOUNTER — OFFICE VISIT (OUTPATIENT)
Dept: FAMILY MEDICINE CLINIC | Facility: CLINIC | Age: 68
End: 2018-08-14

## 2018-08-14 VITALS
SYSTOLIC BLOOD PRESSURE: 137 MMHG | WEIGHT: 208 LBS | OXYGEN SATURATION: 98 % | BODY MASS INDEX: 29.78 KG/M2 | DIASTOLIC BLOOD PRESSURE: 79 MMHG | TEMPERATURE: 99.1 F | HEIGHT: 70 IN | HEART RATE: 68 BPM

## 2018-08-14 DIAGNOSIS — D69.6 THROMBOCYTOPENIA (HCC): ICD-10-CM

## 2018-08-14 DIAGNOSIS — R10.13 EPIGASTRIC PAIN: Primary | ICD-10-CM

## 2018-08-14 LAB
ALBUMIN SERPL-MCNC: 4.2 G/DL (ref 3.5–5.2)
ALBUMIN/GLOB SERPL: 1.5 G/DL
ALP SERPL-CCNC: 59 U/L (ref 39–117)
ALT SERPL-CCNC: 15 U/L (ref 1–41)
AST SERPL-CCNC: 9 U/L (ref 1–40)
BASOPHILS # BLD AUTO: 0.02 10*3/MM3 (ref 0–0.2)
BASOPHILS NFR BLD AUTO: 0.2 % (ref 0–1.5)
BILIRUB SERPL-MCNC: 0.9 MG/DL (ref 0.1–1.2)
BUN SERPL-MCNC: 17 MG/DL (ref 8–23)
BUN/CREAT SERPL: 11.9 (ref 7–25)
CALCIUM SERPL-MCNC: 8.6 MG/DL (ref 8.6–10.5)
CHLORIDE SERPL-SCNC: 101 MMOL/L (ref 98–107)
CO2 SERPL-SCNC: 28.8 MMOL/L (ref 22–29)
CREAT SERPL-MCNC: 1.43 MG/DL (ref 0.76–1.27)
EOSINOPHIL # BLD AUTO: 0.11 10*3/MM3 (ref 0–0.7)
EOSINOPHIL NFR BLD AUTO: 1.3 % (ref 0.3–6.2)
ERYTHROCYTE [DISTWIDTH] IN BLOOD BY AUTOMATED COUNT: 14.5 % (ref 11.5–14.5)
GLOBULIN SER CALC-MCNC: 2.8 GM/DL
GLUCOSE SERPL-MCNC: 90 MG/DL (ref 65–99)
HCT VFR BLD AUTO: 42.4 % (ref 40.4–52.2)
HGB BLD-MCNC: 14.1 G/DL (ref 13.7–17.6)
IMM GRANULOCYTES # BLD: 0.04 10*3/MM3 (ref 0–0.03)
IMM GRANULOCYTES NFR BLD: 0.5 % (ref 0–0.5)
LYMPHOCYTES # BLD AUTO: 0.99 10*3/MM3 (ref 0.9–4.8)
LYMPHOCYTES NFR BLD AUTO: 11.5 % (ref 19.6–45.3)
MCH RBC QN AUTO: 29.4 PG (ref 27–32.7)
MCHC RBC AUTO-ENTMCNC: 33.3 G/DL (ref 32.6–36.4)
MCV RBC AUTO: 88.3 FL (ref 79.8–96.2)
MONOCYTES # BLD AUTO: 0.48 10*3/MM3 (ref 0.2–1.2)
MONOCYTES NFR BLD AUTO: 5.6 % (ref 5–12)
NEUTROPHILS # BLD AUTO: 7.01 10*3/MM3 (ref 1.9–8.1)
NEUTROPHILS NFR BLD AUTO: 81.4 % (ref 42.7–76)
PLATELET # BLD AUTO: 135 10*3/MM3 (ref 140–500)
POTASSIUM SERPL-SCNC: 3.8 MMOL/L (ref 3.5–5.2)
PROT SERPL-MCNC: 7 G/DL (ref 6–8.5)
RBC # BLD AUTO: 4.8 10*6/MM3 (ref 4.6–6)
SODIUM SERPL-SCNC: 141 MMOL/L (ref 136–145)
WBC # BLD AUTO: 8.61 10*3/MM3 (ref 4.5–10.7)

## 2018-08-14 PROCEDURE — 99213 OFFICE O/P EST LOW 20 MIN: CPT | Performed by: FAMILY MEDICINE

## 2018-08-14 RX ORDER — SUCRALFATE 1 G/1
1 TABLET ORAL DAILY
COMMUNITY
Start: 2018-08-10 | End: 2018-10-01 | Stop reason: ALTCHOICE

## 2018-08-14 NOTE — PROGRESS NOTES
"Young Valente is a 68 y.o. male.     Chief Complaint   Patient presents with   • Abdominal Pain     follow up        History of Present Illness    Follow-up abdominal pain.  Sent to the emergency room last week with abdominal pain and dehydration.  Upon further history had been swimming 3 miles at a time without break.  He was also constipated for 4 days.  He says not having bowel movements.  Feeling better.  Abdominal pain is nearly resolved.  No fever.  No vomiting.  No nausea.  He continues his multiple medications.  At the emergency room the CT scan was normal.  Lipase normal.  White count slightly elevated.  His chronic thrombocytopenia was unchanged.  Creatinine was slightly elevated, likely from dehydration.      The following portions of the patient's history were reviewed and updated as appropriate: allergies, current medications, past family history, past medical history, past social history, past surgical history and problem list.          Review of Systems   Constitutional: Negative.    Respiratory: Negative.    Cardiovascular: Negative.    Gastrointestinal: Negative.  Negative for abdominal pain, constipation, diarrhea and vomiting.   Genitourinary: Negative.    Musculoskeletal: Negative.        Objective   Blood pressure 137/79, pulse 68, temperature 99.1 °F (37.3 °C), temperature source Oral, height 177.8 cm (70\"), weight 94.3 kg (208 lb), SpO2 98 %.  Physical Exam   Constitutional: He is oriented to person, place, and time. He appears well-developed and well-nourished. No distress.   HENT:   Head: Normocephalic and atraumatic.   Mouth/Throat: Oropharynx is clear and moist.   Eyes: Conjunctivae are normal.   Neck: No thyromegaly present.   Cardiovascular: Normal rate, regular rhythm, normal heart sounds and intact distal pulses.    Pulmonary/Chest: Effort normal and breath sounds normal.   Abdominal: Soft. Bowel sounds are normal. He exhibits no distension and no mass. There is no " tenderness. There is no rebound and no guarding. No hernia.   Musculoskeletal: He exhibits no edema.   Neurological: He is alert and oriented to person, place, and time.   Skin: Skin is warm and dry. No rash noted.   Psychiatric: He has a normal mood and affect. His behavior is normal. Judgment and thought content normal.   Nursing note and vitals reviewed.      Assessment/Plan   Kaiser was seen today for abdominal pain.    Diagnoses and all orders for this visit:    Epigastric pain  -     Comprehensive Metabolic Panel  -     CBC & Differential    Thrombocytopenia (CMS/HCC)  -     Comprehensive Metabolic Panel  -     CBC & Differential      Abdominal pain.  Resolved.  Possible gastroenteritis.  Possible dehydration.  No evidence of bowel obstruction.  No evidence of mesenteric thrombosis or ischemia.  No evidence of pancreatitis.  No evidence of cholelithiasis or cholecystitis.  No evidence of appendicitis.  At this point I'm reviewed checking the blood work to ensure stability.  He is okay for travel next week on his vacation.  I want him to limit time in the swimming pool.  Also maintain adequate hydration.  With recurrent symptoms he is to let us know.

## 2018-08-15 ENCOUNTER — EPISODE CHANGES (OUTPATIENT)
Dept: CASE MANAGEMENT | Facility: OTHER | Age: 68
End: 2018-08-15

## 2018-08-15 DIAGNOSIS — E03.9 ACQUIRED HYPOTHYROIDISM: Primary | ICD-10-CM

## 2018-08-15 DIAGNOSIS — E78.1 HYPERTRIGLYCERIDEMIA: ICD-10-CM

## 2018-08-15 DIAGNOSIS — R10.84 GENERALIZED ABDOMINAL PAIN: ICD-10-CM

## 2018-08-15 NOTE — PROGRESS NOTES
The kidney function is slightly improved compared to last week.  However I would like to continue to monitor.  Platelet count is stable.  Up slightly.  Follow-up in 3 months with a CMP, lipid panel, CBC, and TSH prior.

## 2018-09-11 DIAGNOSIS — N40.0 BPH WITHOUT OBSTRUCTION/LOWER URINARY TRACT SYMPTOMS: ICD-10-CM

## 2018-09-11 DIAGNOSIS — E29.1 HYPOGONADISM IN MALE: ICD-10-CM

## 2018-09-11 DIAGNOSIS — R73.9 HYPERGLYCEMIA: ICD-10-CM

## 2018-09-11 DIAGNOSIS — E83.52 HYPERCALCEMIA: ICD-10-CM

## 2018-09-11 DIAGNOSIS — E03.9 ACQUIRED HYPOTHYROIDISM: Primary | ICD-10-CM

## 2018-09-11 DIAGNOSIS — E55.9 VITAMIN D DEFICIENCY: ICD-10-CM

## 2018-09-17 ENCOUNTER — LAB (OUTPATIENT)
Dept: ENDOCRINOLOGY | Age: 68
End: 2018-09-17

## 2018-09-17 DIAGNOSIS — E29.1 HYPOGONADISM IN MALE: ICD-10-CM

## 2018-09-17 DIAGNOSIS — E03.9 ACQUIRED HYPOTHYROIDISM: ICD-10-CM

## 2018-09-17 DIAGNOSIS — E83.52 HYPERCALCEMIA: ICD-10-CM

## 2018-09-17 DIAGNOSIS — E55.9 VITAMIN D DEFICIENCY: ICD-10-CM

## 2018-09-17 DIAGNOSIS — N40.0 BPH WITHOUT OBSTRUCTION/LOWER URINARY TRACT SYMPTOMS: ICD-10-CM

## 2018-09-17 DIAGNOSIS — R73.9 HYPERGLYCEMIA: ICD-10-CM

## 2018-09-20 LAB
25(OH)D3+25(OH)D2 SERPL-MCNC: 77.6 NG/ML (ref 30–100)
ALBUMIN SERPL-MCNC: 4.2 G/DL (ref 3.5–5.2)
ALBUMIN/GLOB SERPL: 1.5 G/DL
ALP SERPL-CCNC: 87 U/L (ref 39–117)
ALT SERPL-CCNC: 23 U/L (ref 1–41)
AST SERPL-CCNC: 18 U/L (ref 1–40)
BILIRUB SERPL-MCNC: 0.3 MG/DL (ref 0.1–1.2)
BUN SERPL-MCNC: 23 MG/DL (ref 8–23)
BUN/CREAT SERPL: 15.4 (ref 7–25)
C PEPTIDE SERPL-MCNC: 4.8 NG/ML (ref 1.1–4.4)
CA-I SERPL ISE-MCNC: 5.4 MG/DL (ref 4.5–5.6)
CALCIUM SERPL-MCNC: 9.2 MG/DL (ref 8.6–10.5)
CHLORIDE SERPL-SCNC: 100 MMOL/L (ref 98–107)
CHOLEST SERPL-MCNC: 195 MG/DL (ref 0–200)
CO2 SERPL-SCNC: 23.1 MMOL/L (ref 22–29)
CREAT SERPL-MCNC: 1.49 MG/DL (ref 0.76–1.27)
FT4I SERPL CALC-MCNC: 1.2 (ref 1.2–4.9)
GLOBULIN SER CALC-MCNC: 2.8 GM/DL
GLUCOSE SERPL-MCNC: 96 MG/DL (ref 65–99)
HBA1C MFR BLD: 5.2 % (ref 4.8–5.6)
HCT VFR BLD AUTO: 50.1 % (ref 40.4–52.2)
HDLC SERPL-MCNC: 31 MG/DL (ref 40–60)
HGB BLD-MCNC: 16.4 G/DL (ref 13.7–17.6)
INTERPRETATION: NORMAL
LDLC SERPL CALC-MCNC: ABNORMAL MG/DL
Lab: NORMAL
PHOSPHATE SERPL-MCNC: 4.1 MG/DL (ref 2.5–4.5)
POTASSIUM SERPL-SCNC: 4.3 MMOL/L (ref 3.5–5.2)
PROT SERPL-MCNC: 7 G/DL (ref 6–8.5)
PSA SERPL-MCNC: 0.88 NG/ML (ref 0–4)
PTH-INTACT SERPL-MCNC: 52 PG/ML (ref 15–65)
SHBG SERPL-SCNC: 48 NMOL/L (ref 19.3–76.4)
SODIUM SERPL-SCNC: 138 MMOL/L (ref 136–145)
T3FREE SERPL-MCNC: 2.8 PG/ML (ref 2–4.4)
T3RU NFR SERPL: 25 % (ref 24–39)
T4 FREE SERPL-MCNC: 0.93 NG/DL (ref 0.93–1.7)
T4 SERPL-MCNC: 4.7 UG/DL (ref 4.5–12)
TESTOST FREE SERPL-MCNC: 18 PG/ML (ref 6.6–18.1)
TESTOST SERPL-MCNC: 928 NG/DL (ref 264–916)
THYROGLOB AB SERPL-ACNC: <1 IU/ML
THYROGLOB SERPL-MCNC: 3.6 NG/ML
THYROGLOB SERPL-MCNC: NORMAL NG/ML
TRIGL SERPL-MCNC: 452 MG/DL (ref 0–150)
TSH SERPL DL<=0.005 MIU/L-ACNC: 1.67 UIU/ML (ref 0.45–4.5)
VLDLC SERPL CALC-MCNC: ABNORMAL MG/DL

## 2018-10-01 ENCOUNTER — OFFICE VISIT (OUTPATIENT)
Dept: ENDOCRINOLOGY | Age: 68
End: 2018-10-01

## 2018-10-01 VITALS
WEIGHT: 212 LBS | DIASTOLIC BLOOD PRESSURE: 80 MMHG | BODY MASS INDEX: 30.35 KG/M2 | HEIGHT: 70 IN | SYSTOLIC BLOOD PRESSURE: 140 MMHG

## 2018-10-01 DIAGNOSIS — I10 BENIGN ESSENTIAL HTN: ICD-10-CM

## 2018-10-01 DIAGNOSIS — E03.9 ACQUIRED HYPOTHYROIDISM: Primary | ICD-10-CM

## 2018-10-01 DIAGNOSIS — E78.1 HYPERTRIGLYCERIDEMIA: ICD-10-CM

## 2018-10-01 DIAGNOSIS — R73.9 HYPERGLYCEMIA: ICD-10-CM

## 2018-10-01 DIAGNOSIS — E83.52 HYPERCALCEMIA: ICD-10-CM

## 2018-10-01 DIAGNOSIS — E29.1 HYPOGONADISM IN MALE: ICD-10-CM

## 2018-10-01 PROCEDURE — 99214 OFFICE O/P EST MOD 30 MIN: CPT | Performed by: NURSE PRACTITIONER

## 2018-10-01 RX ORDER — LISINOPRIL 10 MG/1
10 TABLET ORAL DAILY
Qty: 30 TABLET | Refills: 5 | Status: SHIPPED | OUTPATIENT
Start: 2018-10-01 | End: 2019-02-28 | Stop reason: SDUPTHER

## 2018-10-01 NOTE — PATIENT INSTRUCTIONS
Lisinopril 10 mg daily for blood pressure  Change fish oil and increase dose  Monitor blood pressure  Decreased salt in diet

## 2018-10-01 NOTE — PROGRESS NOTES
"Young Valente is a 68 y.o. male is here today for follow-up.  Chief Complaint   Patient presents with   • Hypothyroidism     Recent labs.    • Hypogonadism   • Abnormal Calcium   • Hyperlipidemia   • Vitamin D Deficiency     /80   Ht 177.8 cm (70\")   Wt 96.2 kg (212 lb)   BMI 30.42 kg/m²   Current Outpatient Prescriptions on File Prior to Visit   Medication Sig   • allopurinol (ZYLOPRIM) 100 MG tablet Take 1 tablet by mouth Daily.   • aspirin 81 MG tablet Take 81 mg by mouth Every Night. HOLD PRIOR TO SURG   • busPIRone (BUSPAR) 15 MG tablet Take 30 mg by mouth 2 (Two) Times a Day.   • glucosamine-chondroitin 500-400 MG capsule capsule Take 1 capsule by mouth 2 (Two) Times a Day With Meals. HOLD PRIOR TO SURG   • lamoTRIgine (LaMICtal) 100 MG tablet Take 150 mg by mouth Daily.   • levothyroxine (SYNTHROID, LEVOTHROID) 137 MCG tablet Take 1 tablet by mouth Daily.   • Omega-3 Fatty Acids (FISH OIL) 1000 MG capsule capsule Take 1,000 mg by mouth Daily With Breakfast. HOLD PRIOR TO SURG   • omeprazole (priLOSEC) 20 MG capsule Take 2 capsules by mouth Daily.   • QUEtiapine (SEROquel) 25 MG tablet Take 25 mg by mouth 3 (Three) Times a Day.   • sertraline (ZOLOFT) 100 MG tablet Take 200 mg by mouth Every Morning.   • Syringe/Needle, Disp, (B-D 3CC LUER-JACY SYR 23GX1\") 23G X 1\" 3 ML misc For testosterone injection once every 10 days.   • Testosterone Cypionate (DEPOTESTOTERONE CYPIONATE) 200 MG/ML injection 200 mg once every 10 days   • TURMERIC PO Take 1 capsule by mouth 2 (Two) Times a Day. HOLD PRIOR TO SURG    • vitamin D (ERGOCALCIFEROL) 53051 units capsule capsule Take 1 capsule by mouth 2 (Two) Times a Week.   • [DISCONTINUED] colchicine 0.6 MG tablet Take 1 tablet by mouth 2 (Two) Times a Day As Needed (pseudogout pain).   • [DISCONTINUED] modafinil (PROVIGIL) 100 MG tablet Take 150 mg by mouth Every Morning.   • [DISCONTINUED] polyethylene glycol (MIRALAX) packet Take 17 g by mouth Daily As " Needed (constipation).   • [DISCONTINUED] sucralfate (CARAFATE) 1 g tablet Take 1 g by mouth Daily.     No current facility-administered medications on file prior to visit.      Family History   Problem Relation Age of Onset   • Breast cancer Mother    • Heart disease Father    • Hypertension Father    • Colon cancer Sister      Social History   Substance Use Topics   • Smoking status: Never Smoker   • Smokeless tobacco: Never Used   • Alcohol use Yes      Comment: 1 DAILY DRINK     Allergies   Allergen Reactions   • Codeine      nausea         History of Present Illness  Encounter Diagnoses   Name Primary?   • Hypertriglyceridemia    • Acquired hypothyroidism Yes   • Hypogonadism in male    • Hypercalcemia    • Hyperglycemia    • Benign essential HTN      68-year-old male patient here today for routine follow-up visit.  He is being seen for the above-mentioned problems.  He had recent labs which were reviewed and he was provided a copy.  He is currently taking over-the-counter fish oil and states he is taking 3 capsules daily.  We discussed switching to pharmaceutical grade fish oil to lower his triglycerides or increasing his over-the-counter fish oil to 4 capsules daily.  He was noted to be hypertensive at today's visit.  He has never been treated for hypertension before.  He denies any signs and symptoms of low testosterone.  He denies any signs and symptoms of hypothyroidism.  He is taking his medications as prescribed and is not wanting to increase the amount of medications he is currently taking.  After the discussion of hypertension he was in agreement to treat his blood pressure.  The following portions of the patient's history were reviewed and updated as appropriate: allergies, current medications, past family history, past medical history, past social history, past surgical history and problem list.    Review of Systems   Constitutional: Negative for fatigue.   HENT: Negative for trouble swallowing.     Eyes: Negative for visual disturbance.   Respiratory: Negative for shortness of breath.    Cardiovascular: Negative for leg swelling.   Endocrine: Negative for polyuria.   Skin: Negative for wound.   Neurological: Negative for numbness.       Objective   Physical Exam  Results for orders placed or performed in visit on 09/17/18   Comprehensive Metabolic Panel   Result Value Ref Range    Glucose 96 65 - 99 mg/dL    BUN 23 8 - 23 mg/dL    Creatinine 1.49 (H) 0.76 - 1.27 mg/dL    eGFR Non African Am 47 (L) >60 mL/min/1.73    eGFR African Am 57 (L) >60 mL/min/1.73    BUN/Creatinine Ratio 15.4 7.0 - 25.0    Sodium 138 136 - 145 mmol/L    Potassium 4.3 3.5 - 5.2 mmol/L    Chloride 100 98 - 107 mmol/L    Total CO2 23.1 22.0 - 29.0 mmol/L    Calcium 9.2 8.6 - 10.5 mg/dL    Total Protein 7.0 6.0 - 8.5 g/dL    Albumin 4.20 3.50 - 5.20 g/dL    Globulin 2.8 gm/dL    A/G Ratio 1.5 g/dL    Total Bilirubin 0.3 0.1 - 1.2 mg/dL    Alkaline Phosphatase 87 39 - 117 U/L    AST (SGOT) 18 1 - 40 U/L    ALT (SGPT) 23 1 - 41 U/L   Calcium, Ionized   Result Value Ref Range    Ionized Calcium 5.4 4.5 - 5.6 mg/dL   Comprehensive Thyroglobulin   Result Value Ref Range    Thyroglobulin Ab <1.0 IU/mL    Thyroglobulin 3.6 ng/mL    Thyroglobulin (TG-CLAIRE) Comment ng/mL   C-Peptide   Result Value Ref Range    C-Peptide 4.8 (H) 1.1 - 4.4 ng/mL   Hemoglobin & Hematocrit, Blood   Result Value Ref Range    Hemoglobin 16.4 13.7 - 17.6 g/dL    Hematocrit 50.1 40.4 - 52.2 %   Hemoglobin A1c   Result Value Ref Range    Hemoglobin A1C 5.20 4.80 - 5.60 %   Lipid Panel   Result Value Ref Range    Total Cholesterol 195 0 - 200 mg/dL    Triglycerides 452 (H) 0 - 150 mg/dL    HDL Cholesterol 31 (L) 40 - 60 mg/dL    VLDL Cholesterol CANCELED mg/dL    LDL Cholesterol  CANCELED mg/dL   PSA DIAGNOSTIC   Result Value Ref Range    PSA 0.881 0.000 - 4.000 ng/mL   T3, Free   Result Value Ref Range    T3, Free 2.8 2.0 - 4.4 pg/mL   T4, Free   Result Value Ref Range     Free T4 0.93 0.93 - 1.70 ng/dL   TestT+TestF+SHBG   Result Value Ref Range    Testosterone, Total 928 (H) 264 - 916 ng/dL    Testosterone, Free 18.0 6.6 - 18.1 pg/mL    Sex Hormone Binding Globulin 48.0 19.3 - 76.4 nmol/L   Thyroid Panel With TSH   Result Value Ref Range    TSH 1.670 0.450 - 4.500 uIU/mL    T4, Total 4.7 4.5 - 12.0 ug/dL    T3 Uptake 25 24 - 39 %    Free Thyroxine Index 1.2 1.2 - 4.9   Vitamin D 25 Hydroxy   Result Value Ref Range    25 Hydroxy, Vitamin D 77.6 30.0 - 100.0 ng/ml   Phosphorus   Result Value Ref Range    Phosphorus 4.1 2.5 - 4.5 mg/dL   PTH, Intact   Result Value Ref Range    PTH, Intact 52 15 - 65 pg/mL   Cardiovascular Risk Assessment   Result Value Ref Range    Interpretation Note    Diabetes Patient Education   Result Value Ref Range    PDF Image Not applicable          Assessment/Plan   Problems Addressed this Visit        Cardiovascular and Mediastinum    Hypertriglyceridemia    Benign essential HTN    Relevant Medications    lisinopril (PRINIVIL,ZESTRIL) 10 MG tablet       Endocrine    Acquired hypothyroidism - Primary    Hypogonadism in male       Other    Hypercalcemia    Hyperglycemia        In summary, patient was seen and examined.  Metabolically he is stable.  He will start lisinopril 10 mg once daily for hypertension and has agreed to monitor his blood pressure.  He has been advised to contact the office with his blood sugar readings if they failed to improve.  He will continue on his current dose of levothyroxine.  His triglycerides are considerably higher than in the past.  I have asked that he increases fish oil or consider changing brands.  He does not want to take pharmaceutical grade fish oil at this time.  His testosterone level is in satisfactory range.  His calcium level is normal.  He will follow-up with Dr. Greene his next visit with labs.  I've encouraged him to contact the office should he have any questions or concerns prior to his next visit

## 2018-10-03 RX ORDER — TESTOSTERONE CYPIONATE 200 MG/ML
INJECTION, SOLUTION INTRAMUSCULAR
Qty: 3 ML | Refills: 0 | OUTPATIENT
Start: 2018-10-03 | End: 2018-10-08 | Stop reason: SDUPTHER

## 2018-10-08 RX ORDER — TESTOSTERONE CYPIONATE 200 MG/ML
INJECTION, SOLUTION INTRAMUSCULAR
Qty: 3 ML | Refills: 0 | OUTPATIENT
Start: 2018-10-08 | End: 2018-12-17 | Stop reason: SDUPTHER

## 2018-10-18 RX ORDER — ALLOPURINOL 100 MG/1
TABLET ORAL
Qty: 30 TABLET | Refills: 4 | Status: SHIPPED | OUTPATIENT
Start: 2018-10-18 | End: 2019-03-25 | Stop reason: SDUPTHER

## 2018-10-30 DIAGNOSIS — K21.9 GASTROESOPHAGEAL REFLUX DISEASE WITHOUT ESOPHAGITIS: ICD-10-CM

## 2018-10-30 RX ORDER — OMEPRAZOLE 20 MG/1
40 CAPSULE, DELAYED RELEASE ORAL DAILY
Qty: 60 CAPSULE | Refills: 5 | Status: SHIPPED | OUTPATIENT
Start: 2018-10-30 | End: 2018-12-06

## 2018-11-01 ENCOUNTER — RESULTS ENCOUNTER (OUTPATIENT)
Dept: FAMILY MEDICINE CLINIC | Facility: CLINIC | Age: 68
End: 2018-11-01

## 2018-11-01 DIAGNOSIS — E78.1 HYPERTRIGLYCERIDEMIA: ICD-10-CM

## 2018-11-01 DIAGNOSIS — R10.84 GENERALIZED ABDOMINAL PAIN: ICD-10-CM

## 2018-11-01 DIAGNOSIS — E03.9 ACQUIRED HYPOTHYROIDISM: ICD-10-CM

## 2018-12-06 DIAGNOSIS — K21.9 GASTROESOPHAGEAL REFLUX DISEASE WITHOUT ESOPHAGITIS: ICD-10-CM

## 2018-12-06 RX ORDER — OMEPRAZOLE 20 MG/1
CAPSULE, DELAYED RELEASE ORAL
Qty: 30 CAPSULE | Refills: 5 | Status: SHIPPED | OUTPATIENT
Start: 2018-12-06 | End: 2019-05-24 | Stop reason: SDUPTHER

## 2018-12-14 LAB
ALBUMIN SERPL-MCNC: 4.4 G/DL (ref 3.5–5.2)
ALBUMIN/GLOB SERPL: 1.5 G/DL
ALP SERPL-CCNC: 70 U/L (ref 39–117)
ALT SERPL-CCNC: 20 U/L (ref 1–41)
AST SERPL-CCNC: 19 U/L (ref 1–40)
BASOPHILS # BLD AUTO: 0.04 10*3/MM3 (ref 0–0.2)
BASOPHILS NFR BLD AUTO: 0.5 % (ref 0–1.5)
BILIRUB SERPL-MCNC: 0.4 MG/DL (ref 0.1–1.2)
BUN SERPL-MCNC: 29 MG/DL (ref 8–23)
BUN/CREAT SERPL: 18.1 (ref 7–25)
CALCIUM SERPL-MCNC: 9.2 MG/DL (ref 8.6–10.5)
CHLORIDE SERPL-SCNC: 101 MMOL/L (ref 98–107)
CHOLEST SERPL-MCNC: 193 MG/DL (ref 0–200)
CO2 SERPL-SCNC: 29.2 MMOL/L (ref 22–29)
CREAT SERPL-MCNC: 1.6 MG/DL (ref 0.76–1.27)
EOSINOPHIL # BLD AUTO: 0.28 10*3/MM3 (ref 0–0.7)
EOSINOPHIL NFR BLD AUTO: 3.3 % (ref 0.3–6.2)
ERYTHROCYTE [DISTWIDTH] IN BLOOD BY AUTOMATED COUNT: 14.4 % (ref 11.5–14.5)
GLOBULIN SER CALC-MCNC: 2.9 GM/DL
GLUCOSE SERPL-MCNC: 89 MG/DL (ref 65–99)
HCT VFR BLD AUTO: 49.4 % (ref 40.4–52.2)
HDLC SERPL-MCNC: 34 MG/DL (ref 40–60)
HGB BLD-MCNC: 16.9 G/DL (ref 13.7–17.6)
IMM GRANULOCYTES # BLD: 0.04 10*3/MM3 (ref 0–0.03)
IMM GRANULOCYTES NFR BLD: 0.5 % (ref 0–0.5)
LDLC SERPL CALC-MCNC: 117 MG/DL (ref 0–100)
LYMPHOCYTES # BLD AUTO: 2.23 10*3/MM3 (ref 0.9–4.8)
LYMPHOCYTES NFR BLD AUTO: 26.6 % (ref 19.6–45.3)
MCH RBC QN AUTO: 29.6 PG (ref 27–32.7)
MCHC RBC AUTO-ENTMCNC: 34.2 G/DL (ref 32.6–36.4)
MCV RBC AUTO: 86.5 FL (ref 79.8–96.2)
MONOCYTES # BLD AUTO: 0.45 10*3/MM3 (ref 0.2–1.2)
MONOCYTES NFR BLD AUTO: 5.4 % (ref 5–12)
NEUTROPHILS # BLD AUTO: 5.38 10*3/MM3 (ref 1.9–8.1)
NEUTROPHILS NFR BLD AUTO: 64.2 % (ref 42.7–76)
NRBC BLD AUTO-RTO: 0 /100 WBC (ref 0–0)
PLATELET # BLD AUTO: 115 10*3/MM3 (ref 140–500)
POTASSIUM SERPL-SCNC: 3.9 MMOL/L (ref 3.5–5.2)
PROT SERPL-MCNC: 7.3 G/DL (ref 6–8.5)
RBC # BLD AUTO: 5.71 10*6/MM3 (ref 4.6–6)
SODIUM SERPL-SCNC: 142 MMOL/L (ref 136–145)
TRIGL SERPL-MCNC: 208 MG/DL (ref 0–150)
TSH SERPL DL<=0.005 MIU/L-ACNC: 0.91 MIU/ML (ref 0.27–4.2)
VLDLC SERPL CALC-MCNC: 41.6 MG/DL (ref 5–40)
WBC # BLD AUTO: 8.38 10*3/MM3 (ref 4.5–10.7)

## 2018-12-18 RX ORDER — TESTOSTERONE CYPIONATE 200 MG/ML
INJECTION, SOLUTION INTRAMUSCULAR
Qty: 3 ML | Refills: 0 | OUTPATIENT
Start: 2018-12-18 | End: 2019-01-08 | Stop reason: SDUPTHER

## 2018-12-21 ENCOUNTER — OFFICE VISIT (OUTPATIENT)
Dept: FAMILY MEDICINE CLINIC | Facility: CLINIC | Age: 68
End: 2018-12-21

## 2018-12-21 VITALS
HEIGHT: 70 IN | WEIGHT: 207.3 LBS | DIASTOLIC BLOOD PRESSURE: 74 MMHG | OXYGEN SATURATION: 96 % | BODY MASS INDEX: 29.68 KG/M2 | SYSTOLIC BLOOD PRESSURE: 119 MMHG | TEMPERATURE: 97.2 F | HEART RATE: 77 BPM

## 2018-12-21 DIAGNOSIS — I10 BENIGN ESSENTIAL HTN: ICD-10-CM

## 2018-12-21 DIAGNOSIS — Z00.00 MEDICARE ANNUAL WELLNESS VISIT, SUBSEQUENT: Primary | ICD-10-CM

## 2018-12-21 DIAGNOSIS — M1A.0720 CHRONIC GOUT OF LEFT FOOT, UNSPECIFIED CAUSE: ICD-10-CM

## 2018-12-21 DIAGNOSIS — N18.30 STAGE 3 CHRONIC KIDNEY DISEASE (HCC): ICD-10-CM

## 2018-12-21 DIAGNOSIS — F33.41 RECURRENT MAJOR DEPRESSIVE DISORDER, IN PARTIAL REMISSION (HCC): ICD-10-CM

## 2018-12-21 DIAGNOSIS — E03.9 ACQUIRED HYPOTHYROIDISM: ICD-10-CM

## 2018-12-21 DIAGNOSIS — E78.2 MIXED HYPERLIPIDEMIA: ICD-10-CM

## 2018-12-21 DIAGNOSIS — D69.6 THROMBOCYTOPENIA (HCC): ICD-10-CM

## 2018-12-21 PROCEDURE — G0439 PPPS, SUBSEQ VISIT: HCPCS | Performed by: FAMILY MEDICINE

## 2018-12-21 PROCEDURE — 99214 OFFICE O/P EST MOD 30 MIN: CPT | Performed by: FAMILY MEDICINE

## 2018-12-21 RX ORDER — ATORVASTATIN CALCIUM 10 MG/1
10 TABLET, FILM COATED ORAL DAILY
Qty: 90 TABLET | Refills: 1 | Status: SHIPPED | OUTPATIENT
Start: 2018-12-21 | End: 2019-03-25 | Stop reason: SDUPTHER

## 2018-12-21 NOTE — PROGRESS NOTES
Subjective   Kaiser Valente is a 68 y.o. male.     Hypertension (follow up labs); Hyperlipidemia; and Hypothyroidism    History of Present Illness    Hypertension follow up. Doing well with current medication which he is taking as directed. No known high or low blood pressure episodes. No cardiovascular or neurological symptoms. Today's BP: 119/74.      Hyperlipidemia follow up.  10 year risk of adverse chronic coronary vascular disease is 22%.  Optimal risk is half of that.  He has worsening chronic kidney disease.  He is on testosterone which may increase risk of cardiovascular issues.  He is physically active.  No current cardiovascular symptoms.    Last lipid panel:   Lab Results   Component Value Date    HDL 34 (L) 12/14/2018     Lab Results   Component Value Date     (H) 12/14/2018     Lab Results   Component Value Date    TRIG 208 (H) 12/14/2018     Chronic gout.  No recent flare up.  Continues allopurinol.    Hypothyroidism followup.  Taking levothyroxine as indicated.  No symptoms of high or low thyroid.  Last TSH as follows:    Lab Results   Component Value Date    TSH 0.905 12/14/2018      Stage III chronic kidney disease.  GFR slightly lower.  Previous long-term naproxen use.  1000 g a day for many years.  Stop couple years ago.    Thrombocytopenia.  Stable for last couple of years.    Major depression.  Partial remission.  Continues multiple psychiatric medications.  Followed by psychiatry.    Low testosterone.  On testosterone replacement therapy through endocrinology.    The following portions of the patient's history were reviewed and updated as appropriate: allergies, current medications, past family history, past medical history, past social history, past surgical history and problem list.      Review of Systems   Constitutional: Negative.    Respiratory: Negative.    Cardiovascular: Negative.    Musculoskeletal: Negative.        Objective   Blood pressure 119/74, pulse 77, temperature 97.2  "°F (36.2 °C), temperature source Oral, height 177.8 cm (70\"), weight 94 kg (207 lb 4.8 oz), SpO2 96 %.  Physical Exam   Constitutional: He appears well-developed and well-nourished. No distress.   Neck: No thyromegaly present.   Cardiovascular: Normal rate, regular rhythm, normal heart sounds and intact distal pulses.   Pulmonary/Chest: Effort normal and breath sounds normal.   Musculoskeletal: He exhibits no edema.   Skin: Skin is warm and dry.   Psychiatric: He has a normal mood and affect. His behavior is normal. Judgment and thought content normal.   Nursing note and vitals reviewed.      Assessment/Plan   Kaiser was seen today for hypertension, hyperlipidemia and hypothyroidism.    Diagnoses and all orders for this visit:    Medicare annual wellness visit, subsequent    Chronic gout of left foot, unspecified cause    Recurrent major depressive disorder, in partial remission (CMS/[facility])    Acquired hypothyroidism    Benign essential HTN    Mixed hyperlipidemia  -     Comprehensive Metabolic Panel; Future  -     Lipid Panel; Future    Stage 3 chronic kidney disease (CMS/[facility])    Thrombocytopenia (CMS/[facility])    Other orders  -     Cancel: Pneumococcal Polysaccharide Vaccine 23-Valent Greater Than or Equal To 3yo Subcutaneous / IM  -     atorvastatin (LIPITOR) 10 MG tablet; Take 1 tablet by mouth Daily.      Hypertension.  Controlled.  Continue lisinopril.    Immunizations.  Patient received a Prevnar, the second one, at a pharmacy about a month ago.  I'm holding off the pneumococcal 23 today.  We'll have to give in one year.    Major depression.  Partial remission.  Followed by psychiatry.    Hypothyroid.  Stable.    Hyperlipidemia.  10 year risk greater than 20%.  Given the history of testosterone use, and chronic renal insufficiency, hypertension, I recommend statin use.  Atorvastatin 10 mg a day.  I believe benefits outweigh risks.  Risks include myopathy and myositis.  Patient aware.    Stage III chronic kidney " disease.  Partially exacerbated recently by dehydration.  He did not drink a lot of water the day of the blood work.  However we will continue to monitor.  I recommend adequate hydration.  Continue lisinopril.  Adding atorvastatin as above.    Thrombocytopenia.  Long term.  Has been seen by hematology in the past.

## 2018-12-21 NOTE — PROGRESS NOTES
QUICK REFERENCE INFORMATION:  The ABCs of the Annual Wellness Visit    Subsequent Medicare Wellness Visit    HEALTH RISK ASSESSMENT    1950    Recent Hospitalizations:  No hospitalization(s) within the last year..        Current Medical Providers:  Patient Care Team:  Jared Noriega MD as PCP - General (Family Medicine)  Sylvester Pretty MD as PCP - Claims Attributed  Armani Painting MD as Consulting Physician (Hematology and Oncology)  Yousuf Greene MD as Consulting Physician (Endocrinology)  Be Iraheta MD as Referring Physician (Internal Medicine)        Smoking Status:  Social History     Tobacco Use   Smoking Status Never Smoker   Smokeless Tobacco Never Used       Alcohol Consumption:  Social History     Substance and Sexual Activity   Alcohol Use Yes    Comment: 1 DAILY DRINK       Depression Screen:   PHQ-2/PHQ-9 Depression Screening 12/21/2018   Little interest or pleasure in doing things 0   Feeling down, depressed, or hopeless 0   Total Score 0       Health Habits and Functional and Cognitive Screening:  Functional & Cognitive Status 12/21/2018   Do you have difficulty preparing food and eating? No   Do you have difficulty bathing yourself, getting dressed or grooming yourself? No   Do you have difficulty using the toilet? No   Do you have difficulty moving around from place to place? No   Do you have trouble with steps or getting out of a bed or a chair? Yes   In the past year have you fallen or experienced a near fall? Yes   Current Diet Limited Junk Food   Dental Exam Up to date   Eye Exam Up to date   Exercise (times per week) 0 times per week   Current Exercise Activities Include None   Do you need help using the phone?  No   Are you deaf or do you have serious difficulty hearing?  Yes   Do you need help with transportation? No   Do you need help shopping? No   Do you need help preparing meals?  No   Do you need help with housework?  No   Do you need help with laundry? No   Do you need  help taking your medications? No   Do you need help managing money? No   Do you ever drive or ride in a car without wearing a seat belt? No   Have you felt unusual stress, anger or loneliness in the last month? No   Who do you live with? Spouse   If you need help, do you have trouble finding someone available to you? No   Have you been bothered in the last four weeks by sexual problems? Yes   Do you have difficulty concentrating, remembering or making decisions? No           Does the patient have evidence of cognitive impairment? No    Aspirin use counseling: Taking ASA appropriately as indicated      Recent Lab Results:  CMP:  Lab Results   Component Value Date    GLU 89 12/14/2018    BUN 29 (H) 12/14/2018    CREATININE 1.60 (H) 12/14/2018    EGFRIFNONA 43 (L) 12/14/2018    EGFRIFAFRI 52 (L) 12/14/2018    BCR 18.1 12/14/2018     12/14/2018    K 3.9 12/14/2018    CO2 29.2 (H) 12/14/2018    CALCIUM 9.2 12/14/2018    PROTENTOTREF 7.3 12/14/2018    ALBUMIN 4.40 12/14/2018    LABGLOBREF 2.9 12/14/2018    LABIL2 1.5 12/14/2018    BILITOT 0.4 12/14/2018    ALKPHOS 70 12/14/2018    AST 19 12/14/2018    ALT 20 12/14/2018     Lipid Panel:  Lab Results   Component Value Date    TRIG 208 (H) 12/14/2018    HDL 34 (L) 12/14/2018    VLDL 41.6 (H) 12/14/2018     HbA1c:  Lab Results   Component Value Date    HGBA1C 5.20 09/17/2018       Visual Acuity:  No exam data present    Age-appropriate Screening Schedule:  Refer to the list below for future screening recommendations based on patient's age, sex and/or medical conditions. Orders for these recommended tests are listed in the plan section. The patient has been provided with a written plan.    Health Maintenance   Topic Date Due   • TDAP/TD VACCINES (1 - Tdap) 01/30/1967   • PNEUMOCOCCAL VACCINES (65+ LOW/MEDIUM RISK) (2 of 2 - PPSV23) 09/19/2017   • ZOSTER VACCINE (2 of 2) 09/28/2018   • LIPID PANEL  12/14/2019   • COLONOSCOPY  03/23/2020   • INFLUENZA VACCINE  Completed     "    Subjective   History of Present Illness    Kaiser Valente is a 68 y.o. male who presents for an Subsequent Wellness Visit.    The following portions of the patient's history were reviewed and updated as appropriate: allergies, current medications, past family history, past medical history, past social history, past surgical history and problem list.    Outpatient Medications Prior to Visit   Medication Sig Dispense Refill   • allopurinol (ZYLOPRIM) 100 MG tablet TAKE ONE TABLET BY MOUTH DAILY 30 tablet 4   • aspirin 81 MG tablet Take 81 mg by mouth Every Night. HOLD PRIOR TO SURG     • busPIRone (BUSPAR) 15 MG tablet Take 30 mg by mouth 2 (Two) Times a Day.     • glucosamine-chondroitin 500-400 MG capsule capsule Take 1 capsule by mouth 2 (Two) Times a Day With Meals. HOLD PRIOR TO SURG     • lamoTRIgine (LaMICtal) 100 MG tablet Take 150 mg by mouth Daily.     • levothyroxine (SYNTHROID, LEVOTHROID) 137 MCG tablet Take 1 tablet by mouth Daily. 90 tablet 3   • lisinopril (PRINIVIL,ZESTRIL) 10 MG tablet Take 1 tablet by mouth Daily. 30 tablet 5   • Omega-3 Fatty Acids (FISH OIL) 1000 MG capsule capsule Take 4,000 mg by mouth Daily With Breakfast. HOLD PRIOR TO SURG     • omeprazole (priLOSEC) 20 MG capsule TAKE ONE CAPSULE BY MOUTH DAILY 30 capsule 5   • QUEtiapine (SEROquel) 25 MG tablet Take 25 mg by mouth 3 (Three) Times a Day.     • sertraline (ZOLOFT) 100 MG tablet Take 200 mg by mouth Every Morning.     • Syringe/Needle, Disp, (B-D 3CC LUER-JACY SYR 23GX1\") 23G X 1\" 3 ML misc For testosterone injection once every 10 days. 10 each 2   • Testosterone Cypionate (DEPOTESTOTERONE CYPIONATE) 200 MG/ML injection INJECT 1 ML INTRAMUSCULARLY EVERY 10 DAYS 3 mL 0   • TURMERIC PO Take 1 capsule by mouth 2 (Two) Times a Day. HOLD PRIOR TO SURG      • vitamin D (ERGOCALCIFEROL) 79672 units capsule capsule Take 1 capsule by mouth 2 (Two) Times a Week. 26 capsule 3     No facility-administered medications prior to visit.  " "      Patient Active Problem List   Diagnosis   • Acquired hypothyroidism   • Osteoarthritis of both knees   • Gastroesophageal reflux disease without esophagitis   • Mixed hyperlipidemia   • Monoclonal gammopathy of undetermined significance   • Hypercalcemia   • Chronic fatigue   • Thrombocytopenia (CMS/[facility])   • History of primary hyperparathyroidism   • Hypogonadism in male   • Toe pain, left   • Recurrent major depressive disorder, in partial remission (CMS/[facility])   • Pseudogout of joint of right foot   • Chronic gout of left foot   • Stage 3 chronic kidney disease (CMS/[facility])   • Hyperglycemia   • Benign prostatic hyperplasia without lower urinary tract symptoms   • Generalized abdominal pain   • Benign essential HTN       Advance Care Planning:  has an advance directive - a copy has been provided and is in file    Identification of Risk Factors:  Risk factors include: cardiovascular risk and depression.    Review of Systems    Compared to one year ago, the patient feels his physical health is the same.  Compared to one year ago, the patient feels his mental health is the same.    Objective     Physical Exam    Vitals:    12/21/18 1144   BP: 119/74   Pulse: 77   Temp: 97.2 °F (36.2 °C)   TempSrc: Oral   SpO2: 96%   Weight: 94 kg (207 lb 4.8 oz)   Height: 177.8 cm (70\")       Patient's Body mass index is 29.74 kg/m². BMI is within normal parameters. No follow-up required.      Assessment/Plan   Patient Self-Management and Personalized Health Advice  The patient has been provided with information about: exercise, prevention of cardiac or vascular disease and mental health concerns and preventive services including:   · Pneumococcal vaccine .    Visit Diagnoses:    ICD-10-CM ICD-9-CM   1. Medicare annual wellness visit, subsequent Z00.00 V70.0   2. Chronic gout of left foot, unspecified cause M1A.0720 274.02   3. Recurrent major depressive disorder, in partial remission (CMS/[facility]) F33.41 296.35   4. Acquired " "hypothyroidism E03.9 244.9   5. Benign essential HTN I10 401.1   6. Mixed hyperlipidemia E78.2 272.2   7. Stage 3 chronic kidney disease (CMS/HCC) N18.3 585.3   8. Thrombocytopenia (CMS/[facility]) D69.6 287.5       Orders Placed This Encounter   Procedures   • Comprehensive Metabolic Panel     Standing Status:   Future     Standing Expiration Date:   12/22/2019     Scheduling Instructions:      Patient to have lab work drawn before next visit         • Lipid Panel     Standing Status:   Future     Standing Expiration Date:   12/22/2019     Scheduling Instructions:      Patient to have lab work drawn before next visit             Outpatient Encounter Medications as of 12/21/2018   Medication Sig Dispense Refill   • allopurinol (ZYLOPRIM) 100 MG tablet TAKE ONE TABLET BY MOUTH DAILY 30 tablet 4   • aspirin 81 MG tablet Take 81 mg by mouth Every Night. HOLD PRIOR TO SURG     • busPIRone (BUSPAR) 15 MG tablet Take 30 mg by mouth 2 (Two) Times a Day.     • glucosamine-chondroitin 500-400 MG capsule capsule Take 1 capsule by mouth 2 (Two) Times a Day With Meals. HOLD PRIOR TO SURG     • lamoTRIgine (LaMICtal) 100 MG tablet Take 150 mg by mouth Daily.     • levothyroxine (SYNTHROID, LEVOTHROID) 137 MCG tablet Take 1 tablet by mouth Daily. 90 tablet 3   • lisinopril (PRINIVIL,ZESTRIL) 10 MG tablet Take 1 tablet by mouth Daily. 30 tablet 5   • Omega-3 Fatty Acids (FISH OIL) 1000 MG capsule capsule Take 4,000 mg by mouth Daily With Breakfast. HOLD PRIOR TO SURG     • omeprazole (priLOSEC) 20 MG capsule TAKE ONE CAPSULE BY MOUTH DAILY 30 capsule 5   • QUEtiapine (SEROquel) 25 MG tablet Take 25 mg by mouth 3 (Three) Times a Day.     • sertraline (ZOLOFT) 100 MG tablet Take 200 mg by mouth Every Morning.     • Syringe/Needle, Disp, (B-D 3CC LUER-JACY SYR 23GX1\") 23G X 1\" 3 ML misc For testosterone injection once every 10 days. 10 each 2   • Testosterone Cypionate (DEPOTESTOTERONE CYPIONATE) 200 MG/ML injection INJECT 1 ML " INTRAMUSCULARLY EVERY 10 DAYS 3 mL 0   • TURMERIC PO Take 1 capsule by mouth 2 (Two) Times a Day. HOLD PRIOR TO SURG      • vitamin D (ERGOCALCIFEROL) 10295 units capsule capsule Take 1 capsule by mouth 2 (Two) Times a Week. 26 capsule 3   • atorvastatin (LIPITOR) 10 MG tablet Take 1 tablet by mouth Daily. 90 tablet 1     No facility-administered encounter medications on file as of 12/21/2018.        Reviewed use of high risk medication in the elderly: yes  Reviewed for potential of harmful drug interactions in the elderly: yes    Follow Up:  Return in about 3 months (around 3/21/2019) for Recheck.     An After Visit Summary and PPPS with all of these plans were given to the patient.

## 2019-01-08 RX ORDER — TESTOSTERONE CYPIONATE 200 MG/ML
INJECTION, SOLUTION INTRAMUSCULAR
Qty: 3 ML | Refills: 0 | Status: SHIPPED | OUTPATIENT
Start: 2019-01-08 | End: 2019-02-18 | Stop reason: SDUPTHER

## 2019-02-17 RX ORDER — TESTOSTERONE CYPIONATE 200 MG/ML
INJECTION, SOLUTION INTRAMUSCULAR
Qty: 3 ML | Refills: 0 | Status: CANCELLED | OUTPATIENT
Start: 2019-02-17

## 2019-02-18 RX ORDER — TESTOSTERONE CYPIONATE 200 MG/ML
200 INJECTION, SOLUTION INTRAMUSCULAR
Qty: 3 ML | Refills: 0 | Status: SHIPPED | OUTPATIENT
Start: 2019-02-18 | End: 2019-02-20 | Stop reason: SDUPTHER

## 2019-02-20 RX ORDER — TESTOSTERONE CYPIONATE 200 MG/ML
200 INJECTION, SOLUTION INTRAMUSCULAR
Qty: 3 ML | Refills: 0 | Status: SHIPPED | OUTPATIENT
Start: 2019-02-20 | End: 2019-03-08 | Stop reason: SDUPTHER

## 2019-02-28 RX ORDER — LISINOPRIL 10 MG/1
10 TABLET ORAL DAILY
Qty: 30 TABLET | Refills: 0 | Status: SHIPPED | OUTPATIENT
Start: 2019-02-28 | End: 2019-03-29 | Stop reason: SDUPTHER

## 2019-03-07 RX ORDER — TESTOSTERONE CYPIONATE 200 MG/ML
INJECTION, SOLUTION INTRAMUSCULAR
Qty: 3 ML | Refills: 0 | Status: CANCELLED | OUTPATIENT
Start: 2019-03-07

## 2019-03-08 RX ORDER — TESTOSTERONE CYPIONATE 200 MG/ML
200 INJECTION, SOLUTION INTRAMUSCULAR
Qty: 3 ML | Refills: 0 | Status: SHIPPED | OUTPATIENT
Start: 2019-03-08 | End: 2019-03-21

## 2019-03-15 ENCOUNTER — LAB (OUTPATIENT)
Dept: LAB | Facility: HOSPITAL | Age: 69
End: 2019-03-15

## 2019-03-15 DIAGNOSIS — E78.2 MIXED HYPERLIPIDEMIA: ICD-10-CM

## 2019-03-15 LAB
ALBUMIN SERPL-MCNC: 4.5 G/DL (ref 3.5–5.2)
ALBUMIN/GLOB SERPL: 1.4 G/DL
ALP SERPL-CCNC: 57 U/L (ref 39–117)
ALT SERPL W P-5'-P-CCNC: 21 U/L (ref 1–41)
ANION GAP SERPL CALCULATED.3IONS-SCNC: 13.1 MMOL/L
AST SERPL-CCNC: 22 U/L (ref 1–40)
BILIRUB SERPL-MCNC: 0.8 MG/DL (ref 0.1–1.2)
BUN BLD-MCNC: 26 MG/DL (ref 8–23)
BUN/CREAT SERPL: 15.9 (ref 7–25)
CALCIUM SPEC-SCNC: 10 MG/DL (ref 8.6–10.5)
CHLORIDE SERPL-SCNC: 103 MMOL/L (ref 98–107)
CHOLEST SERPL-MCNC: 151 MG/DL (ref 0–200)
CO2 SERPL-SCNC: 26.9 MMOL/L (ref 22–29)
CREAT BLD-MCNC: 1.64 MG/DL (ref 0.76–1.27)
GFR SERPL CREATININE-BSD FRML MDRD: 42 ML/MIN/1.73
GLOBULIN UR ELPH-MCNC: 3.3 GM/DL
GLUCOSE BLD-MCNC: 88 MG/DL (ref 65–99)
HDLC SERPL-MCNC: 34 MG/DL (ref 40–60)
LDLC SERPL CALC-MCNC: 84 MG/DL (ref 0–100)
LDLC/HDLC SERPL: 2.46 {RATIO}
POTASSIUM BLD-SCNC: 3.9 MMOL/L (ref 3.5–5.2)
PROT SERPL-MCNC: 7.8 G/DL (ref 6–8.5)
SODIUM BLD-SCNC: 143 MMOL/L (ref 136–145)
TRIGL SERPL-MCNC: 166 MG/DL (ref 0–150)
VLDLC SERPL-MCNC: 33.2 MG/DL (ref 5–40)

## 2019-03-15 PROCEDURE — 36415 COLL VENOUS BLD VENIPUNCTURE: CPT

## 2019-03-15 PROCEDURE — 80053 COMPREHEN METABOLIC PANEL: CPT

## 2019-03-15 PROCEDURE — 80061 LIPID PANEL: CPT

## 2019-03-21 ENCOUNTER — OFFICE VISIT (OUTPATIENT)
Dept: FAMILY MEDICINE CLINIC | Facility: CLINIC | Age: 69
End: 2019-03-21

## 2019-03-21 VITALS
SYSTOLIC BLOOD PRESSURE: 143 MMHG | BODY MASS INDEX: 30.31 KG/M2 | TEMPERATURE: 97.4 F | HEIGHT: 70 IN | WEIGHT: 211.7 LBS | DIASTOLIC BLOOD PRESSURE: 79 MMHG | HEART RATE: 74 BPM | OXYGEN SATURATION: 96 %

## 2019-03-21 DIAGNOSIS — E78.2 MIXED HYPERLIPIDEMIA: ICD-10-CM

## 2019-03-21 DIAGNOSIS — M19.019 SHOULDER ARTHRITIS: ICD-10-CM

## 2019-03-21 DIAGNOSIS — I10 BENIGN ESSENTIAL HTN: Primary | ICD-10-CM

## 2019-03-21 DIAGNOSIS — N18.30 STAGE 3 CHRONIC KIDNEY DISEASE (HCC): ICD-10-CM

## 2019-03-21 PROCEDURE — 99214 OFFICE O/P EST MOD 30 MIN: CPT | Performed by: FAMILY MEDICINE

## 2019-03-21 RX ORDER — BUSPIRONE HYDROCHLORIDE 10 MG/1
30 TABLET ORAL DAILY
COMMUNITY
Start: 2019-03-07 | End: 2022-04-25 | Stop reason: DRUGHIGH

## 2019-03-21 NOTE — PROGRESS NOTES
"Subjective   Kaiser Valente is a 69 y.o. male.     Chief Complaint   Patient presents with   • Hypertension     follow up labs   • Hyperlipidemia   • Shoulder Pain     right shoulder pain        History of Present Illness    Hyperlipidemia follow-up.  He has an elevated cardiovascular risk based on ACC guidelines.  He was started on 10 mg of atorvastatin last check.  His LDL cholesterol dropped about 30% after starting the atorvastatin.  He has no myopathy symptoms.    Shoulder pain.  He states intermittently for quite some time.  Number of years.  Worse lately.  He teaches taekwondo.  He has trouble with punching.  He states that bothers him often.  No weakness.    He is not taking NSAIDs.  He has chronic kidney disease stage III.  His recent creatinine and GFR have been stable.      Hypertension.  He has not checking his blood pressure at home.  Previously here the numbers have been good.      The following portions of the patient's history were reviewed and updated as appropriate: allergies, current medications, past family history, past medical history, past social history, past surgical history and problem list.          Review of Systems   Constitutional: Negative.    Respiratory: Negative.    Cardiovascular: Negative.  Negative for chest pain.   Musculoskeletal: Negative.        Objective   Blood pressure 143/79, pulse 74, temperature 97.4 °F (36.3 °C), temperature source Oral, height 177.8 cm (70\"), weight 96 kg (211 lb 11.2 oz), SpO2 96 %.  Physical Exam   Constitutional: He appears well-developed and well-nourished. No distress.   Neck: No thyromegaly present.   Cardiovascular: Normal rate, regular rhythm, normal heart sounds and intact distal pulses.   Pulmonary/Chest: Effort normal and breath sounds normal.   Musculoskeletal: He exhibits no edema.   Skin: Skin is warm and dry.   Psychiatric: He has a normal mood and affect. His behavior is normal. Judgment and thought content normal.   Nursing note and " "vitals reviewed.      Assessment/Plan   Kaiser was seen today for hypertension, hyperlipidemia and shoulder pain.    Diagnoses and all orders for this visit:    Benign essential HTN    Mixed hyperlipidemia    Stage 3 chronic kidney disease (CMS/[facility])    Shoulder arthritis  -     Ambulatory Referral to Sports Medicine      Hypertension.  Only fair control.  He is to be checking his blood pressure readings at home and letting us know.  Continue lisinopril.    Hyperlipidemia with an elevated cardiovascular risk based on ACC guidelines.  His LDL has dropped about 30% since starting low-dose atorvastatin.  Will continue same.    Testosterone replacement therapy prescribed by his endocrinologist.  Recent testosterone level was elevated.  Patient states that it gives him more energy and better mood.  I contacted the endocrinologist and the endocrinologist nurse practitioner after the patient's last visit.  I asked if it was possible to reconsider the testosterone use because of the elevated cardiovascular risk.  The endocrinologist email me back and stated that the patient should remain on testosterone replacement therapy.  The patient is aware that  the FDA has stated that testosterone replacement therapy may increase risk of cardiovascular events.  Such as \"heart attack\".  The patient is also aware there is some controversy with regards to these studies, but it is still concerning for me.  I have recommended the patient stop his testosterone replacement therapy.  I also recommend he discuss this with his endocrinologist.  I have also sent an email via the electronic health record to his endocrinologist with my recommendations that the patient stop his testosterone replacement therapy and the rationale for it.    Stage III chronic kidney disease.  Stable.    Shoulder arthritis.  Long-standing.  I am sending him to sports medicine for second opinion.             "

## 2019-03-22 ENCOUNTER — OFFICE VISIT (OUTPATIENT)
Dept: SPORTS MEDICINE | Facility: CLINIC | Age: 69
End: 2019-03-22

## 2019-03-22 VITALS
BODY MASS INDEX: 30.21 KG/M2 | WEIGHT: 211 LBS | DIASTOLIC BLOOD PRESSURE: 79 MMHG | HEIGHT: 70 IN | SYSTOLIC BLOOD PRESSURE: 143 MMHG

## 2019-03-22 DIAGNOSIS — M25.511 CHRONIC RIGHT SHOULDER PAIN: Primary | ICD-10-CM

## 2019-03-22 DIAGNOSIS — G89.29 CHRONIC RIGHT SHOULDER PAIN: Primary | ICD-10-CM

## 2019-03-22 DIAGNOSIS — M75.41 SUBACROMIAL IMPINGEMENT, RIGHT: ICD-10-CM

## 2019-03-22 PROCEDURE — 20610 DRAIN/INJ JOINT/BURSA W/O US: CPT | Performed by: FAMILY MEDICINE

## 2019-03-22 PROCEDURE — 73030 X-RAY EXAM OF SHOULDER: CPT | Performed by: FAMILY MEDICINE

## 2019-03-22 PROCEDURE — 99214 OFFICE O/P EST MOD 30 MIN: CPT | Performed by: FAMILY MEDICINE

## 2019-03-22 RX ORDER — TRIAMCINOLONE ACETONIDE 40 MG/ML
80 INJECTION, SUSPENSION INTRA-ARTICULAR; INTRAMUSCULAR ONCE
Status: COMPLETED | OUTPATIENT
Start: 2019-03-22 | End: 2019-03-22

## 2019-03-22 RX ADMIN — TRIAMCINOLONE ACETONIDE 80 MG: 40 INJECTION, SUSPENSION INTRA-ARTICULAR; INTRAMUSCULAR at 11:35

## 2019-03-22 NOTE — PROGRESS NOTES
"Kaiser is a 69 y.o. year old male    Chief Complaint   Patient presents with   • Right Shoulder - Pain, Consult     Chronic Pain worsening  Ref: Dr. Noriega       History of Present Illness  68 y/o male with complaint of worsening right shoulder pain.  This has been a chronic issue for him, however he says it has been worsening over the past 2-3 months.  Pain is aching & dull throughout the shoulder.  Movement exacerbates pain especially overhead movements & punching movements.  Lying on the right shoulder at night also causes increased pain.  He has previously used naproxen which provided some relief, however he has stopped this due to kidney issues.  He denies any recent trauma to the right shoulder.      I have reviewed the patient's medical, family, and social history in detail and updated the computerized patient record.    Review of Systems  Constitutional: Negative for fever.   Musculoskeletal:        Per HPI   Skin: Negative for rash.   Neurological: Negative for weakness and numbness.   Psychiatric/Behavioral: Negative for sleep disturbance.   All other systems reviewed and are negative.    /79   Ht 177.8 cm (70\")   Wt 95.7 kg (211 lb)   BMI 30.28 kg/m²      Physical Exam    Vital signs reviewed.   General: No acute distress.  Eyes: conjunctiva clear; pupils equally round and reactive  ENT: external ears and nose atraumatic; oropharynx clear  CV: no peripheral edema, 2+ distal pulses  Resp: normal respiratory effort, no use of accessory muscles  Skin: no rashes or wounds; normal turgor  Psych: mood and affect appropriate; recent and remote memory intact  Neuro: sensation to light touch intact    MSK Exam:  Mild tenderness to palpation along spine of scapula & AC joint.  Positive leonard nest.  Positive neer's sign. Decreased strength on R with external rotation of shoulder.      Diagnoses and all orders for this visit:    Chronic right shoulder pain  -     XR Shoulder 2+ View Right          EMR " Dragon/Transcription disclaimer:    Much of this encounter note is an electronic transcription/translation of spoken language to printed text.  The electronic translation of spoken language may permit erroneous, or at times, nonsensical words or phrases to be inadvertently transcribed.  Although I have reviewed the note for such errors some may still exist.

## 2019-03-25 DIAGNOSIS — E55.9 VITAMIN D DEFICIENCY: ICD-10-CM

## 2019-03-25 RX ORDER — ATORVASTATIN CALCIUM 10 MG/1
TABLET, FILM COATED ORAL
Qty: 90 TABLET | Refills: 1 | Status: SHIPPED | OUTPATIENT
Start: 2019-03-25 | End: 2019-05-21 | Stop reason: SDUPTHER

## 2019-03-25 RX ORDER — ERGOCALCIFEROL 1.25 MG/1
CAPSULE ORAL
Qty: 26 CAPSULE | Refills: 0 | Status: SHIPPED | OUTPATIENT
Start: 2019-03-25 | End: 2019-05-21 | Stop reason: SDUPTHER

## 2019-03-25 RX ORDER — LEVOTHYROXINE SODIUM 137 UG/1
TABLET ORAL
Qty: 90 TABLET | Refills: 0 | Status: SHIPPED | OUTPATIENT
Start: 2019-03-25 | End: 2019-05-21 | Stop reason: SDUPTHER

## 2019-03-25 RX ORDER — ALLOPURINOL 100 MG/1
TABLET ORAL
Qty: 30 TABLET | Refills: 0 | Status: SHIPPED | OUTPATIENT
Start: 2019-03-25 | End: 2019-05-21 | Stop reason: SDUPTHER

## 2019-03-29 RX ORDER — LISINOPRIL 10 MG/1
10 TABLET ORAL DAILY
Qty: 30 TABLET | Refills: 0 | Status: SHIPPED | OUTPATIENT
Start: 2019-03-29 | End: 2019-04-23 | Stop reason: SDUPTHER

## 2019-04-11 DIAGNOSIS — R53.82 CHRONIC FATIGUE: ICD-10-CM

## 2019-04-11 DIAGNOSIS — E78.2 MIXED HYPERLIPIDEMIA: Primary | ICD-10-CM

## 2019-04-11 DIAGNOSIS — E83.52 HYPERCALCEMIA: ICD-10-CM

## 2019-04-11 DIAGNOSIS — E03.9 ACQUIRED HYPOTHYROIDISM: ICD-10-CM

## 2019-04-11 DIAGNOSIS — Z86.39 HISTORY OF PRIMARY HYPERPARATHYROIDISM: ICD-10-CM

## 2019-04-11 DIAGNOSIS — E29.1 HYPOGONADISM IN MALE: ICD-10-CM

## 2019-04-11 DIAGNOSIS — R73.9 HYPERGLYCEMIA: ICD-10-CM

## 2019-04-23 RX ORDER — LISINOPRIL 10 MG/1
10 TABLET ORAL DAILY
Qty: 30 TABLET | Refills: 0 | Status: SHIPPED | OUTPATIENT
Start: 2019-04-23 | End: 2019-05-19 | Stop reason: SDUPTHER

## 2019-05-06 ENCOUNTER — LAB (OUTPATIENT)
Dept: ENDOCRINOLOGY | Age: 69
End: 2019-05-06

## 2019-05-06 DIAGNOSIS — E29.1 HYPOGONADISM IN MALE: ICD-10-CM

## 2019-05-06 DIAGNOSIS — E03.9 ACQUIRED HYPOTHYROIDISM: ICD-10-CM

## 2019-05-06 DIAGNOSIS — E78.2 MIXED HYPERLIPIDEMIA: ICD-10-CM

## 2019-05-06 DIAGNOSIS — Z86.39 HISTORY OF PRIMARY HYPERPARATHYROIDISM: ICD-10-CM

## 2019-05-06 DIAGNOSIS — R73.9 HYPERGLYCEMIA: ICD-10-CM

## 2019-05-06 DIAGNOSIS — R53.82 CHRONIC FATIGUE: ICD-10-CM

## 2019-05-06 DIAGNOSIS — E83.52 HYPERCALCEMIA: ICD-10-CM

## 2019-05-08 LAB
ALBUMIN SERPL-MCNC: 4.6 G/DL (ref 3.5–5.2)
ALBUMIN/GLOB SERPL: 1.6 G/DL
ALP SERPL-CCNC: 78 U/L (ref 39–117)
ALT SERPL-CCNC: 78 U/L (ref 1–41)
AST SERPL-CCNC: 53 U/L (ref 1–40)
BILIRUB SERPL-MCNC: 0.3 MG/DL (ref 0.2–1.2)
BUN SERPL-MCNC: 25 MG/DL (ref 8–23)
BUN/CREAT SERPL: 16.1 (ref 7–25)
C PEPTIDE SERPL-MCNC: 4.7 NG/ML (ref 1.1–4.4)
CALCIUM SERPL-MCNC: 10 MG/DL (ref 8.6–10.5)
CHLORIDE SERPL-SCNC: 99 MMOL/L (ref 98–107)
CHOLEST SERPL-MCNC: 161 MG/DL (ref 0–200)
CO2 SERPL-SCNC: 28.9 MMOL/L (ref 22–29)
CREAT SERPL-MCNC: 1.55 MG/DL (ref 0.76–1.27)
GLOBULIN SER CALC-MCNC: 2.8 GM/DL
GLUCOSE SERPL-MCNC: 94 MG/DL (ref 65–99)
HBA1C MFR BLD: 5.4 % (ref 4.8–5.6)
HCT VFR BLD AUTO: 49 % (ref 37.5–51)
HDLC SERPL-MCNC: 53 MG/DL (ref 40–60)
HGB BLD-MCNC: 15.9 G/DL (ref 13–17.7)
INTERPRETATION: NORMAL
LDLC SERPL CALC-MCNC: 76 MG/DL (ref 0–100)
Lab: NORMAL
PHOSPHATE SERPL-MCNC: 4 MG/DL (ref 2.5–4.5)
POTASSIUM SERPL-SCNC: 4.7 MMOL/L (ref 3.5–5.2)
PROT SERPL-MCNC: 7.4 G/DL (ref 6–8.5)
PTH-INTACT SERPL-MCNC: 63 PG/ML (ref 15–65)
SHBG SERPL-SCNC: 74.6 NMOL/L (ref 19.3–76.4)
SODIUM SERPL-SCNC: 140 MMOL/L (ref 136–145)
T3FREE SERPL-MCNC: 2.1 PG/ML (ref 2–4.4)
T4 FREE SERPL-MCNC: 0.86 NG/DL (ref 0.93–1.7)
T4 SERPL-MCNC: 4.52 MCG/DL (ref 4.5–11.7)
TESTOST FREE SERPL-MCNC: 0.8 PG/ML (ref 6.6–18.1)
TESTOST SERPL-MCNC: 7 NG/DL (ref 264–916)
THYROGLOB AB SERPL-ACNC: <1 IU/ML
THYROGLOB SERPL-MCNC: 3 NG/ML
THYROGLOB SERPL-MCNC: NORMAL NG/ML
TRIGL SERPL-MCNC: 160 MG/DL (ref 0–150)
TSH SERPL DL<=0.005 MIU/L-ACNC: 1.32 MIU/ML (ref 0.27–4.2)
VLDLC SERPL CALC-MCNC: 32 MG/DL

## 2019-05-20 RX ORDER — LISINOPRIL 10 MG/1
10 TABLET ORAL DAILY
Qty: 30 TABLET | Refills: 0 | Status: SHIPPED | OUTPATIENT
Start: 2019-05-20 | End: 2019-06-14 | Stop reason: SDUPTHER

## 2019-05-21 ENCOUNTER — OFFICE VISIT (OUTPATIENT)
Dept: ENDOCRINOLOGY | Age: 69
End: 2019-05-21

## 2019-05-21 VITALS
SYSTOLIC BLOOD PRESSURE: 132 MMHG | WEIGHT: 206 LBS | HEIGHT: 70 IN | BODY MASS INDEX: 29.49 KG/M2 | DIASTOLIC BLOOD PRESSURE: 72 MMHG

## 2019-05-21 DIAGNOSIS — N40.0 BENIGN PROSTATIC HYPERPLASIA WITHOUT LOWER URINARY TRACT SYMPTOMS: ICD-10-CM

## 2019-05-21 DIAGNOSIS — E03.9 ACQUIRED HYPOTHYROIDISM: Primary | ICD-10-CM

## 2019-05-21 DIAGNOSIS — R73.9 HYPERGLYCEMIA: ICD-10-CM

## 2019-05-21 DIAGNOSIS — I10 BENIGN ESSENTIAL HTN: ICD-10-CM

## 2019-05-21 DIAGNOSIS — E78.2 MIXED HYPERLIPIDEMIA: ICD-10-CM

## 2019-05-21 DIAGNOSIS — E55.9 VITAMIN D DEFICIENCY: ICD-10-CM

## 2019-05-21 DIAGNOSIS — E29.1 HYPOGONADISM IN MALE: ICD-10-CM

## 2019-05-21 DIAGNOSIS — Z86.39 HISTORY OF PRIMARY HYPERPARATHYROIDISM: ICD-10-CM

## 2019-05-21 PROCEDURE — 99214 OFFICE O/P EST MOD 30 MIN: CPT | Performed by: INTERNAL MEDICINE

## 2019-05-21 RX ORDER — ERGOCALCIFEROL 1.25 MG/1
50000 CAPSULE ORAL 2 TIMES WEEKLY
Qty: 26 CAPSULE | Refills: 3 | Status: SHIPPED | OUTPATIENT
Start: 2019-05-23 | End: 2020-03-12 | Stop reason: SDUPTHER

## 2019-05-21 RX ORDER — LEVOTHYROXINE SODIUM 137 UG/1
137 TABLET ORAL DAILY
Qty: 90 TABLET | Refills: 3 | Status: SHIPPED | OUTPATIENT
Start: 2019-05-21 | End: 2020-03-12 | Stop reason: SDUPTHER

## 2019-05-21 RX ORDER — ALLOPURINOL 100 MG/1
100 TABLET ORAL DAILY
Qty: 90 TABLET | Refills: 3 | Status: SHIPPED | OUTPATIENT
Start: 2019-05-21 | End: 2020-03-12 | Stop reason: SDUPTHER

## 2019-05-21 RX ORDER — ATORVASTATIN CALCIUM 10 MG/1
10 TABLET, FILM COATED ORAL DAILY
Qty: 90 TABLET | Refills: 3 | Status: SHIPPED | OUTPATIENT
Start: 2019-05-21 | End: 2020-06-17

## 2019-05-24 DIAGNOSIS — K21.9 GASTROESOPHAGEAL REFLUX DISEASE WITHOUT ESOPHAGITIS: ICD-10-CM

## 2019-05-24 RX ORDER — OMEPRAZOLE 20 MG/1
CAPSULE, DELAYED RELEASE ORAL
Qty: 30 CAPSULE | Refills: 0 | Status: SHIPPED | OUTPATIENT
Start: 2019-05-24 | End: 2019-05-24 | Stop reason: SDUPTHER

## 2019-05-24 RX ORDER — OMEPRAZOLE 20 MG/1
CAPSULE, DELAYED RELEASE ORAL
Qty: 90 CAPSULE | Refills: 0 | Status: SHIPPED | OUTPATIENT
Start: 2019-05-24 | End: 2019-08-22 | Stop reason: SDUPTHER

## 2019-05-24 RX ORDER — ALLOPURINOL 100 MG/1
TABLET ORAL
Qty: 30 TABLET | Refills: 0 | Status: SHIPPED | OUTPATIENT
Start: 2019-05-24 | End: 2020-05-18

## 2019-06-14 RX ORDER — LISINOPRIL 10 MG/1
10 TABLET ORAL DAILY
Qty: 30 TABLET | Refills: 0 | Status: SHIPPED | OUTPATIENT
Start: 2019-06-14 | End: 2019-07-09 | Stop reason: SDUPTHER

## 2019-06-24 RX ORDER — LEVOTHYROXINE SODIUM 137 UG/1
TABLET ORAL
Qty: 90 TABLET | Refills: 0 | Status: SHIPPED | OUTPATIENT
Start: 2019-06-24 | End: 2020-12-14 | Stop reason: SDUPTHER

## 2019-07-10 RX ORDER — LISINOPRIL 10 MG/1
10 TABLET ORAL DAILY
Qty: 30 TABLET | Refills: 0 | Status: SHIPPED | OUTPATIENT
Start: 2019-07-10 | End: 2019-08-04 | Stop reason: SDUPTHER

## 2019-08-04 RX ORDER — LISINOPRIL 10 MG/1
10 TABLET ORAL DAILY
Qty: 30 TABLET | Refills: 0 | Status: SHIPPED | OUTPATIENT
Start: 2019-08-04 | End: 2019-09-03 | Stop reason: SDUPTHER

## 2019-08-22 DIAGNOSIS — K21.9 GASTROESOPHAGEAL REFLUX DISEASE WITHOUT ESOPHAGITIS: ICD-10-CM

## 2019-08-22 RX ORDER — OMEPRAZOLE 20 MG/1
CAPSULE, DELAYED RELEASE ORAL
Qty: 90 CAPSULE | Refills: 0 | Status: SHIPPED | OUTPATIENT
Start: 2019-08-22 | End: 2019-11-16 | Stop reason: SDUPTHER

## 2019-09-03 RX ORDER — LISINOPRIL 10 MG/1
10 TABLET ORAL DAILY
Qty: 30 TABLET | Refills: 0 | Status: SHIPPED | OUTPATIENT
Start: 2019-09-03 | End: 2019-09-28 | Stop reason: SDUPTHER

## 2019-09-30 RX ORDER — LISINOPRIL 10 MG/1
10 TABLET ORAL DAILY
Qty: 30 TABLET | Refills: 0 | Status: SHIPPED | OUTPATIENT
Start: 2019-09-30 | End: 2019-10-26 | Stop reason: SDUPTHER

## 2019-10-15 DIAGNOSIS — E55.9 VITAMIN D DEFICIENCY: ICD-10-CM

## 2019-10-15 RX ORDER — ERGOCALCIFEROL 1.25 MG/1
CAPSULE ORAL
Qty: 26 CAPSULE | Refills: 0 | Status: SHIPPED | OUTPATIENT
Start: 2019-10-15 | End: 2020-06-17

## 2019-10-28 RX ORDER — LISINOPRIL 10 MG/1
10 TABLET ORAL DAILY
Qty: 30 TABLET | Refills: 0 | Status: SHIPPED | OUTPATIENT
Start: 2019-10-28 | End: 2019-11-22 | Stop reason: SDUPTHER

## 2019-11-07 ENCOUNTER — RESULTS ENCOUNTER (OUTPATIENT)
Dept: ENDOCRINOLOGY | Age: 69
End: 2019-11-07

## 2019-11-07 DIAGNOSIS — E03.9 ACQUIRED HYPOTHYROIDISM: ICD-10-CM

## 2019-11-07 DIAGNOSIS — I10 BENIGN ESSENTIAL HTN: ICD-10-CM

## 2019-11-07 DIAGNOSIS — E29.1 HYPOGONADISM IN MALE: ICD-10-CM

## 2019-11-07 DIAGNOSIS — N40.0 BENIGN PROSTATIC HYPERPLASIA WITHOUT LOWER URINARY TRACT SYMPTOMS: ICD-10-CM

## 2019-11-07 DIAGNOSIS — E78.2 MIXED HYPERLIPIDEMIA: ICD-10-CM

## 2019-11-07 DIAGNOSIS — R73.9 HYPERGLYCEMIA: ICD-10-CM

## 2019-11-07 DIAGNOSIS — Z86.39 HISTORY OF PRIMARY HYPERPARATHYROIDISM: ICD-10-CM

## 2019-11-07 DIAGNOSIS — E55.9 VITAMIN D DEFICIENCY: ICD-10-CM

## 2019-11-16 DIAGNOSIS — K21.9 GASTROESOPHAGEAL REFLUX DISEASE WITHOUT ESOPHAGITIS: ICD-10-CM

## 2019-11-18 RX ORDER — OMEPRAZOLE 20 MG/1
CAPSULE, DELAYED RELEASE ORAL
Qty: 90 CAPSULE | Refills: 1 | Status: SHIPPED | OUTPATIENT
Start: 2019-11-18 | End: 2020-05-18

## 2019-11-22 RX ORDER — LISINOPRIL 10 MG/1
10 TABLET ORAL DAILY
Qty: 30 TABLET | Refills: 0 | Status: SHIPPED | OUTPATIENT
Start: 2019-11-22 | End: 2020-03-30 | Stop reason: SDUPTHER

## 2019-12-17 RX ORDER — LISINOPRIL 10 MG/1
10 TABLET ORAL DAILY
Qty: 30 TABLET | Refills: 0 | OUTPATIENT
Start: 2019-12-17 | End: 2020-12-16

## 2020-02-14 DIAGNOSIS — E03.9 ACQUIRED HYPOTHYROIDISM: ICD-10-CM

## 2020-02-14 DIAGNOSIS — E78.2 MIXED HYPERLIPIDEMIA: Primary | ICD-10-CM

## 2020-02-14 DIAGNOSIS — I10 BENIGN ESSENTIAL HTN: ICD-10-CM

## 2020-03-02 ENCOUNTER — OFFICE VISIT (OUTPATIENT)
Dept: FAMILY MEDICINE CLINIC | Facility: CLINIC | Age: 70
End: 2020-03-02

## 2020-03-02 ENCOUNTER — HOSPITAL ENCOUNTER (OUTPATIENT)
Dept: GENERAL RADIOLOGY | Facility: HOSPITAL | Age: 70
Discharge: HOME | End: 2020-03-02
Admitting: NURSE PRACTITIONER

## 2020-03-02 VITALS
TEMPERATURE: 97.9 F | HEART RATE: 66 BPM | HEIGHT: 70 IN | WEIGHT: 212.6 LBS | BODY MASS INDEX: 30.43 KG/M2 | DIASTOLIC BLOOD PRESSURE: 80 MMHG | OXYGEN SATURATION: 98 % | SYSTOLIC BLOOD PRESSURE: 132 MMHG

## 2020-03-02 DIAGNOSIS — M25.562 ACUTE PAIN OF LEFT KNEE: Primary | ICD-10-CM

## 2020-03-02 PROCEDURE — 73562 X-RAY EXAM OF KNEE 3: CPT

## 2020-03-02 PROCEDURE — 99213 OFFICE O/P EST LOW 20 MIN: CPT | Performed by: NURSE PRACTITIONER

## 2020-03-02 NOTE — PROGRESS NOTES
"Subjective   Kaiser Valente is a 70 y.o. male.     Chief Complaint   Patient presents with   • Knee Pain     On-going issue, was getting better was going up steps and had a weird pain happen and has gotten worse, Little bit of swelling      HPI New patient to me.  Here for acute on chronic left knee pain.  He has knee pain due to OA off and on.    This past Thursday he was walking up stairs and developed shooting pain in left knee which has not really improved with rest.  Reports it feels different than any pain he has had in the past.    Takes tumeric for chronic multi joint OA and  pseudogout and this controls it OK. 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   Has not been able to bear much weight on it since and has been using cane or w/c for ambulation.  It feels somewhat instable.     Social History     Tobacco Use   • Smoking status: Never Smoker   • Smokeless tobacco: Never Used   Substance Use Topics   • Alcohol use: Yes     Comment: 1 DAILY DRINK   • Drug use: No       The following portions of the patient's history were reviewed and updated as appropriate: allergies, current medications, past family history, past medical history, past social history, past surgical history and problem list.    Review of Systems   Constitutional: Positive for activity change. Negative for chills and fever.   Respiratory: Negative for cough and shortness of breath.    Cardiovascular: Negative for chest pain and palpitations.   Gastrointestinal: Negative for abdominal pain, blood in stool, diarrhea, nausea and vomiting.   Musculoskeletal: Positive for back pain (had some left back pain prior to knee pain and thinks this may have caused him to walk funny), gait problem and joint swelling.   Neurological: Positive for weakness. Negative for dizziness and numbness.       Objective   Blood pressure 132/80, pulse 66, temperature 97.9 °F (36.6 °C), temperature source Oral, height 177.8 cm (70\"), weight 96.4 kg (212 lb 9.6 oz), SpO2 " 98 %.  Body mass index is 30.5 kg/m².    Physical Exam   Musculoskeletal:   Right knee with some swelling and enlargement  TTP along right anterior joint line  Negative drawer tests, varus and valgus  Not able to bear weight on it         Assessment   Problem List Items Addressed This Visit     None      Visit Diagnoses     Acute pain of left knee    -  Primary    Relevant Orders    Ambulatory Referral to Sports Medicine    XR Knee 3 View Left           Procedures           Impression and Plan: Suspect meniscus injury but has history of pseudogout.  Will get XR  Today.  And we have made him appt with sports med for tomorrow.  In the meantime he will use  ICE TID x 30 mins, rest, tylenol in favor of ibuprofen due to renal issues.  He has used biofreeze in past and can try this as well.   He may continue knee brace he is using until evaluated by ortho.   Fall risks discussed and s/s requiring emergent tx reviewed.      Health Maintenance Due   Topic Date Due   • TDAP/TD VACCINES (1 - Tdap) 01/30/1961   • Pneumococcal Vaccine Once at 65 Years Old  01/30/2013   • HEPATITIS C SCREENING  03/21/2016   • INFLUENZA VACCINE  08/01/2019   • MEDICARE ANNUAL WELLNESS  12/21/2019              EMR Dragon/Transcription disclaimer:   Much of this encounter note is an electronic transcription/translation of spoken language to printed text. The electronic translation of spoken language may permit erroneous, or at times, nonsensical words or phrases to be inadvertently transcribed; Although I have reviewed the note for such errors, some may still exist.

## 2020-03-03 ENCOUNTER — OFFICE VISIT (OUTPATIENT)
Dept: SPORTS MEDICINE | Facility: CLINIC | Age: 70
End: 2020-03-03

## 2020-03-03 VITALS
HEIGHT: 70 IN | HEART RATE: 68 BPM | SYSTOLIC BLOOD PRESSURE: 130 MMHG | WEIGHT: 214 LBS | OXYGEN SATURATION: 99 % | DIASTOLIC BLOOD PRESSURE: 80 MMHG | BODY MASS INDEX: 30.64 KG/M2

## 2020-03-03 DIAGNOSIS — M23.92 INTERNAL DERANGEMENT OF LEFT KNEE: Primary | ICD-10-CM

## 2020-03-03 DIAGNOSIS — M11.262 CHONDROCALCINOSIS OF LEFT KNEE: ICD-10-CM

## 2020-03-03 PROCEDURE — 99214 OFFICE O/P EST MOD 30 MIN: CPT | Performed by: FAMILY MEDICINE

## 2020-03-03 PROCEDURE — 20610 DRAIN/INJ JOINT/BURSA W/O US: CPT | Performed by: FAMILY MEDICINE

## 2020-03-03 RX ORDER — TRIAMCINOLONE ACETONIDE 40 MG/ML
80 INJECTION, SUSPENSION INTRA-ARTICULAR; INTRAMUSCULAR ONCE
Status: COMPLETED | OUTPATIENT
Start: 2020-03-03 | End: 2020-03-03

## 2020-03-03 RX ADMIN — TRIAMCINOLONE ACETONIDE 80 MG: 40 INJECTION, SUSPENSION INTRA-ARTICULAR; INTRAMUSCULAR at 13:59

## 2020-03-03 NOTE — PROGRESS NOTES
"Kaiser is a 70 y.o. year old male evaluation of a problem that is new to this examiner.    Chief Complaint   Patient presents with   • Left Knee - Pain, Follow-up   • Knee Pain     LT knee pain x2-3 weeks - had more difficulty after walking up some stairs - using a cane today to ambulate        History of Present Illness   HPI   About 2 weeks ago patient was walking up stairs and had sudden onset of pain left posterior lateral knee.  Pain worse with weightbearing.  He is now using a cane.  Has had some mild swelling.  No locking or giving way.  He does not recall that he was pivoting at the time that he was walking up the steps.  Patient has a history of pseudogout.  Has not noted any erythema or warmth to the joint.    Patient had x-rays of the knee done yesterday and I have reviewed those images he does show some mild chondrocalcinosis of the medial meniscus as well as some medial joint arthritic changes.  Patient is leaving town in 2 days to visit his grand nephews in [place].    I have reviewed the patient's medical, family, and social history in detail and updated the computerized patient record.    Review of Systems   Constitutional: Negative for fever.   Musculoskeletal:        Per HPI   Skin: Negative for wound.   Neurological: Negative for numbness.   All other systems reviewed and are negative.      /80 (BP Location: Left arm, Patient Position: Sitting, Cuff Size: Adult)   Pulse 68   Ht 177.8 cm (70\")   Wt 97.1 kg (214 lb)   SpO2 99%   BMI 30.71 kg/m²      Physical Exam   Constitutional: He is oriented to person, place, and time. He appears well-developed and well-nourished.   HENT:   Head: Normocephalic and atraumatic.   Eyes: Pupils are equal, round, and reactive to light. Conjunctivae and EOM are normal.   Cardiovascular:   No peripheral edema   Pulmonary/Chest: Effort normal.   Musculoskeletal:   Left knee normal in general appearance except for a mild effusion, does have a small " "popliteal cyst.  Patient has full range of motion without significant pain until he gets into end of flexion and this pain is more so over the anterior lateral and slightly posterior knee.  Equivocal medial Jose.  Negative Lachman.   Neurological: He is alert and oriented to person, place, and time.   Skin: Skin is warm and dry.   Psychiatric: He has a normal mood and affect. His behavior is normal.   Vitals reviewed.  Discussed  with the patient that for him to have sudden onset pain that he is likely he could have a meniscal tear.  Treatment options discussed with the patient.  For now we will try an intra-articular steroid injection.    Knee Injection Procedure Note    Left knee injection was discussed with the patient in detail, including indication, risks, benefits, and alternatives. Verbal consent was given for the procedure. Injection was performed by MD.  Injection site was identified by physical examination and cleaned with Betadine and alcohol swabs. Prior to needle insertion, ethyl chloride spray was used for surface anesthesia. Sterile technique was used.  A 25-gauge, 1.5\" needle was directed to the joint from a(n) lateral and posterior approach. Injectate was passed into the joint space without difficulty. The needle was removed and a simple bandage was applied. The procedure was tolerated well without difficulty.    Injection mixture:  1% lidocaine without epinephrine: 3 mL    40 mg/mL triamcinolone acetonide: 2 mL        Current Outpatient Medications:   •  allopurinol (ZYLOPRIM) 100 MG tablet, Take 1 tablet by mouth Daily., Disp: 90 tablet, Rfl: 3  •  allopurinol (ZYLOPRIM) 100 MG tablet, TAKE 1 TABLET BY MOUTH EVERY DAY, Disp: 30 tablet, Rfl: 0  •  aspirin 81 MG tablet, Take 81 mg by mouth Every Night. HOLD PRIOR TO SURG, Disp: , Rfl:   •  atorvastatin (LIPITOR) 10 MG tablet, Take 1 tablet by mouth Daily., Disp: 90 tablet, Rfl: 3  •  busPIRone (BUSPAR) 10 MG tablet, Take 10 mg by mouth 4 (Four) " "Times a Day., Disp: , Rfl:   •  glucosamine-chondroitin 500-400 MG capsule capsule, Take 1 capsule by mouth 2 (Two) Times a Day With Meals. HOLD PRIOR TO SURG, Disp: , Rfl:   •  lamoTRIgine (LaMICtal) 100 MG tablet, Take 150 mg by mouth Daily., Disp: , Rfl:   •  levothyroxine (SYNTHROID, LEVOTHROID) 137 MCG tablet, Take 1 tablet by mouth Daily., Disp: 90 tablet, Rfl: 3  •  levothyroxine (SYNTHROID, LEVOTHROID) 137 MCG tablet, TAKE 1 TABLET BY MOUTH EVERY DAY, Disp: 90 tablet, Rfl: 0  •  lisinopril (PRINIVIL,ZESTRIL) 10 MG tablet, TAKE 1 TABLET BY MOUTH DAILY, Disp: 30 tablet, Rfl: 0  •  Omega-3 Fatty Acids (FISH OIL) 1000 MG capsule capsule, Take 4,000 mg by mouth Daily With Breakfast. HOLD PRIOR TO SURG, Disp: , Rfl:   •  omeprazole (priLOSEC) 20 MG capsule, TAKE 1 CAPSULE BY MOUTH DAILY, Disp: 90 capsule, Rfl: 1  •  QUEtiapine (SEROquel) 25 MG tablet, Take 25 mg by mouth 3 (Three) Times a Day., Disp: , Rfl:   •  sertraline (ZOLOFT) 100 MG tablet, Take 200 mg by mouth Every Morning., Disp: , Rfl:   •  Syringe/Needle, Disp, (B-D 3CC LUER-JACY SYR 23GX1\") 23G X 1\" 3 ML misc, For testosterone injection once every 10 days., Disp: 10 each, Rfl: 2  •  TURMERIC PO, Take 1 capsule by mouth 2 (Two) Times a Day. HOLD PRIOR TO SURG , Disp: , Rfl:   •  vitamin D (ERGOCALCIFEROL) 82841 units capsule capsule, Take 1 capsule by mouth 2 (Two) Times a Week., Disp: 26 capsule, Rfl: 3  •  vitamin D (ERGOCALCIFEROL) 82853 units capsule capsule, TAKE 1 CAPSULE BY MOUTH 2 TIMES A WEEK, Disp: 26 capsule, Rfl: 0  No current facility-administered medications for this visit.      Diagnoses and all orders for this visit:    Internal derangement of left knee  -     triamcinolone acetonide (KENALOG-40) injection 80 mg    Chondrocalcinosis of left knee  -     triamcinolone acetonide (KENALOG-40) injection 80 mg       Postinjection instructions given regarding ice and activity.  Follow-up PRN.      EMR Dragon/Transcription disclaimer:    Much of " this encounter note is an electronic transcription/translation of spoken language to printed text.  The electronic translation of spoken language may permit erroneous, or at times, nonsensical words or phrases to be inadvertently transcribed.  Although I have reviewed the note for such errors some may still exist.

## 2020-03-04 ENCOUNTER — APPOINTMENT (OUTPATIENT)
Dept: LAB | Facility: HOSPITAL | Age: 70
End: 2020-03-04

## 2020-03-04 LAB
ALBUMIN SERPL-MCNC: 4.6 G/DL (ref 3.5–5.2)
ALBUMIN/GLOB SERPL: 1.6 G/DL
ALP SERPL-CCNC: 75 U/L (ref 39–117)
ALT SERPL W P-5'-P-CCNC: 21 U/L (ref 1–41)
ANION GAP SERPL CALCULATED.3IONS-SCNC: 12.9 MMOL/L (ref 5–15)
AST SERPL-CCNC: 18 U/L (ref 1–40)
BILIRUB SERPL-MCNC: 0.3 MG/DL (ref 0.2–1.2)
BUN BLD-MCNC: 32 MG/DL (ref 8–23)
BUN/CREAT SERPL: 22.1 (ref 7–25)
CALCIUM SPEC-SCNC: 9.5 MG/DL (ref 8.6–10.5)
CHLORIDE SERPL-SCNC: 101 MMOL/L (ref 98–107)
CHOLEST SERPL-MCNC: 166 MG/DL (ref 0–200)
CO2 SERPL-SCNC: 23.1 MMOL/L (ref 22–29)
CREAT BLD-MCNC: 1.45 MG/DL (ref 0.76–1.27)
GFR SERPL CREATININE-BSD FRML MDRD: 48 ML/MIN/1.73
GLOBULIN UR ELPH-MCNC: 2.9 GM/DL
GLUCOSE BLD-MCNC: 123 MG/DL (ref 65–99)
HDLC SERPL-MCNC: 42 MG/DL (ref 40–60)
LDLC SERPL CALC-MCNC: 89 MG/DL (ref 0–100)
LDLC/HDLC SERPL: 2.12 {RATIO}
POTASSIUM BLD-SCNC: 4.3 MMOL/L (ref 3.5–5.2)
PROT SERPL-MCNC: 7.5 G/DL (ref 6–8.5)
SODIUM BLD-SCNC: 137 MMOL/L (ref 136–145)
TRIGL SERPL-MCNC: 174 MG/DL (ref 0–150)
TSH SERPL DL<=0.05 MIU/L-ACNC: 0.37 UIU/ML (ref 0.27–4.2)
VLDLC SERPL-MCNC: 34.8 MG/DL (ref 5–40)

## 2020-03-04 PROCEDURE — 84443 ASSAY THYROID STIM HORMONE: CPT | Performed by: FAMILY MEDICINE

## 2020-03-04 PROCEDURE — 80061 LIPID PANEL: CPT | Performed by: FAMILY MEDICINE

## 2020-03-04 PROCEDURE — 80053 COMPREHEN METABOLIC PANEL: CPT | Performed by: FAMILY MEDICINE

## 2020-03-04 PROCEDURE — 36415 COLL VENOUS BLD VENIPUNCTURE: CPT | Performed by: FAMILY MEDICINE

## 2020-03-12 ENCOUNTER — OFFICE VISIT (OUTPATIENT)
Dept: FAMILY MEDICINE CLINIC | Facility: CLINIC | Age: 70
End: 2020-03-12

## 2020-03-12 VITALS
TEMPERATURE: 97 F | HEART RATE: 86 BPM | SYSTOLIC BLOOD PRESSURE: 127 MMHG | HEIGHT: 70 IN | DIASTOLIC BLOOD PRESSURE: 72 MMHG | OXYGEN SATURATION: 96 % | WEIGHT: 214 LBS | BODY MASS INDEX: 30.64 KG/M2

## 2020-03-12 DIAGNOSIS — E03.9 ACQUIRED HYPOTHYROIDISM: ICD-10-CM

## 2020-03-12 DIAGNOSIS — M1A.0720 CHRONIC GOUT OF LEFT FOOT, UNSPECIFIED CAUSE: ICD-10-CM

## 2020-03-12 DIAGNOSIS — I10 BENIGN ESSENTIAL HTN: ICD-10-CM

## 2020-03-12 DIAGNOSIS — D69.6 THROMBOCYTOPENIA (HCC): ICD-10-CM

## 2020-03-12 DIAGNOSIS — E78.2 MIXED HYPERLIPIDEMIA: ICD-10-CM

## 2020-03-12 DIAGNOSIS — N18.30 STAGE 3 CHRONIC KIDNEY DISEASE (HCC): ICD-10-CM

## 2020-03-12 DIAGNOSIS — Z00.00 MEDICARE ANNUAL WELLNESS VISIT, SUBSEQUENT: Primary | ICD-10-CM

## 2020-03-12 DIAGNOSIS — F33.41 RECURRENT MAJOR DEPRESSIVE DISORDER, IN PARTIAL REMISSION (HCC): ICD-10-CM

## 2020-03-12 PROCEDURE — G0439 PPPS, SUBSEQ VISIT: HCPCS | Performed by: FAMILY MEDICINE

## 2020-03-12 PROCEDURE — 99214 OFFICE O/P EST MOD 30 MIN: CPT | Performed by: FAMILY MEDICINE

## 2020-03-12 NOTE — PROGRESS NOTES
The ABCs of the Annual Wellness Visit  Subsequent Medicare Wellness Visit    Chief Complaint   Patient presents with   • Medicare Wellness-subsequent     follow up labs        Subjective   History of Present Illness:  [facility] Val[facility] is a 70 y.o. male who presents for a Subsequent Medicare Wellness Visit.    HEALTH RISK ASSESSMENT    Recent Hospitalizations:  No hospitalization(s) within the last year.    Current Medical Providers:  Patient Care Team:  Jared Noriega MD as PCP - General (Family Medicine)  Jared Noriega MD as PCP - Claims Attributed  Armani Painting MD as Consulting Physician (Hematology and Oncology)  Yousuf Greene MD as Consulting Physician (Endocrinology)  Be Iraheta MD as Referring Physician (Internal Medicine)    Smoking Status:  Social History     Tobacco Use   Smoking Status Never Smoker   Smokeless Tobacco Never Used       Alcohol Consumption:  Social History     Substance and Sexual Activity   Alcohol Use Yes    Comment: 1 DAILY DRINK       Depression Screen:   PHQ-2/PHQ-9 Depression Screening 3/12/2020   Little interest or pleasure in doing things 0   Feeling down, depressed, or hopeless 0   Total Score 0       Fall Risk Screen:  STEADI Fall Risk Assessment was completed, and patient is at LOW risk for falls.Assessment completed on:3/12/2020    Health Habits and Functional and Cognitive Screening:  Functional & Cognitive Status 3/12/2020   Do you have difficulty preparing food and eating? No   Do you have difficulty bathing yourself, getting dressed or grooming yourself? No   Do you have difficulty using the toilet? No   Do you have difficulty moving around from place to place? No   Do you have trouble with steps or getting out of a bed or a chair? Yes   Current Diet Unhealthy Diet   Dental Exam Up to date   Eye Exam Up to date   Exercise (times per week) 0 times per week   Current Exercise Activities Include None   Do you need help using the phone?  No   Are you deaf or do  you have serious difficulty hearing?  Yes   Do you need help with transportation? No   Do you need help shopping? No   Do you need help preparing meals?  No   Do you need help with housework?  No   Do you need help with laundry? No   Do you need help taking your medications? No   Do you need help managing money? No   Do you ever drive or ride in a car without wearing a seat belt? No   Have you felt unusual stress, anger or loneliness in the last month? No   Who do you live with? Spouse   If you need help, do you have trouble finding someone available to you? No   Have you been bothered in the last four weeks by sexual problems? Yes   Do you have difficulty concentrating, remembering or making decisions? Yes         Does the patient have evidence of cognitive impairment? No    Asprin use counseling:Taking ASA appropriately as indicated    Age-appropriate Screening Schedule:  Refer to the list below for future screening recommendations based on patient's age, sex and/or medical conditions. Orders for these recommended tests are listed in the plan section. The patient has been provided with a written plan.    Health Maintenance   Topic Date Due   • TDAP/TD VACCINES (1 - Tdap) 01/30/1961   • INFLUENZA VACCINE  08/01/2019   • COLONOSCOPY  03/23/2020   • LIPID PANEL  03/04/2021   • ZOSTER VACCINE  Completed          The following portions of the patient's history were reviewed and updated as appropriate: allergies, current medications, past family history, past medical history, past social history, past surgical history and problem list.    Outpatient Medications Prior to Visit   Medication Sig Dispense Refill   • allopurinol (ZYLOPRIM) 100 MG tablet TAKE 1 TABLET BY MOUTH EVERY DAY 30 tablet 0   • aspirin 81 MG tablet Take 81 mg by mouth Every Night. HOLD PRIOR TO SURG     • atorvastatin (LIPITOR) 10 MG tablet Take 1 tablet by mouth Daily. 90 tablet 3   • busPIRone (BUSPAR) 10 MG tablet Take 10 mg by mouth 4 (Four) Times  "a Day.     • glucosamine-chondroitin 500-400 MG capsule capsule Take 1 capsule by mouth 2 (Two) Times a Day With Meals. HOLD PRIOR TO SURG     • lamoTRIgine (LaMICtal) 100 MG tablet Take 150 mg by mouth Daily.     • levothyroxine (SYNTHROID, LEVOTHROID) 137 MCG tablet TAKE 1 TABLET BY MOUTH EVERY DAY 90 tablet 0   • lisinopril (PRINIVIL,ZESTRIL) 10 MG tablet TAKE 1 TABLET BY MOUTH DAILY 30 tablet 0   • Omega-3 Fatty Acids (FISH OIL) 1000 MG capsule capsule Take 4,000 mg by mouth Daily With Breakfast. HOLD PRIOR TO SURG     • omeprazole (priLOSEC) 20 MG capsule TAKE 1 CAPSULE BY MOUTH DAILY 90 capsule 1   • QUEtiapine (SEROquel) 25 MG tablet Take 25 mg by mouth 3 (Three) Times a Day.     • sertraline (ZOLOFT) 100 MG tablet Take 200 mg by mouth Every Morning.     • Syringe/Needle, Disp, (B-D 3CC LUER-JACY SYR 23GX1\") 23G X 1\" 3 ML misc For testosterone injection once every 10 days. 10 each 2   • TURMERIC PO Take 1 capsule by mouth 2 (Two) Times a Day. HOLD PRIOR TO SURG      • vitamin D (ERGOCALCIFEROL) 70526 units capsule capsule TAKE 1 CAPSULE BY MOUTH 2 TIMES A WEEK 26 capsule 0   • allopurinol (ZYLOPRIM) 100 MG tablet Take 1 tablet by mouth Daily. 90 tablet 3   • levothyroxine (SYNTHROID, LEVOTHROID) 137 MCG tablet Take 1 tablet by mouth Daily. 90 tablet 3   • vitamin D (ERGOCALCIFEROL) 06256 units capsule capsule Take 1 capsule by mouth 2 (Two) Times a Week. 26 capsule 3     No facility-administered medications prior to visit.        Patient Active Problem List   Diagnosis   • Acquired hypothyroidism   • Osteoarthritis of both knees   • Gastroesophageal reflux disease without esophagitis   • Mixed hyperlipidemia   • Monoclonal gammopathy of undetermined significance   • Hypercalcemia   • Chronic fatigue   • Thrombocytopenia (CMS/HCC)   • History of primary hyperparathyroidism   • Hypogonadism in male   • Toe pain, left   • Recurrent major depressive disorder, in partial remission (CMS/HCC)   • Pseudogout of joint " "of right foot   • Chronic gout of left foot   • Stage 3 chronic kidney disease (CMS/HCC)   • Hyperglycemia   • Benign prostatic hyperplasia without lower urinary tract symptoms   • Generalized abdominal pain   • Benign essential HTN       Advanced Care Planning:  ACP discussion was held with the patient during this visit. Patient has an advance directive in EMR which is still valid.     Review of Systems    Compared to one year ago, the patient feels his physical health is the same.  Compared to one year ago, the patient feels his mental health is the same.    Reviewed chart for potential of high risk medication in the elderly: yes  Reviewed chart for potential of harmful drug interactions in the elderly:yes    Objective         Vitals:    03/12/20 1053   BP: 127/72   Pulse: 86   Temp: 97 °F (36.1 °C)   TempSrc: Oral   SpO2: 96%   Weight: 97.1 kg (214 lb)   Height: 177.8 cm (70\")       Body mass index is 30.71 kg/m².  Discussed the patient's BMI with him. The BMI Exercise indicated.  Continue taekwondo..    Physical Exam    Lab Results   Component Value Date    TRIG 174 (H) 03/04/2020    HDL 42 03/04/2020    LDL 89 03/04/2020    VLDL 34.8 03/04/2020        Assessment/Plan   Medicare Risks and Personalized Health Plan  CMS Preventative Services Quick Reference  Immunizations Discussed/Encouraged (specific immunizations; adacel Tdap and Pneumococcal 23 )   Patient desires to go to retail pharmacy for the Tdap and pneumococcal 23.  Prevnar 13 up-to-date.  Due for colonoscopy.  He is going to call his surgeon.      The above risks/problems have been discussed with the patient.  Pertinent information has been shared with the patient in the After Visit Summary.  Follow up plans and orders are seen below in the Assessment/Plan Section.    Diagnoses and all orders for this visit:    1. Medicare annual wellness visit, subsequent (Primary)    2. Benign essential HTN    3. Mixed hyperlipidemia    4. Acquired " hypothyroidism    5. Chronic gout of left foot, unspecified cause    6. Stage 3 chronic kidney disease (CMS/HCC)    7. Thrombocytopenia (CMS/HCC)    8. Recurrent major depressive disorder, in partial remission (CMS/HCC)      Follow Up:  No follow-ups on file.     An After Visit Summary and PPPS were given to the patient.

## 2020-03-12 NOTE — PROGRESS NOTES
"Subjective   [facility] Valente is a 70 y.o. male.     Medicare Wellness-subsequent (follow up labs )    History of Present Illness    Hypertension follow up. Doing well with current medication which he is taking as directed. No known high or low blood pressure episodes. No cardiovascular or neurological symptoms. Today's BP: 127/72.      Hyperlipidemia follow up. He is taking statin medication without complaint. No myopathy symptoms.     Lab Results   Component Value Date    CHOL 166 03/04/2020    CHLPL 161 05/06/2019    TRIG 174 (H) 03/04/2020    HDL 42 03/04/2020    LDL 89 03/04/2020     Major depression.  In partial remission.  He continues with his psychiatrist.    Recent knee pain.  Thought to be pseudogout versus meniscal tear.  Had a corticosteroid injection with sports medicine.  The knee is doing better.  He is walking with a cane now.  Doing some exercise.    Hypothyroidism followup.  Taking levothyroxine as indicated.  No symptoms of high or low thyroid.  Last TSH as follows:    Lab Results   Component Value Date    TSH 0.366 03/04/2020      Stage III chronic kidney disease.  Recent creatinine improved..  Overall EGFR is in the 40s and stable.    The following portions of the patient's history were reviewed and updated as appropriate: allergies, current medications, past family history, past medical history, past social history, past surgical history and problem list.      Review of Systems   Constitutional: Negative.    Respiratory: Negative.    Cardiovascular: Negative.    Musculoskeletal: Negative for joint swelling.   Neurological: Negative.    Psychiatric/Behavioral: Negative.        Objective   Blood pressure 127/72, pulse 86, temperature 97 °F (36.1 °C), temperature source Oral, height 177.8 cm (70\"), weight 97.1 kg (214 lb), SpO2 96 %.  Physical Exam   Constitutional: He appears well-developed and well-nourished. No distress.   Neck: No thyromegaly present.   Cardiovascular: Normal rate, regular " rhythm, normal heart sounds and intact distal pulses.   Pulmonary/Chest: Effort normal and breath sounds normal.   Musculoskeletal: He exhibits no edema.   Left knee decreased range of motion.  Mild pain with walking.   Skin: Skin is warm and dry.   Psychiatric: He has a normal mood and affect. His behavior is normal. Judgment and thought content normal.   Nursing note and vitals reviewed.      Assessment/Plan   [facility] was seen today for medicare wellness-subsequent.    Diagnoses and all orders for this visit:    Medicare annual wellness visit, subsequent    Benign essential HTN    Mixed hyperlipidemia    Acquired hypothyroidism    Chronic gout of left foot, unspecified cause    Stage 3 chronic kidney disease (CMS/HCC)    Thrombocytopenia (CMS/HCC)  -     CBC & Differential    Recurrent major depressive disorder, in partial remission (CMS/[facility])        Hypertension.  Well-controlled.    Hyperlipidemia.  Continue atorvastatin.    Hypothyroidism.  Continue levothyroxine.  Prescribed by endocrinology.    Gout.  No flareups left foot.  Had recent left knee pain, possible pseudogout.  Thought more to be a meniscal tear.  He did have a corticosteroid shot.  Feeling better.    Major depression.  Mostly in remission.  Continues to follow with psychiatry.    Thrombocytopenia.  Platelet count was about 112,000 2018.  Needs a follow-up CBC done.    Stage III chronic kidney disease.  Recently improved.    Follow-up in 6 to 12 months for recheck sooner as needed.

## 2020-03-13 LAB
BASOPHILS # BLD AUTO: 0.04 10*3/MM3 (ref 0–0.2)
BASOPHILS NFR BLD AUTO: 0.5 % (ref 0–1.5)
EOSINOPHIL # BLD AUTO: 0.18 10*3/MM3 (ref 0–0.4)
EOSINOPHIL NFR BLD AUTO: 2.4 % (ref 0.3–6.2)
ERYTHROCYTE [DISTWIDTH] IN BLOOD BY AUTOMATED COUNT: 13.3 % (ref 12.3–15.4)
HCT VFR BLD AUTO: 39.7 % (ref 37.5–51)
HGB BLD-MCNC: 13.2 G/DL (ref 13–17.7)
IMM GRANULOCYTES # BLD AUTO: 0.06 10*3/MM3 (ref 0–0.05)
IMM GRANULOCYTES NFR BLD AUTO: 0.8 % (ref 0–0.5)
LYMPHOCYTES # BLD AUTO: 1.74 10*3/MM3 (ref 0.7–3.1)
LYMPHOCYTES NFR BLD AUTO: 22.9 % (ref 19.6–45.3)
MCH RBC QN AUTO: 29.5 PG (ref 26.6–33)
MCHC RBC AUTO-ENTMCNC: 33.2 G/DL (ref 31.5–35.7)
MCV RBC AUTO: 88.6 FL (ref 79–97)
MONOCYTES # BLD AUTO: 0.4 10*3/MM3 (ref 0.1–0.9)
MONOCYTES NFR BLD AUTO: 5.3 % (ref 5–12)
NEUTROPHILS # BLD AUTO: 5.18 10*3/MM3 (ref 1.7–7)
NEUTROPHILS NFR BLD AUTO: 68.1 % (ref 42.7–76)
NRBC BLD AUTO-RTO: 0 /100 WBC (ref 0–0.2)
PLATELET # BLD AUTO: 132 10*3/MM3 (ref 140–450)
RBC # BLD AUTO: 4.48 10*6/MM3 (ref 4.14–5.8)
WBC # BLD AUTO: 7.6 10*3/MM3 (ref 3.4–10.8)

## 2020-03-13 NOTE — PROGRESS NOTES
Please call patient.  The platelet count is borderline low but improved historically.  No further evaluation needed.  We should monitor periodically.

## 2020-03-30 ENCOUNTER — TELEPHONE (OUTPATIENT)
Dept: FAMILY MEDICINE CLINIC | Facility: CLINIC | Age: 70
End: 2020-03-30

## 2020-03-30 RX ORDER — LISINOPRIL 10 MG/1
10 TABLET ORAL DAILY
Qty: 90 TABLET | Refills: 1 | Status: SHIPPED | OUTPATIENT
Start: 2020-03-30 | End: 2020-09-18

## 2020-03-30 RX ORDER — LISINOPRIL 10 MG/1
10 TABLET ORAL DAILY
Qty: 30 TABLET | Refills: 0 | OUTPATIENT
Start: 2020-03-30 | End: 2021-03-30

## 2020-03-30 NOTE — TELEPHONE ENCOUNTER
PATIENT CALLED FOR MED REQUEST FOR     lisinopril (PRINIVIL,ZESTRIL) 10 MG tablet    [id number] [place], [place]  [facility] - [phone]  - [phone]   [phone]    CALL BACK NUMBER [phone]

## 2020-05-14 ENCOUNTER — CLINICAL SUPPORT (OUTPATIENT)
Dept: FAMILY MEDICINE CLINIC | Facility: CLINIC | Age: 70
End: 2020-05-14

## 2020-05-14 DIAGNOSIS — Z23 NEED FOR VACCINATION: Primary | ICD-10-CM

## 2020-05-14 PROCEDURE — G0009 ADMIN PNEUMOCOCCAL VACCINE: HCPCS | Performed by: FAMILY MEDICINE

## 2020-05-14 PROCEDURE — 90732 PPSV23 VACC 2 YRS+ SUBQ/IM: CPT | Performed by: FAMILY MEDICINE

## 2020-05-14 PROCEDURE — 90715 TDAP VACCINE 7 YRS/> IM: CPT | Performed by: FAMILY MEDICINE

## 2020-05-14 PROCEDURE — 90472 IMMUNIZATION ADMIN EACH ADD: CPT | Performed by: FAMILY MEDICINE

## 2020-05-14 NOTE — PROGRESS NOTES
Patient made aware that tdap is not covered by Medicare and that the injection would cost approx. $100.  He expressed understanding and an ABN form was signed

## 2020-05-18 DIAGNOSIS — K21.9 GASTROESOPHAGEAL REFLUX DISEASE WITHOUT ESOPHAGITIS: ICD-10-CM

## 2020-05-18 RX ORDER — OMEPRAZOLE 20 MG/1
CAPSULE, DELAYED RELEASE ORAL
Qty: 90 CAPSULE | Refills: 1 | Status: SHIPPED | OUTPATIENT
Start: 2020-05-18 | End: 2020-11-17 | Stop reason: SDUPTHER

## 2020-05-18 RX ORDER — ALLOPURINOL 100 MG/1
TABLET ORAL
Qty: 90 TABLET | Refills: 0 | Status: SHIPPED | OUTPATIENT
Start: 2020-05-18 | End: 2020-08-18 | Stop reason: SDUPTHER

## 2020-06-17 DIAGNOSIS — E55.9 VITAMIN D DEFICIENCY: ICD-10-CM

## 2020-06-17 RX ORDER — ERGOCALCIFEROL 1.25 MG/1
CAPSULE ORAL
Qty: 26 CAPSULE | Refills: 0 | Status: SHIPPED | OUTPATIENT
Start: 2020-06-17 | End: 2020-10-19 | Stop reason: SDUPTHER

## 2020-06-17 RX ORDER — ATORVASTATIN CALCIUM 10 MG/1
10 TABLET, FILM COATED ORAL DAILY
Qty: 90 TABLET | Refills: 3 | Status: SHIPPED | OUTPATIENT
Start: 2020-06-17 | End: 2020-12-14 | Stop reason: SDUPTHER

## 2020-06-18 ENCOUNTER — TELEPHONE (OUTPATIENT)
Dept: FAMILY MEDICINE CLINIC | Facility: CLINIC | Age: 70
End: 2020-06-18

## 2020-06-18 NOTE — TELEPHONE ENCOUNTER
PATIENT CALLED STATING THAT HE HAD A FEVER  LAST NIGHT. HE STATES THAT HE WENT DOWN AND HAS BEEN RANGING IN THE  AREA. PATIENT STATES THAT HE IS NOT EXPERIENCING ANY OTHER SYMPTOMS. HE IS REQUESTING A CALL [phone] TO FURTHER EVALUATE.

## 2020-08-18 RX ORDER — ALLOPURINOL 100 MG/1
100 TABLET ORAL DAILY
Qty: 90 TABLET | Refills: 1 | Status: SHIPPED | OUTPATIENT
Start: 2020-08-18 | End: 2020-12-14 | Stop reason: SDUPTHER

## 2020-08-18 NOTE — TELEPHONE ENCOUNTER
Caller: [facility]    Relationship: Self    Best call back number: [phone]     Medication needed:   Requested Prescriptions     Pending Prescriptions Disp Refills   • allopurinol (ZYLOPRIM) 100 MG tablet 90 tablet 0     Sig: Take 1 tablet by mouth Daily.       When do you need the refill by: 08/20/2020    What details did the patient provide when requesting the medication: HAS ABOUT A WEEK LEFT    Does the patient have less than a 3 day supply:  [] Yes  [x] No    What is the patient's preferred pharmacy:      [facility] #[id number] [place] [address] [facility] AT [facility] &  - [phone]  - [phone] FX

## 2020-09-15 RX ORDER — LEVOTHYROXINE SODIUM 137 UG/1
TABLET ORAL
Qty: 90 TABLET | Refills: 0 | OUTPATIENT
Start: 2020-09-15

## 2020-09-18 RX ORDER — LISINOPRIL 10 MG/1
TABLET ORAL
Qty: 90 TABLET | Refills: 1 | Status: SHIPPED | OUTPATIENT
Start: 2020-09-18 | End: 2020-12-14 | Stop reason: SDUPTHER

## 2020-10-19 DIAGNOSIS — E55.9 VITAMIN D DEFICIENCY: ICD-10-CM

## 2020-10-19 RX ORDER — ERGOCALCIFEROL 1.25 MG/1
50000 CAPSULE ORAL 2 TIMES WEEKLY
Qty: 26 CAPSULE | Refills: 0 | Status: SHIPPED | OUTPATIENT
Start: 2020-10-19 | End: 2021-02-05 | Stop reason: SDUPTHER

## 2020-10-19 NOTE — TELEPHONE ENCOUNTER
Caller: [facility] Val[facility]    Relationship: Self    Best call back number: [phone]     Medication needed:   Requested Prescriptions     Pending Prescriptions Disp Refills   • vitamin D (ERGOCALCIFEROL) 1.25 MG (64073 UT) capsule capsule 26 capsule 0     Sig: Take 1 capsule by mouth 2 (Two) Times a Week.       When do you need the refill by: 10/21/2020      What details did the patient provide when requesting the medication: HAS A FEW DAYS LET. WOULD LIKE 90 DAY SUPPLY    Does the patient have less than a 3 day supply:  [x] Yes  [] No    What is the patient's preferred pharmacy:      [facility] #[id number] [place], [place] - [facility] AT [facility] [address] &  - [phone] [facility] [phone] FX

## 2020-11-17 DIAGNOSIS — K21.9 GASTROESOPHAGEAL REFLUX DISEASE WITHOUT ESOPHAGITIS: ICD-10-CM

## 2020-11-17 RX ORDER — OMEPRAZOLE 20 MG/1
20 CAPSULE, DELAYED RELEASE ORAL DAILY
Qty: 90 CAPSULE | Refills: 1 | Status: SHIPPED | OUTPATIENT
Start: 2020-11-17 | End: 2020-12-14 | Stop reason: SDUPTHER

## 2020-12-14 ENCOUNTER — OFFICE VISIT (OUTPATIENT)
Dept: FAMILY MEDICINE CLINIC | Facility: CLINIC | Age: 70
End: 2020-12-14

## 2020-12-14 VITALS
HEART RATE: 71 BPM | TEMPERATURE: 97.3 F | SYSTOLIC BLOOD PRESSURE: 130 MMHG | WEIGHT: 207 LBS | DIASTOLIC BLOOD PRESSURE: 84 MMHG | OXYGEN SATURATION: 98 % | HEIGHT: 70 IN | BODY MASS INDEX: 29.63 KG/M2

## 2020-12-14 DIAGNOSIS — K21.9 GASTROESOPHAGEAL REFLUX DISEASE WITHOUT ESOPHAGITIS: ICD-10-CM

## 2020-12-14 DIAGNOSIS — N18.30 STAGE 3 CHRONIC KIDNEY DISEASE, UNSPECIFIED WHETHER STAGE 3A OR 3B CKD (HCC): ICD-10-CM

## 2020-12-14 DIAGNOSIS — E03.9 ACQUIRED HYPOTHYROIDISM: ICD-10-CM

## 2020-12-14 DIAGNOSIS — F33.41 RECURRENT MAJOR DEPRESSIVE DISORDER, IN PARTIAL REMISSION (HCC): ICD-10-CM

## 2020-12-14 DIAGNOSIS — I10 BENIGN ESSENTIAL HTN: Primary | ICD-10-CM

## 2020-12-14 DIAGNOSIS — D69.6 THROMBOCYTOPENIA (HCC): ICD-10-CM

## 2020-12-14 DIAGNOSIS — E78.2 MIXED HYPERLIPIDEMIA: ICD-10-CM

## 2020-12-14 PROCEDURE — 99214 OFFICE O/P EST MOD 30 MIN: CPT | Performed by: FAMILY MEDICINE

## 2020-12-14 RX ORDER — OMEPRAZOLE 20 MG/1
20 CAPSULE, DELAYED RELEASE ORAL DAILY
Qty: 90 CAPSULE | Refills: 1 | Status: SHIPPED | OUTPATIENT
Start: 2020-12-14 | End: 2021-07-28

## 2020-12-14 RX ORDER — LEVOTHYROXINE SODIUM 137 UG/1
137 TABLET ORAL DAILY
Qty: 90 TABLET | Refills: 1 | Status: SHIPPED | OUTPATIENT
Start: 2020-12-14 | End: 2020-12-15

## 2020-12-14 RX ORDER — ATORVASTATIN CALCIUM 10 MG/1
10 TABLET, FILM COATED ORAL DAILY
Qty: 90 TABLET | Refills: 1 | Status: SHIPPED | OUTPATIENT
Start: 2020-12-14 | End: 2021-09-10 | Stop reason: SDUPTHER

## 2020-12-14 RX ORDER — LISINOPRIL 10 MG/1
10 TABLET ORAL DAILY
Qty: 90 TABLET | Refills: 1 | Status: SHIPPED | OUTPATIENT
Start: 2020-12-14 | End: 2021-02-02

## 2020-12-14 RX ORDER — ALLOPURINOL 100 MG/1
100 TABLET ORAL DAILY
Qty: 90 TABLET | Refills: 1 | Status: SHIPPED | OUTPATIENT
Start: 2020-12-14 | End: 2021-06-08 | Stop reason: SDUPTHER

## 2020-12-14 NOTE — PROGRESS NOTES
"Subjective   [facility] Valente is a 70 y.o. male.     Hyperlipidemia (follow up)    History of Present Illness    Hypertension follow up. Doing well with current medication which he is taking as directed. No known high or low blood pressure episodes. No cardiovascular or neurological symptoms. Today's BP: 130/84.      Hyperlipidemia follow up. He is taking statin medication without complaint. No myopathy symptoms.     Lab Results   Component Value Date    CHOL 166 03/04/2020    CHLPL 161 05/06/2019    TRIG 174 (H) 03/04/2020    HDL 42 03/04/2020    LDL 89 03/04/2020     Hypothyroidism followup.  Taking levothyroxine as indicated.  No symptoms of high or low thyroid.  He is no longer seeing his endocrinologist.  Endocrinologist retired.  He has asked me to take over his levothyroxine prescription.  Last TSH was therapeutic in March.  Last TSH as follows:    Lab Results   Component Value Date    TSH 0.366 03/04/2020      Major depression.  In remission.  Followed by psychiatry.  Patient states things are stable.    GERD.  Longstanding omeprazole use.  He gets heartburn occasionally in the evening if he eats the wrong thing.    Stage III chronic kidney disease.  GFR in the mid 40s.  He states he used to take a lot of NSAIDs and that caused kidney problems.  He is on omeprazole as above.  Also on lisinopril 10 mg a day.    The following portions of the patient's history were reviewed and updated as appropriate: allergies, current medications, past family history, past medical history, past social history, past surgical history and problem list.      Review of Systems   Constitutional: Negative.    Respiratory: Negative.    Cardiovascular: Negative.        Objective   Blood pressure 130/84, pulse 71, temperature 97.3 °F (36.3 °C), temperature source Temporal, height 177.8 cm (70\"), weight 93.9 kg (207 lb), SpO2 98 %.  Physical Exam  Vitals signs and nursing note reviewed.   Constitutional:       General: He is not in acute " distress.     Appearance: He is well-developed.   Neck:      Thyroid: No thyromegaly.   Cardiovascular:      Rate and Rhythm: Normal rate and regular rhythm.      Heart sounds: Normal heart sounds.   Pulmonary:      Effort: Pulmonary effort is normal.      Breath sounds: Normal breath sounds.   Skin:     General: Skin is warm and dry.   Psychiatric:         Behavior: Behavior normal.         Thought Content: Thought content normal.         Judgment: Judgment normal.         Assessment/Plan   Diagnoses and all orders for this visit:    1. Benign essential HTN (Primary)  -     Comprehensive Metabolic Panel    2. Mixed hyperlipidemia  -     Comprehensive Metabolic Panel  -     Lipid Panel    3. Acquired hypothyroidism  -     TSH Rfx On Abnormal To Free T4    4. Stage 3 chronic kidney disease, unspecified whether stage 3a or 3b CKD  -     CBC & Differential  -     Comprehensive Metabolic Panel    5. Thrombocytopenia (CMS/HCC)  -     CBC & Differential    6. Recurrent major depressive disorder, in partial remission (CMS/HCC)    7. Gastroesophageal reflux disease without esophagitis  -     omeprazole (priLOSEC) 20 MG capsule; Take 1 capsule by mouth Daily.  Dispense: 90 capsule; Refill: 1    Other orders  -     atorvastatin (LIPITOR) 10 MG tablet; Take 1 tablet by mouth Daily.  Dispense: 90 tablet; Refill: 1  -     allopurinol (ZYLOPRIM) 100 MG tablet; Take 1 tablet by mouth Daily.  Dispense: 90 tablet; Refill: 1  -     levothyroxine (SYNTHROID, LEVOTHROID) 137 MCG tablet; Take 1 tablet by mouth Daily.  Dispense: 90 tablet; Refill: 1  -     lisinopril (PRINIVIL,ZESTRIL) 10 MG tablet; Take 1 tablet by mouth Daily.  Dispense: 90 tablet; Refill: 1      Hypertension.  Overall well controlled.  Continue lisinopril.    Hyperlipidemia.  Checking CMP and lipid panel today.  Continue atorvastatin 10 mg daily.    Hypothyroidism.  I refilled his levothyroxine 137 mcg a day.  Checking TSH today.  Adjust accordingly.    Stage III  chronic kidney disease.  Likely from previous NSAID use.  No longer taking NSAIDs.  However the omeprazole could be causing some risk factors also.  See below discussion.    GERD.  I do recommend weaning the omeprazole.  Avoid taking every third day for a while.  Then avoid taking every other day for a while.  Then use as needed.  If unable to do this we can discuss more in the future.    Thrombocytopenia.  Chronic and stable previously.  Rechecking CBC today.    Major depression.  Followed by psychiatry.    Follow-up in 3 months for recheck and Medicare wellness visit

## 2020-12-15 DIAGNOSIS — E03.9 ACQUIRED HYPOTHYROIDISM: Primary | ICD-10-CM

## 2020-12-15 DIAGNOSIS — N18.30 STAGE 3 CHRONIC KIDNEY DISEASE, UNSPECIFIED WHETHER STAGE 3A OR 3B CKD (HCC): ICD-10-CM

## 2020-12-15 LAB
ALBUMIN SERPL-MCNC: 5 G/DL (ref 3.7–4.7)
ALBUMIN/GLOB SERPL: 1.8 {RATIO} (ref 1.2–2.2)
ALP SERPL-CCNC: 74 IU/L (ref 39–117)
ALT SERPL-CCNC: 17 IU/L (ref 0–44)
AST SERPL-CCNC: 20 IU/L (ref 0–40)
BASOPHILS # BLD AUTO: 0.1 X10E3/UL (ref 0–0.2)
BASOPHILS NFR BLD AUTO: 1 %
BILIRUB SERPL-MCNC: 0.4 MG/DL (ref 0–1.2)
BUN SERPL-MCNC: 19 MG/DL (ref 8–27)
BUN/CREAT SERPL: 15 (ref 10–24)
CALCIUM SERPL-MCNC: 9.9 MG/DL (ref 8.6–10.2)
CHLORIDE SERPL-SCNC: 102 MMOL/L (ref 96–106)
CHOLEST SERPL-MCNC: 157 MG/DL (ref 100–199)
CO2 SERPL-SCNC: 23 MMOL/L (ref 20–29)
CREAT SERPL-MCNC: 1.23 MG/DL (ref 0.76–1.27)
EOSINOPHIL # BLD AUTO: 0.3 X10E3/UL (ref 0–0.4)
EOSINOPHIL NFR BLD AUTO: 4 %
ERYTHROCYTE [DISTWIDTH] IN BLOOD BY AUTOMATED COUNT: 13.3 % (ref 11.6–15.4)
GLOBULIN SER CALC-MCNC: 2.8 G/DL (ref 1.5–4.5)
GLUCOSE SERPL-MCNC: 96 MG/DL (ref 65–99)
HCT VFR BLD AUTO: 41.3 % (ref 37.5–51)
HDLC SERPL-MCNC: 44 MG/DL
HGB BLD-MCNC: 13.3 G/DL (ref 13–17.7)
IMM GRANULOCYTES # BLD AUTO: 0 X10E3/UL (ref 0–0.1)
IMM GRANULOCYTES NFR BLD AUTO: 0 %
LDLC SERPL CALC-MCNC: 85 MG/DL (ref 0–99)
LYMPHOCYTES # BLD AUTO: 1.3 X10E3/UL (ref 0.7–3.1)
LYMPHOCYTES NFR BLD AUTO: 19 %
MCH RBC QN AUTO: 27.9 PG (ref 26.6–33)
MCHC RBC AUTO-ENTMCNC: 32.2 G/DL (ref 31.5–35.7)
MCV RBC AUTO: 87 FL (ref 79–97)
MONOCYTES # BLD AUTO: 0.3 X10E3/UL (ref 0.1–0.9)
MONOCYTES NFR BLD AUTO: 5 %
NEUTROPHILS # BLD AUTO: 5 X10E3/UL (ref 1.4–7)
NEUTROPHILS NFR BLD AUTO: 71 %
PLATELET # BLD AUTO: 140 X10E3/UL (ref 150–450)
POTASSIUM SERPL-SCNC: 4.4 MMOL/L (ref 3.5–5.2)
PROT SERPL-MCNC: 7.8 G/DL (ref 6–8.5)
RBC # BLD AUTO: 4.76 X10E6/UL (ref 4.14–5.8)
SODIUM SERPL-SCNC: 139 MMOL/L (ref 134–144)
T4 FREE SERPL-MCNC: 1.66 NG/DL (ref 0.82–1.77)
TRIGL SERPL-MCNC: 164 MG/DL (ref 0–149)
TSH SERPL DL<=0.005 MIU/L-ACNC: 0.08 UIU/ML (ref 0.45–4.5)
VLDLC SERPL CALC-MCNC: 28 MG/DL (ref 5–40)
WBC # BLD AUTO: 7 X10E3/UL (ref 3.4–10.8)

## 2020-12-15 RX ORDER — LEVOTHYROXINE SODIUM 0.12 MG/1
125 TABLET ORAL DAILY
Qty: 90 TABLET | Refills: 1 | Status: SHIPPED | OUTPATIENT
Start: 2020-12-15 | End: 2021-06-08 | Stop reason: SDUPTHER

## 2020-12-15 NOTE — PROGRESS NOTES
I spoke with patient by phone this morning.  He is aware that he needs a lower dose of thyroid medication.  I have sent in levothyroxine 125 to the pharmacy.  He is going to stop the 137 mcg dose.  The right lab work otherwise looks good.  The platelet count is stable.  Kidney function improved compared to previous visits..

## 2020-12-21 ENCOUNTER — APPOINTMENT (OUTPATIENT)
Dept: CT IMAGING | Facility: HOSPITAL | Age: 70
End: 2020-12-21

## 2020-12-21 ENCOUNTER — HOSPITAL ENCOUNTER (INPATIENT)
Facility: HOSPITAL | Age: 70
LOS: 8 days | Discharge: HOME HEALTH | End: 2020-12-29
Attending: EMERGENCY MEDICINE | Admitting: INTERNAL MEDICINE

## 2020-12-21 ENCOUNTER — ANESTHESIA EVENT (OUTPATIENT)
Dept: PERIOP | Facility: HOSPITAL | Age: 70
End: 2020-12-21

## 2020-12-21 ENCOUNTER — OFFICE VISIT (OUTPATIENT)
Dept: FAMILY MEDICINE CLINIC | Facility: CLINIC | Age: 70
End: 2020-12-21

## 2020-12-21 ENCOUNTER — ANESTHESIA (OUTPATIENT)
Dept: PERIOP | Facility: HOSPITAL | Age: 70
End: 2020-12-21

## 2020-12-21 VITALS
TEMPERATURE: 97.1 F | SYSTOLIC BLOOD PRESSURE: 156 MMHG | DIASTOLIC BLOOD PRESSURE: 90 MMHG | OXYGEN SATURATION: 99 % | BODY MASS INDEX: 29.63 KG/M2 | HEART RATE: 75 BPM | HEIGHT: 70 IN | WEIGHT: 207 LBS

## 2020-12-21 DIAGNOSIS — K56.609 COLON OBSTRUCTION (HCC): ICD-10-CM

## 2020-12-21 DIAGNOSIS — C25.9 PANCREATIC ADENOCARCINOMA (HCC): ICD-10-CM

## 2020-12-21 DIAGNOSIS — R10.84 GENERALIZED ABDOMINAL PAIN: ICD-10-CM

## 2020-12-21 DIAGNOSIS — D69.6 THROMBOCYTOPENIA (HCC): ICD-10-CM

## 2020-12-21 DIAGNOSIS — R10.84 GENERALIZED ABDOMINAL PAIN: Primary | ICD-10-CM

## 2020-12-21 DIAGNOSIS — K86.89 PANCREATIC MASS: Primary | ICD-10-CM

## 2020-12-21 DIAGNOSIS — K56.609 COLONIC OBSTRUCTION (HCC): ICD-10-CM

## 2020-12-21 LAB
ALBUMIN SERPL-MCNC: 4.7 G/DL (ref 3.5–5.2)
ALBUMIN/GLOB SERPL: 1.6 G/DL
ALP SERPL-CCNC: 65 U/L (ref 39–117)
ALT SERPL W P-5'-P-CCNC: 18 U/L (ref 1–41)
ANION GAP SERPL CALCULATED.3IONS-SCNC: 12.2 MMOL/L (ref 5–15)
AST SERPL-CCNC: 20 U/L (ref 1–40)
BASOPHILS # BLD AUTO: 0.05 10*3/MM3 (ref 0–0.2)
BASOPHILS NFR BLD AUTO: 0.4 % (ref 0–1.5)
BILIRUB SERPL-MCNC: 0.5 MG/DL (ref 0–1.2)
BILIRUB UR QL STRIP: NEGATIVE
BUN SERPL-MCNC: 19 MG/DL (ref 8–23)
BUN/CREAT SERPL: 16.7 (ref 7–25)
CALCIUM SPEC-SCNC: 9.6 MG/DL (ref 8.6–10.5)
CHLORIDE SERPL-SCNC: 101 MMOL/L (ref 98–107)
CLARITY UR: CLEAR
CO2 SERPL-SCNC: 21.8 MMOL/L (ref 22–29)
COLOR UR: ABNORMAL
CREAT SERPL-MCNC: 1.14 MG/DL (ref 0.76–1.27)
DEPRECATED RDW RBC AUTO: 40.3 FL (ref 37–54)
EOSINOPHIL # BLD AUTO: 0.08 10*3/MM3 (ref 0–0.4)
EOSINOPHIL NFR BLD AUTO: 0.6 % (ref 0.3–6.2)
ERYTHROCYTE [DISTWIDTH] IN BLOOD BY AUTOMATED COUNT: 13.3 % (ref 12.3–15.4)
GFR SERPL CREATININE-BSD FRML MDRD: 63 ML/MIN/1.73
GLOBULIN UR ELPH-MCNC: 3 GM/DL
GLUCOSE SERPL-MCNC: 131 MG/DL (ref 65–99)
GLUCOSE UR STRIP-MCNC: NEGATIVE MG/DL
HCT VFR BLD AUTO: 39.1 % (ref 37.5–51)
HGB BLD-MCNC: 13.3 G/DL (ref 13–17.7)
HGB UR QL STRIP.AUTO: NEGATIVE
HOLD SPECIMEN: NORMAL
HOLD SPECIMEN: NORMAL
KETONES UR QL STRIP: ABNORMAL
LEUKOCYTE ESTERASE UR QL STRIP.AUTO: NEGATIVE
LIPASE SERPL-CCNC: 34 U/L (ref 13–60)
LYMPHOCYTES # BLD AUTO: 0.79 10*3/MM3 (ref 0.7–3.1)
LYMPHOCYTES NFR BLD AUTO: 6.4 % (ref 19.6–45.3)
MCH RBC QN AUTO: 28.9 PG (ref 26.6–33)
MCHC RBC AUTO-ENTMCNC: 34 G/DL (ref 31.5–35.7)
MCV RBC AUTO: 84.8 FL (ref 79–97)
MONOCYTES # BLD AUTO: 0.44 10*3/MM3 (ref 0.1–0.9)
MONOCYTES NFR BLD AUTO: 3.6 % (ref 5–12)
NEUTROPHILS NFR BLD AUTO: 10.91 10*3/MM3 (ref 1.7–7)
NEUTROPHILS NFR BLD AUTO: 88.3 % (ref 42.7–76)
NITRITE UR QL STRIP: NEGATIVE
PH UR STRIP.AUTO: <=5 [PH] (ref 5–8)
PLATELET # BLD AUTO: 142 10*3/MM3 (ref 140–450)
PMV BLD AUTO: 11.8 FL (ref 6–12)
POTASSIUM SERPL-SCNC: 4.1 MMOL/L (ref 3.5–5.2)
PROT SERPL-MCNC: 7.7 G/DL (ref 6–8.5)
PROT UR QL STRIP: NEGATIVE
RBC # BLD AUTO: 4.61 10*6/MM3 (ref 4.14–5.8)
SARS-COV-2 RNA RESP QL NAA+PROBE: NOT DETECTED
SODIUM SERPL-SCNC: 135 MMOL/L (ref 136–145)
SP GR UR STRIP: 1.02 (ref 1–1.03)
UROBILINOGEN UR QL STRIP: ABNORMAL
WBC # BLD AUTO: 12.36 10*3/MM3 (ref 3.4–10.8)
WHOLE BLOOD HOLD SPECIMEN: NORMAL
WHOLE BLOOD HOLD SPECIMEN: NORMAL

## 2020-12-21 PROCEDURE — 99222 1ST HOSP IP/OBS MODERATE 55: CPT | Performed by: SURGERY

## 2020-12-21 PROCEDURE — 99214 OFFICE O/P EST MOD 30 MIN: CPT | Performed by: FAMILY MEDICINE

## 2020-12-21 PROCEDURE — 25010000002 CEFOXITIN PER 1 G: Performed by: SURGERY

## 2020-12-21 PROCEDURE — 25010000002 PHENYLEPHRINE PER 1 ML: Performed by: NURSE ANESTHETIST, CERTIFIED REGISTERED

## 2020-12-21 PROCEDURE — 25010000002 IOPAMIDOL 61 % SOLUTION: Performed by: EMERGENCY MEDICINE

## 2020-12-21 PROCEDURE — 25010000002 ONDANSETRON PER 1 MG: Performed by: NURSE PRACTITIONER

## 2020-12-21 PROCEDURE — 83690 ASSAY OF LIPASE: CPT

## 2020-12-21 PROCEDURE — 25010000002 MIDAZOLAM PER 1 MG: Performed by: ANESTHESIOLOGY

## 2020-12-21 PROCEDURE — 81003 URINALYSIS AUTO W/O SCOPE: CPT | Performed by: EMERGENCY MEDICINE

## 2020-12-21 PROCEDURE — 74177 CT ABD & PELVIS W/CONTRAST: CPT

## 2020-12-21 PROCEDURE — 25010000002 SUCCINYLCHOLINE PER 20 MG: Performed by: ANESTHESIOLOGY

## 2020-12-21 PROCEDURE — 85025 COMPLETE CBC W/AUTO DIFF WBC: CPT

## 2020-12-21 PROCEDURE — 25010000002 PROPOFOL 10 MG/ML EMULSION: Performed by: ANESTHESIOLOGY

## 2020-12-21 PROCEDURE — 80053 COMPREHEN METABOLIC PANEL: CPT

## 2020-12-21 PROCEDURE — 25010000002 FENTANYL CITRATE (PF) 100 MCG/2ML SOLUTION: Performed by: NURSE ANESTHETIST, CERTIFIED REGISTERED

## 2020-12-21 PROCEDURE — 25010000002 NEOSTIGMINE PER 0.5 MG: Performed by: ANESTHESIOLOGY

## 2020-12-21 PROCEDURE — U0003 INFECTIOUS AGENT DETECTION BY NUCLEIC ACID (DNA OR RNA); SEVERE ACUTE RESPIRATORY SYNDROME CORONAVIRUS 2 (SARS-COV-2) (CORONAVIRUS DISEASE [COVID-19]), AMPLIFIED PROBE TECHNIQUE, MAKING USE OF HIGH THROUGHPUT TECHNOLOGIES AS DESCRIBED BY CMS-2020-01-R: HCPCS | Performed by: NURSE PRACTITIONER

## 2020-12-21 PROCEDURE — 25010000002 DEXAMETHASONE PER 1 MG: Performed by: NURSE ANESTHETIST, CERTIFIED REGISTERED

## 2020-12-21 PROCEDURE — 99284 EMERGENCY DEPT VISIT MOD MDM: CPT

## 2020-12-21 PROCEDURE — 25010000002 ONDANSETRON PER 1 MG: Performed by: ANESTHESIOLOGY

## 2020-12-21 PROCEDURE — 25010000002 HYDROMORPHONE PER 4 MG: Performed by: SURGERY

## 2020-12-21 PROCEDURE — 25010000002 FENTANYL CITRATE (PF) 100 MCG/2ML SOLUTION: Performed by: ANESTHESIOLOGY

## 2020-12-21 PROCEDURE — 44320 COLOSTOMY: CPT | Performed by: SURGERY

## 2020-12-21 PROCEDURE — 0D1L0Z4 BYPASS TRANSVERSE COLON TO CUTANEOUS, OPEN APPROACH: ICD-10-PCS | Performed by: SURGERY

## 2020-12-21 PROCEDURE — 25010000002 PROPOFOL 10 MG/ML EMULSION: Performed by: NURSE ANESTHETIST, CERTIFIED REGISTERED

## 2020-12-21 PROCEDURE — 25010000002 HYDROMORPHONE PER 4 MG: Performed by: NURSE ANESTHETIST, CERTIFIED REGISTERED

## 2020-12-21 RX ORDER — ROCURONIUM BROMIDE 10 MG/ML
INJECTION, SOLUTION INTRAVENOUS AS NEEDED
Status: DISCONTINUED | OUTPATIENT
Start: 2020-12-21 | End: 2020-12-21 | Stop reason: SURG

## 2020-12-21 RX ORDER — FLUMAZENIL 0.1 MG/ML
0.2 INJECTION INTRAVENOUS AS NEEDED
Status: DISCONTINUED | OUTPATIENT
Start: 2020-12-21 | End: 2020-12-21 | Stop reason: HOSPADM

## 2020-12-21 RX ORDER — OXYCODONE AND ACETAMINOPHEN 7.5; 325 MG/1; MG/1
1 TABLET ORAL ONCE AS NEEDED
Status: DISCONTINUED | OUTPATIENT
Start: 2020-12-21 | End: 2020-12-21 | Stop reason: HOSPADM

## 2020-12-21 RX ORDER — OXYCODONE HYDROCHLORIDE AND ACETAMINOPHEN 5; 325 MG/1; MG/1
1 TABLET ORAL EVERY 4 HOURS PRN
Status: DISCONTINUED | OUTPATIENT
Start: 2020-12-21 | End: 2020-12-29 | Stop reason: HOSPADM

## 2020-12-21 RX ORDER — QUETIAPINE FUMARATE 25 MG/1
25 TABLET, FILM COATED ORAL 2 TIMES DAILY
Status: DISCONTINUED | OUTPATIENT
Start: 2020-12-21 | End: 2020-12-29 | Stop reason: HOSPADM

## 2020-12-21 RX ORDER — ASPIRIN 81 MG/1
81 TABLET ORAL DAILY
Status: DISCONTINUED | OUTPATIENT
Start: 2020-12-21 | End: 2020-12-29 | Stop reason: HOSPADM

## 2020-12-21 RX ORDER — ONDANSETRON 2 MG/ML
4 INJECTION INTRAMUSCULAR; INTRAVENOUS EVERY 4 HOURS PRN
Status: DISCONTINUED | OUTPATIENT
Start: 2020-12-21 | End: 2020-12-29 | Stop reason: HOSPADM

## 2020-12-21 RX ORDER — SERTRALINE HYDROCHLORIDE 100 MG/1
200 TABLET, FILM COATED ORAL EVERY MORNING
Status: DISCONTINUED | OUTPATIENT
Start: 2020-12-22 | End: 2020-12-29 | Stop reason: HOSPADM

## 2020-12-21 RX ORDER — ACETAMINOPHEN 650 MG/1
650 SUPPOSITORY RECTAL EVERY 4 HOURS PRN
Status: DISCONTINUED | OUTPATIENT
Start: 2020-12-21 | End: 2020-12-29 | Stop reason: HOSPADM

## 2020-12-21 RX ORDER — FENTANYL CITRATE 50 UG/ML
50 INJECTION, SOLUTION INTRAMUSCULAR; INTRAVENOUS
Status: DISCONTINUED | OUTPATIENT
Start: 2020-12-21 | End: 2020-12-21 | Stop reason: HOSPADM

## 2020-12-21 RX ORDER — MIDAZOLAM HYDROCHLORIDE 1 MG/ML
1 INJECTION INTRAMUSCULAR; INTRAVENOUS
Status: DISCONTINUED | OUTPATIENT
Start: 2020-12-21 | End: 2020-12-21 | Stop reason: HOSPADM

## 2020-12-21 RX ORDER — DEXAMETHASONE SODIUM PHOSPHATE 4 MG/ML
INJECTION, SOLUTION INTRA-ARTICULAR; INTRALESIONAL; INTRAMUSCULAR; INTRAVENOUS; SOFT TISSUE AS NEEDED
Status: DISCONTINUED | OUTPATIENT
Start: 2020-12-21 | End: 2020-12-21 | Stop reason: SURG

## 2020-12-21 RX ORDER — DIPHENHYDRAMINE HCL 25 MG
25 CAPSULE ORAL
Status: DISCONTINUED | OUTPATIENT
Start: 2020-12-21 | End: 2020-12-21 | Stop reason: HOSPADM

## 2020-12-21 RX ORDER — NALOXONE HCL 0.4 MG/ML
0.2 VIAL (ML) INJECTION AS NEEDED
Status: DISCONTINUED | OUTPATIENT
Start: 2020-12-21 | End: 2020-12-21 | Stop reason: HOSPADM

## 2020-12-21 RX ORDER — ACETAMINOPHEN 325 MG/1
650 TABLET ORAL EVERY 4 HOURS PRN
Status: DISCONTINUED | OUTPATIENT
Start: 2020-12-21 | End: 2020-12-29 | Stop reason: HOSPADM

## 2020-12-21 RX ORDER — LISINOPRIL 10 MG/1
10 TABLET ORAL DAILY
Status: DISCONTINUED | OUTPATIENT
Start: 2020-12-21 | End: 2020-12-29 | Stop reason: HOSPADM

## 2020-12-21 RX ORDER — PROMETHAZINE HYDROCHLORIDE 25 MG/1
25 TABLET ORAL ONCE AS NEEDED
Status: DISCONTINUED | OUTPATIENT
Start: 2020-12-21 | End: 2020-12-21 | Stop reason: HOSPADM

## 2020-12-21 RX ORDER — ATORVASTATIN CALCIUM 20 MG/1
10 TABLET, FILM COATED ORAL DAILY
Status: DISCONTINUED | OUTPATIENT
Start: 2020-12-21 | End: 2020-12-29 | Stop reason: HOSPADM

## 2020-12-21 RX ORDER — MAGNESIUM HYDROXIDE 1200 MG/15ML
LIQUID ORAL AS NEEDED
Status: DISCONTINUED | OUTPATIENT
Start: 2020-12-21 | End: 2020-12-21 | Stop reason: HOSPADM

## 2020-12-21 RX ORDER — HYDROMORPHONE HYDROCHLORIDE 1 MG/ML
0.5 INJECTION, SOLUTION INTRAMUSCULAR; INTRAVENOUS; SUBCUTANEOUS
Status: DISCONTINUED | OUTPATIENT
Start: 2020-12-21 | End: 2020-12-21 | Stop reason: HOSPADM

## 2020-12-21 RX ORDER — BUSPIRONE HYDROCHLORIDE 10 MG/1
10 TABLET ORAL 4 TIMES DAILY
Status: DISCONTINUED | OUTPATIENT
Start: 2020-12-21 | End: 2020-12-22

## 2020-12-21 RX ORDER — SODIUM CHLORIDE 0.9 % (FLUSH) 0.9 %
10 SYRINGE (ML) INJECTION AS NEEDED
Status: DISCONTINUED | OUTPATIENT
Start: 2020-12-21 | End: 2020-12-29 | Stop reason: HOSPADM

## 2020-12-21 RX ORDER — LIDOCAINE HYDROCHLORIDE 20 MG/ML
INJECTION, SOLUTION INFILTRATION; PERINEURAL AS NEEDED
Status: DISCONTINUED | OUTPATIENT
Start: 2020-12-21 | End: 2020-12-21 | Stop reason: SURG

## 2020-12-21 RX ORDER — HYDROCODONE BITARTRATE AND ACETAMINOPHEN 7.5; 325 MG/1; MG/1
1 TABLET ORAL ONCE AS NEEDED
Status: DISCONTINUED | OUTPATIENT
Start: 2020-12-21 | End: 2020-12-21 | Stop reason: HOSPADM

## 2020-12-21 RX ORDER — SUCCINYLCHOLINE CHLORIDE 20 MG/ML
INJECTION INTRAMUSCULAR; INTRAVENOUS AS NEEDED
Status: DISCONTINUED | OUTPATIENT
Start: 2020-12-21 | End: 2020-12-21 | Stop reason: SURG

## 2020-12-21 RX ORDER — ONDANSETRON 2 MG/ML
4 INJECTION INTRAMUSCULAR; INTRAVENOUS EVERY 6 HOURS PRN
Status: DISCONTINUED | OUTPATIENT
Start: 2020-12-21 | End: 2020-12-21 | Stop reason: SDUPTHER

## 2020-12-21 RX ORDER — FAMOTIDINE 10 MG/ML
20 INJECTION, SOLUTION INTRAVENOUS ONCE
Status: COMPLETED | OUTPATIENT
Start: 2020-12-21 | End: 2020-12-21

## 2020-12-21 RX ORDER — ONDANSETRON 2 MG/ML
INJECTION INTRAMUSCULAR; INTRAVENOUS AS NEEDED
Status: DISCONTINUED | OUTPATIENT
Start: 2020-12-21 | End: 2020-12-21 | Stop reason: SURG

## 2020-12-21 RX ORDER — HYDROMORPHONE HYDROCHLORIDE 1 MG/ML
0.5 INJECTION, SOLUTION INTRAMUSCULAR; INTRAVENOUS; SUBCUTANEOUS
Status: DISCONTINUED | OUTPATIENT
Start: 2020-12-21 | End: 2020-12-29 | Stop reason: HOSPADM

## 2020-12-21 RX ORDER — DIPHENHYDRAMINE HYDROCHLORIDE 50 MG/ML
12.5 INJECTION INTRAMUSCULAR; INTRAVENOUS
Status: DISCONTINUED | OUTPATIENT
Start: 2020-12-21 | End: 2020-12-21 | Stop reason: HOSPADM

## 2020-12-21 RX ORDER — GLYCOPYRROLATE 0.2 MG/ML
INJECTION INTRAMUSCULAR; INTRAVENOUS AS NEEDED
Status: DISCONTINUED | OUTPATIENT
Start: 2020-12-21 | End: 2020-12-21 | Stop reason: SURG

## 2020-12-21 RX ORDER — SODIUM CHLORIDE, SODIUM LACTATE, POTASSIUM CHLORIDE, CALCIUM CHLORIDE 600; 310; 30; 20 MG/100ML; MG/100ML; MG/100ML; MG/100ML
100 INJECTION, SOLUTION INTRAVENOUS CONTINUOUS
Status: DISCONTINUED | OUTPATIENT
Start: 2020-12-21 | End: 2020-12-23

## 2020-12-21 RX ORDER — EPHEDRINE SULFATE 50 MG/ML
5 INJECTION, SOLUTION INTRAVENOUS ONCE AS NEEDED
Status: DISCONTINUED | OUTPATIENT
Start: 2020-12-21 | End: 2020-12-21 | Stop reason: HOSPADM

## 2020-12-21 RX ORDER — ACETAMINOPHEN 160 MG/5ML
650 SOLUTION ORAL EVERY 4 HOURS PRN
Status: DISCONTINUED | OUTPATIENT
Start: 2020-12-21 | End: 2020-12-29 | Stop reason: HOSPADM

## 2020-12-21 RX ORDER — PROMETHAZINE HYDROCHLORIDE 25 MG/1
25 SUPPOSITORY RECTAL ONCE AS NEEDED
Status: DISCONTINUED | OUTPATIENT
Start: 2020-12-21 | End: 2020-12-21 | Stop reason: HOSPADM

## 2020-12-21 RX ORDER — LEVOTHYROXINE SODIUM 0.12 MG/1
125 TABLET ORAL
Status: DISCONTINUED | OUTPATIENT
Start: 2020-12-22 | End: 2020-12-29 | Stop reason: HOSPADM

## 2020-12-21 RX ORDER — ONDANSETRON 2 MG/ML
4 INJECTION INTRAMUSCULAR; INTRAVENOUS ONCE
Status: COMPLETED | OUTPATIENT
Start: 2020-12-21 | End: 2020-12-21

## 2020-12-21 RX ORDER — LIDOCAINE HYDROCHLORIDE 10 MG/ML
0.5 INJECTION, SOLUTION EPIDURAL; INFILTRATION; INTRACAUDAL; PERINEURAL ONCE AS NEEDED
Status: DISCONTINUED | OUTPATIENT
Start: 2020-12-21 | End: 2020-12-21 | Stop reason: HOSPADM

## 2020-12-21 RX ORDER — SODIUM CHLORIDE 0.9 % (FLUSH) 0.9 %
3 SYRINGE (ML) INJECTION EVERY 12 HOURS SCHEDULED
Status: DISCONTINUED | OUTPATIENT
Start: 2020-12-21 | End: 2020-12-21 | Stop reason: HOSPADM

## 2020-12-21 RX ORDER — ALLOPURINOL 100 MG/1
100 TABLET ORAL DAILY
Status: DISCONTINUED | OUTPATIENT
Start: 2020-12-21 | End: 2020-12-29 | Stop reason: HOSPADM

## 2020-12-21 RX ORDER — ONDANSETRON 2 MG/ML
4 INJECTION INTRAMUSCULAR; INTRAVENOUS ONCE AS NEEDED
Status: DISCONTINUED | OUTPATIENT
Start: 2020-12-21 | End: 2020-12-21 | Stop reason: HOSPADM

## 2020-12-21 RX ORDER — SODIUM CHLORIDE 0.9 % (FLUSH) 0.9 %
3-10 SYRINGE (ML) INJECTION AS NEEDED
Status: DISCONTINUED | OUTPATIENT
Start: 2020-12-21 | End: 2020-12-21 | Stop reason: HOSPADM

## 2020-12-21 RX ORDER — LABETALOL HYDROCHLORIDE 5 MG/ML
5 INJECTION, SOLUTION INTRAVENOUS
Status: DISCONTINUED | OUTPATIENT
Start: 2020-12-21 | End: 2020-12-21 | Stop reason: HOSPADM

## 2020-12-21 RX ORDER — PANTOPRAZOLE SODIUM 40 MG/1
40 TABLET, DELAYED RELEASE ORAL EVERY MORNING
Status: DISCONTINUED | OUTPATIENT
Start: 2020-12-22 | End: 2020-12-29 | Stop reason: HOSPADM

## 2020-12-21 RX ORDER — BUPIVACAINE HYDROCHLORIDE AND EPINEPHRINE 5; 5 MG/ML; UG/ML
INJECTION, SOLUTION PERINEURAL AS NEEDED
Status: DISCONTINUED | OUTPATIENT
Start: 2020-12-21 | End: 2020-12-21 | Stop reason: HOSPADM

## 2020-12-21 RX ORDER — FENTANYL CITRATE 50 UG/ML
INJECTION, SOLUTION INTRAMUSCULAR; INTRAVENOUS AS NEEDED
Status: DISCONTINUED | OUTPATIENT
Start: 2020-12-21 | End: 2020-12-21 | Stop reason: SURG

## 2020-12-21 RX ORDER — PROPOFOL 10 MG/ML
VIAL (ML) INTRAVENOUS AS NEEDED
Status: DISCONTINUED | OUTPATIENT
Start: 2020-12-21 | End: 2020-12-21 | Stop reason: SURG

## 2020-12-21 RX ADMIN — FENTANYL CITRATE 50 MCG: 50 INJECTION, SOLUTION INTRAMUSCULAR; INTRAVENOUS at 20:43

## 2020-12-21 RX ADMIN — PHENYLEPHRINE HYDROCHLORIDE 200 MCG: 10 INJECTION INTRAVENOUS at 19:01

## 2020-12-21 RX ADMIN — SODIUM CHLORIDE 1000 ML: 9 INJECTION, SOLUTION INTRAVENOUS at 11:25

## 2020-12-21 RX ADMIN — IOPAMIDOL 85 ML: 612 INJECTION, SOLUTION INTRAVENOUS at 11:55

## 2020-12-21 RX ADMIN — FENTANYL CITRATE 50 MCG: 50 INJECTION, SOLUTION INTRAMUSCULAR; INTRAVENOUS at 21:07

## 2020-12-21 RX ADMIN — FENTANYL CITRATE 100 MCG: 50 INJECTION INTRAMUSCULAR; INTRAVENOUS at 19:20

## 2020-12-21 RX ADMIN — GLYCOPYRROLATE 0.4 MG: 0.2 INJECTION INTRAMUSCULAR; INTRAVENOUS at 19:50

## 2020-12-21 RX ADMIN — ONDANSETRON 4 MG: 2 INJECTION INTRAMUSCULAR; INTRAVENOUS at 11:25

## 2020-12-21 RX ADMIN — FENTANYL CITRATE 50 MCG: 50 INJECTION INTRAMUSCULAR; INTRAVENOUS at 19:14

## 2020-12-21 RX ADMIN — ROCURONIUM BROMIDE 50 MG: 10 INJECTION INTRAVENOUS at 19:01

## 2020-12-21 RX ADMIN — BUSPIRONE HYDROCHLORIDE 10 MG: 10 TABLET ORAL at 23:12

## 2020-12-21 RX ADMIN — ROCURONIUM BROMIDE 2.5 MG: 10 INJECTION INTRAVENOUS at 18:47

## 2020-12-21 RX ADMIN — PROPOFOL 50 MCG/KG/MIN: 10 INJECTION, EMULSION INTRAVENOUS at 19:00

## 2020-12-21 RX ADMIN — FENTANYL CITRATE 50 MCG: 50 INJECTION, SOLUTION INTRAMUSCULAR; INTRAVENOUS at 21:22

## 2020-12-21 RX ADMIN — Medication 2 G: at 18:34

## 2020-12-21 RX ADMIN — ONDANSETRON HYDROCHLORIDE 4 MG: 2 SOLUTION INTRAMUSCULAR; INTRAVENOUS at 19:30

## 2020-12-21 RX ADMIN — DEXAMETHASONE SODIUM PHOSPHATE 8 MG: 4 INJECTION INTRA-ARTICULAR; INTRALESIONAL; INTRAMUSCULAR; INTRAVENOUS; SOFT TISSUE at 19:04

## 2020-12-21 RX ADMIN — OXYCODONE HYDROCHLORIDE AND ACETAMINOPHEN 1 TABLET: 5; 325 TABLET ORAL at 23:18

## 2020-12-21 RX ADMIN — PROPOFOL 80 MG: 10 INJECTION, EMULSION INTRAVENOUS at 18:48

## 2020-12-21 RX ADMIN — HYDROMORPHONE HYDROCHLORIDE 0.5 MG: 1 INJECTION, SOLUTION INTRAMUSCULAR; INTRAVENOUS; SUBCUTANEOUS at 20:54

## 2020-12-21 RX ADMIN — ATORVASTATIN CALCIUM 10 MG: 20 TABLET, FILM COATED ORAL at 23:13

## 2020-12-21 RX ADMIN — SUCCINYLCHOLINE CHLORIDE 160 MG: 20 INJECTION, SOLUTION INTRAMUSCULAR; INTRAVENOUS; PARENTERAL at 18:48

## 2020-12-21 RX ADMIN — ALLOPURINOL 100 MG: 100 TABLET ORAL at 23:12

## 2020-12-21 RX ADMIN — LIDOCAINE HYDROCHLORIDE 60 MG: 20 INJECTION, SOLUTION INFILTRATION; PERINEURAL at 18:47

## 2020-12-21 RX ADMIN — SODIUM CHLORIDE, POTASSIUM CHLORIDE, SODIUM LACTATE AND CALCIUM CHLORIDE: 600; 310; 30; 20 INJECTION, SOLUTION INTRAVENOUS at 18:41

## 2020-12-21 RX ADMIN — FENTANYL CITRATE 50 MCG: 50 INJECTION, SOLUTION INTRAMUSCULAR; INTRAVENOUS at 20:54

## 2020-12-21 RX ADMIN — LISINOPRIL 10 MG: 20 TABLET ORAL at 23:12

## 2020-12-21 RX ADMIN — LAMOTRIGINE 150 MG: 100 TABLET ORAL at 23:13

## 2020-12-21 RX ADMIN — HYDROMORPHONE HYDROCHLORIDE 0.5 MG: 1 INJECTION, SOLUTION INTRAMUSCULAR; INTRAVENOUS; SUBCUTANEOUS at 23:54

## 2020-12-21 RX ADMIN — QUETIAPINE FUMARATE 25 MG: 25 TABLET ORAL at 23:12

## 2020-12-21 RX ADMIN — HYDROMORPHONE HYDROCHLORIDE 0.5 MG: 1 INJECTION, SOLUTION INTRAMUSCULAR; INTRAVENOUS; SUBCUTANEOUS at 20:44

## 2020-12-21 RX ADMIN — FAMOTIDINE 20 MG: 10 INJECTION INTRAVENOUS at 18:35

## 2020-12-21 RX ADMIN — NEOSTIGMINE METHYLSULFATE 2 MG: 1 INJECTION INTRAMUSCULAR; INTRAVENOUS; SUBCUTANEOUS at 19:50

## 2020-12-21 RX ADMIN — MIDAZOLAM 1 MG: 1 INJECTION INTRAMUSCULAR; INTRAVENOUS at 18:35

## 2020-12-21 RX ADMIN — FENTANYL CITRATE 50 MCG: 50 INJECTION INTRAMUSCULAR; INTRAVENOUS at 18:46

## 2020-12-21 NOTE — PROGRESS NOTES
"Young Valente is a 70 y.o. male.     Chief Complaint   Patient presents with   • Abdominal Pain     x few days   • Vomiting   • Constipation        History of Present Illness    70-year-old male with history of hypertension hyperlipidemia presents with 2 or 3 days of some crampy abdominal pain, increased burping, perhaps decreased gas, and no bowel movement since late last week.  He ate a chicken dinner last night.  He started vomiting this morning.  He did not have breakfast today.  He does not feel ill otherwise.  No myalgias.  No arthralgias.  No URI symptoms.  No fever.  No fevers chills.  He has had no previous abdominal surgeries.  No sick contacts.  His wife did not get ill after eating dinner last night.    In the office he was vomiting.  He vomited a large amount into a trash can.  No hematemesis.      The following portions of the patient's history were reviewed and updated as appropriate: allergies, current medications, past family history, past medical history, past social history, past surgical history and problem list.          Review of Systems   Constitutional: Positive for appetite change. Negative for chills and fever.   Respiratory: Negative.    Cardiovascular: Negative.    Gastrointestinal: Positive for abdominal pain, constipation, nausea and vomiting. Negative for abdominal distention, anal bleeding, blood in stool and diarrhea.   Musculoskeletal: Negative.    Psychiatric/Behavioral: Negative.        Objective   Blood pressure 156/90, pulse 75, temperature 97.1 °F (36.2 °C), temperature source Temporal, height 177.8 cm (70\"), weight 93.9 kg (207 lb), SpO2 99 %.  Physical Exam  Constitutional:       Comments: Patient vomiting upon entrance to the exam room.  No hematemesis.  He is slightly pale.  After the vomiting was complete, patient was able to talk in complete sentences and was nontoxic.   HENT:      Head: Atraumatic.   Cardiovascular:      Rate and Rhythm: Normal rate and " regular rhythm.      Pulses: Normal pulses.      Comments: No tachycardia  Pulmonary:      Effort: Pulmonary effort is normal.   Abdominal:      Comments: Abdomen is questionably distended.  Bowel sounds are diminished/minimal.  There is some pain to palpation at the epigastrium.  Mild.  No rebound tenderness.  Abdomen is currently nonacute.   Skin:     Coloration: Skin is pale.   Neurological:      General: No focal deficit present.      Mental Status: He is oriented to person, place, and time.   Psychiatric:         Mood and Affect: Mood normal.         Behavior: Behavior normal.         Assessment/Plan   Diagnoses and all orders for this visit:    1. Generalized abdominal pain (Primary)      Abdominal pain with 72-hour history constipation, increased burping, and now vomiting 12 hours after eating.  Suspicious for possible bowel obstruction.  Including volvulus.  Differential diagnosis includes food poisoning from the chicken last night, viral GE, cannot exclude atypical presentation of COVID-19.  I am recommending emergency room visit.  He is stable for transport with his wife.  I spoke to his wife also.  He understands the importance of going to the emergency room.  We will contact the emergency room with report.

## 2020-12-21 NOTE — ANESTHESIA PROCEDURE NOTES
Airway  Urgency: elective    Date/Time: 12/21/2020 6:50 PM  Airway not difficult    General Information and Staff    Patient location during procedure: OR  Anesthesiologist: Everton Cabello MD    Indications and Patient Condition  Indications for airway management: airway protection    Preoxygenated: yes  MILS maintained throughout  Mask difficulty assessment: 1 - vent by mask    Final Airway Details  Final airway type: endotracheal airway      Successful airway: ETT  Cuffed: yes   Successful intubation technique: direct laryngoscopy  Facilitating devices/methods: cricoid pressure  Endotracheal tube insertion site: oral  Blade: Jiménez  Blade size: 3  ETT size (mm): 8.0  Cormack-Lehane Classification: grade I - full view of glottis  Placement verified by: capnometry   Measured from: teeth  ETT/EBT  to teeth (cm): 22  Number of attempts at approach: 1  Assessment: lips, teeth, and gum same as pre-op and atraumatic intubation

## 2020-12-21 NOTE — ANESTHESIA PREPROCEDURE EVALUATION
Anesthesia Evaluation     Patient summary reviewed and Nursing notes reviewed   history of anesthetic complications: PONV  NPO Solid Status: > 8 hours  NPO Liquid Status: > 8 hours           Airway   Mallampati: II  TM distance: >3 FB  Neck ROM: full  No difficulty expected  Dental - normal exam     Pulmonary    (+) sleep apnea,   (-) COPD, asthma, rhonchi, decreased breath sounds, wheezes, not a smoker  Cardiovascular   Exercise tolerance: good (4-7 METS)    ECG reviewed  Rhythm: regular  Rate: normal    (+) hypertension, hyperlipidemia,   (-) CAD, dysrhythmias, angina, BUCK, murmur      Neuro/Psych  (+) psychiatric history Depression and Anxiety,     (-) seizures, CVA  GI/Hepatic/Renal/Endo    (+)  GERD well controlled,  renal disease CRI,   (-) liver disease, diabetes    ROS Comment: Pancreatic mass obstructing colon previously unknown to pt. Some N/V earlier today.  Had some clears this am     Musculoskeletal     Abdominal     Abdomen: soft.   Substance History      OB/GYN          Other   arthritis,    history of cancer (pancreatic mass)                    Anesthesia Plan    ASA 3     general   Rapid sequence(May benefit from low dose propofol drip.)  intravenous induction     Anesthetic plan, all risks, benefits, and alternatives have been provided, discussed and informed consent has been obtained with: patient.

## 2020-12-22 ENCOUNTER — TELEPHONE (OUTPATIENT)
Dept: FAMILY MEDICINE CLINIC | Facility: CLINIC | Age: 70
End: 2020-12-22

## 2020-12-22 PROBLEM — D69.6 THROMBOCYTOPENIA: Status: ACTIVE | Noted: 2020-12-22

## 2020-12-22 LAB
ANION GAP SERPL CALCULATED.3IONS-SCNC: 8.7 MMOL/L (ref 5–15)
BASOPHILS # BLD AUTO: 0.03 10*3/MM3 (ref 0–0.2)
BASOPHILS NFR BLD AUTO: 0.3 % (ref 0–1.5)
BUN SERPL-MCNC: 16 MG/DL (ref 8–23)
BUN/CREAT SERPL: 13.8 (ref 7–25)
CALCIUM SPEC-SCNC: 8.7 MG/DL (ref 8.6–10.5)
CANCER AG19-9 SERPL-ACNC: 166.1 U/ML
CEA SERPL-MCNC: 2.59 NG/ML
CHLORIDE SERPL-SCNC: 105 MMOL/L (ref 98–107)
CO2 SERPL-SCNC: 26.3 MMOL/L (ref 22–29)
CREAT SERPL-MCNC: 1.16 MG/DL (ref 0.76–1.27)
DEPRECATED RDW RBC AUTO: 43.8 FL (ref 37–54)
EOSINOPHIL # BLD AUTO: 0 10*3/MM3 (ref 0–0.4)
EOSINOPHIL NFR BLD AUTO: 0 % (ref 0.3–6.2)
ERYTHROCYTE [DISTWIDTH] IN BLOOD BY AUTOMATED COUNT: 13.6 % (ref 12.3–15.4)
FIBRINOGEN PPP-MCNC: 326 MG/DL (ref 219–464)
GFR SERPL CREATININE-BSD FRML MDRD: 62 ML/MIN/1.73
GLUCOSE SERPL-MCNC: 108 MG/DL (ref 65–99)
HCT VFR BLD AUTO: 35.2 % (ref 37.5–51)
HGB BLD-MCNC: 11.5 G/DL (ref 13–17.7)
IMM GRANULOCYTES # BLD AUTO: 0.03 10*3/MM3 (ref 0–0.05)
IMM GRANULOCYTES NFR BLD AUTO: 0.3 % (ref 0–0.5)
LDH SERPL-CCNC: 170 U/L (ref 135–225)
LYMPHOCYTES # BLD AUTO: 0.97 10*3/MM3 (ref 0.7–3.1)
LYMPHOCYTES NFR BLD AUTO: 10.9 % (ref 19.6–45.3)
MCH RBC QN AUTO: 28.8 PG (ref 26.6–33)
MCHC RBC AUTO-ENTMCNC: 32.7 G/DL (ref 31.5–35.7)
MCV RBC AUTO: 88 FL (ref 79–97)
MONOCYTES # BLD AUTO: 0.58 10*3/MM3 (ref 0.1–0.9)
MONOCYTES NFR BLD AUTO: 6.5 % (ref 5–12)
NEUTROPHILS NFR BLD AUTO: 7.29 10*3/MM3 (ref 1.7–7)
NEUTROPHILS NFR BLD AUTO: 82 % (ref 42.7–76)
NRBC BLD AUTO-RTO: 0 /100 WBC (ref 0–0.2)
PLATELET # BLD AUTO: 95 10*3/MM3 (ref 140–450)
PLATELET # BLD AUTO: 98 10*3/MM3 (ref 140–450)
PLATELETS.RETICULATED NFR BLD AUTO: 8.2 % (ref 0.9–6.5)
PMV BLD AUTO: 12.2 FL (ref 6–12)
POTASSIUM SERPL-SCNC: 4.4 MMOL/L (ref 3.5–5.2)
RBC # BLD AUTO: 4 10*6/MM3 (ref 4.14–5.8)
SODIUM SERPL-SCNC: 140 MMOL/L (ref 136–145)
WBC # BLD AUTO: 8.9 10*3/MM3 (ref 3.4–10.8)

## 2020-12-22 PROCEDURE — 86301 IMMUNOASSAY TUMOR CA 19-9: CPT | Performed by: SURGERY

## 2020-12-22 PROCEDURE — 25010000002 HYDROMORPHONE PER 4 MG: Performed by: SURGERY

## 2020-12-22 PROCEDURE — 85025 COMPLETE CBC W/AUTO DIFF WBC: CPT | Performed by: SURGERY

## 2020-12-22 PROCEDURE — 82784 ASSAY IGA/IGD/IGG/IGM EACH: CPT | Performed by: INTERNAL MEDICINE

## 2020-12-22 PROCEDURE — 85384 FIBRINOGEN ACTIVITY: CPT | Performed by: INTERNAL MEDICINE

## 2020-12-22 PROCEDURE — 82378 CARCINOEMBRYONIC ANTIGEN: CPT | Performed by: SURGERY

## 2020-12-22 PROCEDURE — 84165 PROTEIN E-PHORESIS SERUM: CPT | Performed by: INTERNAL MEDICINE

## 2020-12-22 PROCEDURE — 85055 RETICULATED PLATELET ASSAY: CPT | Performed by: INTERNAL MEDICINE

## 2020-12-22 PROCEDURE — 83615 LACTATE (LD) (LDH) ENZYME: CPT | Performed by: INTERNAL MEDICINE

## 2020-12-22 PROCEDURE — 99024 POSTOP FOLLOW-UP VISIT: CPT | Performed by: SURGERY

## 2020-12-22 PROCEDURE — 84155 ASSAY OF PROTEIN SERUM: CPT | Performed by: INTERNAL MEDICINE

## 2020-12-22 PROCEDURE — 83883 ASSAY NEPHELOMETRY NOT SPEC: CPT | Performed by: INTERNAL MEDICINE

## 2020-12-22 PROCEDURE — 99223 1ST HOSP IP/OBS HIGH 75: CPT | Performed by: INTERNAL MEDICINE

## 2020-12-22 PROCEDURE — 86334 IMMUNOFIX E-PHORESIS SERUM: CPT | Performed by: INTERNAL MEDICINE

## 2020-12-22 PROCEDURE — 80048 BASIC METABOLIC PNL TOTAL CA: CPT | Performed by: SURGERY

## 2020-12-22 RX ORDER — BUSPIRONE HYDROCHLORIDE 10 MG/1
20 TABLET ORAL EVERY 12 HOURS SCHEDULED
Status: DISCONTINUED | OUTPATIENT
Start: 2020-12-22 | End: 2020-12-29 | Stop reason: HOSPADM

## 2020-12-22 RX ORDER — SODIUM CHLORIDE, SODIUM LACTATE, POTASSIUM CHLORIDE, CALCIUM CHLORIDE 600; 310; 30; 20 MG/100ML; MG/100ML; MG/100ML; MG/100ML
100 INJECTION, SOLUTION INTRAVENOUS CONTINUOUS
Status: DISCONTINUED | OUTPATIENT
Start: 2020-12-22 | End: 2020-12-26

## 2020-12-22 RX ADMIN — HYDROMORPHONE HYDROCHLORIDE 0.5 MG: 1 INJECTION, SOLUTION INTRAMUSCULAR; INTRAVENOUS; SUBCUTANEOUS at 02:36

## 2020-12-22 RX ADMIN — ALLOPURINOL 100 MG: 100 TABLET ORAL at 09:37

## 2020-12-22 RX ADMIN — QUETIAPINE FUMARATE 25 MG: 25 TABLET ORAL at 21:17

## 2020-12-22 RX ADMIN — BUSPIRONE HYDROCHLORIDE 10 MG: 10 TABLET ORAL at 13:02

## 2020-12-22 RX ADMIN — OXYCODONE HYDROCHLORIDE AND ACETAMINOPHEN 1 TABLET: 5; 325 TABLET ORAL at 14:55

## 2020-12-22 RX ADMIN — LEVOTHYROXINE SODIUM 125 MCG: 125 TABLET ORAL at 06:49

## 2020-12-22 RX ADMIN — BUSPIRONE HYDROCHLORIDE 10 MG: 10 TABLET ORAL at 09:36

## 2020-12-22 RX ADMIN — LAMOTRIGINE 150 MG: 100 TABLET ORAL at 09:36

## 2020-12-22 RX ADMIN — SODIUM CHLORIDE, POTASSIUM CHLORIDE, SODIUM LACTATE AND CALCIUM CHLORIDE 100 ML/HR: 600; 310; 30; 20 INJECTION, SOLUTION INTRAVENOUS at 14:55

## 2020-12-22 RX ADMIN — ATORVASTATIN CALCIUM 10 MG: 20 TABLET, FILM COATED ORAL at 09:36

## 2020-12-22 RX ADMIN — LISINOPRIL 10 MG: 20 TABLET ORAL at 09:37

## 2020-12-22 RX ADMIN — QUETIAPINE FUMARATE 25 MG: 25 TABLET ORAL at 09:35

## 2020-12-22 RX ADMIN — OXYCODONE HYDROCHLORIDE AND ACETAMINOPHEN 1 TABLET: 5; 325 TABLET ORAL at 06:49

## 2020-12-22 RX ADMIN — SERTRALINE 200 MG: 100 TABLET, FILM COATED ORAL at 06:49

## 2020-12-22 RX ADMIN — PANTOPRAZOLE SODIUM 40 MG: 40 TABLET, DELAYED RELEASE ORAL at 06:49

## 2020-12-22 RX ADMIN — BUSPIRONE HYDROCHLORIDE 20 MG: 10 TABLET ORAL at 21:17

## 2020-12-22 NOTE — ANESTHESIA POSTPROCEDURE EVALUATION
"Patient: Kaiser Valente    Procedure Summary     Date: 12/21/20 Room / Location: [facility] OR  / [facility] MAIN OR    Anesthesia Start: 1842 Anesthesia Stop: 2029    Procedure: LAPAROTOMY EXPLORATORY WITH LOOP COLOSTOMY (N/A Abdomen) Diagnosis:       Pancreatic mass      Colon obstruction (CMS/HCC)      (Pancreatic mass [K86.89])      (Colon obstruction (CMS/HCC) [K56.609])    Surgeon: Geronimo Branch Jr., MD Provider: Chilo Lovett MD    Anesthesia Type: general ASA Status: 3          Anesthesia Type: general    Vitals  Vitals Value Taken Time   /77 12/21/20 2115   Temp 37.4 °C (99.4 °F) 12/21/20 2021   Pulse 72 12/21/20 2127   Resp 16 12/21/20 2115   SpO2 97 % 12/21/20 2127   Vitals shown include unvalidated device data.        Post Anesthesia Care and Evaluation    Patient location during evaluation: bedside  Patient participation: complete - patient participated  Level of consciousness: awake and alert  Pain management: adequate  Airway patency: patent  Anesthetic complications: No anesthetic complications    Cardiovascular status: acceptable  Respiratory status: acceptable  Hydration status: acceptable    Comments: /77   Pulse 71   Temp 37.4 °C (99.4 °F) (Oral)   Resp 16   Ht 177.8 cm (70\")   Wt 93.9 kg (207 lb)   SpO2 98%   BMI 29.70 kg/m²       "

## 2020-12-22 NOTE — TELEPHONE ENCOUNTER
PATIENT BEING DISCHARGED IN 2 DAYS AND WILL BE ON AN ISTOMY BAG.  MED ASSIST NEEDS ORDERS FOR SKILLED NURSING AND PHYSICAL THERAPY.  PLEASE ADVISE    CALL BACK #: [phone]

## 2020-12-23 LAB
ALBUMIN SERPL ELPH-MCNC: 3.1 G/DL (ref 2.9–4.4)
ALBUMIN SERPL-MCNC: 3.4 G/DL (ref 3.5–5.2)
ALBUMIN/GLOB SERPL: 1.2 {RATIO} (ref 0.7–1.7)
ALBUMIN/GLOB SERPL: 1.4 G/DL
ALP SERPL-CCNC: 43 U/L (ref 39–117)
ALPHA1 GLOB SERPL ELPH-MCNC: 0.3 G/DL (ref 0–0.4)
ALPHA2 GLOB SERPL ELPH-MCNC: 0.5 G/DL (ref 0.4–1)
ALT SERPL W P-5'-P-CCNC: 14 U/L (ref 1–41)
ANION GAP SERPL CALCULATED.3IONS-SCNC: 7.2 MMOL/L (ref 5–15)
AST SERPL-CCNC: 14 U/L (ref 1–40)
B-GLOBULIN SERPL ELPH-MCNC: 0.9 G/DL (ref 0.7–1.3)
BILIRUB SERPL-MCNC: 0.4 MG/DL (ref 0–1.2)
BUN SERPL-MCNC: 15 MG/DL (ref 8–23)
BUN/CREAT SERPL: 13.4 (ref 7–25)
CALCIUM SPEC-SCNC: 8.5 MG/DL (ref 8.6–10.5)
CHLORIDE SERPL-SCNC: 104 MMOL/L (ref 98–107)
CO2 SERPL-SCNC: 26.8 MMOL/L (ref 22–29)
CREAT SERPL-MCNC: 1.12 MG/DL (ref 0.76–1.27)
DEPRECATED RDW RBC AUTO: 42.4 FL (ref 37–54)
ERYTHROCYTE [DISTWIDTH] IN BLOOD BY AUTOMATED COUNT: 13.3 % (ref 12.3–15.4)
GAMMA GLOB SERPL ELPH-MCNC: 1 G/DL (ref 0.4–1.8)
GFR SERPL CREATININE-BSD FRML MDRD: 64 ML/MIN/1.73
GLOBULIN SER-MCNC: 2.7 G/DL (ref 2.2–3.9)
GLOBULIN UR ELPH-MCNC: 2.5 GM/DL
GLUCOSE SERPL-MCNC: 98 MG/DL (ref 65–99)
HCT VFR BLD AUTO: 29.6 % (ref 37.5–51)
HGB BLD-MCNC: 10 G/DL (ref 13–17.7)
IGA SERPL-MCNC: 110 MG/DL (ref 61–437)
IGG SERPL-MCNC: 988 MG/DL (ref 603–1613)
IGM SERPL-MCNC: 92 MG/DL (ref 15–143)
INTERPRETATION SERPL IEP-IMP: ABNORMAL
KAPPA LC FREE SER-MCNC: 18.2 MG/L (ref 3.3–19.4)
KAPPA LC FREE/LAMBDA FREE SER: 1.39 {RATIO} (ref 0.26–1.65)
LABORATORY COMMENT REPORT: ABNORMAL
LAMBDA LC FREE SERPL-MCNC: 13.1 MG/L (ref 5.7–26.3)
M PROTEIN SERPL ELPH-MCNC: 0.4 G/DL
MCH RBC QN AUTO: 29.6 PG (ref 26.6–33)
MCHC RBC AUTO-ENTMCNC: 33.8 G/DL (ref 31.5–35.7)
MCV RBC AUTO: 87.6 FL (ref 79–97)
PLATELET # BLD AUTO: 79 10*3/MM3 (ref 140–450)
PMV BLD AUTO: 12.5 FL (ref 6–12)
POTASSIUM SERPL-SCNC: 3.6 MMOL/L (ref 3.5–5.2)
PROT SERPL-MCNC: 5.8 G/DL (ref 6–8.5)
PROT SERPL-MCNC: 5.9 G/DL (ref 6–8.5)
RBC # BLD AUTO: 3.38 10*6/MM3 (ref 4.14–5.8)
SODIUM SERPL-SCNC: 138 MMOL/L (ref 136–145)
WBC # BLD AUTO: 5.07 10*3/MM3 (ref 3.4–10.8)

## 2020-12-23 PROCEDURE — 80053 COMPREHEN METABOLIC PANEL: CPT | Performed by: NURSE PRACTITIONER

## 2020-12-23 PROCEDURE — 99024 POSTOP FOLLOW-UP VISIT: CPT | Performed by: SURGERY

## 2020-12-23 PROCEDURE — 85027 COMPLETE CBC AUTOMATED: CPT | Performed by: NURSE PRACTITIONER

## 2020-12-23 PROCEDURE — 99232 SBSQ HOSP IP/OBS MODERATE 35: CPT | Performed by: INTERNAL MEDICINE

## 2020-12-23 RX ORDER — OXYMETAZOLINE HYDROCHLORIDE 0.05 G/100ML
2 SPRAY NASAL EVERY 6 HOURS PRN
Status: ACTIVE | OUTPATIENT
Start: 2020-12-23 | End: 2020-12-26

## 2020-12-23 RX ORDER — ECHINACEA PURPUREA EXTRACT 125 MG
2 TABLET ORAL 3 TIMES DAILY
Status: DISCONTINUED | OUTPATIENT
Start: 2020-12-23 | End: 2020-12-27

## 2020-12-23 RX ADMIN — BUSPIRONE HYDROCHLORIDE 20 MG: 10 TABLET ORAL at 09:06

## 2020-12-23 RX ADMIN — ATORVASTATIN CALCIUM 10 MG: 20 TABLET, FILM COATED ORAL at 09:41

## 2020-12-23 RX ADMIN — LAMOTRIGINE 150 MG: 100 TABLET ORAL at 09:07

## 2020-12-23 RX ADMIN — OXYCODONE HYDROCHLORIDE AND ACETAMINOPHEN 1 TABLET: 5; 325 TABLET ORAL at 09:06

## 2020-12-23 RX ADMIN — SODIUM CHLORIDE, POTASSIUM CHLORIDE, SODIUM LACTATE AND CALCIUM CHLORIDE 100 ML/HR: 600; 310; 30; 20 INJECTION, SOLUTION INTRAVENOUS at 09:11

## 2020-12-23 RX ADMIN — SERTRALINE 200 MG: 100 TABLET, FILM COATED ORAL at 06:18

## 2020-12-23 RX ADMIN — QUETIAPINE FUMARATE 25 MG: 25 TABLET ORAL at 09:07

## 2020-12-23 RX ADMIN — LISINOPRIL 10 MG: 20 TABLET ORAL at 09:07

## 2020-12-23 RX ADMIN — Medication 2 SPRAY: at 17:29

## 2020-12-23 RX ADMIN — BUSPIRONE HYDROCHLORIDE 20 MG: 10 TABLET ORAL at 20:10

## 2020-12-23 RX ADMIN — ALLOPURINOL 100 MG: 100 TABLET ORAL at 09:07

## 2020-12-23 RX ADMIN — QUETIAPINE FUMARATE 25 MG: 25 TABLET ORAL at 20:10

## 2020-12-23 RX ADMIN — LEVOTHYROXINE SODIUM 125 MCG: 125 TABLET ORAL at 06:18

## 2020-12-23 RX ADMIN — SODIUM CHLORIDE, PRESERVATIVE FREE 10 ML: 5 INJECTION INTRAVENOUS at 20:10

## 2020-12-23 RX ADMIN — SODIUM CHLORIDE, POTASSIUM CHLORIDE, SODIUM LACTATE AND CALCIUM CHLORIDE 100 ML/HR: 600; 310; 30; 20 INJECTION, SOLUTION INTRAVENOUS at 19:38

## 2020-12-23 RX ADMIN — Medication 2 SPRAY: at 20:13

## 2020-12-23 RX ADMIN — PANTOPRAZOLE SODIUM 40 MG: 40 TABLET, DELAYED RELEASE ORAL at 06:18

## 2020-12-24 ENCOUNTER — ANESTHESIA EVENT (OUTPATIENT)
Dept: GASTROENTEROLOGY | Facility: HOSPITAL | Age: 70
End: 2020-12-24

## 2020-12-24 ENCOUNTER — APPOINTMENT (OUTPATIENT)
Dept: CT IMAGING | Facility: HOSPITAL | Age: 70
End: 2020-12-24

## 2020-12-24 ENCOUNTER — ANESTHESIA (OUTPATIENT)
Dept: GASTROENTEROLOGY | Facility: HOSPITAL | Age: 70
End: 2020-12-24

## 2020-12-24 LAB
ALBUMIN SERPL-MCNC: 3.4 G/DL (ref 3.5–5.2)
ALBUMIN/GLOB SERPL: 1.3 G/DL
ALP SERPL-CCNC: 44 U/L (ref 39–117)
ALT SERPL W P-5'-P-CCNC: 13 U/L (ref 1–41)
ANION GAP SERPL CALCULATED.3IONS-SCNC: 8.8 MMOL/L (ref 5–15)
APTT PPP: 40.1 SECONDS (ref 22.7–35.4)
AST SERPL-CCNC: 14 U/L (ref 1–40)
BASOPHILS # BLD AUTO: 0.01 10*3/MM3 (ref 0–0.2)
BASOPHILS NFR BLD AUTO: 0.2 % (ref 0–1.5)
BILIRUB SERPL-MCNC: 0.4 MG/DL (ref 0–1.2)
BUN SERPL-MCNC: 12 MG/DL (ref 8–23)
BUN/CREAT SERPL: 12.4 (ref 7–25)
CALCIUM SPEC-SCNC: 8.7 MG/DL (ref 8.6–10.5)
CHLORIDE SERPL-SCNC: 103 MMOL/L (ref 98–107)
CO2 SERPL-SCNC: 28.2 MMOL/L (ref 22–29)
CREAT SERPL-MCNC: 0.97 MG/DL (ref 0.76–1.27)
DEPRECATED RDW RBC AUTO: 38.7 FL (ref 37–54)
DEPRECATED RDW RBC AUTO: 38.9 FL (ref 37–54)
EOSINOPHIL # BLD AUTO: 0.1 10*3/MM3 (ref 0–0.4)
EOSINOPHIL NFR BLD AUTO: 2.1 % (ref 0.3–6.2)
ERYTHROCYTE [DISTWIDTH] IN BLOOD BY AUTOMATED COUNT: 12.9 % (ref 12.3–15.4)
ERYTHROCYTE [DISTWIDTH] IN BLOOD BY AUTOMATED COUNT: 13 % (ref 12.3–15.4)
GFR SERPL CREATININE-BSD FRML MDRD: 76 ML/MIN/1.73
GLOBULIN UR ELPH-MCNC: 2.6 GM/DL
GLUCOSE SERPL-MCNC: 93 MG/DL (ref 65–99)
HCT VFR BLD AUTO: 29.4 % (ref 37.5–51)
HCT VFR BLD AUTO: 31.3 % (ref 37.5–51)
HGB BLD-MCNC: 10 G/DL (ref 13–17.7)
HGB BLD-MCNC: 10.4 G/DL (ref 13–17.7)
IMM GRANULOCYTES # BLD AUTO: 0.02 10*3/MM3 (ref 0–0.05)
IMM GRANULOCYTES NFR BLD AUTO: 0.4 % (ref 0–0.5)
INR PPP: 1.22 (ref 0.9–1.1)
LYMPHOCYTES # BLD AUTO: 0.62 10*3/MM3 (ref 0.7–3.1)
LYMPHOCYTES NFR BLD AUTO: 13.1 % (ref 19.6–45.3)
MCH RBC QN AUTO: 28.4 PG (ref 26.6–33)
MCH RBC QN AUTO: 28.7 PG (ref 26.6–33)
MCHC RBC AUTO-ENTMCNC: 33.2 G/DL (ref 31.5–35.7)
MCHC RBC AUTO-ENTMCNC: 34 G/DL (ref 31.5–35.7)
MCV RBC AUTO: 84.5 FL (ref 79–97)
MCV RBC AUTO: 85.5 FL (ref 79–97)
MONOCYTES # BLD AUTO: 0.23 10*3/MM3 (ref 0.1–0.9)
MONOCYTES NFR BLD AUTO: 4.9 % (ref 5–12)
NEUTROPHILS NFR BLD AUTO: 3.76 10*3/MM3 (ref 1.7–7)
NEUTROPHILS NFR BLD AUTO: 79.3 % (ref 42.7–76)
NRBC BLD AUTO-RTO: 0 /100 WBC (ref 0–0.2)
PLATELET # BLD AUTO: 76 10*3/MM3 (ref 140–450)
PLATELET # BLD AUTO: 80 10*3/MM3 (ref 140–450)
PMV BLD AUTO: 12.4 FL (ref 6–12)
PMV BLD AUTO: 12.4 FL (ref 6–12)
POTASSIUM SERPL-SCNC: 3.8 MMOL/L (ref 3.5–5.2)
PROT SERPL-MCNC: 6 G/DL (ref 6–8.5)
PROTHROMBIN TIME: 15.2 SECONDS (ref 11.7–14.2)
RBC # BLD AUTO: 3.48 10*6/MM3 (ref 4.14–5.8)
RBC # BLD AUTO: 3.66 10*6/MM3 (ref 4.14–5.8)
SODIUM SERPL-SCNC: 140 MMOL/L (ref 136–145)
WBC # BLD AUTO: 4.71 10*3/MM3 (ref 3.4–10.8)
WBC # BLD AUTO: 4.74 10*3/MM3 (ref 3.4–10.8)

## 2020-12-24 PROCEDURE — 25010000002 PROPOFOL 10 MG/ML EMULSION: Performed by: NURSE ANESTHETIST, CERTIFIED REGISTERED

## 2020-12-24 PROCEDURE — 25010000002 FENTANYL CITRATE (PF) 100 MCG/2ML SOLUTION: Performed by: RADIOLOGY

## 2020-12-24 PROCEDURE — 0DJD8ZZ INSPECTION OF LOWER INTESTINAL TRACT, VIA NATURAL OR ARTIFICIAL OPENING ENDOSCOPIC: ICD-10-PCS | Performed by: SURGERY

## 2020-12-24 PROCEDURE — 0FDG3ZX EXTRACTION OF PANCREAS, PERCUTANEOUS APPROACH, DIAGNOSTIC: ICD-10-PCS | Performed by: RADIOLOGY

## 2020-12-24 PROCEDURE — 80053 COMPREHEN METABOLIC PANEL: CPT | Performed by: NURSE PRACTITIONER

## 2020-12-24 PROCEDURE — 99232 SBSQ HOSP IP/OBS MODERATE 35: CPT | Performed by: INTERNAL MEDICINE

## 2020-12-24 PROCEDURE — 85730 THROMBOPLASTIN TIME PARTIAL: CPT | Performed by: SURGERY

## 2020-12-24 PROCEDURE — 45330 DIAGNOSTIC SIGMOIDOSCOPY: CPT | Performed by: SURGERY

## 2020-12-24 PROCEDURE — 85610 PROTHROMBIN TIME: CPT | Performed by: SURGERY

## 2020-12-24 PROCEDURE — 88305 TISSUE EXAM BY PATHOLOGIST: CPT | Performed by: INTERNAL MEDICINE

## 2020-12-24 PROCEDURE — 85027 COMPLETE CBC AUTOMATED: CPT | Performed by: NURSE PRACTITIONER

## 2020-12-24 PROCEDURE — 77012 CT SCAN FOR NEEDLE BIOPSY: CPT

## 2020-12-24 PROCEDURE — 25010000003 LIDOCAINE 1 % SOLUTION: Performed by: INTERNAL MEDICINE

## 2020-12-24 PROCEDURE — 85025 COMPLETE CBC W/AUTO DIFF WBC: CPT | Performed by: SURGERY

## 2020-12-24 PROCEDURE — 25010000002 MIDAZOLAM PER 1 MG: Performed by: RADIOLOGY

## 2020-12-24 RX ORDER — FENTANYL CITRATE 50 UG/ML
INJECTION, SOLUTION INTRAMUSCULAR; INTRAVENOUS
Status: COMPLETED | OUTPATIENT
Start: 2020-12-24 | End: 2020-12-24

## 2020-12-24 RX ORDER — MIDAZOLAM HYDROCHLORIDE 1 MG/ML
INJECTION INTRAMUSCULAR; INTRAVENOUS
Status: COMPLETED | OUTPATIENT
Start: 2020-12-24 | End: 2020-12-24

## 2020-12-24 RX ORDER — LIDOCAINE HYDROCHLORIDE 10 MG/ML
20 INJECTION, SOLUTION INFILTRATION; PERINEURAL ONCE
Status: COMPLETED | OUTPATIENT
Start: 2020-12-24 | End: 2020-12-24

## 2020-12-24 RX ORDER — PROPOFOL 10 MG/ML
VIAL (ML) INTRAVENOUS AS NEEDED
Status: DISCONTINUED | OUTPATIENT
Start: 2020-12-24 | End: 2020-12-24 | Stop reason: SURG

## 2020-12-24 RX ORDER — PROPOFOL 10 MG/ML
VIAL (ML) INTRAVENOUS CONTINUOUS PRN
Status: DISCONTINUED | OUTPATIENT
Start: 2020-12-24 | End: 2020-12-24 | Stop reason: SURG

## 2020-12-24 RX ORDER — SODIUM CHLORIDE 9 MG/ML
30 INJECTION, SOLUTION INTRAVENOUS CONTINUOUS PRN
Status: DISCONTINUED | OUTPATIENT
Start: 2020-12-24 | End: 2020-12-29 | Stop reason: HOSPADM

## 2020-12-24 RX ORDER — LIDOCAINE HYDROCHLORIDE 20 MG/ML
INJECTION, SOLUTION INFILTRATION; PERINEURAL AS NEEDED
Status: DISCONTINUED | OUTPATIENT
Start: 2020-12-24 | End: 2020-12-24 | Stop reason: SURG

## 2020-12-24 RX ADMIN — BUSPIRONE HYDROCHLORIDE 20 MG: 10 TABLET ORAL at 20:45

## 2020-12-24 RX ADMIN — MIDAZOLAM 1 MG: 1 INJECTION INTRAMUSCULAR; INTRAVENOUS at 15:13

## 2020-12-24 RX ADMIN — Medication 2 SPRAY: at 20:47

## 2020-12-24 RX ADMIN — SODIUM CHLORIDE 30 ML/HR: 9 INJECTION, SOLUTION INTRAVENOUS at 09:41

## 2020-12-24 RX ADMIN — QUETIAPINE FUMARATE 25 MG: 25 TABLET ORAL at 20:45

## 2020-12-24 RX ADMIN — LIDOCAINE HYDROCHLORIDE 60 MG: 20 INJECTION, SOLUTION INFILTRATION; PERINEURAL at 10:46

## 2020-12-24 RX ADMIN — SODIUM CHLORIDE, POTASSIUM CHLORIDE, SODIUM LACTATE AND CALCIUM CHLORIDE 100 ML/HR: 600; 310; 30; 20 INJECTION, SOLUTION INTRAVENOUS at 05:33

## 2020-12-24 RX ADMIN — PROPOFOL 100 MG: 10 INJECTION, EMULSION INTRAVENOUS at 10:46

## 2020-12-24 RX ADMIN — PROPOFOL 140 MCG/KG/MIN: 10 INJECTION, EMULSION INTRAVENOUS at 10:47

## 2020-12-24 RX ADMIN — FENTANYL CITRATE 50 MCG: 50 INJECTION INTRAMUSCULAR; INTRAVENOUS at 15:14

## 2020-12-24 RX ADMIN — SODIUM CHLORIDE, PRESERVATIVE FREE 10 ML: 5 INJECTION INTRAVENOUS at 20:46

## 2020-12-24 RX ADMIN — Medication 2 SPRAY: at 17:32

## 2020-12-24 RX ADMIN — LIDOCAINE HYDROCHLORIDE 20 ML: 10 INJECTION, SOLUTION INFILTRATION; PERINEURAL at 15:12

## 2020-12-24 NOTE — ANESTHESIA PREPROCEDURE EVALUATION
Anesthesia Evaluation     Patient summary reviewed and Nursing notes reviewed   history of anesthetic complications: PONV               Airway   Mallampati: I  TM distance: >3 FB  Neck ROM: full  No difficulty expected  Dental - normal exam     Pulmonary - normal exam   (+) sleep apnea,   Cardiovascular - normal exam    (+) hypertension, hyperlipidemia,       Neuro/Psych  (+) psychiatric history Anxiety and Depression,     GI/Hepatic/Renal/Endo    (+)  GERD,  renal disease,     Musculoskeletal     Abdominal  - normal exam    Bowel sounds: normal.   Substance History - negative use     OB/GYN negative ob/gyn ROS         Other   arthritis,                      Anesthesia Plan    ASA 3     MAC       Anesthetic plan, all risks, benefits, and alternatives have been provided, discussed and informed consent has been obtained with: patient.

## 2020-12-24 NOTE — ANESTHESIA POSTPROCEDURE EVALUATION
"Patient: Kaiser Valnete    Procedure Summary     Date: 12/24/20 Room / Location:  [facility]    Anesthesia Start: 1037 Anesthesia Stop: 1112    Procedure: COLONOSCOPY (N/A ) Diagnosis:       Colon obstruction (CMS/HCC)      (Colon obstruction (CMS/HCC) [K56.609])    Surgeon: Geronimo Branch Jr., MD Provider: Fady Navarro MD    Anesthesia Type: MAC ASA Status: 3          Anesthesia Type: MAC    Vitals  Vitals Value Taken Time   /85 12/24/20 1121   Temp     Pulse 69 12/24/20 1121   Resp 20 12/24/20 1121   SpO2 97 % 12/24/20 1121           Post Anesthesia Care and Evaluation    Patient location during evaluation: PACU  Patient participation: complete - patient participated  Level of consciousness: awake  Pain score: 0  Pain management: adequate  Airway patency: patent  Anesthetic complications: No anesthetic complications  PONV Status: none  Cardiovascular status: acceptable  Respiratory status: acceptable  Hydration status: acceptable    Comments: /85 (BP Location: Left arm, Patient Position: Lying)   Pulse 69   Temp 37.1 °C (98.8 °F) (Oral)   Resp 20   Ht 177.8 cm (70\")   Wt 93.9 kg (207 lb)   SpO2 97%   BMI 29.70 kg/m²       "

## 2020-12-25 LAB
ALBUMIN SERPL-MCNC: 3.1 G/DL (ref 3.5–5.2)
ALBUMIN/GLOB SERPL: 1.2 G/DL
ALP SERPL-CCNC: 46 U/L (ref 39–117)
ALT SERPL W P-5'-P-CCNC: 11 U/L (ref 1–41)
ANION GAP SERPL CALCULATED.3IONS-SCNC: 10.8 MMOL/L (ref 5–15)
APTT PPP: 36.2 SECONDS (ref 22.7–35.4)
APTT PPP: 36.2 SECONDS (ref 22.7–35.4)
AST SERPL-CCNC: 13 U/L (ref 1–40)
BASOPHILS # BLD AUTO: 0.01 10*3/MM3 (ref 0–0.2)
BASOPHILS NFR BLD AUTO: 0.3 % (ref 0–1.5)
BILIRUB SERPL-MCNC: 0.4 MG/DL (ref 0–1.2)
BUN SERPL-MCNC: 12 MG/DL (ref 8–23)
BUN/CREAT SERPL: 11.4 (ref 7–25)
CALCIUM SPEC-SCNC: 8.9 MG/DL (ref 8.6–10.5)
CHLORIDE SERPL-SCNC: 104 MMOL/L (ref 98–107)
CO2 SERPL-SCNC: 25.2 MMOL/L (ref 22–29)
CREAT SERPL-MCNC: 1.05 MG/DL (ref 0.76–1.27)
D DIMER PPP FEU-MCNC: 2.6 MCGFEU/ML (ref 0–0.49)
DEPRECATED RDW RBC AUTO: 39.6 FL (ref 37–54)
EOSINOPHIL # BLD AUTO: 0.16 10*3/MM3 (ref 0–0.4)
EOSINOPHIL NFR BLD AUTO: 4 % (ref 0.3–6.2)
ERYTHROCYTE [DISTWIDTH] IN BLOOD BY AUTOMATED COUNT: 12.9 % (ref 12.3–15.4)
GFR SERPL CREATININE-BSD FRML MDRD: 69 ML/MIN/1.73
GLOBULIN UR ELPH-MCNC: 2.6 GM/DL
GLUCOSE SERPL-MCNC: 93 MG/DL (ref 65–99)
HCT VFR BLD AUTO: 30.7 % (ref 37.5–51)
HGB BLD-MCNC: 10.2 G/DL (ref 13–17.7)
IMM GRANULOCYTES # BLD AUTO: 0.01 10*3/MM3 (ref 0–0.05)
IMM GRANULOCYTES NFR BLD AUTO: 0.3 % (ref 0–0.5)
LYMPHOCYTES # BLD AUTO: 0.78 10*3/MM3 (ref 0.7–3.1)
LYMPHOCYTES NFR BLD AUTO: 19.6 % (ref 19.6–45.3)
MCH RBC QN AUTO: 28.4 PG (ref 26.6–33)
MCHC RBC AUTO-ENTMCNC: 33.2 G/DL (ref 31.5–35.7)
MCV RBC AUTO: 85.5 FL (ref 79–97)
MONOCYTES # BLD AUTO: 0.2 10*3/MM3 (ref 0.1–0.9)
MONOCYTES NFR BLD AUTO: 5 % (ref 5–12)
NEUTROPHILS NFR BLD AUTO: 2.81 10*3/MM3 (ref 1.7–7)
NEUTROPHILS NFR BLD AUTO: 70.8 % (ref 42.7–76)
NRBC BLD AUTO-RTO: 0 /100 WBC (ref 0–0.2)
PLATELET # BLD AUTO: 77 10*3/MM3 (ref 140–450)
PMV BLD AUTO: 12.1 FL (ref 6–12)
POTASSIUM SERPL-SCNC: 3.4 MMOL/L (ref 3.5–5.2)
PROT SERPL-MCNC: 5.7 G/DL (ref 6–8.5)
RBC # BLD AUTO: 3.59 10*6/MM3 (ref 4.14–5.8)
SODIUM SERPL-SCNC: 140 MMOL/L (ref 136–145)
WBC # BLD AUTO: 3.97 10*3/MM3 (ref 3.4–10.8)

## 2020-12-25 PROCEDURE — 86147 CARDIOLIPIN ANTIBODY EA IG: CPT | Performed by: INTERNAL MEDICINE

## 2020-12-25 PROCEDURE — 85379 FIBRIN DEGRADATION QUANT: CPT | Performed by: INTERNAL MEDICINE

## 2020-12-25 PROCEDURE — 99232 SBSQ HOSP IP/OBS MODERATE 35: CPT | Performed by: INTERNAL MEDICINE

## 2020-12-25 PROCEDURE — 80053 COMPREHEN METABOLIC PANEL: CPT | Performed by: SURGERY

## 2020-12-25 PROCEDURE — 85732 THROMBOPLASTIN TIME PARTIAL: CPT | Performed by: INTERNAL MEDICINE

## 2020-12-25 PROCEDURE — 99024 POSTOP FOLLOW-UP VISIT: CPT | Performed by: SURGERY

## 2020-12-25 PROCEDURE — 85025 COMPLETE CBC W/AUTO DIFF WBC: CPT | Performed by: SURGERY

## 2020-12-25 PROCEDURE — 85730 THROMBOPLASTIN TIME PARTIAL: CPT | Performed by: INTERNAL MEDICINE

## 2020-12-25 RX ORDER — POTASSIUM CHLORIDE 750 MG/1
40 TABLET, FILM COATED, EXTENDED RELEASE ORAL ONCE
Status: COMPLETED | OUTPATIENT
Start: 2020-12-25 | End: 2020-12-25

## 2020-12-25 RX ADMIN — ASPIRIN 81 MG: 81 TABLET, COATED ORAL at 09:36

## 2020-12-25 RX ADMIN — QUETIAPINE FUMARATE 25 MG: 25 TABLET ORAL at 20:59

## 2020-12-25 RX ADMIN — ATORVASTATIN CALCIUM 10 MG: 20 TABLET, FILM COATED ORAL at 09:36

## 2020-12-25 RX ADMIN — Medication 2 SPRAY: at 09:37

## 2020-12-25 RX ADMIN — Medication 2 SPRAY: at 21:03

## 2020-12-25 RX ADMIN — BUSPIRONE HYDROCHLORIDE 20 MG: 10 TABLET ORAL at 20:59

## 2020-12-25 RX ADMIN — SERTRALINE 200 MG: 100 TABLET, FILM COATED ORAL at 06:32

## 2020-12-25 RX ADMIN — SODIUM CHLORIDE, POTASSIUM CHLORIDE, SODIUM LACTATE AND CALCIUM CHLORIDE 100 ML/HR: 600; 310; 30; 20 INJECTION, SOLUTION INTRAVENOUS at 19:23

## 2020-12-25 RX ADMIN — SODIUM CHLORIDE, PRESERVATIVE FREE 10 ML: 5 INJECTION INTRAVENOUS at 21:00

## 2020-12-25 RX ADMIN — SODIUM CHLORIDE, POTASSIUM CHLORIDE, SODIUM LACTATE AND CALCIUM CHLORIDE 100 ML/HR: 600; 310; 30; 20 INJECTION, SOLUTION INTRAVENOUS at 09:42

## 2020-12-25 RX ADMIN — LEVOTHYROXINE SODIUM 125 MCG: 125 TABLET ORAL at 06:32

## 2020-12-25 RX ADMIN — POTASSIUM CHLORIDE 40 MEQ: 750 TABLET, EXTENDED RELEASE ORAL at 20:59

## 2020-12-25 RX ADMIN — LAMOTRIGINE 150 MG: 100 TABLET ORAL at 09:35

## 2020-12-25 RX ADMIN — BUSPIRONE HYDROCHLORIDE 20 MG: 10 TABLET ORAL at 09:35

## 2020-12-25 RX ADMIN — PANTOPRAZOLE SODIUM 40 MG: 40 TABLET, DELAYED RELEASE ORAL at 07:56

## 2020-12-25 RX ADMIN — Medication 2 SPRAY: at 17:11

## 2020-12-25 RX ADMIN — QUETIAPINE FUMARATE 25 MG: 25 TABLET ORAL at 09:35

## 2020-12-25 RX ADMIN — ALLOPURINOL 100 MG: 100 TABLET ORAL at 09:36

## 2020-12-25 RX ADMIN — LISINOPRIL 10 MG: 20 TABLET ORAL at 09:35

## 2020-12-25 RX ADMIN — SODIUM CHLORIDE, POTASSIUM CHLORIDE, SODIUM LACTATE AND CALCIUM CHLORIDE 100 ML/HR: 600; 310; 30; 20 INJECTION, SOLUTION INTRAVENOUS at 00:53

## 2020-12-26 ENCOUNTER — APPOINTMENT (OUTPATIENT)
Dept: CARDIOLOGY | Facility: HOSPITAL | Age: 70
End: 2020-12-26

## 2020-12-26 LAB
ALBUMIN SERPL-MCNC: 3.4 G/DL (ref 3.5–5.2)
ALBUMIN/GLOB SERPL: 1.3 G/DL
ALP SERPL-CCNC: 46 U/L (ref 39–117)
ALT SERPL W P-5'-P-CCNC: 11 U/L (ref 1–41)
ANION GAP SERPL CALCULATED.3IONS-SCNC: 7.5 MMOL/L (ref 5–15)
AST SERPL-CCNC: 13 U/L (ref 1–40)
BASOPHILS # BLD AUTO: 0.02 10*3/MM3 (ref 0–0.2)
BASOPHILS NFR BLD AUTO: 0.4 % (ref 0–1.5)
BH CV LOWER VASCULAR LEFT COMMON FEMORAL AUGMENT: NORMAL
BH CV LOWER VASCULAR LEFT COMMON FEMORAL COMPETENT: NORMAL
BH CV LOWER VASCULAR LEFT COMMON FEMORAL COMPRESS: NORMAL
BH CV LOWER VASCULAR LEFT COMMON FEMORAL PHASIC: NORMAL
BH CV LOWER VASCULAR LEFT COMMON FEMORAL SPONT: NORMAL
BH CV LOWER VASCULAR LEFT DISTAL FEMORAL COMPRESS: NORMAL
BH CV LOWER VASCULAR LEFT GASTRONEMIUS COMPRESS: NORMAL
BH CV LOWER VASCULAR LEFT GREATER SAPH AK COMPRESS: NORMAL
BH CV LOWER VASCULAR LEFT GREATER SAPH BK COMPRESS: NORMAL
BH CV LOWER VASCULAR LEFT LESSER SAPH COMPRESS: NORMAL
BH CV LOWER VASCULAR LEFT MID FEMORAL AUGMENT: NORMAL
BH CV LOWER VASCULAR LEFT MID FEMORAL COMPETENT: NORMAL
BH CV LOWER VASCULAR LEFT MID FEMORAL COMPRESS: NORMAL
BH CV LOWER VASCULAR LEFT MID FEMORAL PHASIC: NORMAL
BH CV LOWER VASCULAR LEFT MID FEMORAL SPONT: NORMAL
BH CV LOWER VASCULAR LEFT PERONEAL COMPRESS: NORMAL
BH CV LOWER VASCULAR LEFT POPLITEAL AUGMENT: NORMAL
BH CV LOWER VASCULAR LEFT POPLITEAL COMPETENT: NORMAL
BH CV LOWER VASCULAR LEFT POPLITEAL COMPRESS: NORMAL
BH CV LOWER VASCULAR LEFT POPLITEAL PHASIC: NORMAL
BH CV LOWER VASCULAR LEFT POPLITEAL SPONT: NORMAL
BH CV LOWER VASCULAR LEFT POSTERIOR TIBIAL COMPRESS: NORMAL
BH CV LOWER VASCULAR LEFT PROFUNDA FEMORAL COMPRESS: NORMAL
BH CV LOWER VASCULAR LEFT PROXIMAL FEMORAL COMPRESS: NORMAL
BH CV LOWER VASCULAR LEFT SAPHENOFEMORAL JUNCTION COMPRESS: NORMAL
BH CV LOWER VASCULAR RIGHT COMMON FEMORAL AUGMENT: NORMAL
BH CV LOWER VASCULAR RIGHT COMMON FEMORAL COMPETENT: NORMAL
BH CV LOWER VASCULAR RIGHT COMMON FEMORAL COMPRESS: NORMAL
BH CV LOWER VASCULAR RIGHT COMMON FEMORAL PHASIC: NORMAL
BH CV LOWER VASCULAR RIGHT COMMON FEMORAL SPONT: NORMAL
BH CV LOWER VASCULAR RIGHT DISTAL FEMORAL COMPRESS: NORMAL
BH CV LOWER VASCULAR RIGHT GASTRONEMIUS COMPRESS: NORMAL
BH CV LOWER VASCULAR RIGHT GREATER SAPH AK COMPRESS: NORMAL
BH CV LOWER VASCULAR RIGHT GREATER SAPH BK COMPRESS: NORMAL
BH CV LOWER VASCULAR RIGHT LESSER SAPH COMPRESS: NORMAL
BH CV LOWER VASCULAR RIGHT MID FEMORAL AUGMENT: NORMAL
BH CV LOWER VASCULAR RIGHT MID FEMORAL COMPETENT: NORMAL
BH CV LOWER VASCULAR RIGHT MID FEMORAL COMPRESS: NORMAL
BH CV LOWER VASCULAR RIGHT MID FEMORAL PHASIC: NORMAL
BH CV LOWER VASCULAR RIGHT MID FEMORAL SPONT: NORMAL
BH CV LOWER VASCULAR RIGHT PERONEAL COMPRESS: NORMAL
BH CV LOWER VASCULAR RIGHT POPLITEAL AUGMENT: NORMAL
BH CV LOWER VASCULAR RIGHT POPLITEAL COMPETENT: NORMAL
BH CV LOWER VASCULAR RIGHT POPLITEAL COMPRESS: NORMAL
BH CV LOWER VASCULAR RIGHT POPLITEAL PHASIC: NORMAL
BH CV LOWER VASCULAR RIGHT POPLITEAL SPONT: NORMAL
BH CV LOWER VASCULAR RIGHT POSTERIOR TIBIAL COMPRESS: NORMAL
BH CV LOWER VASCULAR RIGHT PROFUNDA FEMORAL COMPRESS: NORMAL
BH CV LOWER VASCULAR RIGHT PROXIMAL FEMORAL COMPRESS: NORMAL
BH CV LOWER VASCULAR RIGHT SAPHENOFEMORAL JUNCTION COMPRESS: NORMAL
BILIRUB SERPL-MCNC: 0.3 MG/DL (ref 0–1.2)
BUN SERPL-MCNC: 13 MG/DL (ref 8–23)
BUN/CREAT SERPL: 14.1 (ref 7–25)
CALCIUM SPEC-SCNC: 8.7 MG/DL (ref 8.6–10.5)
CHLORIDE SERPL-SCNC: 108 MMOL/L (ref 98–107)
CO2 SERPL-SCNC: 26.5 MMOL/L (ref 22–29)
CREAT SERPL-MCNC: 0.92 MG/DL (ref 0.76–1.27)
DEPRECATED RDW RBC AUTO: 43.2 FL (ref 37–54)
EOSINOPHIL # BLD AUTO: 0.18 10*3/MM3 (ref 0–0.4)
EOSINOPHIL NFR BLD AUTO: 3.6 % (ref 0.3–6.2)
ERYTHROCYTE [DISTWIDTH] IN BLOOD BY AUTOMATED COUNT: 13.2 % (ref 12.3–15.4)
GFR SERPL CREATININE-BSD FRML MDRD: 81 ML/MIN/1.73
GLOBULIN UR ELPH-MCNC: 2.7 GM/DL
GLUCOSE SERPL-MCNC: 93 MG/DL (ref 65–99)
HCT VFR BLD AUTO: 31.4 % (ref 37.5–51)
HGB BLD-MCNC: 10.2 G/DL (ref 13–17.7)
IMM GRANULOCYTES # BLD AUTO: 0.01 10*3/MM3 (ref 0–0.05)
IMM GRANULOCYTES NFR BLD AUTO: 0.2 % (ref 0–0.5)
LYMPHOCYTES # BLD AUTO: 0.92 10*3/MM3 (ref 0.7–3.1)
LYMPHOCYTES NFR BLD AUTO: 18.3 % (ref 19.6–45.3)
MCH RBC QN AUTO: 28.9 PG (ref 26.6–33)
MCHC RBC AUTO-ENTMCNC: 32.5 G/DL (ref 31.5–35.7)
MCV RBC AUTO: 89 FL (ref 79–97)
MONOCYTES # BLD AUTO: 0.28 10*3/MM3 (ref 0.1–0.9)
MONOCYTES NFR BLD AUTO: 5.6 % (ref 5–12)
NEUTROPHILS NFR BLD AUTO: 3.63 10*3/MM3 (ref 1.7–7)
NEUTROPHILS NFR BLD AUTO: 71.9 % (ref 42.7–76)
NRBC BLD AUTO-RTO: 0.2 /100 WBC (ref 0–0.2)
PLATELET # BLD AUTO: 99 10*3/MM3 (ref 140–450)
PMV BLD AUTO: 12.2 FL (ref 6–12)
POTASSIUM SERPL-SCNC: 3.8 MMOL/L (ref 3.5–5.2)
PROT SERPL-MCNC: 6.1 G/DL (ref 6–8.5)
RBC # BLD AUTO: 3.53 10*6/MM3 (ref 4.14–5.8)
SODIUM SERPL-SCNC: 142 MMOL/L (ref 136–145)
WBC # BLD AUTO: 5.04 10*3/MM3 (ref 3.4–10.8)

## 2020-12-26 PROCEDURE — 25010000002 ENOXAPARIN PER 10 MG: Performed by: INTERNAL MEDICINE

## 2020-12-26 PROCEDURE — 99232 SBSQ HOSP IP/OBS MODERATE 35: CPT | Performed by: INTERNAL MEDICINE

## 2020-12-26 PROCEDURE — 99024 POSTOP FOLLOW-UP VISIT: CPT | Performed by: SURGERY

## 2020-12-26 PROCEDURE — 85025 COMPLETE CBC W/AUTO DIFF WBC: CPT | Performed by: INTERNAL MEDICINE

## 2020-12-26 PROCEDURE — 93970 EXTREMITY STUDY: CPT

## 2020-12-26 PROCEDURE — 80053 COMPREHEN METABOLIC PANEL: CPT | Performed by: INTERNAL MEDICINE

## 2020-12-26 RX ADMIN — BUSPIRONE HYDROCHLORIDE 20 MG: 10 TABLET ORAL at 20:49

## 2020-12-26 RX ADMIN — ASPIRIN 81 MG: 81 TABLET, COATED ORAL at 10:20

## 2020-12-26 RX ADMIN — QUETIAPINE FUMARATE 25 MG: 25 TABLET ORAL at 20:49

## 2020-12-26 RX ADMIN — QUETIAPINE FUMARATE 25 MG: 25 TABLET ORAL at 10:19

## 2020-12-26 RX ADMIN — Medication 2 SPRAY: at 10:20

## 2020-12-26 RX ADMIN — ATORVASTATIN CALCIUM 10 MG: 20 TABLET, FILM COATED ORAL at 10:19

## 2020-12-26 RX ADMIN — ENOXAPARIN SODIUM 40 MG: 40 INJECTION SUBCUTANEOUS at 13:05

## 2020-12-26 RX ADMIN — SODIUM CHLORIDE, POTASSIUM CHLORIDE, SODIUM LACTATE AND CALCIUM CHLORIDE 100 ML/HR: 600; 310; 30; 20 INJECTION, SOLUTION INTRAVENOUS at 04:56

## 2020-12-26 RX ADMIN — BUSPIRONE HYDROCHLORIDE 20 MG: 10 TABLET ORAL at 10:19

## 2020-12-26 RX ADMIN — Medication 2 SPRAY: at 20:50

## 2020-12-26 RX ADMIN — LEVOTHYROXINE SODIUM 125 MCG: 125 TABLET ORAL at 06:17

## 2020-12-26 RX ADMIN — Medication 2 SPRAY: at 15:40

## 2020-12-26 RX ADMIN — ALLOPURINOL 100 MG: 100 TABLET ORAL at 10:19

## 2020-12-26 RX ADMIN — PANTOPRAZOLE SODIUM 40 MG: 40 TABLET, DELAYED RELEASE ORAL at 07:46

## 2020-12-26 RX ADMIN — SERTRALINE 200 MG: 100 TABLET, FILM COATED ORAL at 07:45

## 2020-12-26 RX ADMIN — SODIUM CHLORIDE, PRESERVATIVE FREE 10 ML: 5 INJECTION INTRAVENOUS at 20:50

## 2020-12-26 RX ADMIN — LISINOPRIL 10 MG: 20 TABLET ORAL at 10:20

## 2020-12-26 RX ADMIN — LAMOTRIGINE 150 MG: 100 TABLET ORAL at 10:19

## 2020-12-27 LAB
DEPRECATED RDW RBC AUTO: 42.8 FL (ref 37–54)
ERYTHROCYTE [DISTWIDTH] IN BLOOD BY AUTOMATED COUNT: 13.4 % (ref 12.3–15.4)
HCT VFR BLD AUTO: 31 % (ref 37.5–51)
HGB BLD-MCNC: 10.2 G/DL (ref 13–17.7)
MCH RBC QN AUTO: 28.9 PG (ref 26.6–33)
MCHC RBC AUTO-ENTMCNC: 32.9 G/DL (ref 31.5–35.7)
MCV RBC AUTO: 87.8 FL (ref 79–97)
PLATELET # BLD AUTO: 106 10*3/MM3 (ref 140–450)
PMV BLD AUTO: 12.3 FL (ref 6–12)
RBC # BLD AUTO: 3.53 10*6/MM3 (ref 4.14–5.8)
WBC # BLD AUTO: 4.36 10*3/MM3 (ref 3.4–10.8)

## 2020-12-27 PROCEDURE — 85027 COMPLETE CBC AUTOMATED: CPT | Performed by: INTERNAL MEDICINE

## 2020-12-27 PROCEDURE — 99232 SBSQ HOSP IP/OBS MODERATE 35: CPT | Performed by: INTERNAL MEDICINE

## 2020-12-27 PROCEDURE — 99024 POSTOP FOLLOW-UP VISIT: CPT | Performed by: SURGERY

## 2020-12-27 PROCEDURE — 63710000001 PREDNISONE PER 1 MG: Performed by: INTERNAL MEDICINE

## 2020-12-27 PROCEDURE — 25010000002 ENOXAPARIN PER 10 MG: Performed by: INTERNAL MEDICINE

## 2020-12-27 RX ORDER — ECHINACEA PURPUREA EXTRACT 125 MG
2 TABLET ORAL AS NEEDED
Status: DISCONTINUED | OUTPATIENT
Start: 2020-12-27 | End: 2020-12-29 | Stop reason: HOSPADM

## 2020-12-27 RX ORDER — PREDNISONE 20 MG/1
20 TABLET ORAL
Status: DISCONTINUED | OUTPATIENT
Start: 2020-12-27 | End: 2020-12-29 | Stop reason: HOSPADM

## 2020-12-27 RX ADMIN — QUETIAPINE FUMARATE 25 MG: 25 TABLET ORAL at 20:27

## 2020-12-27 RX ADMIN — BUSPIRONE HYDROCHLORIDE 20 MG: 10 TABLET ORAL at 20:26

## 2020-12-27 RX ADMIN — ALLOPURINOL 100 MG: 100 TABLET ORAL at 08:49

## 2020-12-27 RX ADMIN — ATORVASTATIN CALCIUM 10 MG: 20 TABLET, FILM COATED ORAL at 08:49

## 2020-12-27 RX ADMIN — PREDNISONE 20 MG: 20 TABLET ORAL at 13:06

## 2020-12-27 RX ADMIN — LEVOTHYROXINE SODIUM 125 MCG: 125 TABLET ORAL at 05:59

## 2020-12-27 RX ADMIN — ASPIRIN 81 MG: 81 TABLET, COATED ORAL at 08:49

## 2020-12-27 RX ADMIN — LAMOTRIGINE 150 MG: 100 TABLET ORAL at 08:49

## 2020-12-27 RX ADMIN — ENOXAPARIN SODIUM 40 MG: 40 INJECTION SUBCUTANEOUS at 10:56

## 2020-12-27 RX ADMIN — SERTRALINE 200 MG: 100 TABLET, FILM COATED ORAL at 07:08

## 2020-12-27 RX ADMIN — LISINOPRIL 10 MG: 20 TABLET ORAL at 08:49

## 2020-12-27 RX ADMIN — Medication 2 SPRAY: at 08:49

## 2020-12-27 RX ADMIN — Medication 2 SPRAY: at 15:53

## 2020-12-27 RX ADMIN — PANTOPRAZOLE SODIUM 40 MG: 40 TABLET, DELAYED RELEASE ORAL at 07:08

## 2020-12-27 RX ADMIN — QUETIAPINE FUMARATE 25 MG: 25 TABLET ORAL at 08:49

## 2020-12-27 RX ADMIN — BUSPIRONE HYDROCHLORIDE 20 MG: 10 TABLET ORAL at 08:49

## 2020-12-28 DIAGNOSIS — C25.1 MALIGNANT NEOPLASM OF BODY OF PANCREAS (HCC): ICD-10-CM

## 2020-12-28 DIAGNOSIS — K86.89 PANCREATIC MASS: Primary | ICD-10-CM

## 2020-12-28 PROBLEM — C25.9 PANCREATIC ADENOCARCINOMA (HCC): Status: ACTIVE | Noted: 2020-12-28

## 2020-12-28 LAB
CARDIOLIPIN IGG SER IA-ACNC: <9 GPL U/ML (ref 0–14)
CARDIOLIPIN IGM SER IA-ACNC: 13 MPL U/ML (ref 0–12)
DEPRECATED RDW RBC AUTO: 43.8 FL (ref 37–54)
ERYTHROCYTE [DISTWIDTH] IN BLOOD BY AUTOMATED COUNT: 13.3 % (ref 12.3–15.4)
HCT VFR BLD AUTO: 33.9 % (ref 37.5–51)
HGB BLD-MCNC: 10.7 G/DL (ref 13–17.7)
LAB AP CASE REPORT: NORMAL
LAB AP CLINICAL INFORMATION: NORMAL
MCH RBC QN AUTO: 28.2 PG (ref 26.6–33)
MCHC RBC AUTO-ENTMCNC: 31.6 G/DL (ref 31.5–35.7)
MCV RBC AUTO: 89.2 FL (ref 79–97)
PATH REPORT.FINAL DX SPEC: NORMAL
PATH REPORT.GROSS SPEC: NORMAL
PLATELET # BLD AUTO: 120 10*3/MM3 (ref 140–450)
PMV BLD AUTO: 12.4 FL (ref 6–12)
RBC # BLD AUTO: 3.8 10*6/MM3 (ref 4.14–5.8)
WBC # BLD AUTO: 4.95 10*3/MM3 (ref 3.4–10.8)

## 2020-12-28 PROCEDURE — 99024 POSTOP FOLLOW-UP VISIT: CPT | Performed by: SURGERY

## 2020-12-28 PROCEDURE — 90620 MENB-4C VACCINE IM: CPT | Performed by: INTERNAL MEDICINE

## 2020-12-28 PROCEDURE — 25010000002 MENINGOCOCCAL B SUSPENSION PREFILLED SYRINGE: Performed by: INTERNAL MEDICINE

## 2020-12-28 PROCEDURE — 85027 COMPLETE CBC AUTOMATED: CPT | Performed by: INTERNAL MEDICINE

## 2020-12-28 PROCEDURE — 90734 MENACWYD/MENACWYCRM VACC IM: CPT | Performed by: INTERNAL MEDICINE

## 2020-12-28 PROCEDURE — 63710000001 PREDNISONE PER 1 MG: Performed by: INTERNAL MEDICINE

## 2020-12-28 PROCEDURE — 25010000002 ENOXAPARIN PER 10 MG: Performed by: INTERNAL MEDICINE

## 2020-12-28 PROCEDURE — 99233 SBSQ HOSP IP/OBS HIGH 50: CPT | Performed by: INTERNAL MEDICINE

## 2020-12-28 PROCEDURE — 25010000002 MENINGOCOCCAL RECONSTITUTED SOLUTION: Performed by: INTERNAL MEDICINE

## 2020-12-28 RX ADMIN — PANTOPRAZOLE SODIUM 40 MG: 40 TABLET, DELAYED RELEASE ORAL at 07:14

## 2020-12-28 RX ADMIN — ASPIRIN 81 MG: 81 TABLET, COATED ORAL at 09:09

## 2020-12-28 RX ADMIN — HAEMOPHILUS B POLYSACCHARIDE CONJUGATE VACCINE FOR INJ 0.5 ML: RECON SOLN at 18:56

## 2020-12-28 RX ADMIN — BUSPIRONE HYDROCHLORIDE 20 MG: 10 TABLET ORAL at 09:09

## 2020-12-28 RX ADMIN — APIXABAN 2.5 MG: 2.5 TABLET, FILM COATED ORAL at 20:00

## 2020-12-28 RX ADMIN — ENOXAPARIN SODIUM 40 MG: 40 INJECTION SUBCUTANEOUS at 11:39

## 2020-12-28 RX ADMIN — Medication 0.5 ML: at 18:58

## 2020-12-28 RX ADMIN — QUETIAPINE FUMARATE 25 MG: 25 TABLET ORAL at 09:09

## 2020-12-28 RX ADMIN — SERTRALINE 200 MG: 100 TABLET, FILM COATED ORAL at 07:15

## 2020-12-28 RX ADMIN — LAMOTRIGINE 150 MG: 100 TABLET ORAL at 09:09

## 2020-12-28 RX ADMIN — QUETIAPINE FUMARATE 25 MG: 25 TABLET ORAL at 20:00

## 2020-12-28 RX ADMIN — PREDNISONE 20 MG: 20 TABLET ORAL at 09:07

## 2020-12-28 RX ADMIN — ALLOPURINOL 100 MG: 100 TABLET ORAL at 09:09

## 2020-12-28 RX ADMIN — SODIUM CHLORIDE, PRESERVATIVE FREE 10 ML: 5 INJECTION INTRAVENOUS at 20:00

## 2020-12-28 RX ADMIN — NEISSERIA MENINGITIDIS SEROGROUP B NHBA FUSION PROTEIN ANTIGEN, NEISSERIA MENINGITIDIS SEROGROUP B FHBP FUSION PROTEIN ANTIGEN AND NEISSERIA MENINGITIDIS SEROGROUP B NADA PROTEIN ANTIGEN 0.5 ML: 50; 50; 50; 25 INJECTION, SUSPENSION INTRAMUSCULAR at 18:57

## 2020-12-28 RX ADMIN — LEVOTHYROXINE SODIUM 125 MCG: 125 TABLET ORAL at 07:14

## 2020-12-28 RX ADMIN — LISINOPRIL 10 MG: 20 TABLET ORAL at 09:09

## 2020-12-28 RX ADMIN — ATORVASTATIN CALCIUM 10 MG: 20 TABLET, FILM COATED ORAL at 09:15

## 2020-12-28 RX ADMIN — BUSPIRONE HYDROCHLORIDE 20 MG: 10 TABLET ORAL at 20:00

## 2020-12-29 ENCOUNTER — READMISSION MANAGEMENT (OUTPATIENT)
Dept: CALL CENTER | Facility: HOSPITAL | Age: 70
End: 2020-12-29

## 2020-12-29 ENCOUNTER — TELEPHONE (OUTPATIENT)
Dept: GENERAL RADIOLOGY | Facility: HOSPITAL | Age: 70
End: 2020-12-29

## 2020-12-29 VITALS
WEIGHT: 207 LBS | HEART RATE: 59 BPM | RESPIRATION RATE: 16 BRPM | OXYGEN SATURATION: 98 % | BODY MASS INDEX: 29.63 KG/M2 | TEMPERATURE: 98.2 F | DIASTOLIC BLOOD PRESSURE: 78 MMHG | SYSTOLIC BLOOD PRESSURE: 137 MMHG | HEIGHT: 70 IN

## 2020-12-29 DIAGNOSIS — C25.1 MALIGNANT NEOPLASM OF BODY OF PANCREAS (HCC): Primary | ICD-10-CM

## 2020-12-29 PROBLEM — K56.609 COLON OBSTRUCTION: Status: RESOLVED | Noted: 2020-12-21 | Resolved: 2020-12-29

## 2020-12-29 PROBLEM — N18.30 STAGE 3 CHRONIC KIDNEY DISEASE (HCC): Status: RESOLVED | Noted: 2017-10-16 | Resolved: 2020-12-29

## 2020-12-29 PROBLEM — D69.3 CHRONIC ITP (IDIOPATHIC THROMBOCYTOPENIA): Status: ACTIVE | Noted: 2020-12-29

## 2020-12-29 LAB
ALBUMIN SERPL-MCNC: 3.7 G/DL (ref 3.5–5.2)
ALBUMIN/GLOB SERPL: 1.4 G/DL
ALP SERPL-CCNC: 48 U/L (ref 39–117)
ALT SERPL W P-5'-P-CCNC: 18 U/L (ref 1–41)
ANION GAP SERPL CALCULATED.3IONS-SCNC: 10.2 MMOL/L (ref 5–15)
AST SERPL-CCNC: 27 U/L (ref 1–40)
BASOPHILS # BLD AUTO: 0.02 10*3/MM3 (ref 0–0.2)
BASOPHILS NFR BLD AUTO: 0.4 % (ref 0–1.5)
BILIRUB SERPL-MCNC: 0.2 MG/DL (ref 0–1.2)
BUN SERPL-MCNC: 17 MG/DL (ref 8–23)
BUN/CREAT SERPL: 18.9 (ref 7–25)
CALCIUM SPEC-SCNC: 9 MG/DL (ref 8.6–10.5)
CHLORIDE SERPL-SCNC: 107 MMOL/L (ref 98–107)
CO2 SERPL-SCNC: 25.8 MMOL/L (ref 22–29)
CREAT SERPL-MCNC: 0.9 MG/DL (ref 0.76–1.27)
DEPRECATED RDW RBC AUTO: 41.4 FL (ref 37–54)
EOSINOPHIL # BLD AUTO: 0.1 10*3/MM3 (ref 0–0.4)
EOSINOPHIL NFR BLD AUTO: 1.9 % (ref 0.3–6.2)
ERYTHROCYTE [DISTWIDTH] IN BLOOD BY AUTOMATED COUNT: 13.2 % (ref 12.3–15.4)
GFR SERPL CREATININE-BSD FRML MDRD: 83 ML/MIN/1.73
GLOBULIN UR ELPH-MCNC: 2.7 GM/DL
GLUCOSE SERPL-MCNC: 90 MG/DL (ref 65–99)
HCT VFR BLD AUTO: 30.3 % (ref 37.5–51)
HGB BLD-MCNC: 9.8 G/DL (ref 13–17.7)
IMM GRANULOCYTES # BLD AUTO: 0.03 10*3/MM3 (ref 0–0.05)
IMM GRANULOCYTES NFR BLD AUTO: 0.6 % (ref 0–0.5)
LYMPHOCYTES # BLD AUTO: 1.12 10*3/MM3 (ref 0.7–3.1)
LYMPHOCYTES NFR BLD AUTO: 21.6 % (ref 19.6–45.3)
MCH RBC QN AUTO: 28.2 PG (ref 26.6–33)
MCHC RBC AUTO-ENTMCNC: 32.3 G/DL (ref 31.5–35.7)
MCV RBC AUTO: 87.1 FL (ref 79–97)
MONOCYTES # BLD AUTO: 0.32 10*3/MM3 (ref 0.1–0.9)
MONOCYTES NFR BLD AUTO: 6.2 % (ref 5–12)
NEUTROPHILS NFR BLD AUTO: 3.6 10*3/MM3 (ref 1.7–7)
NEUTROPHILS NFR BLD AUTO: 69.3 % (ref 42.7–76)
NRBC BLD AUTO-RTO: 0 /100 WBC (ref 0–0.2)
PLATELET # BLD AUTO: 114 10*3/MM3 (ref 140–450)
PMV BLD AUTO: 12.3 FL (ref 6–12)
POTASSIUM SERPL-SCNC: 3.4 MMOL/L (ref 3.5–5.2)
PROT SERPL-MCNC: 6.4 G/DL (ref 6–8.5)
RBC # BLD AUTO: 3.48 10*6/MM3 (ref 4.14–5.8)
SODIUM SERPL-SCNC: 143 MMOL/L (ref 136–145)
WBC # BLD AUTO: 5.19 10*3/MM3 (ref 3.4–10.8)

## 2020-12-29 PROCEDURE — 63710000001 PREDNISONE PER 1 MG: Performed by: INTERNAL MEDICINE

## 2020-12-29 PROCEDURE — 99233 SBSQ HOSP IP/OBS HIGH 50: CPT | Performed by: INTERNAL MEDICINE

## 2020-12-29 PROCEDURE — 80053 COMPREHEN METABOLIC PANEL: CPT | Performed by: NURSE PRACTITIONER

## 2020-12-29 PROCEDURE — 85025 COMPLETE CBC W/AUTO DIFF WBC: CPT | Performed by: INTERNAL MEDICINE

## 2020-12-29 RX ORDER — POTASSIUM CHLORIDE 750 MG/1
40 TABLET, FILM COATED, EXTENDED RELEASE ORAL ONCE
Status: COMPLETED | OUTPATIENT
Start: 2020-12-29 | End: 2020-12-29

## 2020-12-29 RX ORDER — PREDNISONE 20 MG/1
20 TABLET ORAL
Qty: 60 TABLET | Refills: 0 | Status: SHIPPED | OUTPATIENT
Start: 2020-12-30 | End: 2021-02-02

## 2020-12-29 RX ADMIN — PREDNISONE 20 MG: 20 TABLET ORAL at 09:08

## 2020-12-29 RX ADMIN — SERTRALINE 200 MG: 100 TABLET, FILM COATED ORAL at 07:09

## 2020-12-29 RX ADMIN — LISINOPRIL 10 MG: 20 TABLET ORAL at 09:09

## 2020-12-29 RX ADMIN — LEVOTHYROXINE SODIUM 125 MCG: 125 TABLET ORAL at 06:07

## 2020-12-29 RX ADMIN — QUETIAPINE FUMARATE 25 MG: 25 TABLET ORAL at 09:08

## 2020-12-29 RX ADMIN — ASPIRIN 81 MG: 81 TABLET, COATED ORAL at 09:09

## 2020-12-29 RX ADMIN — PANTOPRAZOLE SODIUM 40 MG: 40 TABLET, DELAYED RELEASE ORAL at 07:09

## 2020-12-29 RX ADMIN — APIXABAN 2.5 MG: 2.5 TABLET, FILM COATED ORAL at 09:09

## 2020-12-29 RX ADMIN — LAMOTRIGINE 150 MG: 100 TABLET ORAL at 09:08

## 2020-12-29 RX ADMIN — ATORVASTATIN CALCIUM 10 MG: 20 TABLET, FILM COATED ORAL at 09:08

## 2020-12-29 RX ADMIN — ALLOPURINOL 100 MG: 100 TABLET ORAL at 09:08

## 2020-12-29 RX ADMIN — POTASSIUM CHLORIDE 40 MEQ: 750 TABLET, EXTENDED RELEASE ORAL at 10:01

## 2020-12-29 RX ADMIN — BUSPIRONE HYDROCHLORIDE 20 MG: 10 TABLET ORAL at 09:08

## 2020-12-29 NOTE — TELEPHONE ENCOUNTER
----- Message from Anai Enriquez RN sent at 12/29/2020 11:43 AM EST -----  Regarding: [facility] cancer [place] appointment  This patient needs to be scheduled for an appointment with Dr. Torres a week after his PET scan which is scheduled on Thursday.      He is being discharged from the hospital today.     Thanks in advance for your help.    [facility] JUSTYNA

## 2020-12-29 NOTE — PROGRESS NOTES
Patient was counseled extensively on Eliquis including the followin) Eliquis's indication, mechanism of action, and dosing  2) Enforced the importance of taking Eliquis as instructed every day and that it is a twice a day medication.  3) Explained possible side effects of Eliquis therapy, including increased risk of bleeding, s/sx of bleeding, and s/sx of any additional clots/PE/CVA.   4) Emphasized the importance of going to the emergency room if any of the following occur: Falling and hitting your head; noticing bright red blood in urine or dark/tarry stools; vomiting up blood or vomit has a coffee-ground like texture; coughing up blood  5) Discussed the importance of speaking with physician/pharmacist when starting any new medications to check for drug interactions with Eliquis  6) Discussed the importance of informing any surgeon or proceduralist that you are on Eliquis and may need to go off prior to the procedure (including spinal/epidural procedures)  7) Discussed what to do about a missed dose (Take as soon as you remember and if it is closer to the time for your next dose, skip the missed dose and go back to your normal time; DO NOT double up doses)  8) Instructed the pt not to take or discontinue any medications without informing his physician/pharmacist     Patient expressed understanding and had no further questions. Patient provided first month of medication for free through [facility] program; subsequent months sent to home [place] for $29/mo. copay.     Jessy Evans, Pharm.D., [facility]  Oncology Pharmacy Specialist  [phone]

## 2020-12-29 NOTE — TELEPHONE ENCOUNTER
Pt has been scheduled with Dr Trores on 1/7/21 at 9:30 am pt is to bring a disk of all scans with him to his visit. Faxed medical records to dr torres office [phone]

## 2020-12-30 ENCOUNTER — TRANSITIONAL CARE MANAGEMENT TELEPHONE ENCOUNTER (OUTPATIENT)
Dept: CALL CENTER | Facility: HOSPITAL | Age: 70
End: 2020-12-30

## 2020-12-30 NOTE — OUTREACH NOTE
Call Center TCM Note      Responses   [facility] patient discharged from?  [place]   Does the patient have one of the following disease processes/diagnoses(primary or secondary)?  General Surgery   TCM attempt successful?  No   Unsuccessful attempts  Attempt 1          Rosalba Salcido RN    12/30/2020, 14:43 EST

## 2020-12-30 NOTE — OUTREACH NOTE
Call Center TCM Note      Responses   [facility] patient discharged from?  [place]   Does the patient have one of the following disease processes/diagnoses(primary or secondary)?  General Surgery   TCM attempt successful?  No   Unsuccessful attempts  Attempt 2          Rosalba Salcido RN    12/30/2020, 15:57 EST

## 2020-12-31 ENCOUNTER — HOSPITAL ENCOUNTER (OUTPATIENT)
Dept: PET IMAGING | Facility: HOSPITAL | Age: 70
Discharge: HOME | End: 2020-12-31

## 2020-12-31 ENCOUNTER — TRANSITIONAL CARE MANAGEMENT TELEPHONE ENCOUNTER (OUTPATIENT)
Dept: CALL CENTER | Facility: HOSPITAL | Age: 70
End: 2020-12-31

## 2020-12-31 DIAGNOSIS — C25.1 MALIGNANT NEOPLASM OF BODY OF PANCREAS (HCC): ICD-10-CM

## 2020-12-31 DIAGNOSIS — K86.89 PANCREATIC MASS: ICD-10-CM

## 2020-12-31 LAB — GLUCOSE BLDC GLUCOMTR-MCNC: 95 MG/DL (ref 70–130)

## 2020-12-31 PROCEDURE — 78815 PET IMAGE W/CT SKULL-THIGH: CPT

## 2020-12-31 PROCEDURE — 82962 GLUCOSE BLOOD TEST: CPT

## 2020-12-31 PROCEDURE — A9552 F18 FDG: HCPCS | Performed by: INTERNAL MEDICINE

## 2020-12-31 PROCEDURE — 0 FLUDEOXYGLUCOSE F18 SOLUTION: Performed by: INTERNAL MEDICINE

## 2020-12-31 RX ADMIN — FLUDEOXYGLUCOSE F18 1 DOSE: 300 INJECTION INTRAVENOUS at 08:20

## 2020-12-31 NOTE — OUTREACH NOTE
Call Center TCM Note      Responses   [facility] patient discharged from?  [place]   Does the patient have one of the following disease processes/diagnoses(primary or secondary)?  General Surgery   TCM attempt successful?  Yes   Call start time  1253   Call end time  1254   Discharge diagnosis  Exploratory lap with loop colostomy this visit   Is patient permission given to speak with other caregiver?  Yes   List who call center can speak with  son   Person spoke with today (if not patient) and relationship  son   Does the patient have all medications related to this admission filled (includes all antibiotics, pain medications, etc.)  Yes   Is the patient taking all medications as directed (includes completed medication regime)?  Yes   Does the patient have a follow up appointment scheduled with their surgeon?  Yes   Has the patient kept scheduled appointments due by today?  Yes   What [facility] the [facility] health [facility]   Psychosocial issues?  No   Did the patient receive a copy of their discharge instructions?  Yes   What is the patient's perception of their health status since discharge?  Improving   Nursing interventions  Nurse provided patient education   Is the patient/caregiver able to teach back signs and symptoms of incisional infection?  Fever, Increased redness, swelling or pain at the incisonal site, Increased drainage or bleeding, Incisional warmth, Pus or odor from incision   Is the patient/caregiver able to teach back the hierarchy of who to call/visit for symptoms/problems? PCP, Specialist, Home health nurse, Urgent Care, ED, 911  Yes   TCM call completed?  Yes   Wrap up additional comments  Per son, patient is doing well, he had his PET scan done this morning, home heatlh nurse is coming this afternoon.          Brooke Toth RN    12/31/2020, 12:54 EST

## 2020-12-31 NOTE — PROGRESS NOTES
Called pt he was busy with the home nurse. Pt will call back later to sched and appt ok for hub to sched

## 2021-01-04 ENCOUNTER — TELEPHONE (OUTPATIENT)
Dept: ONCOLOGY | Facility: CLINIC | Age: 71
End: 2021-01-04

## 2021-01-04 ENCOUNTER — TELEPHONE (OUTPATIENT)
Dept: FAMILY MEDICINE CLINIC | Facility: CLINIC | Age: 71
End: 2021-01-04

## 2021-01-04 NOTE — TELEPHONE ENCOUNTER
----- Message from Crys Finch MA sent at 1/4/2021  3:41 PM EST -----  Regarding: FW: Test Results Question  Contact: [phone]  Please advise.   ----- Message -----  From: Kaiser Valente  Sent: 1/4/2021   3:37 PM EST  To: Katina Falcon [facility]  Subject: Test Results Question                            I haven't heard anything from my PET scan. I am getting extremely anxious.

## 2021-01-04 NOTE — TELEPHONE ENCOUNTER
Patient should reach out to his oncologist Dr. Armani Painting to order the test for results  Dr. Noriega is out this week  Thank you

## 2021-01-04 NOTE — TELEPHONE ENCOUNTER
Caller: AYALA HARO    Relationship: SELF    Best call back number: [phone]    Caller requesting test results: PET SCAN    What test was performed: PET/CT FUSION    When was the test performed: 12/31/20    Where was the test performed[facility]    Additional notes: PT IS WANTING THE RESULTS OF THE PET SCAN

## 2021-01-04 NOTE — TELEPHONE ENCOUNTER
Returning pt's call. Pt wanting results from PET scan done on 12/31. Told pt I messaged Dr. Painting to see if he has had a chance to look at the results yet and I will get back with him. Pt v/u.

## 2021-01-05 ENCOUNTER — TELEPHONE (OUTPATIENT)
Dept: ONCOLOGY | Facility: HOSPITAL | Age: 71
End: 2021-01-05

## 2021-01-05 ENCOUNTER — TELEMEDICINE (OUTPATIENT)
Dept: FAMILY MEDICINE CLINIC | Facility: CLINIC | Age: 71
End: 2021-01-05

## 2021-01-05 DIAGNOSIS — C25.9 PANCREATIC ADENOCARCINOMA (HCC): Primary | ICD-10-CM

## 2021-01-05 PROCEDURE — 99213 OFFICE O/P EST LOW 20 MIN: CPT | Performed by: NURSE PRACTITIONER

## 2021-01-05 NOTE — TELEPHONE ENCOUNTER
Called pt to update him on Nadia's message that he did not need to keep his appt with Dr. Painting, and that we [facility]. Pt v/u    ----- Message from Nadia Gee RN sent at 1/5/2021  9:15 AM EST -----  Sorry I didn't catch you in time Hieu but Dr. Painting and I spoke about this pt this morning and if he is going to be transferring care downtown, he does not need to keep apt here. If you want me to call him, I can or if you want to relay the message to him, that's fine too. Thank you!   ----- Message -----  From: Hieu Salazar RN  Sent: 1/5/2021   8:58 AM EST  To: Armani Painting MD, Nadia Gee RN    Called pt again he stated he is transferring care downtown. He is seeing the surgeon Dr. Rogers on Thursday and the medical oncologist Dr. Powers on Monday. Pt is scheduled for a visit with you on 1/25, but pt is unsure at this time if he is going to keep the appt.   Thanks!  ----- Message -----  From: Armani Painting MD  Sent: 1/4/2021   8:41 PM EST  To: Nadia Gee RN, Hieu Salazar RN    The result has not been finalized. The current report, which has not been signed, shows the known cancer and a few other smaller areas that are thought not to be cancer but need to be followed.    I'm not sure why the PET was ordered in my name. I have not seen him in years and did not see him in the hospital. It appears that he is transferring his care downtown. If that is true, does he need to see me in a few weeks?    When is he being seen by either surg onc or med onc at [facility]? [facility]  ----- Message -----  From: Hieu Salazar RN  Sent: 1/4/2021   4:11 PM EST  To: Armani Painting MD, Nadia Gee RN    Pt calling wanting PET scan results from 12/31. Please advise if there is anything I can tell pt. Thanks!

## 2021-01-05 NOTE — TELEPHONE ENCOUNTER
Called pt to update him on Dr. Moreau message. Pt stated that he sees the surgeon Dr. Angulo this Thursday and the Medical Oncologist Dr. Powers on Monday. Pt is unsure if he is going to keep his appt with Dr. Painting on 1/25. Pt would like PET to be faxed to the [facility] for their viewing.     ----- Message from Armani Painting MD sent at 1/4/2021  8:41 PM EST -----  The result has not been finalized. The current report, which has not been signed, shows the known cancer and a few other smaller areas that are thought not to be cancer but need to be followed.    I'm not sure why the PET was ordered in my name. I have not seen him in years and did not see him in the hospital. It appears that he is transferring his care downtown. If that is true, does he need to see me in a few weeks?    When is he being seen by either surg onc or med onc at [facility]? [facility]  ----- Message -----  From: Hieu Salazar, RN  Sent: 1/4/2021   4:11 PM EST  To: Armani Painting MD, Nadia Gee RN    Pt calling wanting PET scan results from 12/31. Please advise if there is anything I can tell pt. Thanks!

## 2021-01-05 NOTE — PROGRESS NOTES
Subjective   Kaiser Valente is a 70 y.o. male.     No chief complaint on file.     CC: hospital f/u    HPI new patient to me.  He scheduled a video visit for hospital follow-up and had some questions about his discharge summary.  He notes that on his discharge summary told him to stop the glucosamine conjoint and and the turmeric that he takes for arthritic type pain.  He had 2 new medications added.  He is now on Eliquis and prednisone.  He is doing well with his stoma and has had home health care out for his stoma care.  He is feeling pretty well.  He has appointments upcoming with surgical oncologist and medical oncologist to discuss plan.    Social History     Tobacco Use   • Smoking status: Never Smoker   • Smokeless tobacco: Never Used   Substance Use Topics   • Alcohol use: Yes     Comment: 1 DAILY DRINK   • Drug use: No       The following portions of the patient's history were reviewed and updated as appropriate: allergies, current medications, past family history, past medical history, past social history, past surgical history and problem list.    Review of Systems   Constitutional: Negative for chills, fatigue and fever.   Respiratory: Negative for cough.    Cardiovascular: Negative for chest pain and palpitations.   Gastrointestinal: Negative for abdominal pain, blood in stool, nausea and vomiting.   Neurological: Negative for weakness.       Objective   There were no vitals taken for this visit.  There is no height or weight on file to calculate BMI.    Physical Exam   Limited by video visit.  He is well appearing, well dressed, and does not seem to be distressed.  He seems alert and oriented and his mood and affect are normal, good historian of medical history. He seems optimistic about his cancer and upcoming appts with oncology.  He did tell a couple jokes during his exam.  No cough or dyspnea appreciated, able to complete sentences without problem. He does not appear weak or pale.       Assessment    Problem List Items Addressed This Visit        Hematology and Neoplasia    Pancreatic adenocarcinoma (CMS/HCC) - Primary           Procedures           Impression and Plan: Hospital course and discharge summary reviewed.  Patient has been diagnosed with pancreatic cancer and will be seeing medical and surgical oncologist in the next week.  He has a stoma  and is doing well with this.  He has good support and seems to be maintaining his sense of humor.    I ran an interaction check with the supplements: Glucosamine chondroitin and turmeric and they both do have interactions with the Eliquis.  I recommended he hold off on the supplements for now and discuss further with his medical oncologist if he needs them at a later time.  His arthritic pain will probably stay under somewhat good control while he is on the prednisone at least.  He also mentioned in passing that he was on Echinacea to hopefully avoid Covid and I did tell him he should also stop this as it can also interact with Eliquis.  He can continue the vitamin C and vitamin D.  Recommend wearing a facemask and social distancing handwashing for best Covid protection now.      Health Maintenance Due   Topic Date Due   • HEPATITIS C SCREENING  03/21/2016       Patient gave consent today for a telehealth video visit as following recommendations of our governor and CDC during the COVID-19 pandemic.    25 min was spent in discussion with pt and greater than 50% of that time was spent counseling.         EMR Dragon/Transcription disclaimer:   Much of this encounter note is an electronic transcription/translation of spoken language to printed text. The electronic translation of spoken language may permit erroneous, or at times, nonsensical words or phrases to be inadvertently transcribed; Although I have reviewed the note for such errors, some may still exist.      Zoom platform used with good A/V quality.

## 2021-01-07 ENCOUNTER — READMISSION MANAGEMENT (OUTPATIENT)
Dept: CALL CENTER | Facility: HOSPITAL | Age: 71
End: 2021-01-07

## 2021-01-07 NOTE — OUTREACH NOTE
General Surgery Week 2 Survey      Responses   [facility] patient discharged from?  [place]   Does the patient have one of the following disease processes/diagnoses(primary or secondary)?  General Surgery   Week 2 attempt successful?  Yes   Call start time  1545   Call end time  1558   Meds reviewed with patient/caregiver?  Yes   Is the patient having any side effects they believe may be caused by any medication additions or changes?  No   Is the patient taking all medications as directed (includes completed medication regime)?  Yes   Has the patient kept scheduled appointments due by today?  Yes   What is the patient's perception of their health status since discharge?  Improving   Week 2 call completed?  Yes   Wrap up additional comments  Pt is recovering fairly well. Pt will be having further surgery to remove his spleen and the pancreatic mass.          Madeline Arceo RN

## 2021-01-08 ENCOUNTER — TELEPHONE (OUTPATIENT)
Dept: FAMILY MEDICINE CLINIC | Facility: CLINIC | Age: 71
End: 2021-01-08

## 2021-01-08 NOTE — TELEPHONE ENCOUNTER
"PATIENT IS HAVING A COLONOSCOPY DONE AND NEEDING A RX FOR \"ADAPT LUBRICATING DEODERANT\" SO INSURANCE WILL COVER IT FOR HIM    PATIENT NEEDS BY Monday AT THE LATEST.     [facility] [place], [place] - [address] [place] [address] - [phone]  - [phone]   [phone]    PLEASE ADVISE   [phone] (M)    "

## 2021-01-14 NOTE — TELEPHONE ENCOUNTER
Followed up on this. Pt has received this after paying. He is having the colostomy reversed on the following Tuesday from this update. He will not need this again hopefully.

## 2021-01-15 ENCOUNTER — READMISSION MANAGEMENT (OUTPATIENT)
Dept: CALL CENTER | Facility: HOSPITAL | Age: 71
End: 2021-01-15

## 2021-01-15 NOTE — OUTREACH NOTE
General Surgery Week 3 Survey      Responses   [facility] patient discharged from?  [place]   Does the patient have one of the following disease processes/diagnoses(primary or secondary)?  General Surgery   Week 3 attempt successful?  Yes   Call start time  1037   Call end time  1042   Discharge diagnosis  Exploratory lap with loop colostomy this visit   Is patient permission given to speak with other caregiver?  Yes   List who call center can speak with  Tri ,wife   Person spoke with today (if not patient) and relationship  Tri   Meds reviewed with patient/caregiver?  Yes   Is the patient taking all medications as directed (includes completed medication regime)?  Yes   Has the patient kept scheduled appointments due by today?  Yes   Has home health visited the patient within 72 hours of discharge?  Yes   What is the patient's perception of their health status since discharge?  Improving   If the patient is a current smoker, are they able to teach back resources for cessation?  Not a smoker   Additional teach back comments  Had covid test today for upcoming surgery and is self quarantined   Week 3 call completed?  Yes   Revoked  No further contact(revokes)-requires comment   Is the patient interested in additional calls from an ambulatory ?  NOTE:  applies to high risk patients requiring additional follow-up.  Yes   Graduated/Revoked comments  -- [patient has planned surgery as documented before next call.e]   Wrap up additional comments  Surgery to remove pancreatic mass, spleen and reconnect colon will be 1/19/21 at [facility]. He will then have 6 months of chemo.  He is doing well, anxious to get this underway.            Gabriela Dobbins RN

## 2021-01-18 ENCOUNTER — EPISODE CHANGES (OUTPATIENT)
Dept: CASE MANAGEMENT | Facility: OTHER | Age: 71
End: 2021-01-18

## 2021-02-01 ENCOUNTER — PATIENT OUTREACH (OUTPATIENT)
Dept: CASE MANAGEMENT | Facility: OTHER | Age: 71
End: 2021-02-01

## 2021-02-01 NOTE — OUTREACH NOTE
Care Coordination Assessment    Documented/Reviewed By: Mami Summers RN Date/time: 2/1/2021  1:56 PM   Assessment completed with: patient  Support system: family  Type of residence: private residence  Home care services: Yes (Comment: [facility])  Bed or wheelchair confined: No  Inadequate nutrition: No  Medication adherence problem: No  Experiencing side effects from current medications: No  History of fall(s) in last 6 months: No  Difficulty keeping appointments: No  Family aware of the patient's advance care planning wishes: Yes  Muslim or spiritual beliefs that impact treatment: No  Chronic pain: Yes  Location of chronic pain: surgical site pain  Chronic pain timing: constant  Chronic pain severity: 6  Limitation of routine activities due to chronic pain: mild

## 2021-02-01 NOTE — OUTREACH NOTE
Care Plan Note      Responses   Lifestyle Goals  Routine follow-up with doctor(s), Less pain, Medication management, Have more energy, Increase physical activity   Barriers  Pain   Self Management  Medication Adherence, Increase Physical Activities   Suggested Appointments  Other (See Comment) [Keep appt with PCP and surgeon as scheduled. ]   Annual Wellness Visit:   Patient Has Completed   Specific Disease Process Teaching  -- [Continue to rest and treat pain with Tylenol. Continue PT exercises as directed. ]   Does patient have depression diagnosis?  No   Advanced Directives:  Patient Has   Ed Visits past 12 months:  None   Hospitalizations past 12 months  1   Discharge destination:  Home Health   Agency:  [facility]   Medication Adherence  Medications understood   Goal Progress  Making Progress Toward Goal(s)   Readiness Scale  7   Confidence Scale  7        The main concerns and/or symptoms the patient would like to address are: Spoke with patient regarding health and wellness post surgery. Patient states he is lethargic and falling asleep during the day mid-activity. Patient states he is taking Tylenol for pain. Patient is being seen by [facility] for SN and PT. Patient just finished with PT today and has SN visit scheduled at 2:30. Patient has CMP and CBC ordered to be drawn by HH RN today. Patient has follow up appointment scheduled with Dr. Noriega tomorrow (virtual) and an appointment on Thursday for staple removal downwn. Patient feels he has no unmet needs or concerns at this time.    Education/instruction provided by Care Coordinator: Introduced self, explained ACM RN role and provided contact information. Reviewed patient's care at home and patient's follow up appointments. Patient denies any needs or concerns at this time. Patient is treating pain with Tylenol and following up with physicians as directed. Patient is working with PT and SN at home with [facility].     Follow Up Outreach Due: 2 weeks    Mami  JUSTYNA Summers  Ambulatory     2/1/2021, 14:07 EST

## 2021-02-02 ENCOUNTER — TELEMEDICINE (OUTPATIENT)
Dept: FAMILY MEDICINE CLINIC | Facility: CLINIC | Age: 71
End: 2021-02-02

## 2021-02-02 DIAGNOSIS — C25.9 PANCREATIC ADENOCARCINOMA (HCC): ICD-10-CM

## 2021-02-02 DIAGNOSIS — I10 BENIGN ESSENTIAL HTN: ICD-10-CM

## 2021-02-02 DIAGNOSIS — R53.83 OTHER FATIGUE: ICD-10-CM

## 2021-02-02 DIAGNOSIS — Z09 HOSPITAL DISCHARGE FOLLOW-UP: Primary | ICD-10-CM

## 2021-02-02 PROCEDURE — 99214 OFFICE O/P EST MOD 30 MIN: CPT | Performed by: FAMILY MEDICINE

## 2021-02-02 NOTE — PROGRESS NOTES
Chief Complaint  Hospital Follow Up Visit    Subjective          [facility] Valente presents to [facility] for   History of Present Illness    You have chosen to receive care through a telehealth visit.  Do you consent to use a video/audio connection for your medical care today? Yes    FaceTime visit.  Excellent A/V connection.    Follow-up hospitalization for pancreatic cancer with splenectomy and partial colectomy.  The colostomy bag is gone.  They reanastomosed.  He is going to see Dr. Torres the oncological surgeon in a couple of days.  He has no leg pain or leg swelling.  The home health nurse has been coming.  Lab work was done yesterday results are pending, CBC CMP.  He has had no constipation or diarrhea.  The incisional discomfort he states is minimal.  He is not taking hydrocodone or other painkillers other than Tylenol.  We reviewed all his medication in detail.  His wife was there also.    He states he feels very tired and sedated.  He feels groggy.  We reviewed all of his medication in detail.  He continues his psychiatric medication as before including the Lamictal and Seroquel and buspirone.  He has not been in touch with a psychiatrist in a while.  He is not having trouble with major depression at this time.    They stopped his lisinopril at the hospital.  His blood pressure at home is running 130/60.    He is no longer on the Eliquis.  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      Objective   Vital Signs:   There were no vitals taken for this visit.    Physical Exam  Constitutional:       Comments: He is mostly alert.  But overall seems slightly sedated.  Speech is slightly hesitant, but no focal neurological deficit.  He looks tired.   HENT:      Head: Atraumatic.   Pulmonary:      Effort: Pulmonary effort is normal. No respiratory distress.      Comments: He speaks in full sentences.  No respiratory distress.  Skin:     Coloration: Skin is not pale.       Comments: His skin color seems slightly sallow.  Could be related to the video quality.   Neurological:      Mental Status: He is alert and oriented to person, place, and time.      Coordination: Coordination normal.   Psychiatric:         Behavior: Behavior normal.        Result Review :                 Assessment and Plan    Problem List Items Addressed This Visit        Unprioritized    Benign essential HTN    Pancreatic adenocarcinoma (CMS/HCC)      Other Visit Diagnoses     Hospital discharge follow-up    -  Primary    Other fatigue            Hospital discharge follow-up.  All medications have been reconciled.    Fatigue.  Questionable sedation?  Some this is very likely related to typical postsurgical post hospitalization fatigue.  However I am concerned that a secondary medications may be causing some sedation.  He is not taking hydrocodone.  I have asked him to contact his psychiatrist for further recommendations on may be cutting back on the Lamictal or buspirone.  Or possibly the Seroquel.    Pancreatic cancer.  He was going to be seeing his surgeon in a couple of days.  And he will follow up with oncology as directed.    Hypertension.  Blood pressure is quite well now off lisinopril.  Eating to follow at home.  I will see him back for a [employer] video visit in 3 weeks.    Total duration of visit approximately 20 minutes    Addendum 4:50 PM.  I had a conversation with the patient's psychiatrist.  Described the potential sedation.  The psychiatrist recommends stopping the Seroquel.  Continue the other medications as is.  I spoke with the patient.  He will stop taking the Seroquel.  I will see what the CMP shows.  It is possible his albumin is down from malnutrition and surgery, which could have a change in the pharmacokinetics of the Seroquel.    Follow Up   No follow-ups on file.  Patient was given instructions and counseling regarding his condition or for health maintenance advice. Please see  specific information pulled into the AVS if appropriate.

## 2021-02-05 DIAGNOSIS — E55.9 VITAMIN D DEFICIENCY: ICD-10-CM

## 2021-02-05 RX ORDER — ERGOCALCIFEROL 1.25 MG/1
50000 CAPSULE ORAL 2 TIMES WEEKLY
Qty: 26 CAPSULE | Refills: 2 | Status: SHIPPED | OUTPATIENT
Start: 2021-02-08 | End: 2021-10-20

## 2021-02-14 DIAGNOSIS — U07.1 COVID-19 VIRUS DETECTED: Primary | ICD-10-CM

## 2021-02-16 ENCOUNTER — PATIENT OUTREACH (OUTPATIENT)
Dept: CASE MANAGEMENT | Facility: OTHER | Age: 71
End: 2021-02-16

## 2021-02-16 DIAGNOSIS — U07.1 COVID-19 VIRUS DETECTED: Primary | ICD-10-CM

## 2021-02-16 DIAGNOSIS — U07.1 COVID-19 VIRUS DETECTED: ICD-10-CM

## 2021-02-16 DIAGNOSIS — U07.1 CLINICAL DIAGNOSIS OF SEVERE ACUTE RESPIRATORY SYNDROME CORONAVIRUS 2 (SARS-COV-2) DISEASE: ICD-10-CM

## 2021-02-16 DIAGNOSIS — U07.1 CLINICAL DIAGNOSIS OF SEVERE ACUTE RESPIRATORY SYNDROME CORONAVIRUS 2 (SARS-COV-2) DISEASE: Primary | ICD-10-CM

## 2021-02-16 NOTE — OUTREACH NOTE
Patient Outreach Note    Introduced self, explained ACM RN role. Reviewed patient's health and wellness. Patient just received a COVID test due to being tested on Friday with positive results. Patient states he has no symptoms. Patient eager to schedule his port placement to start chemo. No other needs or concerns at this time. No further outreach scheduled. AWV complete. AD filed. MyChart active.     Mami Summers RN  Ambulatory     2/16/2021, 15:57 EST

## 2021-02-17 LAB — SARS-COV-2 RNA RESP QL NAA+PROBE: NOT DETECTED

## 2021-02-23 ENCOUNTER — TELEMEDICINE (OUTPATIENT)
Dept: FAMILY MEDICINE CLINIC | Facility: CLINIC | Age: 71
End: 2021-02-23

## 2021-02-23 DIAGNOSIS — C25.9 PANCREATIC ADENOCARCINOMA (HCC): Primary | Chronic | ICD-10-CM

## 2021-02-23 PROBLEM — D69.3 CHRONIC ITP (IDIOPATHIC THROMBOCYTOPENIA): Chronic | Status: ACTIVE | Noted: 2020-12-29

## 2021-02-23 PROBLEM — F33.41 RECURRENT MAJOR DEPRESSIVE DISORDER, IN PARTIAL REMISSION (HCC): Chronic | Status: ACTIVE | Noted: 2017-04-15

## 2021-02-23 PROBLEM — I10 BENIGN ESSENTIAL HTN: Chronic | Status: ACTIVE | Noted: 2018-10-01

## 2021-02-23 PROCEDURE — 99213 OFFICE O/P EST LOW 20 MIN: CPT | Performed by: FAMILY MEDICINE

## 2021-02-23 NOTE — PROGRESS NOTES
Chief Complaint  Cancer    Subjective          [facility] Valente presents to [facility]  History of Present Illness    You have chosen to receive care through a telehealth visit.  Do you consent to use a video/audio connection for your medical care today? Yes    Mild pancreatic carcinoma.  He continues at home.  He had his port placed yesterday.  He had the tumor removed along with a splenectomy about a month ago.  He did receive his meningitis boosters, and his Pneumovax is up-to-date.  He is doing well otherwise at home.  Not much pain.  He had a positive test for SARS-CoV-2 last week.  Repeat test was negative.  Wife was positive.  She is asymptomatic.  As is patient.  Other household members negative for COVID-19 and vaccinated.  Both patient and his wife have received the first COVID-19 vaccination.    Patient states he had an EKG done yesterday through his [facility] oncologist.  Reportedly his QTC was prolonged at 0.5 something.  I do not have the records available.  His last [facility] EKG demonstrated a QTC of 0.437.  I reviewed his medication.  None should prolong QTc interval that I can find with research.       Objective   Vital Signs:   There were no vitals taken for this visit.    Physical Exam  Constitutional:       General: He is not in acute distress.     Comments: Color looks better.   HENT:      Head: Atraumatic.   Pulmonary:      Effort: Pulmonary effort is normal. No respiratory distress.   Skin:     Coloration: Skin is not pale.   Neurological:      Mental Status: He is alert and oriented to person, place, and time.      Coordination: Coordination normal.   Psychiatric:         Behavior: Behavior normal.        Result Review :                 Assessment and Plan    Diagnoses and all orders for this visit:    1. Pancreatic adenocarcinoma (CMS/HCC) (Primary)      Pancreatic cancer.  Recently reported elevated QTC.  Reportedly they will not do chemotherapy to  the patient seen by cardiologist.  They are in the process of finding him a cardiologist.  I am going to assist in getting a ASAP consultation.  I have placed a phone call into a cardiologist that the patient knows.  Awaiting response.  In the meantime I want to see the patient back in 6 weeks for telehealth visit.  He will call with questions.    Patient visit 15 minutes      Follow Up   No follow-ups on file.  Patient was given instructions and counseling regarding his condition or for health maintenance advice. Please see specific information pulled into the AVS if appropriate.

## 2021-02-24 ENCOUNTER — TELEPHONE (OUTPATIENT)
Dept: FAMILY MEDICINE CLINIC | Facility: CLINIC | Age: 71
End: 2021-02-24

## 2021-02-24 NOTE — TELEPHONE ENCOUNTER
HANNY FROM [facility] WANTS TO CONTINUE PHYSICAL THERAPY WITH PATIENT:  2 TIMES A WEEK FOR 2 WEEKS  1 TIME A WEEK FOR 2 WEEKS    CALL BACK #: [phone]

## 2021-04-07 ENCOUNTER — TELEMEDICINE (OUTPATIENT)
Dept: FAMILY MEDICINE CLINIC | Facility: CLINIC | Age: 71
End: 2021-04-07

## 2021-04-07 DIAGNOSIS — C25.9 PANCREATIC ADENOCARCINOMA (HCC): Primary | ICD-10-CM

## 2021-04-07 PROCEDURE — 99213 OFFICE O/P EST LOW 20 MIN: CPT | Performed by: FAMILY MEDICINE

## 2021-04-07 RX ORDER — PROCHLORPERAZINE MALEATE 10 MG
10 TABLET ORAL EVERY 6 HOURS PRN
COMMUNITY
Start: 2021-03-10 | End: 2022-06-27

## 2021-04-07 RX ORDER — DIPHENOXYLATE HYDROCHLORIDE AND ATROPINE SULFATE 2.5; .025 MG/1; MG/1
TABLET ORAL
COMMUNITY
Start: 2021-03-23 | End: 2022-06-27

## 2021-04-07 RX ORDER — MAGNESIUM OXIDE 400 MG/1
1 TABLET ORAL 2 TIMES DAILY
COMMUNITY
Start: 2021-03-28 | End: 2021-07-28

## 2021-04-07 RX ORDER — DEXAMETHASONE 4 MG/1
TABLET ORAL
COMMUNITY
Start: 2021-03-28 | End: 2022-06-27

## 2021-04-07 RX ORDER — ONDANSETRON HYDROCHLORIDE 8 MG/1
8 TABLET, FILM COATED ORAL EVERY 8 HOURS PRN
COMMUNITY
Start: 2021-03-09 | End: 2022-06-27

## 2021-04-07 RX ORDER — POTASSIUM CHLORIDE 20 MEQ/1
20 TABLET, EXTENDED RELEASE ORAL DAILY
COMMUNITY
Start: 2021-02-22

## 2021-04-07 NOTE — PROGRESS NOTES
Chief Complaint  Pancreatic Cancer    Subjective          [facility] Valente presents to [facility]  History of Present Illness      Coronavirus pandemic telehealth visit.  Good audio and video connection.  Patient gave informed consent through the [employer] check in process.    Pancreatic carcinoma.  Stage II.  Tail the pancreas.  Status post partial colectomy.  He is now undergoing chemotherapy infusions.  He is on his third cycle of reported 12.  He is followed by to cancer Center and also his oncological surgeon Dr. Torres.  I reviewed recent blood work.  His white blood cell count remains high at 20,000 unchanged compared about 2 weeks ago.  On intermittent steroids.  He has a mild anemia with a hemoglobin of 10.  Unchanged.  His potassium just minimally low.  He continues on potassium supplementation.  His CA 19-9 is lower.  He has reported genetic testing that is pending.  He complains of some nausea and some diarrhea but otherwise doing well.  He states he is in good spirits.  He continues to follow with a psychiatrist.  He is tolerating the Seroquel but he is on the buspirone, Lamictal, and sertraline.  He has had no recent fevers.  His appetite has been okay.  He is having some trouble with peripheral neuropathy.    Objective   Vital Signs:   There were no vitals taken for this visit.    Physical Exam  Constitutional:       Appearance: Normal appearance.   HENT:      Head: Atraumatic.   Eyes:      Conjunctiva/sclera: Conjunctivae normal.   Cardiovascular:      Rate and Rhythm: Normal rate and regular rhythm.      Pulses: Normal pulses.      Heart sounds: Normal heart sounds.   Pulmonary:      Effort: Pulmonary effort is normal.      Breath sounds: Normal breath sounds.   Abdominal:      General: Abdomen is flat. There is no distension.      Palpations: Abdomen is soft. There is no mass.      Tenderness: There is no abdominal tenderness.      Hernia: No hernia is present.    Musculoskeletal:         General: Normal range of motion.      Cervical back: Normal range of motion and neck supple. No muscular tenderness.   Lymphadenopathy:      Cervical: No cervical adenopathy.   Skin:     General: Skin is warm and dry.      Findings: No rash.   Neurological:      General: No focal deficit present.      Mental Status: He is alert and oriented to person, place, and time.   Psychiatric:         Mood and Affect: Mood normal.         Behavior: Behavior normal.        Result Review :                 Assessment and Plan    Diagnoses and all orders for this visit:    1. Pancreatic adenocarcinoma (CMS/HCC) (Primary)      Follow-up pancreatic carcinoma, hypertension, depression.  Overall he is doing well.  He is continue to tolerate the home infusions.  Is having some mild peripheral neuropathy symptoms and some diarrhea symptoms but they are stable.  He is on some antidiarrheals provided by the [facility].  At this point no further treatment changes needed on my part.  Continue current medication including atorvastatin.  I will see him back in 3 months for recheck.  Sooner as needed.    Duration of visit 15 minutes      Follow Up   No follow-ups on file.  Patient was given instructions and counseling regarding his condition or for health maintenance advice. Please see specific information pulled into the AVS if appropriate.

## 2021-06-08 RX ORDER — ALLOPURINOL 100 MG/1
100 TABLET ORAL DAILY
Qty: 90 TABLET | Refills: 1 | Status: SHIPPED | OUTPATIENT
Start: 2021-06-08 | End: 2021-12-08

## 2021-06-08 RX ORDER — LEVOTHYROXINE SODIUM 0.12 MG/1
125 TABLET ORAL DAILY
Qty: 90 TABLET | Refills: 1 | Status: SHIPPED | OUTPATIENT
Start: 2021-06-08 | End: 2021-12-09

## 2021-06-11 ENCOUNTER — APPOINTMENT (OUTPATIENT)
Dept: GENERAL RADIOLOGY | Facility: HOSPITAL | Age: 71
End: 2021-06-11

## 2021-06-11 ENCOUNTER — HOSPITAL ENCOUNTER (INPATIENT)
Facility: HOSPITAL | Age: 71
LOS: 2 days | Discharge: HOME | End: 2021-06-14
Attending: EMERGENCY MEDICINE | Admitting: INTERNAL MEDICINE

## 2021-06-11 DIAGNOSIS — R55 SYNCOPE, UNSPECIFIED SYNCOPE TYPE: Primary | ICD-10-CM

## 2021-06-11 DIAGNOSIS — D72.829 LEUKOCYTOSIS, UNSPECIFIED TYPE: ICD-10-CM

## 2021-06-11 LAB
ALBUMIN SERPL-MCNC: 4 G/DL (ref 3.5–5.2)
ALBUMIN/GLOB SERPL: 1.4 G/DL
ALP SERPL-CCNC: 142 U/L (ref 39–117)
ALT SERPL W P-5'-P-CCNC: 28 U/L (ref 1–41)
ANION GAP SERPL CALCULATED.3IONS-SCNC: 14.7 MMOL/L (ref 5–15)
AST SERPL-CCNC: 32 U/L (ref 1–40)
BASOPHILS # BLD MANUAL: 0.66 10*3/MM3 (ref 0–0.2)
BASOPHILS NFR BLD AUTO: 1 % (ref 0–1.5)
BILIRUB SERPL-MCNC: 0.2 MG/DL (ref 0–1.2)
BUN SERPL-MCNC: 29 MG/DL (ref 8–23)
BUN/CREAT SERPL: 27.1 (ref 7–25)
CALCIUM SPEC-SCNC: 9.3 MG/DL (ref 8.6–10.5)
CHLORIDE SERPL-SCNC: 102 MMOL/L (ref 98–107)
CO2 SERPL-SCNC: 21.3 MMOL/L (ref 22–29)
CREAT SERPL-MCNC: 1.07 MG/DL (ref 0.76–1.27)
D-LACTATE SERPL-SCNC: 3.9 MMOL/L (ref 0.5–2)
DEPRECATED RDW RBC AUTO: 69.3 FL (ref 37–54)
ERYTHROCYTE [DISTWIDTH] IN BLOOD BY AUTOMATED COUNT: 23.7 % (ref 12.3–15.4)
GFR SERPL CREATININE-BSD FRML MDRD: 68 ML/MIN/1.73
GLOBULIN UR ELPH-MCNC: 2.8 GM/DL
GLUCOSE SERPL-MCNC: 114 MG/DL (ref 65–99)
HCT VFR BLD AUTO: 31.9 % (ref 37.5–51)
HGB BLD-MCNC: 10.7 G/DL (ref 13–17.7)
HOWELL-JOLLY BOD BLD QL SMEAR: PRESENT
LYMPHOCYTES # BLD MANUAL: 4 10*3/MM3 (ref 0.7–3.1)
LYMPHOCYTES NFR BLD MANUAL: 1 % (ref 5–12)
LYMPHOCYTES NFR BLD MANUAL: 6.1 % (ref 19.6–45.3)
MAGNESIUM SERPL-MCNC: 1.9 MG/DL (ref 1.6–2.4)
MCH RBC QN AUTO: 27.6 PG (ref 26.6–33)
MCHC RBC AUTO-ENTMCNC: 33.5 G/DL (ref 31.5–35.7)
MCV RBC AUTO: 82.2 FL (ref 79–97)
MONOCYTES # BLD AUTO: 0.66 10*3/MM3 (ref 0.1–0.9)
NEUTROPHILS # BLD AUTO: 60.25 10*3/MM3 (ref 1.7–7)
NEUTROPHILS NFR BLD MANUAL: 91.9 % (ref 42.7–76)
NT-PROBNP SERPL-MCNC: 635.3 PG/ML (ref 0–900)
PLAT MORPH BLD: NORMAL
PLATELET # BLD AUTO: 388 10*3/MM3 (ref 140–450)
PMV BLD AUTO: 10.6 FL (ref 6–12)
POIKILOCYTOSIS BLD QL SMEAR: ABNORMAL
POTASSIUM SERPL-SCNC: 3.6 MMOL/L (ref 3.5–5.2)
PROT SERPL-MCNC: 6.8 G/DL (ref 6–8.5)
RBC # BLD AUTO: 3.88 10*6/MM3 (ref 4.14–5.8)
SODIUM SERPL-SCNC: 138 MMOL/L (ref 136–145)
TARGETS BLD QL SMEAR: ABNORMAL
TROPONIN T SERPL-MCNC: <0.01 NG/ML (ref 0–0.03)
WBC # BLD AUTO: 65.56 10*3/MM3 (ref 3.4–10.8)
WBC MORPH BLD: NORMAL

## 2021-06-11 PROCEDURE — 81001 URINALYSIS AUTO W/SCOPE: CPT | Performed by: NURSE PRACTITIONER

## 2021-06-11 PROCEDURE — 99285 EMERGENCY DEPT VISIT HI MDM: CPT

## 2021-06-11 PROCEDURE — 83735 ASSAY OF MAGNESIUM: CPT | Performed by: EMERGENCY MEDICINE

## 2021-06-11 PROCEDURE — 93010 ELECTROCARDIOGRAM REPORT: CPT | Performed by: INTERNAL MEDICINE

## 2021-06-11 PROCEDURE — 85007 BL SMEAR W/DIFF WBC COUNT: CPT | Performed by: EMERGENCY MEDICINE

## 2021-06-11 PROCEDURE — 87040 BLOOD CULTURE FOR BACTERIA: CPT | Performed by: EMERGENCY MEDICINE

## 2021-06-11 PROCEDURE — 71045 X-RAY EXAM CHEST 1 VIEW: CPT

## 2021-06-11 PROCEDURE — U0004 COV-19 TEST NON-CDC HGH THRU: HCPCS | Performed by: EMERGENCY MEDICINE

## 2021-06-11 PROCEDURE — G0378 HOSPITAL OBSERVATION PER HR: HCPCS

## 2021-06-11 PROCEDURE — 84484 ASSAY OF TROPONIN QUANT: CPT | Performed by: EMERGENCY MEDICINE

## 2021-06-11 PROCEDURE — 83880 ASSAY OF NATRIURETIC PEPTIDE: CPT | Performed by: EMERGENCY MEDICINE

## 2021-06-11 PROCEDURE — 80053 COMPREHEN METABOLIC PANEL: CPT | Performed by: EMERGENCY MEDICINE

## 2021-06-11 PROCEDURE — 85025 COMPLETE CBC W/AUTO DIFF WBC: CPT | Performed by: EMERGENCY MEDICINE

## 2021-06-11 PROCEDURE — 93005 ELECTROCARDIOGRAM TRACING: CPT | Performed by: EMERGENCY MEDICINE

## 2021-06-11 PROCEDURE — 83605 ASSAY OF LACTIC ACID: CPT | Performed by: EMERGENCY MEDICINE

## 2021-06-11 PROCEDURE — U0005 INFEC AGEN DETEC AMPLI PROBE: HCPCS | Performed by: EMERGENCY MEDICINE

## 2021-06-11 PROCEDURE — 25010000002 ONDANSETRON PER 1 MG: Performed by: NURSE PRACTITIONER

## 2021-06-11 RX ORDER — SODIUM CHLORIDE 0.9 % (FLUSH) 0.9 %
10 SYRINGE (ML) INJECTION AS NEEDED
Status: DISCONTINUED | OUTPATIENT
Start: 2021-06-11 | End: 2021-06-14 | Stop reason: HOSPADM

## 2021-06-11 RX ORDER — ACETAMINOPHEN 160 MG/5ML
650 SOLUTION ORAL EVERY 4 HOURS PRN
Status: DISCONTINUED | OUTPATIENT
Start: 2021-06-11 | End: 2021-06-14 | Stop reason: HOSPADM

## 2021-06-11 RX ORDER — ONDANSETRON 2 MG/ML
4 INJECTION INTRAMUSCULAR; INTRAVENOUS ONCE
Status: COMPLETED | OUTPATIENT
Start: 2021-06-11 | End: 2021-06-11

## 2021-06-11 RX ORDER — ACETAMINOPHEN 650 MG/1
650 SUPPOSITORY RECTAL EVERY 4 HOURS PRN
Status: DISCONTINUED | OUTPATIENT
Start: 2021-06-11 | End: 2021-06-14 | Stop reason: HOSPADM

## 2021-06-11 RX ORDER — SODIUM CHLORIDE 0.9 % (FLUSH) 0.9 %
10 SYRINGE (ML) INJECTION EVERY 12 HOURS SCHEDULED
Status: DISCONTINUED | OUTPATIENT
Start: 2021-06-11 | End: 2021-06-14 | Stop reason: HOSPADM

## 2021-06-11 RX ORDER — ONDANSETRON 2 MG/ML
4 INJECTION INTRAMUSCULAR; INTRAVENOUS EVERY 6 HOURS PRN
Status: DISCONTINUED | OUTPATIENT
Start: 2021-06-11 | End: 2021-06-14 | Stop reason: HOSPADM

## 2021-06-11 RX ORDER — CALCIUM CARBONATE 200(500)MG
2 TABLET,CHEWABLE ORAL 2 TIMES DAILY PRN
Status: DISCONTINUED | OUTPATIENT
Start: 2021-06-11 | End: 2021-06-14 | Stop reason: HOSPADM

## 2021-06-11 RX ORDER — SODIUM CHLORIDE 9 MG/ML
100 INJECTION, SOLUTION INTRAVENOUS CONTINUOUS
Status: DISCONTINUED | OUTPATIENT
Start: 2021-06-11 | End: 2021-06-14 | Stop reason: HOSPADM

## 2021-06-11 RX ORDER — NITROGLYCERIN 0.4 MG/1
0.4 TABLET SUBLINGUAL
Status: DISCONTINUED | OUTPATIENT
Start: 2021-06-11 | End: 2021-06-14 | Stop reason: HOSPADM

## 2021-06-11 RX ORDER — ACETAMINOPHEN 325 MG/1
650 TABLET ORAL EVERY 4 HOURS PRN
Status: DISCONTINUED | OUTPATIENT
Start: 2021-06-11 | End: 2021-06-14 | Stop reason: HOSPADM

## 2021-06-11 RX ORDER — ONDANSETRON 4 MG/1
4 TABLET, FILM COATED ORAL EVERY 6 HOURS PRN
Status: DISCONTINUED | OUTPATIENT
Start: 2021-06-11 | End: 2021-06-14 | Stop reason: HOSPADM

## 2021-06-11 RX ADMIN — SODIUM CHLORIDE 1000 ML: 9 INJECTION, SOLUTION INTRAVENOUS at 19:58

## 2021-06-11 RX ADMIN — ONDANSETRON 4 MG: 2 INJECTION INTRAMUSCULAR; INTRAVENOUS at 19:59

## 2021-06-11 RX ADMIN — SODIUM CHLORIDE 1000 ML: 9 INJECTION, SOLUTION INTRAVENOUS at 23:28

## 2021-06-11 NOTE — ED PROVIDER NOTES
EMERGENCY DEPARTMENT ENCOUNTER    Room Number:  17/17  Date of encounter:  6/11/2021  PCP: Jared Noriega MD  Historian: PATIENT   Full history not obtainable due to: syncope     HPI:  Chief Complaint: syncope     Context: Kaiser Valente is a 71 y.o. male who presents to the ED c/o syncope onset just pta. 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 He states the class ended and he was walking to his car when he began to feel 'woozy'. He kneeled down and then the next thing he knew he was in side of the building and laying on the floor. He had soiled himself. Then it was followed by an episode of vomiting. No chest pain. No soa. No prior hx of syncope. States during the class he felt well. He reports adequate po intake today preceding the episode.       His oncologist is at [facility].  Last chemo was tue-thur. He has had 7/12 treatments thus far.           PAST MEDICAL HISTORY    Active Ambulatory Problems     Diagnosis Date Noted   • Acquired hypothyroidism 03/21/2016   • Osteoarthritis of both knees 03/21/2016   • Gastroesophageal reflux disease without esophagitis 03/21/2016   • Mixed hyperlipidemia 03/21/2016   • Monoclonal gammopathy of undetermined significance 09/26/2016   • Hypercalcemia 09/26/2016   • Chronic fatigue 10/11/2016   • Thrombocytopenia (CMS/HCC) 10/17/2016   • History of primary hyperparathyroidism 02/14/2017   • Hypogonadism in male 04/11/2017   • Toe pain, left 04/11/2017   • Recurrent major depressive disorder, in partial remission (CMS/HCC) 04/15/2017   • Pseudogout of joint of right foot 05/19/2017   • Chronic gout of left foot 06/26/2017   • Hyperglycemia 03/29/2018   • Benign prostatic hyperplasia without lower urinary tract symptoms 03/29/2018   • Generalized abdominal pain 08/15/2018   • Benign essential HTN 10/01/2018   • Pancreatic mass 12/21/2020   • Thrombocytopenia (CMS/HCC) 12/22/2020   •  Pancreatic adenocarcinoma (CMS/[facility]) 12/28/2020   • Chronic ITP (idiopathic thrombocytopenia) (CMS/[facility]) 12/29/2020     Resolved Ambulatory Problems     Diagnosis Date Noted   • Abnormal EKG 03/21/2016   • Primary hyperparathyroidism (CMS/[facility]) 10/11/2016   • Parathyroid adenoma 02/03/2017   • Localized edema 04/11/2017   • Cellulitis of left lower extremity 04/15/2017   • Cellulitis of foot, left 04/15/2017   • Gout 04/16/2017   • Stage 3 chronic kidney disease (CMS/[facility]) 10/16/2017   • Colon obstruction (CMS/[facility]) 12/21/2020     Past Medical History:   Diagnosis Date   • Anxiety    • Depression    • GERD (gastroesophageal reflux disease)    • Hypothyroidism    • Inverted T wave    • Osteoarthritis    • Parathyroid disease (CMS/[facility])    • PONV (postoperative nausea and vomiting)    • PTSD (post-traumatic stress disorder)    • Sleep apnea          PAST SURGICAL HISTORY  Past Surgical History:   Procedure Laterality Date   • COLON SURGERY     • COLONOSCOPY  07/2014   • COLONOSCOPY N/A 12/24/2020    Procedure: COLONOSCOPY;  Surgeon: Geronimo Branch Jr., MD;  Location: [facility] ENDOSCOPY;  Service: General;  Laterality: N/A;  colon obstruction  post:   colon obstruction from external compression   • ENDOSCOPY      several years ago, normal   • EXPLORATORY LAPAROTOMY N/A 12/21/2020    Procedure: LAPAROTOMY EXPLORATORY WITH LOOP COLOSTOMY;  Surgeon: Geronimo Branch Jr., MD;  Location: [facility] OR;  Service: General;  Laterality: N/A;   • HEMORRHOIDECTOMY     • THYROIDECTOMY Left 2/3/2017    Procedure: Left inferior parathyroidectomy with intraoperative PTH monitoring;  Surgeon: Sarah Matos MD;  Location: [facility] OR;  Service:    • WRIST FUSION Right          FAMILY HISTORY  Family History   Problem Relation Age of Onset   • Breast cancer Mother    • Heart disease Father    • Hypertension Father    • Colon cancer Sister          SOCIAL HISTORY  Social History     Socioeconomic History   • Marital status:       Spouse name: Not on file   • Number of children: Not on file   • Years of education: Not on file   • Highest education level: Not on file   Tobacco Use   • Smoking status: Never Smoker   • Smokeless tobacco: Never Used   Vaping Use   • Vaping Use: Never used   Substance and Sexual Activity   • Alcohol use: Yes     Comment: 1 DAILY DRINK   • Drug use: No   • Sexual activity: Defer         ALLERGIES  Codeine        REVIEW OF SYSTEMS  Review of Systems   All systems reviewed and marked as negative except as listed in HPI       PHYSICAL EXAM    I have reviewed the triage vital signs and nursing notes.    ED Triage Vitals [06/11/21 1833]   Temp Heart Rate Resp BP SpO2   98.1 °F (36.7 °C) 72 16 139/79 98 %      Temp src Heart Rate Source Patient Position BP Location FiO2 (%)   -- -- -- -- --       GENERAL: alert well developed, well nourished in no distress  HENT: NCAT, neck supple, trachea midline  EYES: no scleral icterus, PERRL, normal conjunctivae  CV: regular rhythm, regular rate, no murmur  RESPIRATORY: unlabored effort, CTAB  ABDOMEN: soft, nontender, nondistended, bowel sounds present. Scarring to midline abdomen otherwise unremarkable.   MUSCULOSKELETAL: no gross deformity  NEURO: alert,  sensory and motor function of extremities grossly intact, speech clear, mental status normal/baseline  SKIN: warm, dry, no rash  PSYCH:  Appropriate mood and affect    Vital signs and nursing notes reviewed.          LAB RESULTS  Recent Results (from the past 24 hour(s))   ECG 12 Lead    Collection Time: 06/11/21  7:07 PM   Result Value Ref Range    QT Interval 398 ms       Ordered the above labs and independently reviewed the results.        RADIOLOGY  XR Chest 1 View    Result Date: 6/11/2021  PORTABLE CHEST 06/11/2021 AT 7:08 PM  CLINICAL HISTORY: Dizziness  The lungs are well-expanded and appear free of infiltrates or masses. There are no pleural effusions. The heart is normal in size. A Mediport device is in place in  satisfactory position in the right subclavian vein.  IMPRESSIONS: No evidence of active disease within the chest.  This report was finalized on 6/11/2021 7:26 PM by Dr. Fady Piña M.D.        I ordered the above noted radiological studies. Independently reviewed by me and discussed with radiologist.  See dictation above for official radiology interpretation.      PROCEDURES    Procedures        MEDICATIONS GIVEN IN ER    Medications   sodium chloride 0.9 % bolus 1,000 mL (1,000 mL Intravenous New Bag 6/11/21 1958)   ondansetron (ZOFRAN) injection 4 mg (4 mg Intravenous Given 6/11/21 1959)         PROGRESS, DATA ANALYSIS, CONSULTS, AND MEDICAL DECISION MAKING    All labs have been independently reviewed by me.  All radiology studies have been reviewed by me.   EKG's independently reviewed by me.  Discussion below represents my analysis of pertinent findings related to patient's condition, differential diagnosis, treatment plan and final disposition.    DIFFERENTIAL DIAGNOSIS INCLUDE BUT NOT LIMITED TO: Vasovagal episode, orthostasis, arrhythmia, seizure, pseudo seizure, dehydration, kevin, electrolyte disturbance, CVA, TIA , PE       ED Course as of Jun 11 2002 Fri Jun 11, 2021   1900 Nurse reports patient requests port access.  IV nurse has been paged.    [JS]   2001 I viewed chest x-ray on PACS system.  My findings are no cardiomegaly.    [JS]   2001 See Dr. Zimmerman note for additional ED care and disposition.    [JS]      ED Course User Index  [JS] Dora Cabello APRN         Pt masked in first look. I wore appropriate PPE throughout my encounters with the pt. I performed hand hygiene on entry into the pt room and upon exit.     Dictated utilizing Dragon dictation:  Much of this encounter note is an electronic transcription/translation of spoken language to printed text. The electronic translation of spoken language may permit erroneous, or at times, nonsensical words or phrases to be inadvertently  transcribed; Although I have reviewed the note for such errors, some may still exist.     Dora Cabello, MONIKA  06/11/21 2002

## 2021-06-11 NOTE — ED NOTES
Pt requests that we access his port instead of doing peripheral line. IV RN paged at this time.      Wendy Grace, RN  06/11/21 [id number]

## 2021-06-11 NOTE — ED TRIAGE NOTES
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 Pt has pancreatic cancer and is receiving chemo tx.   Pt had his 7th (out of 12) treatment this morning. Pt is alert and oriented at this time. VSS at this time. Pt denies any pain at this time. Pt did not fall, he was assisted to the floor by a person he was walking with. Pt did not hit his head. Pt placed in mask during triage. This RN wearing proper PPE, mask and glasses, during pt encounter. Hand hygiene performed before and after pt encounter.

## 2021-06-11 NOTE — ED NOTES
Report received from JUSTYNA Nguyen. Pt awaiting IV therapy for port access. FM at bedside.     Esmer Bennett RN  06/11/21 [id number]

## 2021-06-12 PROBLEM — D72.829 LEUKOCYTOSIS: Status: ACTIVE | Noted: 2021-06-12

## 2021-06-12 LAB
ANION GAP SERPL CALCULATED.3IONS-SCNC: 13.9 MMOL/L (ref 5–15)
BACTERIA UR QL AUTO: ABNORMAL /HPF
BILIRUB UR QL STRIP: NEGATIVE
BUN SERPL-MCNC: 25 MG/DL (ref 8–23)
BUN/CREAT SERPL: 26.3 (ref 7–25)
CALCIUM SPEC-SCNC: 8.9 MG/DL (ref 8.6–10.5)
CHLORIDE SERPL-SCNC: 106 MMOL/L (ref 98–107)
CLARITY UR: ABNORMAL
CO2 SERPL-SCNC: 19.1 MMOL/L (ref 22–29)
COLOR UR: YELLOW
CREAT SERPL-MCNC: 0.95 MG/DL (ref 0.76–1.27)
D-LACTATE SERPL-SCNC: 3.4 MMOL/L (ref 0.5–2)
DEPRECATED RDW RBC AUTO: 71.2 FL (ref 37–54)
DEPRECATED RDW RBC AUTO: 71.7 FL (ref 37–54)
ERYTHROCYTE [DISTWIDTH] IN BLOOD BY AUTOMATED COUNT: 23.9 % (ref 12.3–15.4)
ERYTHROCYTE [DISTWIDTH] IN BLOOD BY AUTOMATED COUNT: 23.9 % (ref 12.3–15.4)
GFR SERPL CREATININE-BSD FRML MDRD: 78 ML/MIN/1.73
GLUCOSE SERPL-MCNC: 103 MG/DL (ref 65–99)
GLUCOSE UR STRIP-MCNC: NEGATIVE MG/DL
GRAN CASTS URNS QL MICRO: ABNORMAL /LPF
HCT VFR BLD AUTO: 29.7 % (ref 37.5–51)
HCT VFR BLD AUTO: 31.3 % (ref 37.5–51)
HGB BLD-MCNC: 10.5 G/DL (ref 13–17.7)
HGB BLD-MCNC: 9.8 G/DL (ref 13–17.7)
HGB UR QL STRIP.AUTO: NEGATIVE
HYALINE CASTS UR QL AUTO: ABNORMAL /LPF
KETONES UR QL STRIP: ABNORMAL
LEUKOCYTE ESTERASE UR QL STRIP.AUTO: ABNORMAL
LYMPHOCYTES # BLD MANUAL: 5.95 10*3/MM3 (ref 0.7–3.1)
LYMPHOCYTES NFR BLD MANUAL: 5 % (ref 19.6–45.3)
MCH RBC QN AUTO: 28.1 PG (ref 26.6–33)
MCH RBC QN AUTO: 28.2 PG (ref 26.6–33)
MCHC RBC AUTO-ENTMCNC: 33 G/DL (ref 31.5–35.7)
MCHC RBC AUTO-ENTMCNC: 33.5 G/DL (ref 31.5–35.7)
MCV RBC AUTO: 84.1 FL (ref 79–97)
MCV RBC AUTO: 85.1 FL (ref 79–97)
NEUTROPHILS # BLD AUTO: 113 10*3/MM3 (ref 1.7–7)
NEUTROPHILS NFR BLD MANUAL: 95 % (ref 42.7–76)
NITRITE UR QL STRIP: NEGATIVE
PH UR STRIP.AUTO: 6.5 [PH] (ref 5–8)
PLAT MORPH BLD: NORMAL
PLATELET # BLD AUTO: 333 10*3/MM3 (ref 140–450)
PLATELET # BLD AUTO: 350 10*3/MM3 (ref 140–450)
PMV BLD AUTO: 11.2 FL (ref 6–12)
PMV BLD AUTO: 11.4 FL (ref 6–12)
POTASSIUM SERPL-SCNC: 3.7 MMOL/L (ref 3.5–5.2)
PROCALCITONIN SERPL-MCNC: 0.19 NG/ML (ref 0–0.25)
PROT UR QL STRIP: ABNORMAL
RBC # BLD AUTO: 3.49 10*6/MM3 (ref 4.14–5.8)
RBC # BLD AUTO: 3.72 10*6/MM3 (ref 4.14–5.8)
RBC # UR: ABNORMAL /HPF
RBC MORPH BLD: NORMAL
REF LAB TEST METHOD: ABNORMAL
SARS-COV-2 ORF1AB RESP QL NAA+PROBE: NOT DETECTED
SODIUM SERPL-SCNC: 139 MMOL/L (ref 136–145)
SP GR UR STRIP: 1.02 (ref 1–1.03)
SQUAMOUS #/AREA URNS HPF: ABNORMAL /HPF
TSH SERPL DL<=0.05 MIU/L-ACNC: 0.43 UIU/ML (ref 0.27–4.2)
UROBILINOGEN UR QL STRIP: ABNORMAL
WBC # BLD AUTO: 118.95 10*3/MM3 (ref 3.4–10.8)
WBC # BLD AUTO: 124.73 10*3/MM3 (ref 3.4–10.8)
WBC MORPH BLD: NORMAL
WBC UR QL AUTO: ABNORMAL /HPF

## 2021-06-12 PROCEDURE — 83605 ASSAY OF LACTIC ACID: CPT | Performed by: EMERGENCY MEDICINE

## 2021-06-12 PROCEDURE — 84443 ASSAY THYROID STIM HORMONE: CPT | Performed by: NURSE PRACTITIONER

## 2021-06-12 PROCEDURE — 25010000003 CEFTRIAXONE PER 250 MG: Performed by: NURSE PRACTITIONER

## 2021-06-12 PROCEDURE — 99222 1ST HOSP IP/OBS MODERATE 55: CPT | Performed by: INTERNAL MEDICINE

## 2021-06-12 PROCEDURE — 85025 COMPLETE CBC W/AUTO DIFF WBC: CPT | Performed by: NURSE PRACTITIONER

## 2021-06-12 PROCEDURE — 99223 1ST HOSP IP/OBS HIGH 75: CPT | Performed by: INTERNAL MEDICINE

## 2021-06-12 PROCEDURE — 85027 COMPLETE CBC AUTOMATED: CPT | Performed by: NURSE PRACTITIONER

## 2021-06-12 PROCEDURE — 84145 PROCALCITONIN (PCT): CPT | Performed by: NURSE PRACTITIONER

## 2021-06-12 PROCEDURE — 80048 BASIC METABOLIC PNL TOTAL CA: CPT | Performed by: NURSE PRACTITIONER

## 2021-06-12 PROCEDURE — 85007 BL SMEAR W/DIFF WBC COUNT: CPT | Performed by: NURSE PRACTITIONER

## 2021-06-12 RX ORDER — ATORVASTATIN CALCIUM 20 MG/1
10 TABLET, FILM COATED ORAL DAILY
Status: DISCONTINUED | OUTPATIENT
Start: 2021-06-12 | End: 2021-06-14 | Stop reason: HOSPADM

## 2021-06-12 RX ORDER — ALLOPURINOL 100 MG/1
100 TABLET ORAL DAILY
Status: DISCONTINUED | OUTPATIENT
Start: 2021-06-12 | End: 2021-06-14 | Stop reason: HOSPADM

## 2021-06-12 RX ORDER — PANTOPRAZOLE SODIUM 40 MG/1
40 TABLET, DELAYED RELEASE ORAL EVERY MORNING
Refills: 1 | Status: DISCONTINUED | OUTPATIENT
Start: 2021-06-12 | End: 2021-06-14 | Stop reason: HOSPADM

## 2021-06-12 RX ORDER — LAMOTRIGINE 25 MG/1
50 TABLET ORAL EVERY MORNING
Status: DISCONTINUED | OUTPATIENT
Start: 2021-06-12 | End: 2021-06-14 | Stop reason: HOSPADM

## 2021-06-12 RX ORDER — BUSPIRONE HYDROCHLORIDE 15 MG/1
30 TABLET ORAL DAILY
Status: DISCONTINUED | OUTPATIENT
Start: 2021-06-12 | End: 2021-06-14 | Stop reason: HOSPADM

## 2021-06-12 RX ORDER — LAMOTRIGINE 100 MG/1
100 TABLET ORAL NIGHTLY
Status: DISCONTINUED | OUTPATIENT
Start: 2021-06-12 | End: 2021-06-14 | Stop reason: HOSPADM

## 2021-06-12 RX ORDER — MULTIVIT WITH MINERALS/LUTEIN
1000 TABLET ORAL NIGHTLY
COMMUNITY
End: 2022-06-27

## 2021-06-12 RX ORDER — LEVOTHYROXINE SODIUM 0.12 MG/1
125 TABLET ORAL
Status: DISCONTINUED | OUTPATIENT
Start: 2021-06-12 | End: 2021-06-14 | Stop reason: HOSPADM

## 2021-06-12 RX ORDER — PROCHLORPERAZINE MALEATE 10 MG
10 TABLET ORAL EVERY 6 HOURS PRN
Status: DISCONTINUED | OUTPATIENT
Start: 2021-06-12 | End: 2021-06-14 | Stop reason: HOSPADM

## 2021-06-12 RX ORDER — CEFTRIAXONE SODIUM 2 G/50ML
2 INJECTION, SOLUTION INTRAVENOUS EVERY 24 HOURS
Status: DISCONTINUED | OUTPATIENT
Start: 2021-06-12 | End: 2021-06-13

## 2021-06-12 RX ORDER — SERTRALINE HYDROCHLORIDE 100 MG/1
200 TABLET, FILM COATED ORAL EVERY MORNING
Status: DISCONTINUED | OUTPATIENT
Start: 2021-06-12 | End: 2021-06-14 | Stop reason: HOSPADM

## 2021-06-12 RX ORDER — LAMOTRIGINE 25 MG/1
50 TABLET ORAL EVERY MORNING
COMMUNITY
End: 2022-04-25

## 2021-06-12 RX ORDER — DIPHENOXYLATE HYDROCHLORIDE AND ATROPINE SULFATE 2.5; .025 MG/1; MG/1
1 TABLET ORAL 4 TIMES DAILY PRN
Status: DISCONTINUED | OUTPATIENT
Start: 2021-06-12 | End: 2021-06-14 | Stop reason: HOSPADM

## 2021-06-12 RX ADMIN — ALLOPURINOL 100 MG: 100 TABLET ORAL at 09:12

## 2021-06-12 RX ADMIN — CEFTRIAXONE SODIUM 2 G: 2 INJECTION, SOLUTION INTRAVENOUS at 02:57

## 2021-06-12 RX ADMIN — SODIUM CHLORIDE 100 ML/HR: 9 INJECTION, SOLUTION INTRAVENOUS at 00:43

## 2021-06-12 RX ADMIN — BUSPIRONE HYDROCHLORIDE 30 MG: 15 TABLET ORAL at 09:12

## 2021-06-12 RX ADMIN — DIPHENOXYLATE HYDROCHLORIDE AND ATROPINE SULFATE 1 TABLET: 2.5; .025 TABLET ORAL at 20:42

## 2021-06-12 RX ADMIN — DIPHENOXYLATE HYDROCHLORIDE AND ATROPINE SULFATE 1 TABLET: 2.5; .025 TABLET ORAL at 13:47

## 2021-06-12 RX ADMIN — SERTRALINE 200 MG: 100 TABLET, FILM COATED ORAL at 09:12

## 2021-06-12 RX ADMIN — PANTOPRAZOLE SODIUM 40 MG: 40 TABLET, DELAYED RELEASE ORAL at 09:12

## 2021-06-12 RX ADMIN — LAMOTRIGINE 100 MG: 100 TABLET ORAL at 20:42

## 2021-06-12 RX ADMIN — SODIUM CHLORIDE 500 ML: 9 INJECTION, SOLUTION INTRAVENOUS at 03:28

## 2021-06-12 RX ADMIN — LAMOTRIGINE 50 MG: 25 TABLET ORAL at 09:12

## 2021-06-12 RX ADMIN — LEVOTHYROXINE SODIUM 125 MCG: 0.12 TABLET ORAL at 09:12

## 2021-06-12 RX ADMIN — ATORVASTATIN CALCIUM 10 MG: 20 TABLET, FILM COATED ORAL at 09:12

## 2021-06-12 NOTE — H&P
Patient Name:  Kaiser Valente  YOB: 1950  MRN:  [id number]  Admit Date:  6/11/2021  Patient Care Team:  Jared Noriega MD as PCP - General (Family Medicine)  Armani Painting MD as Consulting Physician (Hematology and Oncology)  Yousuf Greene MD as Consulting Physician (Endocrinology)  Be Iraheta MD as Referring Physician (Internal Medicine)      Subjective   History Present Illness     Chief Complaint   Patient presents with   • Syncope       Mr. Valente is a 71 y.o. non-smoker with a history of pancreatic adenocarcinoma, hypertension, hyperlipidemia, hypothyroidism, thrombocytopenia, depression, and GERD that presents to [facility] complaining of a syncopal episode.  He reports he was leaving a [employer] class yesterday evening when he had a sudden onset dizziness.  He states someone in the parking lot assisted him to the ground and he then lost consciousness.  He states when he woke up, he was inside the building and he had lost control of his bowels.  He denies chest pain, shortness of breath, headache, fever, and chills.  He denies urinary symptoms.  He reports intermittent dizziness since he began chemotherapy in March.  He denies a history of syncope.  Work up in the ED revealed WBC 65.56 and urinalysis shows 1+ bacteria, WBCs, and trace leukocytes.       History of Present Illness  Review of Systems     Personal History     Past Medical History:   Diagnosis Date   • Anxiety    • Depression    • GERD (gastroesophageal reflux disease)    • Hypercalcemia    • Hypothyroidism    • Inverted T wave     PT HAD STRESS ECHO DONE 4 YR AGO AND WAS NORMAL   • Osteoarthritis     Knees and ankles   • Parathyroid disease (CMS/HCC)    • PONV (postoperative nausea and vomiting)    • PTSD (post-traumatic stress disorder)    • Sleep apnea     NO MACHINE     Past Surgical History:   Procedure Laterality Date   • COLON SURGERY     • COLONOSCOPY  07/2014   • COLONOSCOPY N/A  12/24/2020    Procedure: COLONOSCOPY;  Surgeon: Geronimo Branch Jr., MD;  Location: [facility] ENDOSCOPY;  Service: General;  Laterality: N/A;  colon obstruction  post:   colon obstruction from external compression   • ENDOSCOPY      several years ago, normal   • EXPLORATORY LAPAROTOMY N/A 12/21/2020    Procedure: LAPAROTOMY EXPLORATORY WITH LOOP COLOSTOMY;  Surgeon: Geronimo Branch Jr., MD;  Location: [facility] MAIN OR;  Service: General;  Laterality: N/A;   • HEMORRHOIDECTOMY     • THYROIDECTOMY Left 2/3/2017    Procedure: Left inferior parathyroidectomy with intraoperative PTH monitoring;  Surgeon: Sarah Matos MD;  Location: [facility] MAIN OR;  Service:    • WRIST FUSION Right      Family History   Problem Relation Age of Onset   • Breast cancer Mother    • Heart disease Father    • Hypertension Father    • Colon cancer Sister      Social History     Tobacco Use   • Smoking status: Never Smoker   • Smokeless tobacco: Never Used   Vaping Use   • Vaping Use: Never used   Substance Use Topics   • Alcohol use: Yes     Comment: 1 DAILY DRINK   • Drug use: No     Medications Prior to Admission   Medication Sig Dispense Refill Last Dose   • allopurinol (ZYLOPRIM) 100 MG tablet Take 1 tablet by mouth Daily. 90 tablet 1 6/11/2021 at Unknown time   • ascorbic acid (VITAMIN C) 1000 MG tablet Take 1,000 mg by mouth Every Night.      • atorvastatin (LIPITOR) 10 MG tablet Take 1 tablet by mouth Daily. 90 tablet 1 6/11/2021 at Unknown time   • busPIRone (BUSPAR) 10 MG tablet Take 30 mg by mouth Daily.   6/11/2021 at Unknown time   • dexamethasone (DECADRON) 4 MG tablet TAKE 1 TABLET BY MOUTH TWICE DAILY WITH FOOD ON DAYS 2 AND 3 AFTER CHEMOTHERAPY AS DIRECTED      • Diclofenac Sodium (VOLTAREN EX) Apply 1 application topically Daily As Needed. Bilateral knees      • diphenoxylate-atropine (LOMOTIL) 2.5-0.025 MG per tablet TAKE 1 TABLET BY MOUTH THREE TIMES DAILY AS NEEDED FOR LOOSE STOOL      • lamoTRIgine (LaMICtal) 100 MG tablet  "Take 100 mg by mouth Every Night.      • lamoTRIgine (LaMICtal) 25 MG tablet Take 50 mg by mouth Every Morning.      • levothyroxine (SYNTHROID, LEVOTHROID) 125 MCG tablet Take 1 tablet by mouth Daily. 90 tablet 1 6/11/2021 at Unknown time   • magnesium oxide (MAG-OX) 400 MG tablet Take 1 tablet by mouth 2 (Two) Times a Day.   6/11/2021 at Unknown time   • Omega-3 Fatty Acids (FISH OIL) 1000 MG capsule capsule Take 2,000 mg by mouth 2 (Two) Times a Day With Meals. HOLD PRIOR TO SURG      • omeprazole (priLOSEC) 20 MG capsule Take 1 capsule by mouth Daily. (Patient taking differently: Take 40 mg by mouth Daily.) 90 capsule 1    • ondansetron (ZOFRAN) 8 MG tablet Take 8 mg by mouth Every 8 (Eight) Hours As Needed.      • potassium chloride (K-DUR,KLOR-CON) 20 MEQ CR tablet Take 20 mEq by mouth Daily.      • prochlorperazine (COMPAZINE) 10 MG tablet Take 10 mg by mouth Every 6 (Six) Hours As Needed.      • sertraline (ZOLOFT) 100 MG tablet Take 200 mg by mouth Every Morning.      • vitamin D (ERGOCALCIFEROL) 1.25 MG (92633 UT) capsule capsule Take 1 capsule by mouth 2 (Two) Times a Week. (Patient taking differently: Take 50,000 Units by mouth 2 (Two) Times a Week. Wednesday and Saturday nights) 26 capsule 2    • Syringe/Needle, Disp, (B-D 3CC LUER-JACY SYR 23GX1\") 23G X 1\" 3 ML misc For testosterone injection once every 10 days. 10 each 2      Allergies:    Allergies   Allergen Reactions   • Codeine Nausea Only     nausea       Objective    Objective     Vital Signs  Temp:  [98.1 °F (36.7 °C)-98.3 °F (36.8 °C)] 98.3 °F (36.8 °C)  Heart Rate:  [66-78] 73  Resp:  [16-20] 20  BP: (121-168)/() 168/97  SpO2:  [97 %-100 %] 99 %  on   ;   Device (Oxygen Therapy): room air  Body mass index is 25.43 kg/m².    Physical Exam    Results Review:  I reviewed the patient's new clinical results.  I reviewed the patient's new imaging results and agree with the interpretation.  I reviewed the patient's other test results and agree " with the interpretation  I personally viewed and interpreted the patient's EKG/Telemetry data  Discussed with ED provider.    Lab Results (last 24 hours)     Procedure Component Value Units Date/Time    CBC & Differential [[id number]]  (Abnormal) Collected: 06/11/21 1957    Specimen: Blood Updated: 06/11/21 2100    Narrative:      The following orders were created for panel order CBC & Differential.  Procedure                               Abnormality         Status                     ---------                               -----------         ------                     CBC Auto Differential[[id number]]        Abnormal            Final result                 Please view results for these tests on the individual orders.    Comprehensive Metabolic Panel [[id number]]  (Abnormal) Collected: 06/11/21 1957    Specimen: Blood Updated: 06/11/21 2037     Glucose 114 mg/dL      BUN 29 mg/dL      Creatinine 1.07 mg/dL      Sodium 138 mmol/L      Potassium 3.6 mmol/L      Comment: Slight hemolysis detected by analyzer. Results may be affected.        Chloride 102 mmol/L      CO2 21.3 mmol/L      Calcium 9.3 mg/dL      Total Protein 6.8 g/dL      Albumin 4.00 g/dL      ALT (SGPT) 28 U/L      AST (SGOT) 32 U/L      Comment: Slight hemolysis detected by analyzer. Results may be affected.        Alkaline Phosphatase 142 U/L      Total Bilirubin 0.2 mg/dL      eGFR Non African Amer 68 mL/min/1.73      Globulin 2.8 gm/dL      A/G Ratio 1.4 g/dL      BUN/Creatinine Ratio 27.1     Anion Gap 14.7 mmol/L     Narrative:      GFR Normal >60  Chronic Kidney Disease <60  Kidney Failure <15      Troponin [[id number]]  (Normal) Collected: 06/11/21 1957    Specimen: Blood Updated: 06/11/21 2028     Troponin T <0.010 ng/mL     Narrative:      Troponin T Reference Range:  <= 0.03 ng/mL-   Negative for AMI  >0.03 ng/mL-     Abnormal for myocardial necrosis.  Clinicians would have to utilize clinical acumen, EKG, Troponin and serial changes to  determine if it is an Acute Myocardial Infarction or myocardial injury due to an underlying chronic condition.       Results may be falsely decreased if patient taking Biotin.      BNP [[id number]]  (Normal) Collected: 06/11/21 1957    Specimen: Blood Updated: 06/11/21 2026     proBNP 635.3 pg/mL     Narrative:      Among patients with dyspnea, NT-proBNP is highly sensitive for the detection of acute congestive heart failure. In addition NT-proBNP of <300 pg/ml effectively rules out acute congestive heart failure with 99% negative predictive value.    Results may be falsely decreased if patient taking Biotin.      Magnesium [[id number]]  (Normal) Collected: 06/11/21 1957    Specimen: Blood Updated: 06/11/21 2028     Magnesium 1.9 mg/dL     CBC Auto Differential [[id number]]  (Abnormal) Collected: 06/11/21 1957    Specimen: Blood Updated: 06/11/21 2100     WBC 65.56 10*3/mm3      RBC 3.88 10*6/mm3      Hemoglobin 10.7 g/dL      Hematocrit 31.9 %      MCV 82.2 fL      MCH 27.6 pg      MCHC 33.5 g/dL      RDW 23.7 %      RDW-SD 69.3 fl      MPV 10.6 fL      Platelets 388 10*3/mm3     Manual Differential [[id number]]  (Abnormal) Collected: 06/11/21 1957    Specimen: Blood Updated: 06/11/21 2100     Neutrophil % 91.9 %      Lymphocyte % 6.1 %      Monocyte % 1.0 %      Basophil % 1.0 %      Neutrophils Absolute 60.25 10*3/mm3      Lymphocytes Absolute 4.00 10*3/mm3      Monocytes Absolute 0.66 10*3/mm3      Basophils Absolute 0.66 10*3/mm3      Diaz-[place] Bodies Present     Poikilocytes Slight/1+     Target Cells Slight/1+     WBC Morphology Normal     Platelet Morphology Normal    Blood Culture - Blood, Arm, Right [[id number]] Collected: 06/11/21 2237    Specimen: Blood from Arm, Right Updated: 06/11/21 2242    Blood Culture - Blood, Arm, Left [[id number]] Collected: 06/11/21 2237    Specimen: Blood from Arm, Left Updated: 06/11/21 2242    Lactic Acid, Plasma [[id number]]  (Abnormal) Collected: 06/11/21 2237    Specimen:  Blood Updated: 06/11/21 2313     Lactate 3.9 mmol/L     COVID PRE-OP / PRE-PROCEDURE SCREENING ORDER (NO ISOLATION) - Swab, Nasopharynx [[id number]] Collected: 06/11/21 2308    Specimen: Swab from Nasopharynx Updated: 06/12/21 0026    Narrative:      The following orders were created for panel order COVID PRE-OP / PRE-PROCEDURE SCREENING ORDER (NO ISOLATION) - Swab, Nasopharynx.  Procedure                               Abnormality         Status                     ---------                               -----------         ------                     COVID-19,APTIMA PANTHER,...[[id number]]                      In process                   Please view results for these tests on the individual orders.    COVID-19,APTIMA PANTHER,CANDICE IN-HOUSE, NP/OP SWAB IN UTM/VTM/SALINE TRANSPORT MEDIA,24 HR TAT - Swab, Nasopharynx [[id number]] Collected: 06/11/21 2308    Specimen: Swab from Nasopharynx Updated: 06/12/21 0026    Urinalysis With Microscopic If Indicated (No Culture) - Urine, Clean Catch [[id number]]  (Abnormal) Collected: 06/11/21 2309    Specimen: Urine, Clean Catch Updated: 06/12/21 0000     Color, UA Yellow     Appearance, UA Cloudy     pH, UA 6.5     Specific Gravity, UA 1.021     Glucose, UA Negative     Ketones, UA Trace     Bilirubin, UA Negative     Blood, UA Negative     Protein, UA 30 mg/dL (1+)     Leuk Esterase, UA Trace     Nitrite, UA Negative     Urobilinogen, UA 0.2 E.U./dL    Urinalysis, Microscopic Only - Urine, Clean Catch [[id number]]  (Abnormal) Collected: 06/11/21 2309    Specimen: Urine, Clean Catch Updated: 06/12/21 0025     RBC, UA None Seen /HPF      WBC, UA 3-5 /HPF      Bacteria, UA 1+ /HPF      Squamous Epithelial Cells, UA 0-2 /HPF      Hyaline Casts, UA 13-20 /LPF      Granular Casts, UA 0-2 /LPF      Methodology Manual Light Microscopy    Timed Lactic Acid, Reflex [[id number]]  (Abnormal) Collected: 06/12/21 0134    Specimen: Blood Updated: 06/12/21 0207     Lactate 3.4 mmol/L            Imaging Results (Last 24 Hours)     Procedure Component Value Units Date/Time    XR Chest 1 View [[id number]] Collected: 06/11/21 1924     Updated: 06/11/21 1929    Narrative:      PORTABLE CHEST 06/11/2021 AT 7:08 PM     CLINICAL HISTORY: Dizziness     The lungs are well-expanded and appear free of infiltrates or masses.  There are no pleural effusions. The heart is normal in size. A Mediport  device is in place in satisfactory position in the right subclavian  vein.     IMPRESSIONS: No evidence of active disease within the chest.     This report was finalized on 6/11/2021 7:26 PM by Dr. Fady Piña M.D.                 ECG 12 Lead   Preliminary Result   HEART RATE= 67  bpm   RR Interval= 892  ms   NE Interval= 157  ms   P Horizontal Axis= 14  deg   P Front Axis= 54  deg   QRSD Interval= 88  ms   QT Interval= 398  ms   QRS Axis= 89  deg   T Wave Axis= 38  deg   - BORDERLINE ECG -   Sinus rhythm   Consider right ventricular hypertrophy   Borderline T abnormalities, anterior leads   Electronically Signed By:    Date and Time of Study: 2021-06-11 19:07:19           Assessment/Plan     Active Hospital Problems    Diagnosis  POA   • **Syncope [R55]  Yes   • Leukocytosis [D72.829]  Unknown   • Pancreatic adenocarcinoma (CMS/HCC) [C25.9]  Yes     Stage 2 .... Pancreatic Tail  Followed by Dr Torres and [facility].      • Benign essential HTN [I10]  Yes   • Recurrent major depressive disorder, in partial remission (CMS/HCC) [F33.41]  Yes   • Thrombocytopenia (CMS/HCC) [D69.6]  Yes   • Acquired hypothyroidism [E03.9]  Yes   • Mixed hyperlipidemia [E78.2]  Yes   • Gastroesophageal reflux disease without esophagitis [K21.9]  Yes       Syncope  -Admit to a telemetry unit for monitoring  -Check echo  -Cardiology consult  -Check orthostatics    Pancreatic Adenocarcinoma  -S/p resection of pancreatic mass  -He is currently receiving chemotherapy at [facility]  -Oncology  consult    Leukocytosis/Thrombocytopenia  -His WBC was 13 in January  -Urinalysis shows 1+ bacteria and trace leukocytes. Elevated lactate, but he has been afebrile. Will initiate Rocephin 2 gm daily  -Blood cultures pending  -Check procal  -Infectious disease consult  -Platelets stable    Hypertension  -Blood pressures stable.  He does not appear to be on any antihypertensive medications at this time  -Monitor    Hypothyroidism  -Check TSH  -Continue Levothyroxine    Hyperlipidemia  -Continue statin    GERD/Depression  -Continue home medications    -I discussed the patients findings and my recommendations with patient.    VTE Prophylaxis - SCDs.  Code Status - Full code.       MONIKA Gagnon  [facility]  06/12/21  04:18 EDT

## 2021-06-12 NOTE — ED PROVIDER NOTES
MD ATTESTATION NOTE    The TORIBIO and I have discussed this patient's history, physical exam, and treatment plan.  I have reviewed the documentation and personally had a face to face interaction with the patient. I affirm the documentation and agree with the treatment and plan.  The attached note describes my personal findings.      Kaiser Valente is a 71 y.o. male who presents to the ED c/o having a syncopal episode tonight.  He reports that he was at the [facility] walking up to the parking lot when he suddenly felt dizzy.  He reports that he sat down on the curb and then passed out.  He woke up inside the building because bystanders had carried him in.  He then vomited.  He had soiled himself.  He denies prior similar episodes.  He reports he started his round of chemotherapy yesterday.  He reports no chest pain or shortness of breath.  He denies headache.  Nothing makes his symptoms worse or better.  He denies pain.  He denies weakness or numbness.  He had no chest pain or shortness of breath.      On exam:  GENERAL: Awake, alert, no acute distress  SKIN: Warm, dry  HENT: Normocephalic, atraumatic  EYES: no scleral icterus  CV: regular rhythm, regular rate  RESPIRATORY: normal effort, lungs clear  ABDOMEN: soft, non-tender, non-distended  MUSCULOSKELETAL: no deformity no calf tenderness or swelling  NEURO: alert, moves all extremities, follows commands    Labs  Recent Results (from the past 24 hour(s))   ECG 12 Lead    Collection Time: 06/11/21  7:07 PM   Result Value Ref Range    QT Interval 398 ms   Comprehensive Metabolic Panel    Collection Time: 06/11/21  7:57 PM    Specimen: Blood   Result Value Ref Range    Glucose 114 (H) 65 - 99 mg/dL    BUN 29 (H) 8 - 23 mg/dL    Creatinine 1.07 0.76 - 1.27 mg/dL    Sodium 138 136 - 145 mmol/L    Potassium 3.6 3.5 - 5.2 mmol/L    Chloride 102 98 - 107 mmol/L    CO2 21.3 (L) 22.0 - 29.0 mmol/L    Calcium 9.3 8.6 - 10.5 mg/dL    Total Protein 6.8 6.0 - 8.5  g/dL    Albumin 4.00 3.50 - 5.20 g/dL    ALT (SGPT) 28 1 - 41 U/L    AST (SGOT) 32 1 - 40 U/L    Alkaline Phosphatase 142 (H) 39 - 117 U/L    Total Bilirubin 0.2 0.0 - 1.2 mg/dL    eGFR Non African Amer 68 >60 mL/min/1.73    Globulin 2.8 gm/dL    A/G Ratio 1.4 g/dL    BUN/Creatinine Ratio 27.1 (H) 7.0 - 25.0    Anion Gap 14.7 5.0 - 15.0 mmol/L   Troponin    Collection Time: 06/11/21  7:57 PM    Specimen: Blood   Result Value Ref Range    Troponin T <0.010 0.000 - 0.030 ng/mL   BNP    Collection Time: 06/11/21  7:57 PM    Specimen: Blood   Result Value Ref Range    proBNP 635.3 0.0 - 900.0 pg/mL   Magnesium    Collection Time: 06/11/21  7:57 PM    Specimen: Blood   Result Value Ref Range    Magnesium 1.9 1.6 - 2.4 mg/dL   CBC Auto Differential    Collection Time: 06/11/21  7:57 PM    Specimen: Blood   Result Value Ref Range    WBC 65.56 (C) 3.40 - 10.80 10*3/mm3    RBC 3.88 (L) 4.14 - 5.80 10*6/mm3    Hemoglobin 10.7 (L) 13.0 - 17.7 g/dL    Hematocrit 31.9 (L) 37.5 - 51.0 %    MCV 82.2 79.0 - 97.0 fL    MCH 27.6 26.6 - 33.0 pg    MCHC 33.5 31.5 - 35.7 g/dL    RDW 23.7 (H) 12.3 - 15.4 %    RDW-SD 69.3 (H) 37.0 - 54.0 fl    MPV 10.6 6.0 - 12.0 fL    Platelets 388 140 - 450 10*3/mm3   Manual Differential    Collection Time: 06/11/21  7:57 PM    Specimen: Blood   Result Value Ref Range    Neutrophil % 91.9 (H) 42.7 - 76.0 %    Lymphocyte % 6.1 (L) 19.6 - 45.3 %    Monocyte % 1.0 (L) 5.0 - 12.0 %    Basophil % 1.0 0.0 - 1.5 %    Neutrophils Absolute 60.25 (H) 1.70 - 7.00 10*3/mm3    Lymphocytes Absolute 4.00 (H) 0.70 - 3.10 10*3/mm3    Monocytes Absolute 0.66 0.10 - 0.90 10*3/mm3    Basophils Absolute 0.66 (H) 0.00 - 0.20 10*3/mm3    Diaz-[place] Bodies Present None Seen    Poikilocytes Slight/1+ None Seen    Target Cells Slight/1+ None Seen    WBC Morphology Normal Normal    Platelet Morphology Normal Normal   Lactic Acid, Plasma    Collection Time: 06/11/21 10:37 PM    Specimen: Blood   Result Value Ref Range    Lactate  3.9 (C) 0.5 - 2.0 mmol/L   Urinalysis With Microscopic If Indicated (No Culture) - Urine, Clean Catch    Collection Time: 06/11/21 11:09 PM    Specimen: Urine, Clean Catch   Result Value Ref Range    Color, UA Yellow Yellow, Straw    Appearance, UA Cloudy (A) Clear    pH, UA 6.5 5.0 - 8.0    Specific Gravity, UA 1.021 1.005 - 1.030    Glucose, UA Negative Negative    Ketones, UA Trace (A) Negative    Bilirubin, UA Negative Negative    Blood, UA Negative Negative    Protein, UA 30 mg/dL (1+) (A) Negative    Leuk Esterase, UA Trace (A) Negative    Nitrite, UA Negative Negative    Urobilinogen, UA 0.2 E.U./dL 0.2 - 1.0 E.U./dL   Urinalysis, Microscopic Only - Urine, Clean Catch    Collection Time: 06/11/21 11:09 PM    Specimen: Urine, Clean Catch   Result Value Ref Range    RBC, UA None Seen None Seen, 0-2 /HPF    WBC, UA 3-5 (A) None Seen, 0-2 /HPF    Bacteria, UA 1+ (A) None Seen /HPF    Squamous Epithelial Cells, UA 0-2 None Seen, 0-2 /HPF    Hyaline Casts, UA 13-20 None Seen /LPF    Granular Casts, UA 0-2 None Seen /LPF    Methodology Manual Light Microscopy        Radiology  XR Chest 1 View    Result Date: 6/11/2021  PORTABLE CHEST 06/11/2021 AT 7:08 PM  CLINICAL HISTORY: Dizziness  The lungs are well-expanded and appear free of infiltrates or masses. There are no pleural effusions. The heart is normal in size. A Mediport device is in place in satisfactory position in the right subclavian vein.  IMPRESSIONS: No evidence of active disease within the chest.  This report was finalized on 6/11/2021 7:26 PM by Dr. Fady Piña M.D.        Medical Decision Making:  ED Course as of Jun 12 0034 Fri Jun 11, 2021 1900 Nurse reports patient requests port access.  IV nurse has been paged.    [JS]   2001 I viewed chest x-ray on PACS system.  My findings are no cardiomegaly.    [JS]   2001 See Dr. Zimmerman note for additional ED care and disposition.    [JS]   2128 EKG          EKG time: 1907  Rhythm/Rate: Normal sinus,  rate 67  P waves and MI: Normal P, normal MI  QRS, axis: Narrow QRS, normal axis  ST and T waves: Normal ST and T waves    Interpreted Contemporaneously by me, independently viewed  Resolved ST and T wave changes in the lateral leads changed compared to prior 1/31/2017      [TR]   2241 Speaking with Mine.  Will admit to A.    [TR]      ED Course User Index  [JS] Dora Cabello APRN  [TR] Erik Zimmerman MD       Plan to complete work-up.  Plan laboratory evaluation, chest x-ray, EKG.  He is certainly complicated case with multiple possible etiologies of his syncope.  He did have dizziness prior to the episode however he did not have abrupt postural change just prior.  He has no chest pain or shortness of breath to suggest a PE.    PPE: Both the patient and I wore a surgical mask throughout the entire patient encounter. I wore protective goggles.     Diagnosis  Final diagnoses:   Syncope, unspecified syncope type   Leukocytosis, unspecified type        Erik Zimmerman MD  06/11/21 2242       Erik Zimmerman MD  06/12/21 0034

## 2021-06-12 NOTE — PLAN OF CARE
Problem: Adult Inpatient Plan of Care  Goal: Plan of Care Review  Outcome: Ongoing, Progressing  Goal: Patient-Specific Goal (Individualized)  Outcome: Ongoing, Progressing  Goal: Absence of Hospital-Acquired Illness or Injury  Outcome: Ongoing, Progressing  Intervention: Identify and Manage Fall Risk  Recent Flowsheet Documentation  Taken 6/12/2021 0012 by Dora Duran RN  Safety Promotion/Fall Prevention:   nonskid shoes/slippers when out of bed   room organization consistent   safety round/check completed  Intervention: Prevent Skin Injury  Recent Flowsheet Documentation  Taken 6/12/2021 0012 by Dora Durna RN  Body Position: position changed independently  Intervention: Prevent and Manage VTE (venous thromboembolism) Risk  Recent Flowsheet Documentation  Taken 6/12/2021 0110 by Dora Duran RN  VTE Prevention/Management:   bilateral   sequential compression devices on  Taken 6/12/2021 0012 by Dora Duran RN  VTE Prevention/Management:   bilateral   sequential compression devices off  Intervention: Prevent Infection  Recent Flowsheet Documentation  Taken 6/12/2021 0012 by Dora Duran RN  Infection Prevention: rest/sleep promoted  Goal: Optimal Comfort and Wellbeing  Outcome: Ongoing, Progressing  Intervention: Provide Person-Centered Care  Recent Flowsheet Documentation  Taken 6/12/2021 0012 by Dora Duran RN  Trust Relationship/Rapport: care explained  Goal: Readiness for Transition of Care  Outcome: Ongoing, Progressing  Intervention: Mutually Develop Transition Plan  Recent Flowsheet Documentation  Taken 6/12/2021 0022 by Dora Duran RN  Transportation Anticipated: family or friend will provide  Patient/Family Anticipated Services at Transition: none  Patient/Family Anticipates Transition to: home with family  Taken 6/12/2021 0019 by Dora Duran RN  Equipment Currently Used at Home:   bath bench   cane, quad   walker, standard    wheelchair     Problem: Syncope  Goal: Absence of Syncopal Symptoms  Outcome: Ongoing, Progressing   Goal Outcome Evaluation:

## 2021-06-12 NOTE — ED NOTES
Call placed to [facility] to notify of lactic acid result.     Esmer Bennett RN  06/11/21 9240

## 2021-06-12 NOTE — ED NOTES
Nursing report ED to floor  [facility]  71 y.o.  male    HPI (triage note):   Chief Complaint   Patient presents with   • Syncope       Admitting doctor:   Be Barnett MD    Admitting diagnosis:   The primary encounter diagnosis was Syncope, unspecified syncope type. A diagnosis of Leukocytosis, unspecified type was also pertinent to this visit.    Code status:   Current Code Status     Date Active Code Status Order ID Comments User Context       6/11/2021 2259 CPR [id number]  Dora Do APRN ED     Advance Care Planning Activity      Questions for Current Code Status     Question Answer Comment    Code Status CPR     Medical Interventions (Level of Support Prior to Arrest) Full           Allergies:   Codeine    Weight:       06/11/21  1904   Weight: 93.9 kg (207 lb)       Most recent vitals:   Vitals:    06/11/21 2130 06/11/21 2145 06/11/21 2200 06/11/21 2230   BP: (!) 144/103  148/84    Patient Position:       Pulse: 67 67 66 69   Resp:       Temp:       SpO2: 100% 100% 99% 99%   Weight:       Height:           Active LDAs/IV Access:   Lines, Drains & Airways    Active LDAs     Name:   Placement date:   Placement time:   Site:   Days:    Colostomy LUQ   12/21/20 1954    LUQ   172    Single Lumen Implantable Port 01/01/21 Right Chest   01/01/21    0000    Chest   161                Labs (abnormal labs have a star):   Labs Reviewed   COMPREHENSIVE METABOLIC PANEL - Abnormal; Notable for the following components:       Result Value    Glucose 114 (*)     BUN 29 (*)     CO2 21.3 (*)     Alkaline Phosphatase 142 (*)     BUN/Creatinine Ratio 27.1 (*)     All other components within normal limits    Narrative:     GFR Normal >60  Chronic Kidney Disease <60  Kidney Failure <15     CBC WITH AUTO DIFFERENTIAL - Abnormal; Notable for the following components:    WBC 65.56 (*)     RBC 3.88 (*)     Hemoglobin 10.7 (*)     Hematocrit 31.9 (*)     RDW 23.7 (*)     RDW-SD 69.3 (*)     All other  components within normal limits   MANUAL DIFFERENTIAL - Abnormal; Notable for the following components:    Neutrophil % 91.9 (*)     Lymphocyte % 6.1 (*)     Monocyte % 1.0 (*)     Neutrophils Absolute 60.25 (*)     Lymphocytes Absolute 4.00 (*)     Basophils Absolute 0.66 (*)     All other components within normal limits   TROPONIN (IN-HOUSE) - Normal    Narrative:     Troponin T Reference Range:  <= 0.03 ng/mL-   Negative for AMI  >0.03 ng/mL-     Abnormal for myocardial necrosis.  Clinicians would have to utilize clinical acumen, EKG, Troponin and serial changes to determine if it is an Acute Myocardial Infarction or myocardial injury due to an underlying chronic condition.       Results may be falsely decreased if patient taking Biotin.     BNP (IN-HOUSE) - Normal    Narrative:     Among patients with dyspnea, NT-proBNP is highly sensitive for the detection of acute congestive heart failure. In addition NT-proBNP of <300 pg/ml effectively rules out acute congestive heart failure with 99% negative predictive value.    Results may be falsely decreased if patient taking Biotin.     MAGNESIUM - Normal   BLOOD CULTURE   BLOOD CULTURE   COVID PRE-OP / PRE-PROCEDURE SCREENING ORDER (NO ISOLATION)    Narrative:     The following orders were created for panel order COVID PRE-OP / PRE-PROCEDURE SCREENING ORDER (NO ISOLATION) - Swab, Nasopharynx.  Procedure                               Abnormality         Status                     ---------                               -----------         ------                     COVID-19,APTIMA PANTHER,...[[id number]]                                                   Please view results for these tests on the individual orders.   COVID-19,APTIMA PANTHERCANDICE IN-HOUSE,NP/OP SWAB IN UTM/VTM/SALINE TRANSPORT MEDIA,24 HR TAT   URINALYSIS W/ MICROSCOPIC IF INDICATED (NO CULTURE)   LACTIC ACID, PLASMA   CBC AND DIFFERENTIAL    Narrative:     The following orders were created for panel order  CBC & Differential.  Procedure                               Abnormality         Status                     ---------                               -----------         ------                     CBC Auto Differential[[id number]]        Abnormal            Final result                 Please view results for these tests on the individual orders.       EKG:   ECG 12 Lead   Preliminary Result   HEART RATE= 67  bpm   RR Interval= 892  ms   IL Interval= 157  ms   P Horizontal Axis= 14  deg   P Front Axis= 54  deg   QRSD Interval= 88  ms   QT Interval= 398  ms   QRS Axis= 89  deg   T Wave Axis= 38  deg   - BORDERLINE ECG -   Sinus rhythm   Consider right ventricular hypertrophy   Borderline T abnormalities, anterior leads   Electronically Signed By:    Date and Time of Study: 2021-06-11 19:07:19          Meds given in ED:   Medications   sodium chloride 0.9 % flush 10 mL (has no administration in time range)   sodium chloride 0.9 % flush 10 mL (has no administration in time range)   nitroglycerin (NITROSTAT) SL tablet 0.4 mg (has no administration in time range)   acetaminophen (TYLENOL) tablet 650 mg (has no administration in time range)     Or   acetaminophen (TYLENOL) 160 MG/5ML solution 650 mg (has no administration in time range)     Or   acetaminophen (TYLENOL) suppository 650 mg (has no administration in time range)   ondansetron (ZOFRAN) tablet 4 mg (has no administration in time range)     Or   ondansetron (ZOFRAN) injection 4 mg (has no administration in time range)   calcium carbonate (TUMS) chewable tablet 500 mg (200 mg elemental) (has no administration in time range)   ondansetron (ZOFRAN) injection 4 mg (4 mg Intravenous Given 6/11/21 1959)   sodium chloride 0.9 % bolus 1,000 mL (0 mL Intravenous Stopped 6/11/21 2040)       Imaging results:  No radiology results for the last day    Ambulatory status:   - up ad josselyn    Social issues:   Social History     Socioeconomic History   • Marital status:       Spouse name: Not on file   • Number of children: Not on file   • Years of education: Not on file   • Highest education level: Not on file   Tobacco Use   • Smoking status: Never Smoker   • Smokeless tobacco: Never Used   Vaping Use   • Vaping Use: Never used   Substance and Sexual Activity   • Alcohol use: Yes     Comment: 1 DAILY DRINK   • Drug use: No   • Sexual activity: Defer    Nursing report ED to floor     Esmer Bennett RN  06/11/21 0539

## 2021-06-12 NOTE — CONSULTS
Subjective     REASON FOR CONSULTATION: Pancreatic cancer, leukocytosis  Provide an opinion on any further workup or treatment                             REQUESTING PHYSICIAN:  Edgard    RECORDS OBTAINED:  Records of the patients history including those obtained from the referring provider were reviewed and summarized in detail.    History of Present Illness   This is a 71-year-old man currently followed at the [facility].  The patient initially presented to [facility] December 2020 with abdominal pain, constipation and vomiting with imaging showing a pancreatic tail mass with invasion into the proximal descending colon resulting in colonic stricture/obstruction and a short segment of splenic vein thrombosis.  He underwent an exploratory laparotomy and diverting loop colostomy on 12/24/2020 with biopsy from the pancreatic mass showing moderately differentiated adenocarcinoma.  The patient had a PET scan on 12/31/2020 showing an intensely avid mass in the pancreatic tail with focus of uptake in the right hepatic lobe favored to represent artifact, right lung apex felt to be reactive and 6 mm pulmonary nodules in the left lung indeterminate.  He was seen by surgical oncology at [facility] and proceeded with surgical resection on 1/19/2021 undergoing a distal pancreatectomy, splenectomy and colostomy takedown.  His pathology showed a 6 centimeter invasive moderately differentiated adenocarcinoma invading the colon..  There was no lymphovascular or perineural invasion and margins were reported clear.  35 lymph nodes were negative for malignancy so final stage was pT3 N0 MX.  Adjuvantly the patient has been receiving modified FOLFIRINOX chemotherapy with growth factor support which he initiated 3/9/2021 which he has been receiving every 2 weeks.  Irinotecan has been discontinued because of diarrhea/GI upset.  He received cycle 7 of chemotherapy with the infusion completed  6/10/2021 and Neulasta on pro injection 6/11/2021.    The patient is admitted to [facility] after experiencing a syncopal event leaving a [employer] class.   He began by feeling a little dizzy and lightheaded followed by loss of consciousness and loss of bowel function.  He denies preceding fevers, chills, rigors, dysuria.  His blood pressure was low per EMS.  On presentation to the ER, the patient had a very elevated WBC count 65.5 with neutrophilia ANC 60.25, manual differential showing no significant immature cells but mild basophilia 0.6.  Blood cultures have been drawn and pending.  The lactic acid is elevated 3.9 but the procalcitonin is normal 0.19.  His repeat CBC this morning shows more dramatic elevation of the white blood cell count at 118.9 again with neutrophilia  but no immature cells reported.  His hemoglobin is 9.8 and the platelets are 333.    Past Medical History:   Diagnosis Date   • Anxiety    • Depression    • GERD (gastroesophageal reflux disease)    • Hypercalcemia    • Hypothyroidism    • Inverted T wave     PT HAD STRESS ECHO DONE 4 YR AGO AND WAS NORMAL   • Osteoarthritis     Knees and ankles   • Parathyroid disease (CMS/HCC)    • PONV (postoperative nausea and vomiting)    • PTSD (post-traumatic stress disorder)    • Sleep apnea     NO MACHINE        Past Surgical History:   Procedure Laterality Date   • COLON SURGERY     • COLONOSCOPY  07/2014   • COLONOSCOPY N/A 12/24/2020    Procedure: COLONOSCOPY;  Surgeon: Geronimo Branch Jr., MD;  Location: [facility] ENDOSCOPY;  Service: General;  Laterality: N/A;  colon obstruction  post:   colon obstruction from external compression   • ENDOSCOPY      several years ago, normal   • EXPLORATORY LAPAROTOMY N/A 12/21/2020    Procedure: LAPAROTOMY EXPLORATORY WITH LOOP COLOSTOMY;  Surgeon: Geronimo Branch Jr., MD;  Location: [facility] MAIN OR;  Service: General;  Laterality: N/A;   • HEMORRHOIDECTOMY     • THYROIDECTOMY Left 2/3/2017    Procedure: Left  inferior parathyroidectomy with intraoperative PTH monitoring;  Surgeon: Sarah Matos MD;  Location: [facility] OR;  Service:    • WRIST FUSION Right         No current facility-administered medications on file prior to encounter.     Current Outpatient Medications on File Prior to Encounter   Medication Sig Dispense Refill   • allopurinol (ZYLOPRIM) 100 MG tablet Take 1 tablet by mouth Daily. 90 tablet 1   • ascorbic acid (VITAMIN C) 1000 MG tablet Take 1,000 mg by mouth Every Night.     • atorvastatin (LIPITOR) 10 MG tablet Take 1 tablet by mouth Daily. 90 tablet 1   • busPIRone (BUSPAR) 10 MG tablet Take 30 mg by mouth Daily.     • dexamethasone (DECADRON) 4 MG tablet TAKE 1 TABLET BY MOUTH TWICE DAILY WITH FOOD ON DAYS 2 AND 3 AFTER CHEMOTHERAPY AS DIRECTED     • Diclofenac Sodium (VOLTAREN EX) Apply 1 application topically Daily As Needed. Bilateral knees     • diphenoxylate-atropine (LOMOTIL) 2.5-0.025 MG per tablet TAKE 1 TABLET BY MOUTH THREE TIMES DAILY AS NEEDED FOR LOOSE STOOL     • lamoTRIgine (LaMICtal) 100 MG tablet Take 100 mg by mouth Every Night.     • lamoTRIgine (LaMICtal) 25 MG tablet Take 50 mg by mouth Every Morning.     • levothyroxine (SYNTHROID, LEVOTHROID) 125 MCG tablet Take 1 tablet by mouth Daily. 90 tablet 1   • magnesium oxide (MAG-OX) 400 MG tablet Take 1 tablet by mouth 2 (Two) Times a Day.     • Omega-3 Fatty Acids (FISH OIL) 1000 MG capsule capsule Take 2,000 mg by mouth 2 (Two) Times a Day With Meals. HOLD PRIOR TO SURG     • omeprazole (priLOSEC) 20 MG capsule Take 1 capsule by mouth Daily. (Patient taking differently: Take 40 mg by mouth Daily.) 90 capsule 1   • ondansetron (ZOFRAN) 8 MG tablet Take 8 mg by mouth Every 8 (Eight) Hours As Needed.     • potassium chloride (K-DUR,KLOR-CON) 20 MEQ CR tablet Take 20 mEq by mouth Daily.     • prochlorperazine (COMPAZINE) 10 MG tablet Take 10 mg by mouth Every 6 (Six) Hours As Needed.     • sertraline (ZOLOFT) 100 MG tablet Take  "200 mg by mouth Every Morning.     • vitamin D (ERGOCALCIFEROL) 1.25 MG (10931 UT) capsule capsule Take 1 capsule by mouth 2 (Two) Times a Week. (Patient taking differently: Take 50,000 Units by mouth 2 (Two) Times a Week. Wednesday and Saturday nights) 26 capsule 2   • Syringe/Needle, Disp, (B-D 3CC LUER-JACY SYR 23GX1\") 23G X 1\" 3 ML misc For testosterone injection once every 10 days. 10 each 2   • [DISCONTINUED] triamcinolone (KENALOG) 0.5 % cream Apply to affected area(s) 2 times a day 30 g 0        ALLERGIES:    Allergies   Allergen Reactions   • Codeine Nausea Only     nausea        Social History     Socioeconomic History   • Marital status:      Spouse name: Not on file   • Number of children: Not on file   • Years of education: Not on file   • Highest education level: Not on file   Tobacco Use   • Smoking status: Never Smoker   • Smokeless tobacco: Never Used   Vaping Use   • Vaping Use: Never used   Substance and Sexual Activity   • Alcohol use: Yes     Comment: 1 DAILY DRINK   • Drug use: No   • Sexual activity: Defer        Family History   Problem Relation Age of Onset   • Breast cancer Mother    • Heart disease Father    • Hypertension Father    • Colon cancer Sister         Review of Systems   Constitutional: Positive for activity change and fatigue. Negative for fever and unexpected weight change.   HENT: Negative.    Respiratory: Negative for cough and shortness of breath.    Cardiovascular: Negative for palpitations and leg swelling.   Gastrointestinal: Positive for diarrhea. Negative for nausea and vomiting.   Genitourinary: Negative.    Musculoskeletal: Negative.    Skin: Negative.    Allergic/Immunologic: Positive for immunocompromised state.   Neurological: Positive for syncope.   Hematological: Negative.    Psychiatric/Behavioral: Negative.           Objective     Vitals:    06/12/21 0012 06/12/21 0751 06/12/21 0754 06/12/21 0756   BP: 168/97 127/73 118/67 110/69   BP Location: Left arm " "Left arm Left arm Left arm   Patient Position: Lying Lying Standing Standing   Pulse: 73 71 73 86   Resp: 20 18     Temp: 98.3 °F (36.8 °C) 98.2 °F (36.8 °C)     TempSrc: Oral Oral     SpO2: 99%      Weight: 75.8 kg (167 lb 3.2 oz)      Height: 172.7 cm (67.99\")        Current Status 11/15/2016   ECOG score 0       Physical Exam    CONSTITUTIONAL: pleasant well-developed adult man  HEENT: no icterus, no thrush, moist membranes  NECK: no jvd  LYMPH: no cervical or supraclavicular lad  CV: RRR, S1S2, no murmur  Chest: Port in the right chest wall without erythema or tenderness  RESP: cta bilat, no wheezing, no rales  GI: soft, non-tender, no splenomegaly, +bs, well-healed scars  MUSC: no edema, normal gait  NEURO: alert and oriented x3, normal strength  PSYCH: normal mood and affect    RECENT LABS:  Hematology WBC   Date Value Ref Range Status   06/12/2021 118.95 (C) 3.40 - 10.80 10*3/mm3 Final     RBC   Date Value Ref Range Status   06/12/2021 3.49 (L) 4.14 - 5.80 10*6/mm3 Final     Hemoglobin   Date Value Ref Range Status   06/12/2021 9.8 (L) 13.0 - 17.7 g/dL Final     Hematocrit   Date Value Ref Range Status   06/12/2021 29.7 (L) 37.5 - 51.0 % Final     Platelets   Date Value Ref Range Status   06/12/2021 333 140 - 450 10*3/mm3 Final        Lab Results   Component Value Date    GLUCOSE 103 (H) 06/12/2021    BUN 25 (H) 06/12/2021    CREATININE 0.95 06/12/2021    EGFRIFNONA 78 06/12/2021    EGFRIFAFRI 67 12/14/2020    BCR 26.3 (H) 06/12/2021    K 3.7 06/12/2021    CO2 19.1 (L) 06/12/2021    CALCIUM 8.9 06/12/2021    PROTENTOTREF 5.8 (L) 12/22/2020    ALBUMIN 4.00 06/11/2021    LABIL2 1.2 12/22/2020    AST 32 06/11/2021    ALT 28 06/11/2021     Troponin less than 0.01    Lactic acid 3.9  Procalcitonin 0.19  Blood cultures pending  Urinalysis trace leuk esterase    Chest x-ray 6/11/2021: No active disease    Assessment/Plan     *yE2O1Az pancreatic adenocarcinoma with invasion of the colon  · Status post " diverting colostomy 12/24/2020 at [facility]  · Baseline scans with indeterminate lung nodule  · surgical resection on 1/19/2021 undergoing a distal pancreatectomy, splenectomy and colostomy takedown for a 6 cm tumor with negative margins and negative lymph nodes  · Receiving adjuvant chemotherapy at [facility] with modified FOLFIRINOX having completed C7 6/10/2021 with omission of irinotecan secondary to GI intolerance    *Extreme neutrophilia  · Patient reports a white blood cell count 16 at the time of his chemotherapy this week at [facility]  · Patient received Neulasta on body injected 6/11/2021  · WBC on admission 65K and up to 124K this morning with differential showing mature neutrophilia  with no significant immature cells.  · Lactic acid 3.9/procalcitonin 0.19    *Anemia hemoglobin 9.8 secondary to chemotherapy    *Diarrhea secondary to chemotherapy and probable pancreatic insufficiency    *Syncopal event  · Sounds potentially related to volume depletion or vasovagal given the low blood pressure reported by EMS but also being evaluated for cardiogenic/infectious etiologies  · Patient with pancreatic cancer higher risk of thrombosis but the patient has O2 sats 99 to 100% on room air no reported dyspnea or chest pain so unlikely to be pulmonary emboli    Hematology/oncology recommendations:  1.  Neutrophilia most likely secondary to Neulasta (although the timing is a little early as the patient reports the injection was 6/11/2021) and potentially reactive to the syncopal event although agree with ruling out sepsis/infection etc.  2.  Repeat CBC with differential in the morning  3.  IV fluids  4.  Cardiology consulted to see in addition to ID  5.  Add Lomotil as needed diarrhea  6.  Zofran as needed nausea  7.  The patient will follow up with Dr. Powers at the [facility] after discharge for continuation of his chemotherapy and surveillance for pancreatic cancer  I  discussed the case with Dr. Joy ID

## 2021-06-12 NOTE — ED NOTES
Pt is aware of need for urine sample. Will alert staff when he can provide one.     Esmer Bennett RN  06/11/21 2100

## 2021-06-12 NOTE — CONSULTS
Referring Provider: MONIKA Raza    Reason for Consultation: leukocytosis    History of present illness:  Kaiser Valente is a 71 y.o. with PMH of pancreatic adenocarcinoma on chemotherapy through [facility] ho I am asked to evaluate and give opinion for leukocytosis. History is obtained from the patient and review of the old and outside medical records which I summarize/synthesize as follows: He is being treated for pancreatic adenocarcinoma at [facility]. He had surgical intervention with Dr Torres and is now on FOLFIRINOX chemotherapy. He received cycle 7 earlier this week and was given Neulasta afterwards.     He presented to the ER yesterday 6/11/21 at 1839 with syncope while walking to the parking lot after observing a martial arts class at the [facility]. He did not have any head trauma but did have vomiting and a bowel movement.     In the ER he was afebrile with a normal HR and BP. Labs were notable for LA 3 and WBC 65 (up to 118 this morning). I reviewed his labs in the care everywhere tab and his typically his WBC is in the normal-slightly elevated range (most recent values in January 2021). On June 8th at [facility] it was 16.     He was started on empiric ceftriaxone and ID consulted for further recommendations.     He denies any antecedent fevers or chills. NO issues w/ his port. No nausea, vomiting, or diarrhea other than the one episode of vomiting after syncope. No dysuria. No abdominal pain. No rashes. He feels improved this morning.     Past Medical History:   Diagnosis Date   • Anxiety    • Depression    • GERD (gastroesophageal reflux disease)    • Hypercalcemia    • Hypothyroidism    • Inverted T wave     PT HAD STRESS ECHO DONE 4 YR AGO AND WAS NORMAL   • Osteoarthritis     Knees and ankles   • Parathyroid disease (CMS/HCC)    • PONV (postoperative nausea and vomiting)    • PTSD (post-traumatic stress disorder)    • Sleep apnea     NO MACHINE       Past Surgical History:   Procedure Laterality Date   • COLON  SURGERY     • COLONOSCOPY  07/2014   • COLONOSCOPY N/A 12/24/2020    Procedure: COLONOSCOPY;  Surgeon: Geronimo Branch Jr., MD;  Location: [facility] ENDOSCOPY;  Service: General;  Laterality: N/A;  colon obstruction  post:   colon obstruction from external compression   • ENDOSCOPY      several years ago, normal   • EXPLORATORY LAPAROTOMY N/A 12/21/2020    Procedure: LAPAROTOMY EXPLORATORY WITH LOOP COLOSTOMY;  Surgeon: Geronimo Branch Jr., MD;  Location: [facility] MAIN OR;  Service: General;  Laterality: N/A;   • HEMORRHOIDECTOMY     • THYROIDECTOMY Left 2/3/2017    Procedure: Left inferior parathyroidectomy with intraoperative PTH monitoring;  Surgeon: Sarah Matos MD;  Location: [facility] MAIN OR;  Service:    • WRIST FUSION Right        Social History:    Non-smoker  Retired   Teaches [employer]    Family History:  Mom: breast ca  Dad: HTN    Antibiotic allergies and intolerances:  None    Medications:    Current Facility-Administered Medications:   •  acetaminophen (TYLENOL) tablet 650 mg, 650 mg, Oral, Q4H PRN **OR** acetaminophen (TYLENOL) 160 MG/5ML solution 650 mg, 650 mg, Oral, Q4H PRN **OR** acetaminophen (TYLENOL) suppository 650 mg, 650 mg, Rectal, Q4H PRN, Dora Do APRN  •  allopurinol (ZYLOPRIM) tablet 100 mg, 100 mg, Oral, Daily, Dora Do APRN, 100 mg at 06/12/21 0912  •  atorvastatin (LIPITOR) tablet 10 mg, 10 mg, Oral, Daily, Dora Do APRN, 10 mg at 06/12/21 0912  •  busPIRone (BUSPAR) tablet 30 mg, 30 mg, Oral, Daily, Dora Do APRN, 30 mg at 06/12/21 0912  •  calcium carbonate (TUMS) chewable tablet 500 mg (200 mg elemental), 2 tablet, Oral, BID PRN, Dora Do APRN  •  cefTRIAXone (ROCEPHIN) IVPB 2 g, 2 g, Intravenous, Q24H, Dora Do APRN, Stopped at 06/12/21 0330  •  Diclofenac Sodium (VOLTAREN) 1 % gel 2 g, 2 g, Topical, BID, Dora Do APRN  •  lamoTRIgine (LaMICtal) tablet 100 mg, 100  mg, Oral, Nightly, Dora Do APRN  •  lamoTRIgine (LaMICtal) tablet 50 mg, 50 mg, Oral, QAM, Dora Do APRN, 50 mg at 06/12/21 0912  •  levothyroxine (SYNTHROID, LEVOTHROID) tablet 125 mcg, 125 mcg, Oral, Q AM, Dora Do APRN, 125 mcg at 06/12/21 0912  •  nitroglycerin (NITROSTAT) SL tablet 0.4 mg, 0.4 mg, Sublingual, Q5 Min PRN, Dora Do APRN  •  ondansetron (ZOFRAN) tablet 4 mg, 4 mg, Oral, Q6H PRN **OR** ondansetron (ZOFRAN) injection 4 mg, 4 mg, Intravenous, Q6H PRN, Dora Do APRN  •  pantoprazole (PROTONIX) EC tablet 40 mg, 40 mg, Oral, QAM, Dora Do APRN, 40 mg at 06/12/21 0912  •  prochlorperazine (COMPAZINE) tablet 10 mg, 10 mg, Oral, Q6H PRN, Dora Do APRN  •  sertraline (ZOLOFT) tablet 200 mg, 200 mg, Oral, QAM, Dora Do APRN, 200 mg at 06/12/21 0912  •  sodium chloride 0.9 % flush 10 mL, 10 mL, Intravenous, Q12H, Dora Do APRN  •  sodium chloride 0.9 % flush 10 mL, 10 mL, Intravenous, PRN, Dora Do APRN  •  sodium chloride 0.9 % infusion, 100 mL/hr, Intravenous, Continuous, Mine Abraham APRN, Last Rate: 100 mL/hr at 06/12/21 0728, 100 mL/hr at 06/12/21 0728    Review of Systems  All systems were reviewed and are negative unless otherwise stated above in the HPI    Objective   Vital Signs   Temp:  [98.1 °F (36.7 °C)-98.3 °F (36.8 °C)] 98.2 °F (36.8 °C)  Heart Rate:  [66-86] 86  Resp:  [16-20] 18  BP: (110-168)/() 110/69    Physical Exam:   General: awake, alert, NAD, very nice  Head: atraumatic  Eyes: Pupils equal, no scleral icterus  ENT: no thrush or ulcers  Neck: Supple  Cardiovascular: NR, RR, no murmurs, no LE edema  Respiratory: Lungs are clear to auscultation bilaterally, no rales or wheezing; normal work of breathing on ambient air  GI: Abdomen is soft, non-tender, non-distended, normal bowel sounds in all four quadrants; healed incisions  :  no Cosme  catheter  Musculoskeletal: no joint effusions, normal musculature  Skin: No rashes, lesions, or embolic phenomenon  Neurological: Alert and oriented x 3, motor strength 5/5 in all four extremities  Psychiatric: Normal mood and affect   Vasc: no cyanosis; R chest port w/o erythema    Labs:     Lab Results   Component Value Date    .95 (C) 06/12/2021    HGB 9.8 (L) 06/12/2021    HCT 29.7 (L) 06/12/2021    MCV 85.1 06/12/2021     06/12/2021     95% PMNs    Lab Results   Component Value Date    GLUCOSE 103 (H) 06/12/2021    BUN 25 (H) 06/12/2021    CREATININE 0.95 06/12/2021    EGFRIFNONA 78 06/12/2021    EGFRIFAFRI 67 12/14/2020    BCR 26.3 (H) 06/12/2021    CO2 19.1 (L) 06/12/2021    CALCIUM 8.9 06/12/2021    PROTENTOTREF 5.8 (L) 12/22/2020    ALBUMIN 4.00 06/11/2021    LABIL2 1.2 12/22/2020    AST 32 06/11/2021    ALT 28 06/11/2021     Procal 0.19  LA 3.4  UA 3-5 WBCs    Microbiology:  6/11 BCx: pending  6/11 COVID: pending    Radiology (personally reviewed report):  CXR negative for an acute process    Assessment/Plan   1 Syncope  2. Leukocytosis  3 Pancreatic adenocarcinoma on chemotherapy at [facility]    I suspect his profound (highest I've ever seen) leukocytosis is a combination of stress response to syncope and recent Neulasta injection. He is afebrile and doesn't have any specific symptoms of infection. That being said, he's high risk for infection given his immunocompromised state so I think it's reasonable that we keep him on ceftriaxone for another 24 hours while waiting on his blood culture results. However, if those are negative and no new signs or symptoms of infection are present then we could stop it at that time. Repeat CBC w/ diff and CMP in  the AM.     ID will follow.

## 2021-06-12 NOTE — PLAN OF CARE
Goal Outcome Evaluation:   Severe leukocytosis possibly related to stress response of syncopal episode combined with administration of Neulasta. No s/s of infection but keeping overnight to rule out infective process d/t immunocompromise. Continuing abx for now. Possibly home tomorrow. Persistent diarrhea r/t chemo. Lomotil q 6 PRN. Aox4. VSS.

## 2021-06-12 NOTE — ED NOTES
Patient was placed in face mask in first look. Patient was wearing facemask when I entered the room and throughout our encounter. I wore full protective equipment throughout this patient encounter including a face mask, and gloves. Hand hygiene was performed before donning protective equipment and after removal when leaving the room.     Esmer Bennett, RN  06/11/21 [id number]

## 2021-06-13 ENCOUNTER — APPOINTMENT (OUTPATIENT)
Dept: CARDIOLOGY | Facility: HOSPITAL | Age: 71
End: 2021-06-13

## 2021-06-13 LAB
ALBUMIN SERPL-MCNC: 3.1 G/DL (ref 3.5–5.2)
ALBUMIN/GLOB SERPL: 1.2 G/DL
ALP SERPL-CCNC: 125 U/L (ref 39–117)
ALT SERPL W P-5'-P-CCNC: 16 U/L (ref 1–41)
ANION GAP SERPL CALCULATED.3IONS-SCNC: 9.5 MMOL/L (ref 5–15)
AORTIC ARCH: 2.6 CM
AORTIC DIMENSIONLESS INDEX: 0.9 (DI)
ASCENDING AORTA: 3.2 CM
AST SERPL-CCNC: 16 U/L (ref 1–40)
BH CV ECHO MEAS - ACS: 2.1 CM
BH CV ECHO MEAS - AO ACC TIME: 0.11 SEC
BH CV ECHO MEAS - AO MAX PG (FULL): 1.7 MMHG
BH CV ECHO MEAS - AO MAX PG: 8.1 MMHG
BH CV ECHO MEAS - AO MEAN PG (FULL): 2 MMHG
BH CV ECHO MEAS - AO MEAN PG: 5 MMHG
BH CV ECHO MEAS - AO ROOT AREA (BSA CORRECTED): 2.1
BH CV ECHO MEAS - AO ROOT AREA: 11.9 CM^2
BH CV ECHO MEAS - AO ROOT DIAM: 3.9 CM
BH CV ECHO MEAS - AO V2 MAX: 142 CM/SEC
BH CV ECHO MEAS - AO V2 MEAN: 98.2 CM/SEC
BH CV ECHO MEAS - AO V2 VTI: 31.4 CM
BH CV ECHO MEAS - ASC AORTA: 3.2 CM
BH CV ECHO MEAS - AVA(I,A): 3.4 CM^2
BH CV ECHO MEAS - AVA(I,D): 3.4 CM^2
BH CV ECHO MEAS - AVA(V,A): 3.4 CM^2
BH CV ECHO MEAS - AVA(V,D): 3.4 CM^2
BH CV ECHO MEAS - BSA(HAYCOCK): 1.9 M^2
BH CV ECHO MEAS - BSA: 1.9 M^2
BH CV ECHO MEAS - BZI_BMI: 25.7 KILOGRAMS/M^2
BH CV ECHO MEAS - BZI_METRIC_HEIGHT: 172 CM
BH CV ECHO MEAS - BZI_METRIC_WEIGHT: 76 KG
BH CV ECHO MEAS - CONTRAST EF (2CH): 62.3 CM2
BH CV ECHO MEAS - CONTRAST EF 4CH: 60.8 CM2
BH CV ECHO MEAS - EDV(CUBED): 85.2 ML
BH CV ECHO MEAS - EDV(MOD-SP2): 114 ML
BH CV ECHO MEAS - EDV(MOD-SP4): 102 ML
BH CV ECHO MEAS - EDV(TEICH): 87.7 ML
BH CV ECHO MEAS - EF(CUBED): 61.5 %
BH CV ECHO MEAS - EF(MOD-BP): 60.4 %
BH CV ECHO MEAS - EF(TEICH): 53.3 %
BH CV ECHO MEAS - ESV(CUBED): 32.8 ML
BH CV ECHO MEAS - ESV(MOD-SP2): 43 ML
BH CV ECHO MEAS - ESV(MOD-SP4): 40 ML
BH CV ECHO MEAS - ESV(TEICH): 41 ML
BH CV ECHO MEAS - FS: 27.3 %
BH CV ECHO MEAS - IVS/LVPW: 1
BH CV ECHO MEAS - IVSD: 1 CM
BH CV ECHO MEAS - LAT PEAK E' VEL: 9.9 CM/SEC
BH CV ECHO MEAS - LV DIASTOLIC VOL/BSA (35-75): 54 ML/M^2
BH CV ECHO MEAS - LV MASS(C)D: 147.8 GRAMS
BH CV ECHO MEAS - LV MASS(C)DI: 78.2 GRAMS/M^2
BH CV ECHO MEAS - LV MAX PG: 6.4 MMHG
BH CV ECHO MEAS - LV MEAN PG: 3 MMHG
BH CV ECHO MEAS - LV SYSTOLIC VOL/BSA (12-30): 21.2 ML/M^2
BH CV ECHO MEAS - LV V1 MAX: 126 CM/SEC
BH CV ECHO MEAS - LV V1 MEAN: 84.6 CM/SEC
BH CV ECHO MEAS - LV V1 VTI: 27.7 CM
BH CV ECHO MEAS - LVIDD: 4.4 CM
BH CV ECHO MEAS - LVIDS: 3.2 CM
BH CV ECHO MEAS - LVLD AP2: 8.9 CM
BH CV ECHO MEAS - LVLD AP4: 8.9 CM
BH CV ECHO MEAS - LVLS AP2: 6.2 CM
BH CV ECHO MEAS - LVLS AP4: 6.9 CM
BH CV ECHO MEAS - LVOT AREA (M): 3.8 CM^2
BH CV ECHO MEAS - LVOT AREA: 3.8 CM^2
BH CV ECHO MEAS - LVOT DIAM: 2.2 CM
BH CV ECHO MEAS - LVPWD: 1 CM
BH CV ECHO MEAS - MED PEAK E' VEL: 6.5 CM/SEC
BH CV ECHO MEAS - MV A DUR: 0.15 SEC
BH CV ECHO MEAS - MV A MAX VEL: 99.4 CM/SEC
BH CV ECHO MEAS - MV DEC SLOPE: 210 CM/SEC^2
BH CV ECHO MEAS - MV DEC TIME: 243 SEC
BH CV ECHO MEAS - MV E MAX VEL: 79.6 CM/SEC
BH CV ECHO MEAS - MV E/A: 0.8
BH CV ECHO MEAS - MV MAX PG: 3.5 MMHG
BH CV ECHO MEAS - MV MEAN PG: 2 MMHG
BH CV ECHO MEAS - MV P1/2T MAX VEL: 76.4 CM/SEC
BH CV ECHO MEAS - MV P1/2T: 106.6 MSEC
BH CV ECHO MEAS - MV V2 MAX: 94 CM/SEC
BH CV ECHO MEAS - MV V2 MEAN: 62.8 CM/SEC
BH CV ECHO MEAS - MV V2 VTI: 28.9 CM
BH CV ECHO MEAS - MVA P1/2T LCG: 2.9 CM^2
BH CV ECHO MEAS - MVA(P1/2T): 2.1 CM^2
BH CV ECHO MEAS - MVA(VTI): 3.6 CM^2
BH CV ECHO MEAS - PA ACC TIME: 0.12 SEC
BH CV ECHO MEAS - PA MAX PG (FULL): 0.79 MMHG
BH CV ECHO MEAS - PA MAX PG: 2.3 MMHG
BH CV ECHO MEAS - PA PR(ACCEL): 26.8 MMHG
BH CV ECHO MEAS - PA V2 MAX: 76.4 CM/SEC
BH CV ECHO MEAS - PI END-D VEL: 82.9 CM/SEC
BH CV ECHO MEAS - PULM DIAS VEL: 41.6 CM/SEC
BH CV ECHO MEAS - PULM S/D: 1.1
BH CV ECHO MEAS - PULM SYS VEL: 47.7 CM/SEC
BH CV ECHO MEAS - PVA(V,A): 3.1 CM^2
BH CV ECHO MEAS - PVA(V,D): 3.1 CM^2
BH CV ECHO MEAS - QP/QS: 0.46
BH CV ECHO MEAS - RAP SYSTOLE: 3 MMHG
BH CV ECHO MEAS - RV MAX PG: 1.5 MMHG
BH CV ECHO MEAS - RV MEAN PG: 1 MMHG
BH CV ECHO MEAS - RV V1 MAX: 62.1 CM/SEC
BH CV ECHO MEAS - RV V1 MEAN: 41.9 CM/SEC
BH CV ECHO MEAS - RV V1 VTI: 12.7 CM
BH CV ECHO MEAS - RVOT AREA: 3.8 CM^2
BH CV ECHO MEAS - RVOT DIAM: 2.2 CM
BH CV ECHO MEAS - SI(AO): 198.5 ML/M^2
BH CV ECHO MEAS - SI(CUBED): 27.7 ML/M^2
BH CV ECHO MEAS - SI(LVOT): 55.7 ML/M^2
BH CV ECHO MEAS - SI(MOD-SP2): 37.6 ML/M^2
BH CV ECHO MEAS - SI(MOD-SP4): 32.8 ML/M^2
BH CV ECHO MEAS - SI(TEICH): 24.7 ML/M^2
BH CV ECHO MEAS - SUP REN AO DIAM: 2.2 CM
BH CV ECHO MEAS - SV(AO): 375.1 ML
BH CV ECHO MEAS - SV(CUBED): 52.4 ML
BH CV ECHO MEAS - SV(LVOT): 105.3 ML
BH CV ECHO MEAS - SV(MOD-SP2): 71 ML
BH CV ECHO MEAS - SV(MOD-SP4): 62 ML
BH CV ECHO MEAS - SV(RVOT): 48.1 ML
BH CV ECHO MEAS - SV(TEICH): 46.7 ML
BH CV ECHO MEAS - TAPSE (>1.6): 1.7 CM
BH CV ECHO MEASUREMENTS AVERAGE E/E' RATIO: 9.71
BH CV VAS BP LEFT ARM: NORMAL MMHG
BH CV XLRA - RV BASE: 3.5 CM
BH CV XLRA - RV LENGTH: 7.5 CM
BH CV XLRA - RV MID: 2.9 CM
BH CV XLRA - TDI S': 10.2 CM/SEC
BILIRUB SERPL-MCNC: <0.2 MG/DL (ref 0–1.2)
BUN SERPL-MCNC: 14 MG/DL (ref 8–23)
BUN/CREAT SERPL: 20.6 (ref 7–25)
CALCIUM SPEC-SCNC: 8.4 MG/DL (ref 8.6–10.5)
CHLORIDE SERPL-SCNC: 112 MMOL/L (ref 98–107)
CO2 SERPL-SCNC: 19.5 MMOL/L (ref 22–29)
CREAT SERPL-MCNC: 0.68 MG/DL (ref 0.76–1.27)
D DIMER PPP FEU-MCNC: 1.01 MCGFEU/ML (ref 0–0.49)
DEPRECATED RDW RBC AUTO: 67.9 FL (ref 37–54)
EOSINOPHIL # BLD MANUAL: 2.16 10*3/MM3 (ref 0–0.4)
EOSINOPHIL NFR BLD MANUAL: 2 % (ref 0.3–6.2)
ERYTHROCYTE [DISTWIDTH] IN BLOOD BY AUTOMATED COUNT: 22.9 % (ref 12.3–15.4)
GFR SERPL CREATININE-BSD FRML MDRD: 114 ML/MIN/1.73
GLOBULIN UR ELPH-MCNC: 2.6 GM/DL
GLUCOSE SERPL-MCNC: 99 MG/DL (ref 65–99)
HCT VFR BLD AUTO: 26.5 % (ref 37.5–51)
HGB BLD-MCNC: 9 G/DL (ref 13–17.7)
LEFT ATRIUM VOLUME INDEX: 17.3 ML/M2
LYMPHOCYTES # BLD MANUAL: 4.32 10*3/MM3 (ref 0.7–3.1)
LYMPHOCYTES NFR BLD MANUAL: 4 % (ref 19.6–45.3)
MAXIMAL PREDICTED HEART RATE: 147 BPM
MCH RBC QN AUTO: 28.4 PG (ref 26.6–33)
MCHC RBC AUTO-ENTMCNC: 34 G/DL (ref 31.5–35.7)
MCV RBC AUTO: 83.6 FL (ref 79–97)
NEUTROPHILS # BLD AUTO: 101.5 10*3/MM3 (ref 1.7–7)
NEUTROPHILS NFR BLD MANUAL: 94 % (ref 42.7–76)
NRBC BLD AUTO-RTO: 0 /100 WBC (ref 0–0.2)
PLAT MORPH BLD: NORMAL
PLATELET # BLD AUTO: 290 10*3/MM3 (ref 140–450)
PMV BLD AUTO: 11.1 FL (ref 6–12)
POTASSIUM SERPL-SCNC: 3.3 MMOL/L (ref 3.5–5.2)
PROT SERPL-MCNC: 5.7 G/DL (ref 6–8.5)
QT INTERVAL: 398 MS
RBC # BLD AUTO: 3.17 10*6/MM3 (ref 4.14–5.8)
RBC MORPH BLD: NORMAL
SINUS: 3.9 CM
SODIUM SERPL-SCNC: 141 MMOL/L (ref 136–145)
STJ: 3.4 CM
STRESS TARGET HR: 125 BPM
TROPONIN T SERPL-MCNC: <0.01 NG/ML (ref 0–0.03)
WBC # BLD AUTO: 107.98 10*3/MM3 (ref 3.4–10.8)
WBC MORPH BLD: NORMAL

## 2021-06-13 PROCEDURE — 93356 MYOCRD STRAIN IMG SPCKL TRCK: CPT

## 2021-06-13 PROCEDURE — 85025 COMPLETE CBC W/AUTO DIFF WBC: CPT | Performed by: INTERNAL MEDICINE

## 2021-06-13 PROCEDURE — 84484 ASSAY OF TROPONIN QUANT: CPT | Performed by: INTERNAL MEDICINE

## 2021-06-13 PROCEDURE — 99232 SBSQ HOSP IP/OBS MODERATE 35: CPT | Performed by: INTERNAL MEDICINE

## 2021-06-13 PROCEDURE — 85379 FIBRIN DEGRADATION QUANT: CPT | Performed by: INTERNAL MEDICINE

## 2021-06-13 PROCEDURE — 25010000003 CEFTRIAXONE PER 250 MG: Performed by: NURSE PRACTITIONER

## 2021-06-13 PROCEDURE — 25010000002 ENOXAPARIN PER 10 MG: Performed by: INTERNAL MEDICINE

## 2021-06-13 PROCEDURE — 93306 TTE W/DOPPLER COMPLETE: CPT

## 2021-06-13 PROCEDURE — 80053 COMPREHEN METABOLIC PANEL: CPT | Performed by: INTERNAL MEDICINE

## 2021-06-13 PROCEDURE — 85007 BL SMEAR W/DIFF WBC COUNT: CPT | Performed by: INTERNAL MEDICINE

## 2021-06-13 RX ORDER — POTASSIUM CHLORIDE 750 MG/1
40 TABLET, FILM COATED, EXTENDED RELEASE ORAL AS NEEDED
Status: DISCONTINUED | OUTPATIENT
Start: 2021-06-13 | End: 2021-06-14 | Stop reason: HOSPADM

## 2021-06-13 RX ORDER — POTASSIUM CHLORIDE 1.5 G/1.77G
40 POWDER, FOR SOLUTION ORAL AS NEEDED
Status: DISCONTINUED | OUTPATIENT
Start: 2021-06-13 | End: 2021-06-14 | Stop reason: HOSPADM

## 2021-06-13 RX ORDER — POTASSIUM CHLORIDE 7.45 MG/ML
10 INJECTION INTRAVENOUS
Status: DISCONTINUED | OUTPATIENT
Start: 2021-06-13 | End: 2021-06-14 | Stop reason: HOSPADM

## 2021-06-13 RX ADMIN — BUSPIRONE HYDROCHLORIDE 30 MG: 15 TABLET ORAL at 08:50

## 2021-06-13 RX ADMIN — SERTRALINE 200 MG: 100 TABLET, FILM COATED ORAL at 06:07

## 2021-06-13 RX ADMIN — DIPHENOXYLATE HYDROCHLORIDE AND ATROPINE SULFATE 1 TABLET: 2.5; .025 TABLET ORAL at 13:18

## 2021-06-13 RX ADMIN — LAMOTRIGINE 50 MG: 25 TABLET ORAL at 06:06

## 2021-06-13 RX ADMIN — DIPHENOXYLATE HYDROCHLORIDE AND ATROPINE SULFATE 1 TABLET: 2.5; .025 TABLET ORAL at 06:10

## 2021-06-13 RX ADMIN — LEVOTHYROXINE SODIUM 125 MCG: 0.12 TABLET ORAL at 06:07

## 2021-06-13 RX ADMIN — SODIUM CHLORIDE 100 ML/HR: 9 INJECTION, SOLUTION INTRAVENOUS at 04:20

## 2021-06-13 RX ADMIN — SODIUM CHLORIDE, PRESERVATIVE FREE 10 ML: 5 INJECTION INTRAVENOUS at 20:48

## 2021-06-13 RX ADMIN — LAMOTRIGINE 100 MG: 100 TABLET ORAL at 20:48

## 2021-06-13 RX ADMIN — CEFTRIAXONE SODIUM 2 G: 2 INJECTION, SOLUTION INTRAVENOUS at 02:48

## 2021-06-13 RX ADMIN — ATORVASTATIN CALCIUM 10 MG: 20 TABLET, FILM COATED ORAL at 08:50

## 2021-06-13 RX ADMIN — ALLOPURINOL 100 MG: 100 TABLET ORAL at 08:50

## 2021-06-13 RX ADMIN — SODIUM CHLORIDE, PRESERVATIVE FREE 10 ML: 5 INJECTION INTRAVENOUS at 08:51

## 2021-06-13 RX ADMIN — POTASSIUM CHLORIDE 40 MEQ: 750 TABLET, EXTENDED RELEASE ORAL at 14:21

## 2021-06-13 RX ADMIN — ENOXAPARIN SODIUM 40 MG: 40 INJECTION SUBCUTANEOUS at 13:18

## 2021-06-13 RX ADMIN — PANTOPRAZOLE SODIUM 40 MG: 40 TABLET, DELAYED RELEASE ORAL at 06:06

## 2021-06-13 NOTE — PROGRESS NOTES
LOS: 1 day     Chief Complaint:  Follow-up leukocytosis    Interval History:  No fever. Cultures negative. Loose stools resolved. WBC still high but down to 110k.     ROS:no n/v or chest pain     Vital Signs  Temp:  [98 °F (36.7 °C)-98.4 °F (36.9 °C)] 98 °F (36.7 °C)  Heart Rate:  [68-92] 68  Resp:  [16-18] 16  BP: (120-153)/(77-94) 128/77    Physical Exam:  General: awake, alert, NAD, very nice  Eyes: no scleral icterus  ENT: no thrush or ulcers  Cardiovascular: NR  Respiratory: normal work of breathing on ambient air  GI: Abdomen is soft, non-tender, non-distended healed incisions  :  no Cosme catheter  Musculoskeletal: normal musculature  Skin: No rashes  Neurological: Alert and oriented x 3  Psychiatric: Normal mood and affect   Vasc: R chest port w/o erythema    meds:    Current Facility-Administered Medications:   •  acetaminophen (TYLENOL) tablet 650 mg, 650 mg, Oral, Q4H PRN **OR** acetaminophen (TYLENOL) 160 MG/5ML solution 650 mg, 650 mg, Oral, Q4H PRN **OR** acetaminophen (TYLENOL) suppository 650 mg, 650 mg, Rectal, Q4H PRN, Dora Do APRN  •  allopurinol (ZYLOPRIM) tablet 100 mg, 100 mg, Oral, Daily, Dora Do APRN, 100 mg at 06/13/21 0850  •  atorvastatin (LIPITOR) tablet 10 mg, 10 mg, Oral, Daily, Dora Do APRN, 10 mg at 06/13/21 0850  •  busPIRone (BUSPAR) tablet 30 mg, 30 mg, Oral, Daily, Dora Do APRN, 30 mg at 06/13/21 0850  •  calcium carbonate (TUMS) chewable tablet 500 mg (200 mg elemental), 2 tablet, Oral, BID PRN, Dora Do APRN  •  Diclofenac Sodium (VOLTAREN) 1 % gel 2 g, 2 g, Topical, BID, Dora Do APRN  •  diphenoxylate-atropine (LOMOTIL) 2.5-0.025 MG per tablet 1 tablet, 1 tablet, Oral, 4x Daily PRN, Clyde Damon MD, 1 tablet at 06/13/21 0610  •  enoxaparin (LOVENOX) syringe 40 mg, 40 mg, Subcutaneous, Q24H, Clyde Damon MD  •  lamoTRIgine (LaMICtal) tablet 100 mg, 100 mg, Oral, Nightly, Ernestina,  MONIKA Ferreira, 100 mg at 06/12/21 2042  •  lamoTRIgine (LaMICtal) tablet 50 mg, 50 mg, Oral, QAM, Dora Do APRN, 50 mg at 06/13/21 0606  •  levothyroxine (SYNTHROID, LEVOTHROID) tablet 125 mcg, 125 mcg, Oral, Q AM, Dora Do APRN, 125 mcg at 06/13/21 0607  •  nitroglycerin (NITROSTAT) SL tablet 0.4 mg, 0.4 mg, Sublingual, Q5 Min PRN, Dora Do APRN  •  ondansetron (ZOFRAN) tablet 4 mg, 4 mg, Oral, Q6H PRN **OR** ondansetron (ZOFRAN) injection 4 mg, 4 mg, Intravenous, Q6H PRN, Dora Do APRN  •  pantoprazole (PROTONIX) EC tablet 40 mg, 40 mg, Oral, QAELIZABETH, Dora Do APRN, 40 mg at 06/13/21 0606  •  prochlorperazine (COMPAZINE) tablet 10 mg, 10 mg, Oral, Q6H PRN, Dora Do APRN  •  sertraline (ZOLOFT) tablet 200 mg, 200 mg, Oral, QAM, Dora Do APRN, 200 mg at 06/13/21 0607  •  sodium chloride 0.9 % flush 10 mL, 10 mL, Intravenous, Q12H, Dora Do APRN, 10 mL at 06/13/21 0851  •  sodium chloride 0.9 % flush 10 mL, 10 mL, Intravenous, PRN, Dora Do APRN  •  sodium chloride 0.9 % infusion, 100 mL/hr, Intravenous, Continuous, Mine Abraham APRN, Last Rate: 100 mL/hr at 06/13/21 0420, 100 mL/hr at 06/13/21 0420    LABS:  CBC, CMP, micro reviewed today  Lab Results   Component Value Date    .98 (C) 06/13/2021    HGB 9.0 (L) 06/13/2021    HCT 26.5 (L) 06/13/2021    MCV 83.6 06/13/2021     06/13/2021     Lab Results   Component Value Date    GLUCOSE 99 06/13/2021    BUN 14 06/13/2021    CREATININE 0.68 (L) 06/13/2021    EGFRIFNONA 114 06/13/2021    EGFRIFAFRI 67 12/14/2020    BCR 20.6 06/13/2021    K 3.3 (L) 06/13/2021    CO2 19.5 (L) 06/13/2021    CALCIUM 8.4 (L) 06/13/2021    PROTENTOTREF 5.8 (L) 12/22/2020    ALBUMIN 3.10 (L) 06/13/2021    LABIL2 1.2 12/22/2020    AST 16 06/13/2021    ALT 16 06/13/2021     Procal 0.19  LA 3.4  UA 3-5 WBCs     Microbiology:  6/11 BCx: NGTD  6/11 COVID:  negative    Radiology (personally reviewed report):   TTE ordered    Assessment/Plan   1 Syncope  2. Leukocytosis  3 Pancreatic adenocarcinoma on chemotherapy at [facility]     I suspect his profound (highest I've ever seen) leukocytosis is a combination of stress response to syncope and recent Neulasta injection. He is afebrile and doesn't have any specific symptoms of infection. His cultures are negative. Stop ceftriaxone. Cardiology evaluation and TTE are pending.     I agree that repeat CBC later this week with his hematologist would be prudent.     ID will sign off at this time but please call me at [phone] if any further ID questions or concerns. Thank you.

## 2021-06-13 NOTE — PLAN OF CARE
Goal Outcome Evaluation:  Plan of Care Reviewed With: patient        Progress: improving  Outcome Summary: VSS; IVF/antibiotics continue; PRN lomotil given; Possible discharge; will continue to monitor

## 2021-06-13 NOTE — PROGRESS NOTES
Name: Kaiser Valente ADMIT: 2021   : 1950  PCP: Jared Noriega MD    MRN: [id number] LOS: 1 days   AGE/SEX: 71 y.o. male  ROOM: [facility]     Subjective   Subjective   Patient seen at bedside.       Objective   Objective   Vital Signs  Temp:  [98 °F (36.7 °C)-98.4 °F (36.9 °C)] 98 °F (36.7 °C)  Heart Rate:  [68-92] 68  Resp:  [16-18] 16  BP: (120-153)/(77-94) 128/77  SpO2:  [97 %-98 %] 97 %  on   ;   Device (Oxygen Therapy): room air  Body mass index is 25.52 kg/m².  Physical Exam  General, awake and alert.  Appears sick on chronic basis.  Head and ENT, normocephalic and atraumatic.  Lungs, symmetric expansion, equal air entry bilaterally.  On chest wall, he has port in place.  Heart, regular rate and rhythm.  Abdomen, soft and nontender.  Extremities, no clubbing or cyanosis.  Neuro, no focal deficits.  Skin: Warm and no rash.  Psych, normal mood and affect.  Musculoskeletal, joint examination is grossly normal.    Results Review     I reviewed the patient's new clinical results.  Results from last 7 days   Lab Units 21  0608 21  0532 21   WBC 10*3/mm3 107.98* 118.95* 124.73* 65.56*   HEMOGLOBIN g/dL 9.0* 9.8* 10.5* 10.7*   PLATELETS 10*3/mm3 290 333 350 388     Results from last 7 days   Lab Units 21  0608 21  0450 21   SODIUM mmol/L 141 139 138   POTASSIUM mmol/L 3.3* 3.7 3.6   CHLORIDE mmol/L 112* 106 102   CO2 mmol/L 19.5* 19.1* 21.3*   BUN mg/dL 14 25* 29*   CREATININE mg/dL 0.68* 0.95 1.07   GLUCOSE mg/dL 99 103* 114*   Estimated Creatinine Clearance: 88.5 mL/min (A) (by C-G formula based on SCr of 0.68 mg/dL (L)).  Results from last 7 days   Lab Units 21  0608 21   ALBUMIN g/dL 3.10* 4.00   BILIRUBIN mg/dL <0.2 0.2   ALK PHOS U/L 125* 142*   AST (SGOT) U/L 16 32   ALT (SGPT) U/L 16 28     Results from last 7 days   Lab Units 21  0608 21  0450 21   CALCIUM mg/dL 8.4* 8.9 9.3   ALBUMIN g/dL  3.10*  --  4.00   MAGNESIUM mg/dL  --   --  1.9     Results from last 7 days   Lab Units 06/12/21  0450 06/12/21  0134 06/11/21  2237   PROCALCITONIN ng/mL 0.19  --   --    LACTATE mmol/L  --  3.4* 3.9*     COVID19   Date Value Ref Range Status   06/11/2021 Not Detected Not Detected - Ref. Range Final     SARS-CoV-2, MADHAV   Date Value Ref Range Status   02/16/2021 Not Detected Not Detected Final     Comment:     This nucleic acid amplification test was developed and its performance  characteristics determined by [employer]. Nucleic acid  amplification tests include RT-PCR and TMA. This test has not been  FDA cleared or approved. This test has been authorized by FDA under  an Emergency Use Authorization (EUA). This test is only authorized  for the duration of time the declaration that circumstances exist  justifying the authorization of the emergency use of in vitro  diagnostic tests for detection of SARS-CoV-2 virus and/or diagnosis  of COVID-19 infection under section 564(b)(1) of the Act, 21 U.S.C.  360bbb-3(b) (1), unless the authorization is terminated or revoked  sooner.  When diagnostic testing is negative, the possibility of a false  negative result should be considered in the context of a patient's  recent exposures and the presence of clinical signs and symptoms  consistent with COVID-19. An individual without symptoms of COVID-19  and who is not shedding SARS-CoV-2 virus would expect to have a  negative (not detected) result in this assay.       No results found for: HGBA1C, POCGLU    XR Chest 1 View  PORTABLE CHEST 06/11/2021 AT 7:08 PM     CLINICAL HISTORY: Dizziness     The lungs are well-expanded and appear free of infiltrates or masses.  There are no pleural effusions. The heart is normal in size. A Mediport  device is in place in satisfactory position in the right subclavian  vein.     IMPRESSIONS: No evidence of active disease within the chest.     This report was finalized on 6/11/2021  7:26 PM by Dr. Fady Piña M.D.       Scheduled Medications  allopurinol, 100 mg, Oral, Daily  atorvastatin, 10 mg, Oral, Daily  busPIRone, 30 mg, Oral, Daily  Diclofenac Sodium, 2 g, Topical, BID  enoxaparin, 40 mg, Subcutaneous, Q24H  lamoTRIgine, 100 mg, Oral, Nightly  lamoTRIgine, 50 mg, Oral, QAM  levothyroxine, 125 mcg, Oral, Q AM  pantoprazole, 40 mg, Oral, QAM  sertraline, 200 mg, Oral, QAM  sodium chloride, 10 mL, Intravenous, Q12H    Infusions  sodium chloride, 100 mL/hr, Last Rate: 100 mL/hr (06/13/21 0420)    Diet  Diet Regular       Assessment/Plan     Active Hospital Problems    Diagnosis  POA   • **Syncope [R55]  Yes   • Leukocytosis [D72.829]  Unknown   • Pancreatic adenocarcinoma (CMS/HCC) [C25.9]  Yes   • Benign essential HTN [I10]  Yes   • Recurrent major depressive disorder, in partial remission (CMS/HCC) [F33.41]  Yes   • Thrombocytopenia (CMS/HCC) [D69.6]  Yes   • Acquired hypothyroidism [E03.9]  Yes   • Mixed hyperlipidemia [E78.2]  Yes   • Gastroesophageal reflux disease without esophagitis [K21.9]  Yes      Resolved Hospital Problems   No resolved problems to display.       71 y.o. male admitted with Syncope.    Assessment and plan  1.  Syncope.  Cardiology evaluation today.  Pending echo.  Use fall precautions and continue to monitor.    2.  Pancreatic adenocarcinoma, status post resection of pancreatic mass.  He is currently receiving chemotherapy at [facility]. Hematology/oncology on board.  Continue to follow recommendations.    3.  Hypothyroidism, continue Synthroid.    4.  Hypertension, blood pressure currently in acceptable range.    5.  Hyperlipidemia, continue Lipitor.    6.  Depression, continue sertraline.    7.  Hypokalemia, potassium replacement protocol.    8.  Significant neutrophilia, most likely secondary to Neulasta.  Hematology and ID on board.  Will continue to follow for recommendations.    9.  CODE STATUS is full code.  Further plans based on hospital  course.      Orlando Rene MD  [facility]  06/13/21  09:58 EDT

## 2021-06-13 NOTE — CONSULTS
Chief complaint: 71-year-old male retired  with syncope    Present illness:  Patient felt he was in his usual state of health on the morning of June 11.  He went to observe kailash at the [facility].  He exited the building and was feeling well walking in the parking lot.  He then developed a feeling of lightheadedness and bent over.  He lowered himself to the ground.  Observers helped him move from the parking lot to the curbside.  He was on the ground and then evidently lost consciousness.  He did not arouse until he had been brought inside the building.  At that point he was still on the floor.  He does not have any recollection of his transfer from the parking lot to the building.  He had an episode of emesis.  It took him several hours to gradually recover.  Today he feels that he is back to his usual condition.  He defecated on himself at the time of his event.    He has relied on a cane since he had his first surgery for relief of bowel obstruction.  This is because of a global decrement of balance.  He does not have a focal motor deficit.    Past history:  Pancreatic cancer involving the tail of the pancreas  Bowel obstruction treated December 24, 2020 with diverting colostomy  Distal pancreatectomy January 19, 2021 with accompanying splenectomy and reversal of colonoscopy to me  Finding of 6 cm invasive adenocarcinoma involving a segment of the colon  7 cycles of chemotherapy completed Wanda 10, 2021  Observation of prolonged QT interval.  He was seen in my office in February prior to chemotherapy.  At that time I did not feel there was QT prolongation.  Echocardiogram was normal with a 60% ejection fraction.    Echocardiogram 13 June 2021: Ejection fraction 64%.  Mild dilatation aortic root with diameter index 2.1 cm/m² corresponding to a absolute dimension of 3.9 cm.  Central vein pressure estimate 3 cm.  No other important abnormalities.    Hypothyroid with treatment  Bipolar with  treatment.  Anxiety with treatment.    Medications:  Allopurinol 100 mg  Atorvastatin 10 mg  Buspirone 30 mg  Topical diclofenac  Enoxaparin 40 mg daily  Lamotrigine 100 mg bedtime and 50 mg morning  Levothyroxine 125 mcg  Pantoprazole 40 mg  Sertraline 200 mg    Review of system:  General: No weight change fever chills  Endocrine: No diabetes  Head: No acute auditory or visual disturbance  Gastrointestinal: He denies awareness of unusual diarrhea constipation or nausea.  Outside of his episode of acute emesis there have been no acute disturbances.  Respiratory: No productive cough  Neurologic: No transient extremity weakness  Cardiac: Orthopnea, edema, chest discomfort, or palpitation.  Musculoskeletal: Arthralgias of the extremities, mild  All other systems negative    Exam:  Vitals:    06/13/21 1000   BP: 112/62   Pulse: 70   Resp:    Temp:    SpO2: 98%     Gen: Well-developed, well-nourished, somewhat frail elderly white male, ambulates with a cane.  No acute distress  Skin: no rash or ulcer  Eyes: reactive pupils, no xanthelasma  Oral: fair dentition, no pallor, moist  Neck: no thyroid enlargement, normal carotid impulses, no carotid or orbital bruit,   CVP = 3 cm, contour is normal  Spine: no scoliosis  Chest: clear  Cor: apex midclavicular line, normal s1 and s2, no murmur, no gallop  Ab: soft, non tender, no organ enlargement, bowel sounds present, no aortic enlargement, no bruit  Rectal: not done  Ex: No edema, no cyanosis, no clubbing  PV: +2 DP, +2 PT, +2 femoral  N: alert, oriented x 3, no agitation, motor function intact all extremities, no tremor, no ataxia      EKG from June 11, 2021: Sinus rhythm, incomplete right bundle branch block, global T wave abnormality with low T wave amplitude.  No discrete QT prolongation.  No change in EKG compared to February 2021.    Assessment:  Episode of syncope with prodrome.  Relatively prolonged absence of consciousness following the event.  Event was somewhat  unusual insofar as his difficulty progressed after he had gone to the ground.  I do not have strong indication to evaluate for ischemic coronary disease or dysrhythmia. He is been treated with intravascular crystalloid and still had relatively low central vein pressure on exam and today's echo.  I feel that CT angiogram to exclude pulmonary embolism is justified, even though there was no dyspnea, since this is a commonly overlooked source for syncope in the elderly and he does have underlying malignancy. D-dimer likely has no utility with elevated WBC. Will check D dimer per protocol

## 2021-06-13 NOTE — PROGRESS NOTES
CHIEF COMPLAINT: Pancreatic cancer/leukocytosis    Interval history:  The patient feels okay.  He has been up and back and forth to the bathroom with no further lightheadedness, dizziness or syncopal/presyncopal symptoms.  Diarrhea is controlled with Lomotil.  He denies nausea or vomiting.  No fevers or chills recorded.  No chest pain/shortness of breath or leg swelling.    HISTORY OF PRESENT ILLNESS:   This is a 71-year-old man currently followed at the [facility].  The patient initially presented to [facility] December 2020 with abdominal pain, constipation and vomiting with imaging showing a pancreatic tail mass with invasion into the proximal descending colon resulting in colonic stricture/obstruction and a short segment of splenic vein thrombosis.  He underwent an exploratory laparotomy and diverting loop colostomy on 12/24/2020 with biopsy from the pancreatic mass showing moderately differentiated adenocarcinoma.  The patient had a PET scan on 12/31/2020 showing an intensely avid mass in the pancreatic tail with focus of uptake in the right hepatic lobe favored to represent artifact, right lung apex felt to be reactive and 6 mm pulmonary nodules in the left lung indeterminate.  He was seen by surgical oncology at [facility] and proceeded with surgical resection on 1/19/2021 undergoing a distal pancreatectomy, splenectomy and colostomy takedown.  His pathology showed a 6 centimeter invasive moderately differentiated adenocarcinoma invading the colon..  There was no lymphovascular or perineural invasion and margins were reported clear.  35 lymph nodes were negative for malignancy so final stage was pT3 N0 MX.  Adjuvantly the patient has been receiving modified FOLFIRINOX chemotherapy with growth factor support which he initiated 3/9/2021 which he has been receiving every 2 weeks.  Irinotecan has been discontinued because of diarrhea/GI upset.  He received cycle 7 of  chemotherapy with the infusion completed 6/10/2021 and Neulasta on pro injection 6/11/2021.     The patient is admitted to [facility] after experiencing a syncopal event leaving a Patient Feed class.   He began by feeling a little dizzy and lightheaded followed by loss of consciousness and loss of bowel function.  He denies preceding fevers, chills, rigors, dysuria.  His blood pressure was low per EMS.  On presentation to the ER, the patient had a very elevated WBC count 65.5 with neutrophilia ANC 60.25, manual differential showing no significant immature cells but mild basophilia 0.6.  Blood cultures have been drawn and pending.  The lactic acid is elevated 3.9 but the procalcitonin is normal 0.19.  His repeat CBC this morning shows more dramatic elevation of the white blood cell count at 118.9 again with neutrophilia  but no immature cells reported.  His hemoglobin is 9.8 and the platelets are 333.      Past Medical History, Past Surgical History, Social History, Family History have been reviewed and are without significant changes except as mentioned.    Review of Systems   Constitutional: Positive for fatigue. Negative for appetite change.   Respiratory: Negative.    Cardiovascular: Negative.    Gastrointestinal: Positive for diarrhea. Negative for nausea and vomiting.   Musculoskeletal: Negative.    Neurological: Negative.    Hematological: Negative.    Psychiatric/Behavioral: The patient is nervous/anxious.           Medications:  The current medication list was reviewed in the EMR    ALLERGIES:    Allergies   Allergen Reactions   • Codeine Nausea Only     nausea       Objective      Vitals:    06/13/21 0715   BP: 128/77   Pulse: 68   Resp: 16   Temp: 98 °F (36.7 °C)   SpO2:           Physical Exam    CONSTITUTIONAL: pleasant well-developed adult man  HEENT: no icterus, no thrush, moist membranes  NECK: no jvd  LYMPH: no cervical or supraclavicular lad  CV: RRR, S1S2, no murmur  Chest: Port in the right chest  wall without erythema or tenderness  RESP: cta bilat, no wheezing, no rales  GI: soft, non-tender, no splenomegaly, +bs, well-healed scars  MUSC: no edema, normal gait  NEURO: alert and oriented x3, normal strength  PSYCH: normal mood  anxious affect    RECENT LABS:  Hematology Results from last 7 days   Lab Units 06/13/21  0608 06/12/21  0532 06/12/21  0450   WBC 10*3/mm3 107.98* 118.95* 124.73*   HEMOGLOBIN g/dL 9.0* 9.8* 10.5*   HEMATOCRIT % 26.5* 29.7* 31.3*   PLATELETS 10*3/mm3 290 333 350     2  Lab Results   Component Value Date    GLUCOSE 99 06/13/2021    BUN 14 06/13/2021    CREATININE 0.68 (L) 06/13/2021    EGFRIFNONA 114 06/13/2021    EGFRIFAFRI 67 12/14/2020    BCR 20.6 06/13/2021    CO2 19.5 (L) 06/13/2021    CALCIUM 8.4 (L) 06/13/2021    PROTENTOTREF 5.8 (L) 12/22/2020    ALBUMIN 3.10 (L) 06/13/2021    LABIL2 1.2 12/22/2020    AST 16 06/13/2021    ALT 16 06/13/2021       Lab Results   Component Value Date    IRON 48 (L) 10/17/2016    TIBC 368 10/17/2016    FERRITIN 42.50 10/17/2016       Lab Results   Component Value Date    NKCJVVUH22 415 10/17/2016       No results found for: FOLATE       Assessment/Plan   *nL3P5Bd pancreatic adenocarcinoma with invasion of the colon  · Status post diverting colostomy 12/24/2020 at [facility]  · Baseline scans with indeterminate lung nodule  · surgical resection on 1/19/2021 undergoing a distal pancreatectomy, splenectomy and colostomy takedown for a 6 cm tumor with negative margins and negative lymph nodes  · Receiving adjuvant chemotherapy at [facility] with modified FOLFIRINOX having completed C7 6/10/2021 with omission of irinotecan secondary to GI intolerance     *Extreme neutrophilia  · Patient reports a white blood cell count 16 at the time of his chemotherapy this week at [facility]  · Patient received Neulasta on body injected 6/11/2021  · WBC on admission 65K then increased to 124K  with differential showing mature neutrophilia ANC  113 with no significant immature cells.  · Lactic acid 3.9/procalcitonin 0.19  · WBC this morning 107/neutrophils 101, absolute lymphocyte count 4.3, absolute eosinophil 2.1 no immature cells     *Anemia hemoglobin 9.0 secondary to chemotherapy     *Diarrhea secondary to chemotherapy and probable pancreatic insufficiency  · Controlled with Lomotil     *Syncopal event  · Sounds potentially related to volume depletion or vasovagal given the low blood pressure reported by EMS but also being evaluated for cardiogenic/infectious etiologies  · Patient with pancreatic cancer higher risk of thrombosis but the patient has O2 sats 99 to 100% on room air no reported dyspnea or chest pain so unlikely to be pulmonary emboli  · No further symptoms, blood cultures no growth 24 hours, no fevers or chills, no events on the heart monitor, no chest pain/shortness of breath/leg swelling to suggest PE     Hematology/oncology recommendations:  1.  Neutrophilia most likely secondary to Neulasta (although the timing is a little early as the patient reports the injection was 6/11/2021) and potentially reactive to the syncopal event although agree with ruling out sepsis/infection etc.  2.    Lomotil as needed diarrhea  3.  Zofran as needed nausea  4.  The patient will follow up with Dr. Powers at the [facility] after discharge for continuation of his chemotherapy and surveillance for pancreatic cancer  5.  If the patient is discharged I suggested he have a CBC with differential midweek with Dr. Powers to make sure his WBC count continues to move in the right direction; recheck cbc with diff a.m. if still admitted  6.  Add pharmacologic DVT prophylaxis with Lovenox 40 mg subcu daily while here                6/13/2021      CC:

## 2021-06-14 ENCOUNTER — READMISSION MANAGEMENT (OUTPATIENT)
Dept: CALL CENTER | Facility: HOSPITAL | Age: 71
End: 2021-06-14

## 2021-06-14 ENCOUNTER — APPOINTMENT (OUTPATIENT)
Dept: CT IMAGING | Facility: HOSPITAL | Age: 71
End: 2021-06-14

## 2021-06-14 VITALS
BODY MASS INDEX: 25.39 KG/M2 | WEIGHT: 167.55 LBS | HEIGHT: 68 IN | HEART RATE: 71 BPM | DIASTOLIC BLOOD PRESSURE: 79 MMHG | RESPIRATION RATE: 16 BRPM | TEMPERATURE: 97.8 F | OXYGEN SATURATION: 98 % | SYSTOLIC BLOOD PRESSURE: 135 MMHG

## 2021-06-14 LAB
ANION GAP SERPL CALCULATED.3IONS-SCNC: 9.3 MMOL/L (ref 5–15)
BUN SERPL-MCNC: 11 MG/DL (ref 8–23)
BUN/CREAT SERPL: 13.8 (ref 7–25)
CALCIUM SPEC-SCNC: 9 MG/DL (ref 8.6–10.5)
CHLORIDE SERPL-SCNC: 109 MMOL/L (ref 98–107)
CO2 SERPL-SCNC: 20.7 MMOL/L (ref 22–29)
CREAT SERPL-MCNC: 0.8 MG/DL (ref 0.76–1.27)
DEPRECATED RDW RBC AUTO: 71.9 FL (ref 37–54)
EOSINOPHIL # BLD MANUAL: 3.26 10*3/MM3 (ref 0–0.4)
EOSINOPHIL NFR BLD MANUAL: 3 % (ref 0.3–6.2)
ERYTHROCYTE [DISTWIDTH] IN BLOOD BY AUTOMATED COUNT: 23.4 % (ref 12.3–15.4)
GFR SERPL CREATININE-BSD FRML MDRD: 95 ML/MIN/1.73
GLUCOSE SERPL-MCNC: 89 MG/DL (ref 65–99)
HCT VFR BLD AUTO: 30.5 % (ref 37.5–51)
HGB BLD-MCNC: 9.7 G/DL (ref 13–17.7)
LYMPHOCYTES # BLD MANUAL: 3.26 10*3/MM3 (ref 0.7–3.1)
LYMPHOCYTES NFR BLD MANUAL: 1 % (ref 5–12)
LYMPHOCYTES NFR BLD MANUAL: 3 % (ref 19.6–45.3)
MCH RBC QN AUTO: 27.4 PG (ref 26.6–33)
MCHC RBC AUTO-ENTMCNC: 31.8 G/DL (ref 31.5–35.7)
MCV RBC AUTO: 86.2 FL (ref 79–97)
MONOCYTES # BLD AUTO: 1.09 10*3/MM3 (ref 0.1–0.9)
NEUTROPHILS # BLD AUTO: 101.14 10*3/MM3 (ref 1.7–7)
NEUTROPHILS NFR BLD MANUAL: 93 % (ref 42.7–76)
PATHOLOGY REVIEW: YES
PLAT MORPH BLD: NORMAL
PLATELET # BLD AUTO: 275 10*3/MM3 (ref 140–450)
PMV BLD AUTO: 12.2 FL (ref 6–12)
POTASSIUM SERPL-SCNC: 3.7 MMOL/L (ref 3.5–5.2)
RBC # BLD AUTO: 3.54 10*6/MM3 (ref 4.14–5.8)
RBC MORPH BLD: NORMAL
SODIUM SERPL-SCNC: 139 MMOL/L (ref 136–145)
WBC # BLD AUTO: 108.75 10*3/MM3 (ref 3.4–10.8)
WBC MORPH BLD: NORMAL

## 2021-06-14 PROCEDURE — 99231 SBSQ HOSP IP/OBS SF/LOW 25: CPT | Performed by: INTERNAL MEDICINE

## 2021-06-14 PROCEDURE — 71275 CT ANGIOGRAPHY CHEST: CPT

## 2021-06-14 PROCEDURE — 25010000002 ENOXAPARIN PER 10 MG: Performed by: INTERNAL MEDICINE

## 2021-06-14 PROCEDURE — 80048 BASIC METABOLIC PNL TOTAL CA: CPT | Performed by: INTERNAL MEDICINE

## 2021-06-14 PROCEDURE — 85025 COMPLETE CBC W/AUTO DIFF WBC: CPT | Performed by: INTERNAL MEDICINE

## 2021-06-14 PROCEDURE — 25010000002 HEPARIN LOCK FLUSH PER 10 UNITS: Performed by: INTERNAL MEDICINE

## 2021-06-14 PROCEDURE — 85007 BL SMEAR W/DIFF WBC COUNT: CPT | Performed by: INTERNAL MEDICINE

## 2021-06-14 PROCEDURE — 0 IOPAMIDOL PER 1 ML: Performed by: INTERNAL MEDICINE

## 2021-06-14 RX ORDER — HEPARIN SODIUM (PORCINE) LOCK FLUSH IV SOLN 100 UNIT/ML 100 UNIT/ML
5 SOLUTION INTRAVENOUS AS NEEDED
Status: DISCONTINUED | OUTPATIENT
Start: 2021-06-14 | End: 2021-06-14 | Stop reason: HOSPADM

## 2021-06-14 RX ORDER — SODIUM CHLORIDE 0.9 % (FLUSH) 0.9 %
20 SYRINGE (ML) INJECTION AS NEEDED
Status: DISCONTINUED | OUTPATIENT
Start: 2021-06-14 | End: 2021-06-14 | Stop reason: HOSPADM

## 2021-06-14 RX ORDER — SODIUM CHLORIDE 0.9 % (FLUSH) 0.9 %
10 SYRINGE (ML) INJECTION AS NEEDED
Status: DISCONTINUED | OUTPATIENT
Start: 2021-06-14 | End: 2021-06-14 | Stop reason: HOSPADM

## 2021-06-14 RX ORDER — SODIUM CHLORIDE 0.9 % (FLUSH) 0.9 %
10 SYRINGE (ML) INJECTION EVERY 12 HOURS SCHEDULED
Status: DISCONTINUED | OUTPATIENT
Start: 2021-06-14 | End: 2021-06-14 | Stop reason: HOSPADM

## 2021-06-14 RX ADMIN — ENOXAPARIN SODIUM 40 MG: 40 INJECTION SUBCUTANEOUS at 09:41

## 2021-06-14 RX ADMIN — ALLOPURINOL 100 MG: 100 TABLET ORAL at 09:41

## 2021-06-14 RX ADMIN — LAMOTRIGINE 50 MG: 25 TABLET ORAL at 06:45

## 2021-06-14 RX ADMIN — SERTRALINE 200 MG: 100 TABLET, FILM COATED ORAL at 06:45

## 2021-06-14 RX ADMIN — IOPAMIDOL 95 ML: 755 INJECTION, SOLUTION INTRAVENOUS at 08:56

## 2021-06-14 RX ADMIN — Medication 500 UNITS: at 13:47

## 2021-06-14 RX ADMIN — BUSPIRONE HYDROCHLORIDE 30 MG: 15 TABLET ORAL at 09:42

## 2021-06-14 RX ADMIN — LEVOTHYROXINE SODIUM 125 MCG: 0.12 TABLET ORAL at 05:33

## 2021-06-14 RX ADMIN — ATORVASTATIN CALCIUM 10 MG: 20 TABLET, FILM COATED ORAL at 09:40

## 2021-06-14 RX ADMIN — PANTOPRAZOLE SODIUM 40 MG: 40 TABLET, DELAYED RELEASE ORAL at 06:45

## 2021-06-14 NOTE — PLAN OF CARE
Goal Outcome Evaluation:  Plan of Care Reviewed With: patient        Progress: improving     Patient alert, oriented and cooperative with care. No blood return noted from right chest port. Site saline locked and iv team notified. Patient is in no distress. He is able to walk to bathroom independently. He denies any discomfort at this time. Awaiting to have CT of chest. Call light in reach. Able to make his needs known.

## 2021-06-14 NOTE — DISCHARGE SUMMARY
[facility]    Date of Discharge:  6/14/2021    PCP: Jared Noriega MD    Discharge Diagnosis:   Active Hospital Problems    Diagnosis  POA   • **Syncope [R55]  Yes   • Leukocytosis [D72.829]  Unknown   • Pancreatic adenocarcinoma (CMS/HCC) [C25.9]  Yes   • Benign essential HTN [I10]  Yes   • Recurrent major depressive disorder, in partial remission (CMS/HCC) [F33.41]  Yes   • Thrombocytopenia (CMS/HCC) [D69.6]  Yes   • Acquired hypothyroidism [E03.9]  Yes   • Mixed hyperlipidemia [E78.2]  Yes   • Gastroesophageal reflux disease without esophagitis [K21.9]  Yes      Resolved Hospital Problems   No resolved problems to display.          Consults     Date and Time Order Name Status Description    6/12/2021  4:19 AM Inpatient Infectious Diseases Consult Completed     6/11/2021 10:59 PM Inpatient Hematology & Oncology Consult      6/11/2021 10:59 PM Inpatient Cardiology Consult Completed     6/11/2021 10:19 PM [facility] (on-call MD unless specified) Details Completed         Hospital Course  71-year-old male, admitted to the hospital, he had syncope, cardiology evaluation was requested.  We had continued to use fall precautions and we continue to monitor.  Patient's last echo shows ejection fraction of 64%.  He had elevated D-dimer, PE was ruled out by CT PE protocol which did not show any evidence of pulmonary embolism.  Patient does have history of pancreatic cancer on chemotherapy.  He is status post splenectomy.  Patient does have significant leukocytosis, he was evaluated by hematology/oncology and ID service.  And is currently doing well clinically.  He will follow up with [facility].  I have seen and examined him at bedside today, he is deemed stable to be discharged.  Total time spent on discharge day management is more than 30 minutes.  Plan of care was discussed with patient and he verbalized understanding.  Underlying etiology of significant  neutrophilia was thought to be secondary to Neulasta.    Condition on Discharge: Improved.     Temp:  [97.7 °F (36.5 °C)-97.8 °F (36.6 °C)] 97.7 °F (36.5 °C)  Heart Rate:  [63-71] 71  Resp:  [16] 16  BP: (118-150)/(76-95) 150/82  Body mass index is 25.69 kg/m².    Physical Exam   General, awake and alert.  Appears sick on chronic basis.  Head and ENT, normocephalic and atraumatic.  Lungs, symmetric expansion, equal air entry bilaterally.  On chest wall, he has port in place.  Heart, regular rate and rhythm.  Abdomen, soft and nontender.  Extremities, no clubbing or cyanosis.  Neuro, no focal deficits.  Skin: Warm and no rash.  Psych, normal mood and affect.  Musculoskeletal, joint examination is grossly normal.    Disposition: Home or Self Care       Discharge Medications      Changes to Medications      Instructions Start Date   omeprazole 20 MG capsule  Commonly known as: priLOSEC  What changed: how much to take   20 mg, Oral, Daily      vitamin D 1.25 MG (91064 UT) capsule capsule  Commonly known as: ERGOCALCIFEROL  What changed: additional instructions   50,000 Units, Oral, 2 Times Weekly         Continue These Medications      Instructions Start Date   allopurinol 100 MG tablet  Commonly known as: ZYLOPRIM   100 mg, Oral, Daily      ascorbic acid 1000 MG tablet  Commonly known as: VITAMIN C   1,000 mg, Oral, Nightly      atorvastatin 10 MG tablet  Commonly known as: LIPITOR   10 mg, Oral, Daily      busPIRone 10 MG tablet  Commonly known as: BUSPAR   30 mg, Oral, Daily      dexamethasone 4 MG tablet  Commonly known as: DECADRON   TAKE 1 TABLET BY MOUTH TWICE DAILY WITH FOOD ON DAYS 2 AND 3 AFTER CHEMOTHERAPY AS DIRECTED      diphenoxylate-atropine 2.5-0.025 MG per tablet  Commonly known as: LOMOTIL   TAKE 1 TABLET BY MOUTH THREE TIMES DAILY AS NEEDED FOR LOOSE STOOL      fish oil 1000 MG capsule capsule   2,000 mg, Oral, 2 Times Daily With Meals, HOLD PRIOR TO SURG      lamoTRIgine 25 MG tablet  Commonly known  "as: LaMICtal   50 mg, Oral, Every Morning      lamoTRIgine 100 MG tablet  Commonly known as: LaMICtal   100 mg, Oral, Nightly      levothyroxine 125 MCG tablet  Commonly known as: SYNTHROID, LEVOTHROID   125 mcg, Oral, Daily      magnesium oxide 400 MG tablet  Commonly known as: MAG-OX   1 tablet, Oral, 2 Times Daily      ondansetron 8 MG tablet  Commonly known as: ZOFRAN   8 mg, Oral, Every 8 Hours PRN      potassium chloride 20 MEQ CR tablet  Commonly known as: K-DUR,KLOR-CON   20 mEq, Oral, Daily      prochlorperazine 10 MG tablet  Commonly known as: COMPAZINE   10 mg, Oral, Every 6 Hours PRN      sertraline 100 MG tablet  Commonly known as: ZOLOFT   200 mg, Oral, Every Morning      Syringe/Needle (Disp) 23G X 1\" 3 ML misc  Commonly known as: B-D 3CC LUER-JACY SYR 23GX1\"   For testosterone injection once every 10 days.      VOLTAREN EX   1 application, Apply externally, Daily PRN, Bilateral knees               Additional Instructions for the Follow-ups that You Need to Schedule     Discharge Follow-up with PCP   As directed       Currently Documented PCP:    Jared Noriega MD    PCP Phone Number:    [phone]     Follow Up Details: Follow-up with PCP in 7 days.         Discharge Follow-up with Specialty: Follow up with hematology/oncology as scheduled.   As directed      Specialty: Follow up with hematology/oncology as scheduled.           Follow-up Information     Jared Noriega MD .    Specialty: Family Medicine  Why: Follow-up with PCP in 7 days.  Contact information:  [address] [address]  [phone]                  Pending Labs     Order Current Status    Pathology Consultation In process    Blood Culture - Blood, Arm, Left Preliminary result    Blood Culture - Blood, Arm, Right Preliminary result         Orlando Rene MD  06/14/21  12:21 EDT    Discharge time spent greater than 30 minutes.  "

## 2021-06-14 NOTE — PROGRESS NOTES
[facility]   71 y.o.  male    LOS: 2 days   Patient Care Team:  Jared Noriega MD as PCP - General (Family Medicine)  Armani Painting MD as Consulting Physician (Hematology and Oncology)  Yousuf Greene MD as Consulting Physician (Endocrinology)  Be Iraheta MD as Referring Physician (Internal Medicine)      Subjective     Review of Systems:   No shortness of air no dizziness    Medication Review:   Current Facility-Administered Medications:   •  acetaminophen (TYLENOL) tablet 650 mg, 650 mg, Oral, Q4H PRN **OR** acetaminophen (TYLENOL) 160 MG/5ML solution 650 mg, 650 mg, Oral, Q4H PRN **OR** acetaminophen (TYLENOL) suppository 650 mg, 650 mg, Rectal, Q4H PRN, Dora Do APRN  •  allopurinol (ZYLOPRIM) tablet 100 mg, 100 mg, Oral, Daily, Dora Do APRN, 100 mg at 06/14/21 0941  •  atorvastatin (LIPITOR) tablet 10 mg, 10 mg, Oral, Daily, Dora Do APRN, 10 mg at 06/14/21 0940  •  busPIRone (BUSPAR) tablet 30 mg, 30 mg, Oral, Daily, Dora Do APRN, 30 mg at 06/14/21 0942  •  calcium carbonate (TUMS) chewable tablet 500 mg (200 mg elemental), 2 tablet, Oral, BID PRN, Dora Do APRN  •  Diclofenac Sodium (VOLTAREN) 1 % gel 2 g, 2 g, Topical, BID, Dora Do APRN  •  diphenoxylate-atropine (LOMOTIL) 2.5-0.025 MG per tablet 1 tablet, 1 tablet, Oral, 4x Daily PRN, Clyde Damon MD, 1 tablet at 06/13/21 1318  •  enoxaparin (LOVENOX) syringe 40 mg, 40 mg, Subcutaneous, Q24H, Clyde Damon MD, 40 mg at 06/14/21 0941  •  lamoTRIgine (LaMICtal) tablet 100 mg, 100 mg, Oral, Nightly, Dora Do APRN, 100 mg at 06/13/21 2048  •  lamoTRIgine (LaMICtal) tablet 50 mg, 50 mg, Oral, QAM, Dora Do APRN, 50 mg at 06/14/21 0645  •  levothyroxine (SYNTHROID, LEVOTHROID) tablet 125 mcg, 125 mcg, Oral, Q AM, Dora Do APRN, 125 mcg at 06/14/21 0533  •  nitroglycerin (NITROSTAT) SL tablet 0.4 mg, 0.4 mg, Sublingual,  Q5 Min PRN, Dora Do APRN  •  ondansetron (ZOFRAN) tablet 4 mg, 4 mg, Oral, Q6H PRN **OR** ondansetron (ZOFRAN) injection 4 mg, 4 mg, Intravenous, Q6H PRN, Dora Do APRN  •  pantoprazole (PROTONIX) EC tablet 40 mg, 40 mg, Oral, QAM, Dora Do APRN, 40 mg at 06/14/21 0645  •  potassium chloride (K-DUR,KLOR-CON) ER tablet 40 mEq, 40 mEq, Oral, PRN, 40 mEq at 06/13/21 1421 **OR** potassium chloride (KLOR-CON) packet 40 mEq, 40 mEq, Oral, PRN **OR** potassium chloride 10 mEq in 100 mL IVPB, 10 mEq, Intravenous, Q1H PRN, Orlando Rene MD  •  prochlorperazine (COMPAZINE) tablet 10 mg, 10 mg, Oral, Q6H PRN, Dora Do APRN  •  sertraline (ZOLOFT) tablet 200 mg, 200 mg, Oral, QAM, Dora Do APRN, 200 mg at 06/14/21 0645  •  sodium chloride 0.9 % flush 10 mL, 10 mL, Intravenous, Q12H, Dora Do APRN, 10 mL at 06/13/21 2048  •  sodium chloride 0.9 % flush 10 mL, 10 mL, Intravenous, PRN, Dora Do APRN  •  sodium chloride 0.9 % infusion, 100 mL/hr, Intravenous, Continuous, Mine Abraham APRN, Stopped at 06/13/21 2000      Objective     Vital Sign Min/Max for last 24 hours  Temp  Min: 97.7 °F (36.5 °C)  Max: 97.8 °F (36.6 °C)   BP  Min: 118/95  Max: 150/82    Pulse  Min: 63  Max: 71         06/12/21  0012 06/13/21  0300 06/13/21  1000   Weight: 75.8 kg (167 lb 3.2 oz) 76.1 kg (167 lb 12.8 oz) 76 kg (167 lb 8.8 oz)        Intake/Output Summary (Last 24 hours) at 6/14/2021 1032  Last data filed at 6/13/2021 2000  Gross per 24 hour   Intake 240 ml   Output --   Net 240 ml     Physical Exam:     General Appearance:   Middle-aged male alert ambulating in room with cane in no acute distress   Neck:    no JVD   Lungs:     Clear to auscultation bilaterally. No wheezes, rhonchi or rales. No accessory muscle use.     Heart:    Regular rate and rhythm.  Normal S1 and S2. 1/6 murmur, no gallop, rub or lift.    Abdomen:     Normal bowel sounds, soft  non-tender, non-distended   Extremities:   No edema in BLE. BLE warm with no cyanosis   Skin:   Warm and dry   Neurologic:   awake alert and oriented x3, speech clear and approp      Monitor:  SR  Results Review:     I reviewed the patient's new clinical results.    Sodium Sodium   Date Value Ref Range Status   06/14/2021 139 136 - 145 mmol/L Final   06/13/2021 141 136 - 145 mmol/L Final   06/12/2021 139 136 - 145 mmol/L Final   06/11/2021 138 136 - 145 mmol/L Final      Potassium Potassium   Date Value Ref Range Status   06/14/2021 3.7 3.5 - 5.2 mmol/L Final   06/13/2021 3.3 (L) 3.5 - 5.2 mmol/L Final   06/12/2021 3.7 3.5 - 5.2 mmol/L Final   06/11/2021 3.6 3.5 - 5.2 mmol/L Final     Comment:     Slight hemolysis detected by analyzer. Results may be affected.      Chloride Chloride   Date Value Ref Range Status   06/14/2021 109 (H) 98 - 107 mmol/L Final   06/13/2021 112 (H) 98 - 107 mmol/L Final   06/12/2021 106 98 - 107 mmol/L Final   06/11/2021 102 98 - 107 mmol/L Final      Bicarbonate No results found for: PLASMABICARB   BUN BUN   Date Value Ref Range Status   06/14/2021 11 8 - 23 mg/dL Final   06/13/2021 14 8 - 23 mg/dL Final   06/12/2021 25 (H) 8 - 23 mg/dL Final   06/11/2021 29 (H) 8 - 23 mg/dL Final      Creatinine Creatinine   Date Value Ref Range Status   06/14/2021 0.80 0.76 - 1.27 mg/dL Final   06/13/2021 0.68 (L) 0.76 - 1.27 mg/dL Final   06/12/2021 0.95 0.76 - 1.27 mg/dL Final   06/11/2021 1.07 0.76 - 1.27 mg/dL Final      Calcium Calcium   Date Value Ref Range Status   06/14/2021 9.0 8.6 - 10.5 mg/dL Final   06/13/2021 8.4 (L) 8.6 - 10.5 mg/dL Final   06/12/2021 8.9 8.6 - 10.5 mg/dL Final   06/11/2021 9.3 8.6 - 10.5 mg/dL Final      Magnesium Magnesium   Date Value Ref Range Status   06/11/2021 1.9 1.6 - 2.4 mg/dL Final        Results from last 7 days   Lab Units 06/14/21  0544   WBC 10*3/mm3 108.75*   HEMOGLOBIN g/dL 9.7*   HEMATOCRIT % 30.5*   PLATELETS 10*3/mm3 275     Results from last 7 days    Lab Units 06/13/21  1510 06/11/21 1957   TROPONIN T ng/mL <0.010 <0.010     Lab Results   Component Value Date    CHOL 166 03/04/2020    CHOL 151 03/15/2019     Lab Results   Component Value Date    HDL 44 12/14/2020    HDL 42 03/04/2020    HDL 53 05/06/2019     Lab Results   Component Value Date    LDL 85 12/14/2020    LDL 89 03/04/2020    LDL 76 05/06/2019     Lab Results   Component Value Date    TRIG 164 (H) 12/14/2020    TRIG 174 (H) 03/04/2020    TRIG 160 (H) 05/06/2019       Assessment/Plan   Assessment/ Plan  1. Syncope       -2D echo 6/13/21: Ejection fraction 64%.  Mild dilatation aortic root with diameter index 2.1 cm/m² corresponding to a absolute dimension of 3.9 cm.  Central vein pressure estimate 3 cm.  No other important abnormalities.     -Sees Dr. Garrett as there was concern for QT prolongation  2.  Elevated D-dimer  -CTA of chest 6/14/2021: No evidence of PE.  3. Pancreatic adenocarcinoma    Plan:  CTA of chest negative for PE today.  Blood pressure and heart rate stable.  Likely home today.  Follow-up with Dr. Chao as scheduled.    MONIKA Wood  06/14/21  10:32 EDT      Time: 17 mins    Dictated portions using Dragon dictation software.     During the entire encounter, I was wearing recommended PPE including face mask and eye protection. Hand sanitization was performed prior to entering room and upon exit.    Josué Galeano M.D.   Reviewed our cardiology group Nurse Practitioner's note.    Pt interviewed and examined.   Findings verified.   Reviewed and agree with corrections or modifications of history, physical, and plans as indicated:   Potassium improved today.  Heart rate and blood pressure stable.  Okay for discharge today. No further syncope.    EMR Dragon/Transcription disclaimer:   Much of this encounter note is an electronic transcription/translation of spoken language to printed text. The electronic translation of spoken language may permit erroneous, or at times,  nonsensical words or phrases to be inadvertently transcribed.  Although I have reviewed the note for such errors, some may still exist. Please contact me if needed for clarification or correction.  Josué Galeano M.D.

## 2021-06-14 NOTE — PROGRESS NOTES
Subjective     CHIEF COMPLAINT:     Pancreatic cancer  Neutrophilia    INTERVAL HISTORY:     Patient reports no chest pain or shortness of breath.   Patient states he did not develop bone pain after he received Neulasta.   No fever.    REVIEW OF SYSTEMS:  Review of systems is significant for the following.  Rest of ROS is negative.    MSK: No bone pain.    SCHEDULED MEDS:  allopurinol, 100 mg, Oral, Daily  atorvastatin, 10 mg, Oral, Daily  busPIRone, 30 mg, Oral, Daily  Diclofenac Sodium, 2 g, Topical, BID  enoxaparin, 40 mg, Subcutaneous, Q24H  lamoTRIgine, 100 mg, Oral, Nightly  lamoTRIgine, 50 mg, Oral, QAM  levothyroxine, 125 mcg, Oral, Q AM  pantoprazole, 40 mg, Oral, QAM  sertraline, 200 mg, Oral, QAM  sodium chloride, 10 mL, Intravenous, Q12H      INFUSIONS:  sodium chloride, 100 mL/hr, Last Rate: Stopped (06/13/21 2000)      PRN MEDS:  •  acetaminophen **OR** acetaminophen **OR** acetaminophen  •  calcium carbonate  •  diphenoxylate-atropine  •  nitroglycerin  •  ondansetron **OR** ondansetron  •  potassium chloride **OR** potassium chloride **OR** potassium chloride  •  prochlorperazine  •  sodium chloride       Objective   VITAL SIGNS:  Temp:  [97.7 °F (36.5 °C)-97.8 °F (36.6 °C)] 97.7 °F (36.5 °C)  Heart Rate:  [63-71] 71  Resp:  [16] 16  BP: (118-150)/(76-95) 150/82       PHYSICAL EXAMINATION:  GENERAL:  The patient appears in fair general condition, not in acute distress.  SKIN: No skin rash. No ecchymosis.   HEAD:  Normocephalic.  EYES:  No Jaundice. No Pallor. Pupils equal. EOMI.  NECK:  Supple. No Masses.  CHEST: Normal respiratory effort. Lungs clear to auscultation.   CARDIAC:  Normal S1 & S2. No murmur. No edema.  ABDOMEN: Postsurgical changes.  Abdomen is soft. No tenderness. No Hepatomegaly. No masses.  NEUROLOGICAL:  No Focal neurological deficits.  PSYCH: Normal mood and affect.        RESULT REVIEW:   Results from last 7 days   Lab Units 06/14/21  0544 06/13/21  0608 06/12/21  0532  06/12/21  0450 06/11/21 1957   WBC 10*3/mm3 108.75* 107.98* 118.95* 124.73* 65.56*   NEUTROS ABS 10*3/mm3 101.14* 101.50* 113.00*  --  60.25*   LYMPHS ABS 10*3/mm3 3.26* 4.32* 5.95*  --  4.00*   HEMOGLOBIN g/dL 9.7* 9.0* 9.8* 10.5* 10.7*   HEMATOCRIT % 30.5* 26.5* 29.7* 31.3* 31.9*   PLATELETS 10*3/mm3 275 290 333 350 388     Results from last 7 days   Lab Units 06/14/21  0544 06/13/21  0608 06/12/21 0450 06/11/21 1957   SODIUM mmol/L 139 141 139 138   POTASSIUM mmol/L 3.7 3.3* 3.7 3.6   CHLORIDE mmol/L 109* 112* 106 102   CO2 mmol/L 20.7* 19.5* 19.1* 21.3*   BUN mg/dL 11 14 25* 29*   CREATININE mg/dL 0.80 0.68* 0.95 1.07   CALCIUM mg/dL 9.0 8.4* 8.9 9.3   ALBUMIN g/dL  --  3.10*  --  4.00   BILIRUBIN mg/dL  --  <0.2  --  0.2   ALK PHOS U/L  --  125*  --  142*   ALT (SGPT) U/L  --  16  --  28   AST (SGOT) U/L  --  16  --  32   MAGNESIUM mg/dL  --   --   --  1.9     CT angiogram chest on 6/14/2021:  1. No pulmonary embolus demonstrated.  2. Tiny focus groundglass infiltrate right left upper lobes as described  above. Continue conservative CT surveillance.  3. Interval splenectomy and partial pancreatectomy.  4. Trace pericardial thickening or effusion.      Assessment/Plan   *Pancreatic adenocarcinoma with invasion of the colon ([id number]).  · S/P diverting colostomy on 12/24/2020  · S/P surgical resection on 1/19/2021 by Dr. Torres with a distal pancreatectomy, splenectomy and colostomy takedown.  · Tumor was 6 cm with negative margins and no LN involvement.   · Patient is undergoing adjuvant chemotherapy with FOLFIRINOX. He received cycle #7 on 6/10/2021. Iriontecan was omitted due to GI side effects.    *Leukocytosis with significant neutrophilia.  · WBC was 16,000 mg on his last chemotherapy treatment.  · Patient received Neulasta on 6/11/2021.  · Patient, WBC count was 65,000 increased to 124,000 on 6/12/2021.  · WBC count is 108,000 today.  · No evidence of infection.  Blood cultures negative.  · Scans  showed no evidence of infection.  · The leukocytosis and neutrophilia are therefore contributed to Neulasta.    *Anemia due to chemotherapy.  · Hemoglobin is 9.7 today.    *Syncopal event.  · ?  Volume depletion vs vasovagal.  · Patient had low blood pressure when evaluated by EMS.    *Tiny focus of groundglass infiltrate upper lobes.  Continued follow-up was recommended.      PLAN:    1.  Okay for the patient to be discharged from Hematology-Oncology standpoint.  2.  We would recommend repeating CBC later this week and Dr Powers's office.  3.  Patient is due for his next cycle of chemotherapy in 1 week.      Lisbeth Darling MD  06/14/21

## 2021-06-14 NOTE — CASE MANAGEMENT/SOCIAL WORK
Discharge Planning Assessment  [facility]     Patient Name: Kaiser Valente  MRN: [id number]  Today's Date: 6/14/2021    Admit Date: 6/11/2021    Discharge Needs Assessment     Row Name 06/14/21 0915       Living Environment    Lives With  spouse;child(shwetha), adult    Current Living Arrangements  home/apartment/condo    Primary Care Provided by  self    Provides Primary Care For  no one    Family Caregiver if Needed  spouse    Quality of Family Relationships  helpful;supportive    Able to Return to Prior Arrangements  yes       Resource/Environmental Concerns    Resource/Environmental Concerns  none       Transition Planning    Patient/Family Anticipates Transition to  home with family    Patient/Family Anticipated Services at Transition  none    Transportation Anticipated  family or friend will provide;car, drives self       Discharge Needs Assessment    Readmission Within the Last 30 Days  no previous admission in last 30 days    Equipment Currently Used at Home  walker, standard;cane, quad;bath bench    Concerns to be Addressed  denies needs/concerns at this time    Anticipated Changes Related to Illness  none    Provided Post Acute Provider List?  N/A    Provided Post Acute Provider Quality & Resource List?  N/A        Discharge Plan     Row Name 06/14/21 0916       Plan    Plan  Home    Plan Comments  S/W pt at bedside.  Facesheet and pharmacy info confirmed.  Pt lives in a single story house w/ a basement with his wife Tri and adult son.  Pt is IADLs with a cane, and also has a walker if needed.  He is able to drive.  Tri assists him at home as needed. Pt has used [facility] in the past, but does not feel HH will be needed at this time.  No hx of SNF.  He did enroll in Meds to Bed. Pt plans to return home with his family upon DC.  He denies DC needs at this time.  CCP will continue to follow. ............sk        Continued Care and Services - Admitted Since 6/11/2021    Coordination has not been  started for this encounter.         Demographic Summary     Row Name 06/14/21 0914       General Information    Admission Type  inpatient    Arrived From  home    Referral Source  admission list    Reason for Consult  discharge planning    Preferred Language  English        Functional Status     Row Name 06/14/21 0914       Functional Status    Usual Activity Tolerance  moderate    Current Activity Tolerance  moderate       Functional Status, IADL    Medications  independent;assistive person    Meal Preparation  assistive person    Housekeeping  assistive person    Laundry  assistive person    Shopping  assistive person       Mental Status    General Appearance WDL  WDL       Mental Status Summary    Recent Changes in Mental Status/Cognitive Functioning  no changes       Employment/    Employment Status  retired            Barbara Gomes RN

## 2021-06-15 ENCOUNTER — TRANSITIONAL CARE MANAGEMENT TELEPHONE ENCOUNTER (OUTPATIENT)
Dept: CALL CENTER | Facility: HOSPITAL | Age: 71
End: 2021-06-15

## 2021-06-15 LAB
LAB AP CASE REPORT: NORMAL
LAB AP CLINICAL INFORMATION: NORMAL
PATH REPORT.FINAL DX SPEC: NORMAL
PATH REPORT.GROSS SPEC: NORMAL

## 2021-06-15 NOTE — OUTREACH NOTE
Prep Survey      Responses   [facility] facility patient discharged from?  [place]   Is LACE score < 7 ?  No   Emergency Room discharge w/ pulse ox?  No   Eligibility  [facility]   Date of Admission  06/11/21   Date of Discharge  06/14/21   Discharge Disposition  Home or Self Care   Discharge diagnosis  syncope, thrombocytopenia, Hx Pancreatic CA on chemo, splenectomy   Does the patient have one of the following disease processes/diagnoses(primary or secondary)?  Other   Does the patient have Home health ordered?  No   Is there a DME ordered?  No   Prep survey completed?  Yes          Moni Cabello RN

## 2021-06-15 NOTE — OUTREACH NOTE
Call Center TCM Note      Responses   [facility] patient discharged from?  [place]   Does the patient have one of the following disease processes/diagnoses(primary or secondary)?  Other   TCM attempt successful?  Yes   Discharge diagnosis  syncope, thrombocytopenia, Hx Pancreatic CA on chemo, splenectomy   Meds reviewed with patient/caregiver?  Yes   Does the patient have all medications ordered at discharge?  N/A   Prescription comments  NO CHANGES MADE TO PT MEDICATIONS    Is the patient taking all medications as directed (includes completed medication regime)?  Yes   Does the patient have a primary care provider?   Yes   Does the patient have an appointment with their PCP within 7 days of discharge?  No   Comments regarding PCP  Pt declines assist in scheduling TCM FWP. He will call as he has to sched around chemo/oncology appts   Has the patient kept scheduled appointments due by today?  Yes   Has home health visited the patient within 72 hours of discharge?  N/A   Psychosocial issues?  No   Did the patient receive a copy of their discharge instructions?  Yes   Nursing interventions  Reviewed instructions with patient   What is the patient's perception of their health status since discharge?  Improving   Is the patient/caregiver able to teach back signs and symptoms related to disease process for when to call PCP?  Yes   Is the patient/caregiver able to teach back signs and symptoms related to disease process for when to call 911?  Yes   Is the patient/caregiver able to teach back the hierarchy of who to call/visit for symptoms/problems? PCP, Specialist, Home health nurse, Urgent Care, ED, 911  Yes   If the patient is a current smoker, are they able to teach back resources for cessation?  Not a smoker   TCM call completed?  Yes   Wrap up additional comments  Pt states he is actually feeling the best he has since pancreatic sx and starting chemo. No further syncope, no concerns at this time. No med  changes. Pt declines assist in scheduling TCM FWP. He will call as he has to sched around chemo/oncology appts          Rosalba Giles MA    6/15/2021, 15:05 EDT

## 2021-06-15 NOTE — CASE MANAGEMENT/SOCIAL WORK
Case Management Discharge Note      Final Note: DC home    Provided Post Acute Provider List?: N/A  Provided Post Acute Provider Quality & Resource List?: N/A    Selected Continued Care - Discharged on 6/14/2021 Admission date: 6/11/2021 - Discharge disposition: Home or Self Care    Destination    No services have been selected for the patient.              Durable Medical Equipment    No services have been selected for the patient.              Dialysis/Infusion    No services have been selected for the patient.              Home Medical Care    No services have been selected for the patient.              Therapy    No services have been selected for the patient.              Community Resources    No services have been selected for the patient.              Community & DME    No services have been selected for the patient.                       Final Discharge Disposition Code: 01 - home or self-care

## 2021-06-16 LAB
BACTERIA SPEC AEROBE CULT: NORMAL
BACTERIA SPEC AEROBE CULT: NORMAL

## 2021-07-28 ENCOUNTER — OFFICE VISIT (OUTPATIENT)
Dept: FAMILY MEDICINE CLINIC | Facility: CLINIC | Age: 71
End: 2021-07-28

## 2021-07-28 VITALS
OXYGEN SATURATION: 98 % | DIASTOLIC BLOOD PRESSURE: 88 MMHG | HEART RATE: 71 BPM | SYSTOLIC BLOOD PRESSURE: 125 MMHG | HEIGHT: 68 IN | BODY MASS INDEX: 25.76 KG/M2 | TEMPERATURE: 97.1 F | WEIGHT: 170 LBS

## 2021-07-28 DIAGNOSIS — C25.9 PANCREATIC ADENOCARCINOMA (HCC): Chronic | ICD-10-CM

## 2021-07-28 DIAGNOSIS — F33.41 RECURRENT MAJOR DEPRESSIVE DISORDER, IN PARTIAL REMISSION (HCC): Chronic | ICD-10-CM

## 2021-07-28 DIAGNOSIS — Z00.00 MEDICARE ANNUAL WELLNESS VISIT, SUBSEQUENT: Primary | ICD-10-CM

## 2021-07-28 DIAGNOSIS — I10 BENIGN ESSENTIAL HTN: Chronic | ICD-10-CM

## 2021-07-28 PROBLEM — K86.89 PANCREATIC MASS: Status: RESOLVED | Noted: 2020-12-21 | Resolved: 2021-07-28

## 2021-07-28 PROBLEM — G47.30 SLEEP APNEA: Status: ACTIVE | Noted: 2021-02-24

## 2021-07-28 PROBLEM — D62 ACUTE BLOOD LOSS ANEMIA: Status: ACTIVE | Noted: 2021-01-21

## 2021-07-28 PROBLEM — D69.1 PLATELET DISORDER (HCC): Status: ACTIVE | Noted: 2021-02-24

## 2021-07-28 PROBLEM — K58.9 IRRITABLE BOWEL SYNDROME: Status: ACTIVE | Noted: 2021-02-24

## 2021-07-28 PROBLEM — R10.84 GENERALIZED ABDOMINAL PAIN: Status: RESOLVED | Noted: 2018-08-15 | Resolved: 2021-07-28

## 2021-07-28 PROBLEM — N28.9 KIDNEY DISEASE: Status: ACTIVE | Noted: 2021-02-24

## 2021-07-28 PROBLEM — Z93.3 COLOSTOMY IN PLACE (HCC): Status: ACTIVE | Noted: 2021-02-24

## 2021-07-28 PROBLEM — M10.079: Status: ACTIVE | Noted: 2017-04-16

## 2021-07-28 PROBLEM — I45.9 CONDUCTION DISORDER OF THE HEART: Status: ACTIVE | Noted: 2021-02-24

## 2021-07-28 PROBLEM — E07.9 DISORDER OF THYROID: Status: ACTIVE | Noted: 2021-02-24

## 2021-07-28 PROBLEM — M19.90 ARTHRITIS: Status: ACTIVE | Noted: 2021-02-24

## 2021-07-28 PROCEDURE — G0439 PPPS, SUBSEQ VISIT: HCPCS | Performed by: FAMILY MEDICINE

## 2021-07-28 PROCEDURE — 99213 OFFICE O/P EST LOW 20 MIN: CPT | Performed by: FAMILY MEDICINE

## 2021-07-28 RX ORDER — LOPERAMIDE HYDROCHLORIDE 2 MG/1
TABLET ORAL
COMMUNITY
Start: 2021-02-22 | End: 2022-06-27

## 2021-07-28 RX ORDER — OMEPRAZOLE 40 MG/1
40 CAPSULE, DELAYED RELEASE ORAL DAILY
COMMUNITY
Start: 2021-07-07 | End: 2022-06-27

## 2021-07-28 NOTE — PROGRESS NOTES
The ABCs of the Annual Wellness Visit  Subsequent Medicare Wellness Visit    Chief Complaint   Patient presents with   • Medicare Wellness-subsequent       Subjective   History of Present Illness:  Kaiser Valente is a 71 y.o. male who presents for a Subsequent Medicare Wellness Visit.    HEALTH RISK ASSESSMENT    Recent Hospitalizations:  Recently treated at the following:  Other: [facility]    Current Medical Providers:  Patient Care Team:  Jared Noriega MD as PCP - General (Family Medicine)  Armani Painting MD as Consulting Physician (Hematology and Oncology)  Yousuf Greene MD as Consulting Physician (Endocrinology)  Be Iraheta MD as Referring Physician (Internal Medicine)    Smoking Status:  Social History     Tobacco Use   Smoking Status Never Smoker   Smokeless Tobacco Never Used       Alcohol Consumption:  Social History     Substance and Sexual Activity   Alcohol Use Yes    Comment: 1 DAILY DRINK       Depression Screen:   PHQ-2/PHQ-9 Depression Screening 7/28/2021   Little interest or pleasure in doing things 0   Feeling down, depressed, or hopeless 1   Total Score 1       Fall Risk Screen:  ANNADI Fall Risk Assessment was completed, and patient is at HIGH risk for falls. Assessment completed on:7/28/2021    Health Habits and Functional and Cognitive Screening:  Functional & Cognitive Status 7/28/2021   Do you have difficulty preparing food and eating? Yes   Do you have difficulty bathing yourself, getting dressed or grooming yourself? Yes   Do you have difficulty using the toilet? No   Do you have difficulty moving around from place to place? No   Do you have trouble with steps or getting out of a bed or a chair? No   Current Diet Well Balanced Diet   Dental Exam Not up to date   Eye Exam Not up to date   Exercise (times per week) 2 times per week   Current Exercises Include Walking   Current Exercise Activities Include -   Do you need help using the phone?  No   Are you deaf or do you have serious  difficulty hearing?  Yes   Do you need help with transportation? No   Do you need help shopping? No   Do you need help preparing meals?  No   Do you need help with housework?  No   Do you need help with laundry? No   Do you need help taking your medications? No   Do you need help managing money? No   Do you ever drive or ride in a car without wearing a seat belt? No   Have you felt unusual stress, anger or loneliness in the last month? No   Who do you live with? Spouse   If you need help, do you have trouble finding someone available to you? No   Have you been bothered in the last four weeks by sexual problems? No   Do you have difficulty concentrating, remembering or making decisions? Yes         Does the patient have evidence of cognitive impairment? No    Asprin use counseling:Does not need ASA (and currently is not on it)    Age-appropriate Screening Schedule:  Refer to the list below for future screening recommendations based on patient's age, sex and/or medical conditions. Orders for these recommended tests are listed in the plan section. The patient has been provided with a written plan.    Health Maintenance   Topic Date Due   • INFLUENZA VACCINE  10/01/2021   • LIPID PANEL  12/14/2021   • TDAP/TD VACCINES (2 - Td or Tdap) 05/14/2030   • ZOSTER VACCINE  Completed          The following portions of the patient's history were reviewed and updated as appropriate: allergies, current medications, past family history, past medical history, past social history, past surgical history and problem list.    Outpatient Medications Prior to Visit   Medication Sig Dispense Refill   • allopurinol (ZYLOPRIM) 100 MG tablet Take 1 tablet by mouth Daily. 90 tablet 1   • ascorbic acid (VITAMIN C) 1000 MG tablet Take 1,000 mg by mouth Every Night.     • atorvastatin (LIPITOR) 10 MG tablet Take 1 tablet by mouth Daily. 90 tablet 1   • busPIRone (BUSPAR) 10 MG tablet Take 30 mg by mouth Daily.     • dexamethasone (DECADRON) 4 MG  "tablet TAKE 1 TABLET BY MOUTH TWICE DAILY WITH FOOD ON DAYS 2 AND 3 AFTER CHEMOTHERAPY AS DIRECTED     • Diclofenac Sodium (VOLTAREN EX) Apply 1 application topically Daily As Needed. Bilateral knees     • diphenoxylate-atropine (LOMOTIL) 2.5-0.025 MG per tablet TAKE 1 TABLET BY MOUTH THREE TIMES DAILY AS NEEDED FOR LOOSE STOOL     • GLUCOSAMINE-CHONDROIT-VIT C-MN PO Take 1 tablet by mouth.     • lamoTRIgine (LaMICtal) 100 MG tablet Take 100 mg by mouth Every Night.     • lamoTRIgine (LaMICtal) 25 MG tablet Take 50 mg by mouth Every Morning.     • levothyroxine (SYNTHROID, LEVOTHROID) 125 MCG tablet Take 1 tablet by mouth Daily. 90 tablet 1   • loperamide (IMODIUM A-D) 2 MG tablet      • Omega-3 Fatty Acids (FISH OIL) 1000 MG capsule capsule Take 2,000 mg by mouth 2 (Two) Times a Day With Meals. HOLD PRIOR TO SURG     • omeprazole (priLOSEC) 40 MG capsule Take 40 mg by mouth Daily. Take before a meal     • ondansetron (ZOFRAN) 8 MG tablet Take 8 mg by mouth Every 8 (Eight) Hours As Needed.     • potassium chloride (K-DUR,KLOR-CON) 20 MEQ CR tablet Take 20 mEq by mouth Daily.     • prochlorperazine (COMPAZINE) 10 MG tablet Take 10 mg by mouth Every 6 (Six) Hours As Needed.     • sertraline (ZOLOFT) 100 MG tablet Take 200 mg by mouth Every Morning.     • Syringe/Needle, Disp, (B-D 3CC LUER-JACY SYR 23GX1\") 23G X 1\" 3 ML misc For testosterone injection once every 10 days. 10 each 2   • vitamin D (ERGOCALCIFEROL) 1.25 MG (54892 UT) capsule capsule Take 1 capsule by mouth 2 (Two) Times a Week. (Patient taking differently: Take 50,000 Units by mouth 2 (Two) Times a Week. Wednesday and Saturday nights) 26 capsule 2   • magnesium oxide (MAG-OX) 400 MG tablet Take 1 tablet by mouth 2 (Two) Times a Day.     • omeprazole (priLOSEC) 20 MG capsule Take 1 capsule by mouth Daily. (Patient taking differently: Take 40 mg by mouth Daily.) 90 capsule 1     No facility-administered medications prior to visit.       Patient Active " "Problem List   Diagnosis   • Acquired hypothyroidism   • Osteoarthritis of both knees   • Gastroesophageal reflux disease without esophagitis   • Mixed hyperlipidemia   • Monoclonal gammopathy of undetermined significance   • Hypercalcemia   • Chronic fatigue   • Thrombocytopenia (CMS/HCC)   • History of primary hyperparathyroidism   • Hypogonadism in male   • Toe pain, left   • Recurrent major depressive disorder, in partial remission (CMS/HCC)   • Idiopathic gout, unspecified ankle and foot   • Pseudogout of joint of right foot   • Chronic gout of left foot   • Hyperglycemia   • Benign prostatic hyperplasia without lower urinary tract symptoms   • Benign essential HTN   • Colonic obstruction (CMS/HCC)   • Thrombocytopenia (CMS/HCC)   • Pancreatic adenocarcinoma (CMS/HCC)   • Chronic ITP (idiopathic thrombocytopenia) (CMS/HCC)   • Syncope   • Leukocytosis   • Acute blood loss anemia   • Arthritis   • Conduction disorder of the heart   • Colostomy in place (CMS/HCC)   • Disorder of thyroid   • Hypertensive disorder   • Irritable bowel syndrome   • Kidney disease   • Platelet disorder (CMS/HCC)   • Sleep apnea       Advanced Care Planning:  ACP discussion was held with the patient during this visit. Patient has an advance directive in EMR which is still valid.     Review of Systems    Compared to one year ago, the patient feels his physical health is worse  Compared to one year ago, the patient feels his mental health is the same.    Reviewed chart for potential of high risk medication in the elderly: yes  Reviewed chart for potential of harmful drug interactions in the elderly:yes    Objective         Vitals:    07/28/21 1252   BP: 125/88   Pulse: 71   Temp: 97.1 °F (36.2 °C)   TempSrc: Temporal   SpO2: 98%   Weight: 77.1 kg (170 lb)   Height: 172 cm (67.72\")       Body mass index is 26.06 kg/m².  Discussed the patient's BMI with him. The BMI is in the acceptable range.    Physical Exam          Assessment/Plan "   Medicare Risks and Personalized Health Plan  CMS Preventative Services Quick Reference  Advance Directive Discussion    The above risks/problems have been discussed with the patient.  Pertinent information has been shared with the patient in the After Visit Summary.  Follow up plans and orders are seen below in the Assessment/Plan Section.    Diagnoses and all orders for this visit:    1. Medicare annual wellness visit, subsequent (Primary)    2. Pancreatic adenocarcinoma (CMS/HCC)    3. Recurrent major depressive disorder, in partial remission (CMS/HCC)    4. Benign essential HTN      Follow Up:  No follow-ups on file.     An After Visit Summary and PPPS were given to the patient.

## 2021-07-28 NOTE — PROGRESS NOTES
"Chief Complaint  Medicare Wellness-subsequent    Subjective          [facility] presents to [facility]  History of Present Illness     Follow-up pancreatic carcinoma.  He continues with chemotherapy.  He has 2 more rounds.  Continues with the [facility] cancer [facility].  He then has a CT scan scheduled in the next couple months.  He states he has been overall tolerating the chemotherapy.  He started to be a little more active.  He walked for half mile recently.  He has had no more syncopal episodes.  He was admitted to the hospital for couple days with syncope and an extremely high white blood cell count thought related to the neutropenia treatments.  His white blood cell count recently was normal.  His hemoglobin is also normalized 11.2, previously 9.7.  He has no abdominal pain.  Has had a little constipation but no obstruction symptoms.    Hyperlipidemia.  Continues atorvastatin 10 mg daily.  No myopathy symptoms.  His LFTs recently normal.    The patient gave me a copy of his lab work that was done recently on July 20 at [facility].  See copy on chart, media tab.    Depression.  In remission.  He said overall good spirits throughout his treatments and does not feel depressed.  He continues on sertraline and other antidepressants prescribed by his psychiatrist.    Objective   Vital Signs:   /88   Pulse 71   Temp 97.1 °F (36.2 °C) (Temporal)   Ht 172 cm (67.72\")   Wt 77.1 kg (170 lb)   SpO2 98%   BMI 26.06 kg/m²     Physical Exam  Vitals and nursing note reviewed.   Constitutional:       General: He is not in acute distress.     Appearance: He is well-developed.      Comments: He looks thinner but otherwise in good health.   Cardiovascular:      Rate and Rhythm: Normal rate and regular rhythm.      Heart sounds: Normal heart sounds.   Pulmonary:      Effort: Pulmonary effort is normal.      Breath sounds: Normal breath sounds.   Abdominal:      General: " There is no distension.      Palpations: Abdomen is soft. There is no mass.      Tenderness: There is no abdominal tenderness.      Hernia: No hernia is present.      Comments: Surgical scar is noted.  No palpable hernia.   Musculoskeletal:      Cervical back: Normal range of motion and neck supple. No tenderness.   Skin:     General: Skin is warm and dry.   Psychiatric:         Mood and Affect: Mood normal.         Behavior: Behavior normal.         Thought Content: Thought content normal.         Judgment: Judgment normal.        Result Review :   The following data was reviewed by: Jared Noriega MD on 07/28/2021:  Common labs    Common Labsle 6/12/21 6/12/21 6/12/21 6/13/21 6/13/21 6/14/21 6/14/21    0450 0450 0532 0608 0608 0544 0544   Glucose  103 (A)  99  89    BUN  25 (A)  14  11    Creatinine  0.95  0.68 (A)  0.80    eGFR Non  Am  78  114  95    Sodium  139  141  139    Potassium  3.7  3.3 (A)  3.7    Chloride  106  112 (A)  109 (A)    Calcium  8.9  8.4 (A)  9.0    Albumin    3.10 (A)      Total Bilirubin    <0.2      Alkaline Phosphatase    125 (A)      AST (SGOT)    16      ALT (SGPT)    16      .73 (A)  118.95 (A)  107.98 (A)  108.75 (A)   Hemoglobin 10.5 (A)  9.8 (A)  9.0 (A)  9.7 (A)   Hematocrit 31.3 (A)  29.7 (A)  26.5 (A)  30.5 (A)   Platelets 350  333  290  275   (A) Abnormal value                      Assessment and Plan    Diagnoses and all orders for this visit:    1. Medicare annual wellness visit, subsequent (Primary)    2. Pancreatic adenocarcinoma (CMS/HCC)    3. Recurrent major depressive disorder, in partial remission (CMS/HCC)    4. Benign essential HTN      Pancreatic adenocarcinoma.  He continues chemotherapy.  He is tolerating the procedures.  He has 2 more rounds reported chemotherapy.  On his CT scan.  I will see him back in 6 months.  We will continue to improve his exercise hopefully.  He is in good spirits.  No evidence of depression at this time.    Major  depression.  In remission.    Hyperlipidemia.  He continues atorvastatin without complaint.  No myopathy symptoms.  LFTs normal.    Hypertension.  Now controlled without medication.  We will continue to monitor.      Follow Up   No follow-ups on file.  Patient was given instructions and counseling regarding his condition or for health maintenance advice. Please see specific information pulled into the AVS if appropriate.

## 2021-09-10 RX ORDER — ATORVASTATIN CALCIUM 10 MG/1
10 TABLET, FILM COATED ORAL DAILY
Qty: 90 TABLET | Refills: 1 | Status: SHIPPED | OUTPATIENT
Start: 2021-09-10 | End: 2022-03-07

## 2021-09-10 NOTE — TELEPHONE ENCOUNTER
Rx Refill Note  Requested Prescriptions     Pending Prescriptions Disp Refills   • atorvastatin (LIPITOR) 10 MG tablet 90 tablet 1     Sig: Take 1 tablet by mouth Daily.      Last office visit with prescribing clinician: 7/28/2021      Next office visit with prescribing clinician: 1/31/2022            Crys Finch MA  09/10/21, 11:00 EDT

## 2021-09-10 NOTE — TELEPHONE ENCOUNTER
PT CALLED IN TODAY AND IS COMPLETELY OUT OF HIS MEDICATION FOR atorvastatin (LIPITOR) 10 MG tablet AND NEEDED IT REFILLED.

## 2021-10-20 DIAGNOSIS — E55.9 VITAMIN D DEFICIENCY: ICD-10-CM

## 2021-10-20 NOTE — TELEPHONE ENCOUNTER
Rx Refill Note  Requested Prescriptions     Pending Prescriptions Disp Refills   • vitamin D (ERGOCALCIFEROL) 1.25 MG (25960 UT) capsule capsule [Pharmacy Med Name: VITAMIN D2 50,000IU (ERGO) CAP RX] 26 capsule 2     Sig: TAKE 1 CAPSULE BY MOUTH 2 TIMES A WEEK      Last office visit with prescribing clinician: 7/28/2021      Next office visit with prescribing clinician: 1/31/2022            Crys Finch MA  10/20/21, 12:56 EDT

## 2021-10-21 RX ORDER — ERGOCALCIFEROL 1.25 MG/1
CAPSULE ORAL
Qty: 26 CAPSULE | Refills: 2 | Status: SHIPPED | OUTPATIENT
Start: 2021-10-21 | End: 2022-04-20

## 2021-12-08 RX ORDER — ALLOPURINOL 100 MG/1
100 TABLET ORAL DAILY
Qty: 90 TABLET | Refills: 1 | Status: SHIPPED | OUTPATIENT
Start: 2021-12-08 | End: 2022-06-27

## 2021-12-08 NOTE — TELEPHONE ENCOUNTER
Rx Refill Note  Requested Prescriptions     Pending Prescriptions Disp Refills   • allopurinol (ZYLOPRIM) 100 MG tablet [Pharmacy Med Name: ALLOPURINOL 100MG TABLETS] 90 tablet 1     Sig: TAKE 1 TABLET BY MOUTH DAILY      Last office visit with prescribing clinician: 7/28/2021      Next office visit with prescribing clinician: 1/31/2022            Crys Finch MA  12/08/21, 09:16 EST

## 2021-12-09 RX ORDER — LEVOTHYROXINE SODIUM 0.12 MG/1
125 TABLET ORAL DAILY
Qty: 90 TABLET | Refills: 1 | Status: SHIPPED | OUTPATIENT
Start: 2021-12-09 | End: 2022-02-01

## 2021-12-09 NOTE — TELEPHONE ENCOUNTER
Rx Refill Note  Requested Prescriptions     Pending Prescriptions Disp Refills   • levothyroxine (SYNTHROID, LEVOTHROID) 125 MCG tablet [Pharmacy Med Name: LEVOTHYROXINE 0.125MG (125MCG) TAB] 90 tablet 1     Sig: TAKE 1 TABLET BY MOUTH DAILY      Last office visit with prescribing clinician: 7/28/2021      Next office visit with prescribing clinician: 1/31/2022            Crys Finch MA  12/09/21, 11:33 EST

## 2022-01-31 ENCOUNTER — OFFICE VISIT (OUTPATIENT)
Dept: FAMILY MEDICINE CLINIC | Facility: CLINIC | Age: 72
End: 2022-01-31

## 2022-01-31 VITALS
TEMPERATURE: 98 F | HEART RATE: 66 BPM | WEIGHT: 185.5 LBS | HEIGHT: 68 IN | SYSTOLIC BLOOD PRESSURE: 140 MMHG | BODY MASS INDEX: 28.11 KG/M2 | RESPIRATION RATE: 16 BRPM | DIASTOLIC BLOOD PRESSURE: 82 MMHG | OXYGEN SATURATION: 97 %

## 2022-01-31 DIAGNOSIS — I10 BENIGN ESSENTIAL HTN: Primary | Chronic | ICD-10-CM

## 2022-01-31 DIAGNOSIS — E78.2 MIXED HYPERLIPIDEMIA: Chronic | ICD-10-CM

## 2022-01-31 DIAGNOSIS — F33.41 RECURRENT MAJOR DEPRESSIVE DISORDER, IN PARTIAL REMISSION: Chronic | ICD-10-CM

## 2022-01-31 DIAGNOSIS — M10.079 IDIOPATHIC GOUT, UNSPECIFIED ANKLE AND FOOT: ICD-10-CM

## 2022-01-31 DIAGNOSIS — E03.9 ACQUIRED HYPOTHYROIDISM: Chronic | ICD-10-CM

## 2022-01-31 DIAGNOSIS — C25.9 PANCREATIC ADENOCARCINOMA: Chronic | ICD-10-CM

## 2022-01-31 PROCEDURE — 99214 OFFICE O/P EST MOD 30 MIN: CPT | Performed by: FAMILY MEDICINE

## 2022-02-01 LAB
ALBUMIN SERPL-MCNC: 4.9 G/DL (ref 3.7–4.7)
ALBUMIN/GLOB SERPL: 2.1 {RATIO} (ref 1.2–2.2)
ALP SERPL-CCNC: 158 IU/L (ref 44–121)
ALT SERPL-CCNC: 14 IU/L (ref 0–44)
AST SERPL-CCNC: 18 IU/L (ref 0–40)
BASOPHILS # BLD AUTO: 0.2 X10E3/UL (ref 0–0.2)
BASOPHILS NFR BLD AUTO: 2 %
BILIRUB SERPL-MCNC: 0.3 MG/DL (ref 0–1.2)
BUN SERPL-MCNC: 24 MG/DL (ref 8–27)
BUN/CREAT SERPL: 20 (ref 10–24)
CALCIUM SERPL-MCNC: 10.3 MG/DL (ref 8.6–10.2)
CHLORIDE SERPL-SCNC: 104 MMOL/L (ref 96–106)
CHOLEST SERPL-MCNC: 194 MG/DL (ref 100–199)
CO2 SERPL-SCNC: 19 MMOL/L (ref 20–29)
CREAT SERPL-MCNC: 1.18 MG/DL (ref 0.76–1.27)
EOSINOPHIL # BLD AUTO: 0.5 X10E3/UL (ref 0–0.4)
EOSINOPHIL NFR BLD AUTO: 5 %
ERYTHROCYTE [DISTWIDTH] IN BLOOD BY AUTOMATED COUNT: 14.6 % (ref 11.6–15.4)
GLOBULIN SER CALC-MCNC: 2.3 G/DL (ref 1.5–4.5)
GLUCOSE SERPL-MCNC: 93 MG/DL (ref 65–99)
HCT VFR BLD AUTO: 40.5 % (ref 37.5–51)
HDLC SERPL-MCNC: 45 MG/DL
HGB BLD-MCNC: 13.4 G/DL (ref 13–17.7)
IMM GRANULOCYTES # BLD AUTO: 0.1 X10E3/UL (ref 0–0.1)
IMM GRANULOCYTES NFR BLD AUTO: 1 %
LDLC SERPL CALC-MCNC: 116 MG/DL (ref 0–99)
LYMPHOCYTES # BLD AUTO: 3.1 X10E3/UL (ref 0.7–3.1)
LYMPHOCYTES NFR BLD AUTO: 30 %
MCH RBC QN AUTO: 28.9 PG (ref 26.6–33)
MCHC RBC AUTO-ENTMCNC: 33.1 G/DL (ref 31.5–35.7)
MCV RBC AUTO: 88 FL (ref 79–97)
MONOCYTES # BLD AUTO: 0.7 X10E3/UL (ref 0.1–0.9)
MONOCYTES NFR BLD AUTO: 7 %
NEUTROPHILS # BLD AUTO: 5.8 X10E3/UL (ref 1.4–7)
NEUTROPHILS NFR BLD AUTO: 55 %
PLATELET # BLD AUTO: 273 X10E3/UL (ref 150–450)
POTASSIUM SERPL-SCNC: 4.6 MMOL/L (ref 3.5–5.2)
PROT SERPL-MCNC: 7.2 G/DL (ref 6–8.5)
RBC # BLD AUTO: 4.63 X10E6/UL (ref 4.14–5.8)
SODIUM SERPL-SCNC: 142 MMOL/L (ref 134–144)
T4 FREE SERPL-MCNC: 1.6 NG/DL (ref 0.82–1.77)
TRIGL SERPL-MCNC: 191 MG/DL (ref 0–149)
TSH SERPL DL<=0.005 MIU/L-ACNC: 0.03 UIU/ML (ref 0.45–4.5)
URATE SERPL-MCNC: 4 MG/DL (ref 3.8–8.4)
VLDLC SERPL CALC-MCNC: 33 MG/DL (ref 5–40)
WBC # BLD AUTO: 10.3 X10E3/UL (ref 3.4–10.8)

## 2022-02-01 RX ORDER — LEVOTHYROXINE SODIUM 112 UG/1
112 TABLET ORAL DAILY
Qty: 90 TABLET | Refills: 1 | Status: SHIPPED | OUTPATIENT
Start: 2022-02-01 | End: 2022-05-03

## 2022-02-25 RX ORDER — SERTRALINE HYDROCHLORIDE 100 MG/1
200 TABLET, FILM COATED ORAL EVERY MORNING
Qty: 90 TABLET | Refills: 1 | Status: SHIPPED | OUTPATIENT
Start: 2022-02-25 | End: 2022-05-23 | Stop reason: SDUPTHER

## 2022-02-25 NOTE — TELEPHONE ENCOUNTER
Rx Refill Note  Requested Prescriptions     Pending Prescriptions Disp Refills   • sertraline (ZOLOFT) 100 MG tablet 90 tablet 1     Sig: Take 2 tablets by mouth Every Morning.      Last office visit with prescribing clinician: 1/31/2022      Next office visit with prescribing clinician: 8/26/2022            Crys Finch MA  02/25/22, 15:59 EST

## 2022-03-07 RX ORDER — ATORVASTATIN CALCIUM 10 MG/1
10 TABLET, FILM COATED ORAL DAILY
Qty: 90 TABLET | Refills: 1 | Status: SHIPPED | OUTPATIENT
Start: 2022-03-07 | End: 2022-04-28 | Stop reason: SDUPTHER

## 2022-03-07 NOTE — TELEPHONE ENCOUNTER
Rx Refill Note  Requested Prescriptions     Pending Prescriptions Disp Refills   • atorvastatin (LIPITOR) 10 MG tablet [Pharmacy Med Name: ATORVASTATIN 10MG TABLETS] 90 tablet 1     Sig: TAKE 1 TABLET BY MOUTH DAILY      Last office visit with prescribing clinician: 1/31/2022      Next office visit with prescribing clinician: 8/26/2022            Crys Finch MA  03/07/22, 08:49 EST

## 2022-03-18 RX ORDER — ALLOPURINOL 100 MG/1
100 TABLET ORAL DAILY
Qty: 90 TABLET | Refills: 1 | OUTPATIENT
Start: 2022-03-18

## 2022-03-18 RX ORDER — LEVOTHYROXINE SODIUM 0.12 MG/1
125 TABLET ORAL DAILY
Qty: 90 TABLET | Refills: 1 | OUTPATIENT
Start: 2022-03-18

## 2022-04-20 DIAGNOSIS — E55.9 VITAMIN D DEFICIENCY: ICD-10-CM

## 2022-04-20 RX ORDER — ERGOCALCIFEROL 1.25 MG/1
CAPSULE ORAL
Qty: 26 CAPSULE | Refills: 2 | Status: SHIPPED | OUTPATIENT
Start: 2022-04-20 | End: 2022-08-26 | Stop reason: SDUPTHER

## 2022-04-20 NOTE — TELEPHONE ENCOUNTER
Rx Refill Note  Requested Prescriptions     Pending Prescriptions Disp Refills   • vitamin D (ERGOCALCIFEROL) 1.25 MG (07183 UT) capsule capsule [Pharmacy Med Name: VITAMIN D2 50,000IU (ERGO) CAP RX] 26 capsule 2     Sig: TAKE 1 CAPSULE BY MOUTH 2 TIMES A WEEK      Last office visit with prescribing clinician: 1/31/2022      Next office visit with prescribing clinician: 8/26/2022            Portia Buchanan  04/20/22, 08:08 EDT

## 2022-04-25 ENCOUNTER — TELEMEDICINE (OUTPATIENT)
Dept: PSYCHIATRY | Facility: CLINIC | Age: 72
End: 2022-04-25

## 2022-04-25 ENCOUNTER — TELEPHONE (OUTPATIENT)
Dept: PSYCHIATRY | Facility: CLINIC | Age: 72
End: 2022-04-25

## 2022-04-25 DIAGNOSIS — F33.1 MAJOR DEPRESSIVE DISORDER, RECURRENT EPISODE, MODERATE: Primary | ICD-10-CM

## 2022-04-25 DIAGNOSIS — F41.1 GENERALIZED ANXIETY DISORDER: ICD-10-CM

## 2022-04-25 PROCEDURE — 90792 PSYCH DIAG EVAL W/MED SRVCS: CPT | Performed by: NURSE PRACTITIONER

## 2022-04-25 RX ORDER — BUSPIRONE HYDROCHLORIDE 30 MG/1
30 TABLET ORAL DAILY
Qty: 90 TABLET | Refills: 0 | Status: SHIPPED | OUTPATIENT
Start: 2022-04-25 | End: 2022-07-11

## 2022-04-25 RX ORDER — LAMOTRIGINE 100 MG/1
TABLET ORAL
Qty: 90 TABLET | Refills: 1 | Status: SHIPPED | OUTPATIENT
Start: 2022-04-25 | End: 2022-07-25 | Stop reason: SDUPTHER

## 2022-04-25 NOTE — TREATMENT PLAN
Multi-Disciplinary Problems (from Behavioral Health Treatment Plan)    Active Problems     Problem: Anxiety  Start Date: 04/25/22    Problem Details: The patient self-scales this problem as a 7 with 10 being the worst.        Goal Priority Start Date Expected End Date End Date    Patient will develop and implement behavioral and cognitive strategies to reduce anxiety and irrational fears. -- 04/25/22 -- --    Goal Details: Progress toward goal:  Not appropriate to rate progress toward goal since this is the initial treatment plan.        Goal Intervention Frequency Start Date End Date    Help patient explore past emotional issues in relation to present anxiety. PRN 04/25/22 --    Intervention Details: Duration of treatment until until remission of symptoms.        Goal Intervention Frequency Start Date End Date    Help patient develop an awareness of their cognitive and physical responses to anxiety. PRN 04/25/22 --    Intervention Details: Duration of treatment until until remission of symptoms.                    Reviewed By     Dora Presley, MONIKA 04/25/22 5225                 I have discussed and reviewed this treatment plan with the patient.  It has been printed for signatures.
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
## 2022-04-28 NOTE — TELEPHONE ENCOUNTER
Rx Refill Note  Requested Prescriptions     Pending Prescriptions Disp Refills   • atorvastatin (LIPITOR) 10 MG tablet 90 tablet 3     Sig: Take 1 tablet by mouth Daily.      Last office visit with prescribing clinician: 1/31/2022      Next office visit with prescribing clinician: 8/26/2022            Portia Buchanan  04/28/22, 14:32 EDT

## 2022-04-29 RX ORDER — ATORVASTATIN CALCIUM 10 MG/1
10 TABLET, FILM COATED ORAL DAILY
Qty: 90 TABLET | Refills: 3 | Status: SHIPPED | OUTPATIENT
Start: 2022-04-29 | End: 2022-06-30

## 2022-05-03 RX ORDER — LEVOTHYROXINE SODIUM 112 UG/1
112 TABLET ORAL DAILY
Qty: 90 TABLET | Refills: 1 | Status: SHIPPED | OUTPATIENT
Start: 2022-05-03 | End: 2022-08-08 | Stop reason: SDUPTHER

## 2022-05-03 NOTE — TELEPHONE ENCOUNTER
Rx Refill Note  Requested Prescriptions     Pending Prescriptions Disp Refills   • levothyroxine (SYNTHROID, LEVOTHROID) 112 MCG tablet [Pharmacy Med Name: LEVOTHYROXINE 0.112MG (112MCG) TABS] 90 tablet 1     Sig: TAKE 1 TABLET BY MOUTH DAILY      Last office visit with prescribing clinician: 1/31/2022      Next office visit with prescribing clinician: 8/26/2022            Portia Buchanan  05/03/22, 10:13 EDT

## 2022-05-13 ENCOUNTER — APPOINTMENT (OUTPATIENT)
Dept: GENERAL RADIOLOGY | Facility: HOSPITAL | Age: 72
End: 2022-05-13

## 2022-05-13 ENCOUNTER — HOSPITAL ENCOUNTER (EMERGENCY)
Facility: HOSPITAL | Age: 72
Discharge: HOME | End: 2022-05-13
Attending: EMERGENCY MEDICINE | Admitting: EMERGENCY MEDICINE

## 2022-05-13 VITALS
HEART RATE: 84 BPM | TEMPERATURE: 97 F | BODY MASS INDEX: 26.2 KG/M2 | HEIGHT: 70 IN | WEIGHT: 183 LBS | OXYGEN SATURATION: 100 % | SYSTOLIC BLOOD PRESSURE: 148 MMHG | RESPIRATION RATE: 18 BRPM | DIASTOLIC BLOOD PRESSURE: 78 MMHG

## 2022-05-13 DIAGNOSIS — E86.0 DEHYDRATION: ICD-10-CM

## 2022-05-13 DIAGNOSIS — R55 SYNCOPE, UNSPECIFIED SYNCOPE TYPE: Primary | ICD-10-CM

## 2022-05-13 LAB
ALBUMIN SERPL-MCNC: 3.7 G/DL (ref 3.5–5.2)
ALBUMIN/GLOB SERPL: 1.2 G/DL
ALP SERPL-CCNC: 114 U/L (ref 39–117)
ALT SERPL W P-5'-P-CCNC: 24 U/L (ref 1–41)
ANION GAP SERPL CALCULATED.3IONS-SCNC: 13.1 MMOL/L (ref 5–15)
AST SERPL-CCNC: 26 U/L (ref 1–40)
BILIRUB SERPL-MCNC: 0.4 MG/DL (ref 0–1.2)
BUN SERPL-MCNC: 22 MG/DL (ref 8–23)
BUN/CREAT SERPL: 16.3 (ref 7–25)
CALCIUM SPEC-SCNC: 8.7 MG/DL (ref 8.6–10.5)
CHLORIDE SERPL-SCNC: 106 MMOL/L (ref 98–107)
CO2 SERPL-SCNC: 20.9 MMOL/L (ref 22–29)
CREAT SERPL-MCNC: 1.35 MG/DL (ref 0.76–1.27)
DEPRECATED RDW RBC AUTO: 44 FL (ref 37–54)
EGFRCR SERPLBLD CKD-EPI 2021: 55.1 ML/MIN/1.73
EOSINOPHIL # BLD MANUAL: 0.12 10*3/MM3 (ref 0–0.4)
EOSINOPHIL NFR BLD MANUAL: 1 % (ref 0.3–6.2)
ERYTHROCYTE [DISTWIDTH] IN BLOOD BY AUTOMATED COUNT: 14.4 % (ref 12.3–15.4)
GLOBULIN UR ELPH-MCNC: 3 GM/DL
GLUCOSE SERPL-MCNC: 140 MG/DL (ref 65–99)
HCT VFR BLD AUTO: 30.4 % (ref 37.5–51)
HGB BLD-MCNC: 10.6 G/DL (ref 13–17.7)
LYMPHOCYTES # BLD MANUAL: 0.61 10*3/MM3 (ref 0.7–3.1)
LYMPHOCYTES NFR BLD MANUAL: 1 % (ref 5–12)
MCH RBC QN AUTO: 30 PG (ref 26.6–33)
MCHC RBC AUTO-ENTMCNC: 34.9 G/DL (ref 31.5–35.7)
MCV RBC AUTO: 86.1 FL (ref 79–97)
MONOCYTES # BLD: 0.12 10*3/MM3 (ref 0.1–0.9)
NEUTROPHILS # BLD AUTO: 11.29 10*3/MM3 (ref 1.7–7)
NEUTROPHILS NFR BLD MANUAL: 93 % (ref 42.7–76)
NT-PROBNP SERPL-MCNC: 805 PG/ML (ref 0–900)
PLAT MORPH BLD: NORMAL
PLATELET # BLD AUTO: 203 10*3/MM3 (ref 140–450)
PMV BLD AUTO: 11.5 FL (ref 6–12)
POTASSIUM SERPL-SCNC: 3.8 MMOL/L (ref 3.5–5.2)
PROT SERPL-MCNC: 6.7 G/DL (ref 6–8.5)
QT INTERVAL: 418 MS
RBC # BLD AUTO: 3.53 10*6/MM3 (ref 4.14–5.8)
RBC MORPH BLD: NORMAL
SARS-COV-2 RNA PNL SPEC NAA+PROBE: NOT DETECTED
SODIUM SERPL-SCNC: 140 MMOL/L (ref 136–145)
TROPONIN T SERPL-MCNC: <0.01 NG/ML (ref 0–0.03)
TROPONIN T SERPL-MCNC: <0.01 NG/ML (ref 0–0.03)
VARIANT LYMPHS NFR BLD MANUAL: 5 % (ref 19.6–45.3)
WBC MORPH BLD: NORMAL
WBC NRBC COR # BLD: 12.14 10*3/MM3 (ref 3.4–10.8)

## 2022-05-13 PROCEDURE — 84484 ASSAY OF TROPONIN QUANT: CPT | Performed by: EMERGENCY MEDICINE

## 2022-05-13 PROCEDURE — 80053 COMPREHEN METABOLIC PANEL: CPT | Performed by: EMERGENCY MEDICINE

## 2022-05-13 PROCEDURE — 25010000002 HEPARIN LOCK FLUSH PER 10 UNITS: Performed by: EMERGENCY MEDICINE

## 2022-05-13 PROCEDURE — 99284 EMERGENCY DEPT VISIT MOD MDM: CPT

## 2022-05-13 PROCEDURE — 83880 ASSAY OF NATRIURETIC PEPTIDE: CPT | Performed by: EMERGENCY MEDICINE

## 2022-05-13 PROCEDURE — 85025 COMPLETE CBC W/AUTO DIFF WBC: CPT | Performed by: EMERGENCY MEDICINE

## 2022-05-13 PROCEDURE — 93005 ELECTROCARDIOGRAM TRACING: CPT | Performed by: EMERGENCY MEDICINE

## 2022-05-13 PROCEDURE — 36415 COLL VENOUS BLD VENIPUNCTURE: CPT

## 2022-05-13 PROCEDURE — 93010 ELECTROCARDIOGRAM REPORT: CPT | Performed by: INTERNAL MEDICINE

## 2022-05-13 PROCEDURE — 71045 X-RAY EXAM CHEST 1 VIEW: CPT

## 2022-05-13 PROCEDURE — 87635 SARS-COV-2 COVID-19 AMP PRB: CPT | Performed by: EMERGENCY MEDICINE

## 2022-05-13 PROCEDURE — 85007 BL SMEAR W/DIFF WBC COUNT: CPT | Performed by: EMERGENCY MEDICINE

## 2022-05-13 RX ORDER — SODIUM CHLORIDE 0.9 % (FLUSH) 0.9 %
20 SYRINGE (ML) INJECTION AS NEEDED
Status: DISCONTINUED | OUTPATIENT
Start: 2022-05-13 | End: 2022-05-13 | Stop reason: HOSPADM

## 2022-05-13 RX ORDER — SODIUM CHLORIDE 0.9 % (FLUSH) 0.9 %
10 SYRINGE (ML) INJECTION AS NEEDED
Status: DISCONTINUED | OUTPATIENT
Start: 2022-05-13 | End: 2022-05-13 | Stop reason: HOSPADM

## 2022-05-13 RX ORDER — HEPARIN SODIUM (PORCINE) LOCK FLUSH IV SOLN 100 UNIT/ML 100 UNIT/ML
5 SOLUTION INTRAVENOUS AS NEEDED
Status: DISCONTINUED | OUTPATIENT
Start: 2022-05-13 | End: 2022-05-13 | Stop reason: HOSPADM

## 2022-05-13 RX ORDER — SODIUM CHLORIDE 0.9 % (FLUSH) 0.9 %
10 SYRINGE (ML) INJECTION EVERY 12 HOURS SCHEDULED
Status: DISCONTINUED | OUTPATIENT
Start: 2022-05-13 | End: 2022-05-13 | Stop reason: HOSPADM

## 2022-05-13 RX ADMIN — SODIUM CHLORIDE 1000 ML: 9 INJECTION, SOLUTION INTRAVENOUS at 14:41

## 2022-05-13 RX ADMIN — Medication 10 ML: at 13:52

## 2022-05-13 RX ADMIN — Medication 500 UNITS: at 16:52

## 2022-05-13 NOTE — DISCHARGE INSTRUCTIONS
Drink plenty of fluids.  Return here [facility] for any chest pain or shortness of breath.  Return here for any further episodes of passing out.

## 2022-05-13 NOTE — ED TRIAGE NOTES
Patient to ER via EMS from home for dizzy and fall in the shower. Patients wife states that he had loss of bowel in shower and was not talking right. Patient denies any blood thinners. Patient A&O x 4 at time of triage. Patient reports that he has had this before. Patient just finish a round of chemo a week ago patient has liver cancer. Patient wearing mask this RN in PPE    Patient had port declined to have EMS start a line

## 2022-05-13 NOTE — ED PROVIDER NOTES
EMERGENCY DEPARTMENT ENCOUNTER    Room Number:  39/39  Date of encounter:  5/13/2022  PCP: Jared Noriega MD  Patient Care Team:  Jared Noriega MD as PCP - General (Family Medicine)  Armani Painting MD as Consulting Physician (Hematology and Oncology)  Yousuf Greene MD as Consulting Physician (Endocrinology)  Be Iraheta MD as Referring Physician (Internal Medicine)   Historian: Patient, family, EMS    HPI:  Chief Complaint: Syncope  A complete HPI/ROS/PMH/PSH/SH/FH are unobtainable due to: Nothing    Context: Kaiser Valente is a 72 y.o. male who presents to the ED c/o having a syncopal episode.  He reports he was taking a shower and suddenly started feeling dizzy.  He reports that he woke up on the floor after defecating on himself.  He reports 3 prior similar episodes.  He reports he had a similar episode back in June of last year.  He denies any pain.  He denies any chest pain or shortness of breath.  He is currently under treatment for cancer.  He states he has been eating and drinking well.  He states no cause was found for his prior syncopal episode.    Prior record review: Discharge note 6/14/2021 with syncope.  He had negative PE protocol at that time.  History of pancreatic cancer on chemotherapy.    PAST MEDICAL HISTORY  Active Ambulatory Problems     Diagnosis Date Noted   • Acquired hypothyroidism 03/21/2016   • Osteoarthritis of both knees 03/21/2016   • Gastroesophageal reflux disease without esophagitis 03/21/2016   • Mixed hyperlipidemia 03/21/2016   • Monoclonal gammopathy of undetermined significance 09/26/2016   • Hypercalcemia 09/26/2016   • Chronic fatigue 10/11/2016   • Thrombocytopenia (HCC) 10/17/2016   • History of primary hyperparathyroidism 02/14/2017   • Hypogonadism in male 04/11/2017   • Toe pain, left 04/11/2017   • Recurrent major depressive disorder, in partial remission (HCC) 04/15/2017   • Idiopathic gout, unspecified ankle and foot 04/16/2017   • Pseudogout of joint  of right foot 05/19/2017   • Chronic gout of left foot 06/26/2017   • Hyperglycemia 03/29/2018   • Benign prostatic hyperplasia without lower urinary tract symptoms 03/29/2018   • Benign essential HTN 10/01/2018   • Colonic obstruction (Former[facility]) 12/21/2020   • Thrombocytopenia ([facility]) 12/22/2020   • Pancreatic adenocarcinoma ([facility]) 12/28/2020   • Chronic ITP (idiopathic thrombocytopenia) ([facility]) 12/29/2020   • Syncope 06/11/2021   • Leukocytosis 06/12/2021   • Acute blood loss anemia 01/21/2021   • Arthritis 02/24/2021   • Conduction disorder of the heart 02/24/2021   • Colostomy in place ([facility]) 02/24/2021   • Disorder of thyroid 02/24/2021   • Hypertensive disorder 02/24/2021   • Irritable bowel syndrome 02/24/2021   • Kidney disease 02/24/2021   • Platelet disorder ([facility]) 02/24/2021   • Sleep apnea 02/24/2021     Resolved Ambulatory Problems     Diagnosis Date Noted   • Abnormal EKG 03/21/2016   • Primary hyperparathyroidism ([facility]) 10/11/2016   • Parathyroid adenoma 02/03/2017   • Localized edema 04/11/2017   • Cellulitis of left lower extremity 04/15/2017   • Cellulitis of foot, left 04/15/2017   • Stage 3 chronic kidney disease (CMS/[facility]) 10/16/2017   • Generalized abdominal pain 08/15/2018   • Pancreatic mass 12/21/2020     Past Medical History:   Diagnosis Date   • Anxiety    • Depression    • GERD (gastroesophageal reflux disease)    • Hypothyroidism    • Inverted T wave    • Osteoarthritis    • Parathyroid disease ([facility])    • PONV (postoperative nausea and vomiting)    • PTSD (post-traumatic stress disorder)        The patient has a COVID HM Topic on their chart, and they are fully vaccinated.    PAST SURGICAL HISTORY  Past Surgical History:   Procedure Laterality Date   • COLON SURGERY     • COLONOSCOPY  07/2014   • COLONOSCOPY N/A 12/24/2020    Procedure: COLONOSCOPY;  Surgeon: Geronimo Branch Jr., MD;  Location: [facility] ENDOSCOPY;  Service: General;  Laterality: N/A;  colon obstruction  post:   colon obstruction from  external compression   • ENDOSCOPY      several years ago, normal   • EXPLORATORY LAPAROTOMY N/A 12/21/2020    Procedure: LAPAROTOMY EXPLORATORY WITH LOOP COLOSTOMY;  Surgeon: Geronimo Branch Jr., MD;  Location: [facility] OR;  Service: General;  Laterality: N/A;   • HEMORRHOIDECTOMY     • THYROIDECTOMY Left 2/3/2017    Procedure: Left inferior parathyroidectomy with intraoperative PTH monitoring;  Surgeon: Sarah Matos MD;  Location: [facility] OR;  Service:    • WRIST FUSION Right          FAMILY HISTORY  Family History   Problem Relation Age of Onset   • Breast cancer Mother    • Heart disease Father    • Hypertension Father    • Colon cancer Sister    • Anxiety disorder Sister    • Anxiety disorder Son          SOCIAL HISTORY  Social History     Socioeconomic History   • Marital status:    • Number of children: 2   • Highest education level: Master's degree (e.g., MA, MS, Lisa, MEd, MSW, THONG)   Tobacco Use   • Smoking status: Never Smoker   • Smokeless tobacco: Never Used   Vaping Use   • Vaping Use: Never used   Substance and Sexual Activity   • Alcohol use: Yes     Comment: 1 DAILY DRINK   • Drug use: No   • Sexual activity: Defer         ALLERGIES  Codeine        REVIEW OF SYSTEMS  Review of Systems   No chest pain, no shortness of breath, no nausea, no vomiting, no abdominal pain, no fever, no chills  All systems reviewed and negative except for those discussed in HPI.       PHYSICAL EXAM    I have reviewed the triage vital signs and nursing notes.    ED Triage Vitals [05/13/22 1234]   Temp Heart Rate Resp BP SpO2   97 °F (36.1 °C) 86 18 118/68 96 %      Temp src Heart Rate Source Patient Position BP Location FiO2 (%)   -- -- -- -- --       Physical Exam  GENERAL: Awake, alert, no acute distress no external signs of trauma  SKIN: Warm, dry  HENT: Normocephalic, atraumatic  EYES: no scleral icterus  CV: regular rhythm, regular rate  RESPIRATORY: normal effort, lungs clear  ABDOMEN: soft,  nontender, nondistended  MUSCULOSKELETAL: no deformity  NEURO: alert, moves all extremities, follows commands          LAB RESULTS  Recent Results (from the past 24 hour(s))   Comprehensive Metabolic Panel    Collection Time: 05/13/22  1:08 PM    Specimen: Blood   Result Value Ref Range    Glucose 140 (H) 65 - 99 mg/dL    BUN 22 8 - 23 mg/dL    Creatinine 1.35 (H) 0.76 - 1.27 mg/dL    Sodium 140 136 - 145 mmol/L    Potassium 3.8 3.5 - 5.2 mmol/L    Chloride 106 98 - 107 mmol/L    CO2 20.9 (L) 22.0 - 29.0 mmol/L    Calcium 8.7 8.6 - 10.5 mg/dL    Total Protein 6.7 6.0 - 8.5 g/dL    Albumin 3.70 3.50 - 5.20 g/dL    ALT (SGPT) 24 1 - 41 U/L    AST (SGOT) 26 1 - 40 U/L    Alkaline Phosphatase 114 39 - 117 U/L    Total Bilirubin 0.4 0.0 - 1.2 mg/dL    Globulin 3.0 gm/dL    A/G Ratio 1.2 g/dL    BUN/Creatinine Ratio 16.3 7.0 - 25.0    Anion Gap 13.1 5.0 - 15.0 mmol/L    eGFR 55.1 (L) >60.0 mL/min/1.73   Troponin    Collection Time: 05/13/22  1:08 PM    Specimen: Blood   Result Value Ref Range    Troponin T <0.010 0.000 - 0.030 ng/mL   BNP    Collection Time: 05/13/22  1:08 PM    Specimen: Blood   Result Value Ref Range    proBNP 805.0 0.0 - 900.0 pg/mL   CBC Auto Differential    Collection Time: 05/13/22  1:08 PM    Specimen: Blood   Result Value Ref Range    WBC 12.14 (H) 3.40 - 10.80 10*3/mm3    RBC 3.53 (L) 4.14 - 5.80 10*6/mm3    Hemoglobin 10.6 (L) 13.0 - 17.7 g/dL    Hematocrit 30.4 (L) 37.5 - 51.0 %    MCV 86.1 79.0 - 97.0 fL    MCH 30.0 26.6 - 33.0 pg    MCHC 34.9 31.5 - 35.7 g/dL    RDW 14.4 12.3 - 15.4 %    RDW-SD 44.0 37.0 - 54.0 fl    MPV 11.5 6.0 - 12.0 fL    Platelets 203 140 - 450 10*3/mm3   Manual Differential    Collection Time: 05/13/22  1:08 PM    Specimen: Blood   Result Value Ref Range    Neutrophil % 93.0 (H) 42.7 - 76.0 %    Lymphocyte % 5.0 (L) 19.6 - 45.3 %    Monocyte % 1.0 (L) 5.0 - 12.0 %    Eosinophil % 1.0 0.3 - 6.2 %    Neutrophils Absolute 11.29 (H) 1.70 - 7.00 10*3/mm3    Lymphocytes  Absolute 0.61 (L) 0.70 - 3.10 10*3/mm3    Monocytes Absolute 0.12 0.10 - 0.90 10*3/mm3    Eosinophils Absolute 0.12 0.00 - 0.40 10*3/mm3    RBC Morphology Normal Normal    WBC Morphology Normal Normal    Platelet Morphology Normal Normal   COVID-19,[facility] CANDICE IN-HOUSE CEPHEID/LETICIA NP SWAB IN TRANSPORT MEDIA 8-12 HR TAT - Swab, Nasopharynx    Collection Time: 05/13/22  1:28 PM    Specimen: Nasopharynx; Swab   Result Value Ref Range    COVID19 Not Detected Not Detected - Ref. Range   ECG 12 Lead    Collection Time: 05/13/22  1:28 PM   Result Value Ref Range    QT Interval 418 ms   Troponin    Collection Time: 05/13/22  3:12 PM    Specimen: Blood   Result Value Ref Range    Troponin T <0.010 0.000 - 0.030 ng/mL       Ordered the above labs and independently reviewed the results.        RADIOLOGY  XR Chest 1 View    Result Date: 5/13/2022  Portable chest radiograph  HISTORY:Syncope  TECHNIQUE: Single AP portable radiograph of the chest  COMPARISON: Chest radiograph 06/11/2021      FINDINGS AND IMPRESSION: Right Port-A-Cath tip terminates over the superior vena cava. No pulmonary consolidation, pleural effusion or pneumothorax is seen. Cardiac silhouette is within normal limits for size.  This report was finalized on 5/13/2022 2:11 PM by Dr. Tom Suárez M.D.        I ordered the above noted radiological studies. Reviewed by me and discussed with radiologist.  See dictation for official radiology interpretation.      PROCEDURES    Procedures      MEDICATIONS GIVEN IN ER    Medications   sodium chloride 0.9 % flush 10 mL (10 mL Intravenous Given 5/13/22 1352)   sodium chloride 0.9 % flush 10 mL (has no administration in time range)   sodium chloride 0.9 % flush 20 mL (has no administration in time range)   heparin injection 500 Units (has no administration in time range)   sodium chloride 0.9 % flush 10 mL (has no administration in time range)   sodium chloride 0.9 % bolus 1,000 mL (1,000 mL Intravenous New Bag 5/13/22  1441)         PROGRESS, DATA ANALYSIS, CONSULTS, AND MEDICAL DECISION MAKING    All labs have been independently reviewed by me.  All radiology studies have been reviewed by me and discussed with radiologist dictating the report.   EKG's independently viewed and interpreted by me.  Discussion below represents my analysis of pertinent findings related to patient's condition, differential diagnosis, treatment plan and final disposition.    Differential diagnosis includes but is not limited to syncope, dehydration, non-STEMI, STEMI, PE, dehydration    ED Course as of 05/13/22 1626   Fri May 13, 2022   1352 EKG          EKG time: 1328  Rhythm/Rate: Normal sinus, rate 71  P waves and AZ: Normal P, normal AZ  QRS, axis: Narrow QRS, normal axis  ST and T waves: Normal ST and T waves    Interpreted Contemporaneously by me, independently viewed  Not significantly changed compared to prior 6/11/2021   [TR]   1429 Orthostatics positive by blood pressure. [TR]   1527 Reviewed the work-up and findings with the patient at the bedside.  He is anxious to go home.  We are pending a second troponin.  I am giving him IV fluids for his dehydration.  I am giving him oral fluids to hydrate.  I offered him observation stay overnight but he declines.  He had a 3-day stay in the hospital previously for syncope without any underlying cause identified.  He is not interested in staying in the hospital for further work-up.  He has no hypoxia, tachycardia, or hypotension to suggest PE.  He has no chest pain or shortness of breath to suggest PE. [TR]      ED Course User Index  [TR] Erik Zimmerman MD           PPE: The patient wore a mask and I wore an N95 mask throughout the entire patient encounter.       AS OF 16:26 EDT VITALS:    BP - 151/83  HR - 73  TEMP - 97 °F (36.1 °C)  O2 SATS - 100%        DIAGNOSIS  Final diagnoses:   Syncope, unspecified syncope type   Dehydration         DISPOSITION  ED Disposition     ED Disposition   Discharge     Condition   Stable    Comment   --                   Erik Zimmerman MD  05/13/22 9664

## 2022-05-23 ENCOUNTER — TELEMEDICINE (OUTPATIENT)
Dept: PSYCHIATRY | Facility: CLINIC | Age: 72
End: 2022-05-23

## 2022-05-23 DIAGNOSIS — F33.1 MAJOR DEPRESSIVE DISORDER, RECURRENT EPISODE, MODERATE: Primary | ICD-10-CM

## 2022-05-23 DIAGNOSIS — F41.1 GENERALIZED ANXIETY DISORDER: ICD-10-CM

## 2022-05-23 PROCEDURE — 99214 OFFICE O/P EST MOD 30 MIN: CPT | Performed by: NURSE PRACTITIONER

## 2022-05-23 RX ORDER — SERTRALINE HYDROCHLORIDE 100 MG/1
200 TABLET, FILM COATED ORAL EVERY MORNING
Qty: 180 TABLET | Refills: 1 | Status: SHIPPED | OUTPATIENT
Start: 2022-05-23 | End: 2022-08-25 | Stop reason: SDUPTHER

## 2022-05-23 NOTE — PROGRESS NOTES
"This provider is located at [facility], [address] [place] [address], [facility], [address] using a secure [employer] Video Visit through [employer]. Patient is being seen remotely via telehealth at their home address in [place], and stated they are in a secure environment for this session. The patient's condition being diagnosed/treated is appropriate for telemedicine. The provider identified herself as well as her credentials.   The patient, and/or patients guardian, consent to be seen remotely, and when consent is given they understand that the consent allows for patient identifiable information to be sent to a third party as needed.   They may refuse to be seen remotely at any time. The electronic data is encrypted and password protected, and the patient and/or guardian has been advised of the potential risks to privacy not withstanding such measures.   PT Identifiers used: Name and .    You have chosen to receive care through a telehealth visit.  Do you consent to use a video/audio connection for your medical care today? Yes      Subjective   K[facility] Valente is a 72 y.o. male who presents today for follow up  For medication management.    Chief Complaint: \"Taking cancer treatments again\"    History of Present Illness:    Patient reports for appointment on time.  Reports above chief complaint.  Reports biopsy of the liver was positive for malignancy.  Reports he is having to take infusions and oral medication.  Reports will be taking these for 6 months.  He also had an incident at home where he had a syncope episode while taking a shower and went by ambulance to the emergency room to be checked out.  He is supposed to follow up with his primary care.  He went to [place] recently to see his son get an award.  Reports his appetite is good for the most part.  Sleep is good.  He reports he gets an infusion tomorrow, and probably will be very tired when he comes home.  Mood remains stable.  Reports he is taking his medications " as ordered.  PHQ-9 today is 8, unchanged from previous encounter.  EMMANUEL-7 score today is 6, previously was 11.         The following portions of the patient's history were reviewed and updated as appropriate: allergies, current medications, past family history, past medical history, past social history, past surgical history and problem list.    Past Psychiatric History:  Patient began treatment several years ago, has been going to the same psychiatrist for many years.  Diagnoses include anxiety, depression, posttraumatic stress disorder.  Reports he has been going to Dr. Sylvester Pretty in [facility].  Dr. Pretty is retiring.  He denies he is ever been admitted to a psychiatric hospital.  Denies any history of suicide attempts.  Denies any history of self harming behaviors.  Past medications include Prozac, Celexa, Seroquel, Xanax.    Past Medical History:  Past Medical History:   Diagnosis Date   • Anxiety    • Depression    • GERD (gastroesophageal reflux disease)    • Hypercalcemia    • Hypothyroidism    • Inverted T wave     PT HAD STRESS ECHO DONE 4 YR AGO AND WAS NORMAL   • Osteoarthritis     Knees and ankles   • Parathyroid disease (HCC)    • PONV (postoperative nausea and vomiting)    • PTSD (post-traumatic stress disorder)    • Sleep apnea     NO MACHINE     Social History:  Social History     Socioeconomic History   • Marital status:    • Number of children: 2   • Highest education level: Master's degree (e.g., MA, MS, Lisa, MEd, MSW, THONG)   Tobacco Use   • Smoking status: Never Smoker   • Smokeless tobacco: Never Used   Vaping Use   • Vaping Use: Never used   Substance and Sexual Activity   • Alcohol use: Yes     Comment: 1 DAILY DRINK   • Drug use: No   • Sexual activity: Defer   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  Marriage, 48 years.  No history of  service.  No arrests or convictions.    Family History:  Family History   Problem Relation Age of  Onset   • Breast cancer Mother    • Heart disease Father    • Hypertension Father    • Colon cancer Sister    • Anxiety disorder Sister    • Anxiety disorder Son        Past Surgical History:  Past Surgical History:   Procedure Laterality Date   • COLON SURGERY     • COLONOSCOPY  07/2014   • COLONOSCOPY N/A 12/24/2020    Procedure: COLONOSCOPY;  Surgeon: Geronimo Branch Jr., MD;  Location: [facility] ENDOSCOPY;  Service: General;  Laterality: N/A;  colon obstruction  post:   colon obstruction from external compression   • ENDOSCOPY      several years ago, normal   • EXPLORATORY LAPAROTOMY N/A 12/21/2020    Procedure: LAPAROTOMY EXPLORATORY WITH LOOP COLOSTOMY;  Surgeon: Geronimo Branch Jr., MD;  Location: [facility] MAIN OR;  Service: General;  Laterality: N/A;   • HEMORRHOIDECTOMY     • THYROIDECTOMY Left 2/3/2017    Procedure: Left inferior parathyroidectomy with intraoperative PTH monitoring;  Surgeon: Sarah Matos MD;  Location: [facility] OR;  Service:    • WRIST FUSION Right        Problem List:  Patient Active Problem List   Diagnosis   • Acquired hypothyroidism   • Osteoarthritis of both knees   • Gastroesophageal reflux disease without esophagitis   • Mixed hyperlipidemia   • Monoclonal gammopathy of undetermined significance   • Hypercalcemia   • Chronic fatigue   • Thrombocytopenia (HCC)   • History of primary hyperparathyroidism   • Hypogonadism in male   • Toe pain, left   • Recurrent major depressive disorder, in partial remission (HCC)   • Idiopathic gout, unspecified ankle and foot   • Pseudogout of joint of right foot   • Chronic gout of left foot   • Hyperglycemia   • Benign prostatic hyperplasia without lower urinary tract symptoms   • Benign essential HTN   • Colonic obstruction (HCC)   • Thrombocytopenia (HCC)   • Pancreatic adenocarcinoma (HCC)   • Chronic ITP (idiopathic thrombocytopenia) (HCC)   • Syncope   • Leukocytosis   • Acute blood loss anemia   • Arthritis   • Conduction disorder of the  heart   • Colostomy in place ([facility])   • Disorder of thyroid   • Hypertensive disorder   • Irritable bowel syndrome   • Kidney disease   • 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 90 tablet 3   • busPIRone (BUSPAR) 30 MG tablet Take 1 tablet by mouth Daily for 90 days. 90 tablet 0   • GLUCOSAMINE-CHONDROIT-VIT C-MN PO Take 1 tablet by mouth.     • lamoTRIgine (LaMICtal) 100 MG tablet Take one oral tablet daily 90 tablet 1   • levothyroxine (SYNTHROID, LEVOTHROID) 112 MCG tablet TAKE 1 TABLET BY MOUTH DAILY 90 tablet 1   • Omega-3 Fatty Acids (FISH OIL) 1000 MG capsule capsule Take 2,000 mg by mouth 2 (Two) Times a Day With Meals. HOLD PRIOR TO SURG     • potassium chloride (K-DUR,KLOR-CON) 20 MEQ CR tablet Take 20 mEq by mouth Daily.     • sertraline (ZOLOFT) 100 MG tablet Take 2 tablets by mouth Every Morning. 180 tablet 1   • vitamin D (ERGOCALCIFEROL) 1.25 MG (13290 UT) capsule capsule TAKE 1 CAPSULE BY MOUTH 2 TIMES A WEEK 26 capsule 2   • allopurinol (ZYLOPRIM) 100 MG tablet TAKE 1 TABLET BY MOUTH DAILY 90 tablet 1   • ascorbic acid (VITAMIN C) 1000 MG tablet Take 1,000 mg by mouth Every Night.     • dexamethasone (DECADRON) 4 MG tablet TAKE 1 TABLET BY MOUTH TWICE DAILY WITH FOOD ON DAYS 2 AND 3 AFTER CHEMOTHERAPY AS DIRECTED     • Diclofenac Sodium (VOLTAREN EX) Apply 1 application topically Daily As Needed. Bilateral knees     • diphenoxylate-atropine (LOMOTIL) 2.5-0.025 MG per tablet TAKE 1 TABLET BY MOUTH THREE TIMES DAILY AS NEEDED FOR LOOSE STOOL     • loperamide (IMODIUM A-D) 2 MG tablet      • omeprazole (priLOSEC) 40 MG capsule Take 40 mg by mouth Daily. Take before a meal     • ondansetron (ZOFRAN) 8 MG tablet Take 8 mg by mouth Every 8 (Eight) Hours As Needed.  "    • prochlorperazine (COMPAZINE) 10 MG tablet Take 10 mg by mouth Every 6 (Six) Hours As Needed.     • Syringe/Needle, Disp, (B-D 3CC LUER-JACY SYR 23GX1\") 23G X 1\" 3 ML misc For testosterone injection once every 10 days. 10 each 2     No current facility-administered medications for this visit.       Review of Systems:    Review of Systems   Constitutional: Positive for fatigue.   Psychiatric/Behavioral: Positive for stress.   All other systems reviewed and are negative.        Physical Exam:   Physical Exam  Vitals reviewed.   Constitutional:       Appearance: Normal appearance.   HENT:      Head: Normocephalic.   Neurological:      Mental Status: He is alert.   Psychiatric:         Attention and Perception: Attention and perception normal.         Mood and Affect: Mood and affect normal.         Speech: Speech normal.         Behavior: Behavior normal. Behavior is cooperative.         Thought Content: Thought content normal.         Cognition and Memory: Cognition and memory normal.         Judgment: Judgment normal.         Vitals:  There were no vitals taken for this visit. There is no height or weight on file to calculate BMI.  Due to extenuating circumstances and possible current health risks associated with the patient being present in a clinical setting (with current health restrictions in place in regards to possible COVID 19 transmission/exposure), the patient was seen remotely today via a MyChart Video Visit through [facility] and telephone encounter.  Unable to obtain vital signs due to nature of remote visit.  Height stated at 68 inches.  Weight stated at 183 pounds.    Last 3 Blood Pressure Readings:  BP Readings from Last 3 Encounters:   05/13/22 148/78   01/31/22 140/82   07/28/21 125/88       PHQ-9 Score:   PHQ-9 Total Score: (P) 8  PHQ-9 Depression Screening  Little interest or pleasure in doing things? (P) 1   Feeling down, depressed, or hopeless? (P) 0   Trouble falling or staying asleep, or sleeping " too much? (P) 3   Feeling tired or having little energy? (P) 2   Poor appetite or overeating? (P) 1   Feeling bad about yourself - or that you are a failure or have let yourself or your family down? (P) 1   Trouble concentrating on things, such as reading the newspaper or watching television? (P) 0   Moving or speaking so slowly that other people could have noticed? Or the opposite - being so fidgety or restless that you have been moving around a lot more than usual? (P) 0   Thoughts that you would be better off dead, or of hurting yourself in some way? (P) 0   PHQ-9 Total Score (P) 8   If you checked off any problems, how difficult have these problems made it for you to do your work, take care of things at home, or get along with other people? (P) Not difficult at all     PHQ-9 Total Score: (P) 8      Feeling nervous, anxious or on edge: (P) Several days  Not being able to stop or control worrying: (P) Several days  Worrying too much about different things: (P) Several days  Trouble Relaxing: (P) Several days  Being so restless that it is hard to sit still: (P) Not at all  Feeling afraid as if something awful might happen: (P) Several days  Becoming easily annoyed or irritable: (P) Several days  EMMANUEL 7 Total Score: (P) 6  If you checked any problems, how difficult have these problems made it for you to do your work, take care of things at home, or get along with other people: (P) Not difficult at all      PROMIS scale screening tool that patient filled out virtually reviewed by this APRN at today's encounter.      Mental Status Exam:   Hygiene:   good  Cooperation:  Cooperative  Eye Contact:  Good  Psychomotor Behavior:  Appropriate  Affect:  Full range  Mood: euthymic  Hopelessness: Denies  Speech:  Normal  Thought Process:  Goal directed and Linear  Thought Content:  Normal  Suicidal:  None  Homicidal:  None  Hallucinations:  None  Delusion:  None  Memory:  Intact  Orientation:  Person, Place, Time and  Situation  Reliability:  good  Insight:  Good  Judgement:  Good  Impulse Control:  Good  Physical/Medical Issues:  Yes Currently undergoing treatment for recurrent cancer.       Lab Results:   Admission on 05/13/2022, Discharged on 05/13/2022   Component Date Value Ref Range Status   • Glucose 05/13/2022 140 (A) 65 - 99 mg/dL Final   • BUN 05/13/2022 22  8 - 23 mg/dL Final   • Creatinine 05/13/2022 1.35 (A) 0.76 - 1.27 mg/dL Final   • Sodium 05/13/2022 140  136 - 145 mmol/L Final   • Potassium 05/13/2022 3.8  3.5 - 5.2 mmol/L Final   • Chloride 05/13/2022 106  98 - 107 mmol/L Final   • CO2 05/13/2022 20.9 (A) 22.0 - 29.0 mmol/L Final   • Calcium 05/13/2022 8.7  8.6 - 10.5 mg/dL Final   • Total Protein 05/13/2022 6.7  6.0 - 8.5 g/dL Final   • Albumin 05/13/2022 3.70  3.50 - 5.20 g/dL Final   • ALT (SGPT) 05/13/2022 24  1 - 41 U/L Final   • AST (SGOT) 05/13/2022 26  1 - 40 U/L Final   • Alkaline Phosphatase 05/13/2022 114  39 - 117 U/L Final   • Total Bilirubin 05/13/2022 0.4  0.0 - 1.2 mg/dL Final   • Globulin 05/13/2022 3.0  gm/dL Final   • A/G Ratio 05/13/2022 1.2  g/dL Final   • BUN/Creatinine Ratio 05/13/2022 16.3  7.0 - 25.0 Final   • Anion Gap 05/13/2022 13.1  5.0 - 15.0 mmol/L Final   • eGFR 05/13/2022 55.1 (A) >60.0 mL/min/1.73 Final    National Kidney Foundation and American Society of Nephrology (ASN) Task Force recommended calculation based on the Chronic Kidney Disease Epidemiology Collaboration (CKD-EPI) equation refit without adjustment for race.   • Troponin T 05/13/2022 <0.010  0.000 - 0.030 ng/mL Final   • Troponin T 05/13/2022 <0.010  0.000 - 0.030 ng/mL Final   • proBNP 05/13/2022 805.0  0.0 - 900.0 pg/mL Final   • QT Interval 05/13/2022 418  ms Final   • WBC 05/13/2022 12.14 (A) 3.40 - 10.80 10*3/mm3 Final   • RBC 05/13/2022 3.53 (A) 4.14 - 5.80 10*6/mm3 Final   • Hemoglobin 05/13/2022 10.6 (A) 13.0 - 17.7 g/dL Final   • Hematocrit 05/13/2022 30.4 (A) 37.5 - 51.0 % Final   • MCV 05/13/2022 86.1   79.0 - 97.0 fL Final   • MCH 05/13/2022 30.0  26.6 - 33.0 pg Final   • MCHC 05/13/2022 34.9  31.5 - 35.7 g/dL Final   • RDW 05/13/2022 14.4  12.3 - 15.4 % Final   • RDW-SD 05/13/2022 44.0  37.0 - 54.0 fl Final   • MPV 05/13/2022 11.5  6.0 - 12.0 fL Final   • Platelets 05/13/2022 203  140 - 450 10*3/mm3 Final   • COVID19 05/13/2022 Not Detected  Not Detected - Ref. Range Final   • Neutrophil % 05/13/2022 93.0 (A) 42.7 - 76.0 % Final   • Lymphocyte % 05/13/2022 5.0 (A) 19.6 - 45.3 % Final   • Monocyte % 05/13/2022 1.0 (A) 5.0 - 12.0 % Final   • Eosinophil % 05/13/2022 1.0  0.3 - 6.2 % Final   • Neutrophils Absolute 05/13/2022 11.29 (A) 1.70 - 7.00 10*3/mm3 Final   • Lymphocytes Absolute 05/13/2022 0.61 (A) 0.70 - 3.10 10*3/mm3 Final   • Monocytes Absolute 05/13/2022 0.12  0.10 - 0.90 10*3/mm3 Final   • Eosinophils Absolute 05/13/2022 0.12  0.00 - 0.40 10*3/mm3 Final   • RBC Morphology 05/13/2022 Normal  Normal Final   • WBC Morphology 05/13/2022 Normal  Normal Final   • Platelet Morphology 05/13/2022 Normal  Normal Final   Office Visit on 01/31/2022   Component Date Value Ref Range Status   • WBC 01/31/2022 10.3  3.4 - 10.8 x10E3/uL Final   • RBC 01/31/2022 4.63  4.14 - 5.80 x10E6/uL Final   • Hemoglobin 01/31/2022 13.4  13.0 - 17.7 g/dL Final   • Hematocrit 01/31/2022 40.5  37.5 - 51.0 % Final   • MCV 01/31/2022 88  79 - 97 fL Final   • MCH 01/31/2022 28.9  26.6 - 33.0 pg Final   • MCHC 01/31/2022 33.1  31.5 - 35.7 g/dL Final   • RDW 01/31/2022 14.6  11.6 - 15.4 % Final   • Platelets 01/31/2022 273  150 - 450 x10E3/uL Final   • Neutrophil Rel % 01/31/2022 55  Not Estab. % Final   • Lymphocyte Rel % 01/31/2022 30  Not Estab. % Final   • Monocyte Rel % 01/31/2022 7  Not Estab. % Final   • Eosinophil Rel % 01/31/2022 5  Not Estab. % Final   • Basophil Rel % 01/31/2022 2  Not Estab. % Final   • Neutrophils Absolute 01/31/2022 5.8  1.4 - 7.0 x10E3/uL Final   • Lymphocytes Absolute 01/31/2022 3.1  0.7 - 3.1 x10E3/uL Final    • Monocytes Absolute 01/31/2022 0.7  0.1 - 0.9 x10E3/uL Final   • Eosinophils Absolute 01/31/2022 0.5 (A) 0.0 - 0.4 x10E3/uL Final   • Basophils Absolute 01/31/2022 0.2  0.0 - 0.2 x10E3/uL Final   • Immature Granulocyte Rel % 01/31/2022 1  Not Estab. % Final   • Immature Grans Absolute 01/31/2022 0.1  0.0 - 0.1 x10E3/uL Final   • Glucose 01/31/2022 93  65 - 99 mg/dL Final   • BUN 01/31/2022 24  8 - 27 mg/dL Final   • Creatinine 01/31/2022 1.18  0.76 - 1.27 mg/dL Final   • eGFR Non  Am 01/31/2022 60  >59 mL/min/1.73 Final   • eGFR African Am 01/31/2022 70  >59 mL/min/1.73 Final    Comment: **In accordance with recommendations from the NKF-ASN Task force,**    [facility] is in the process of updating its eGFR calculation to the    2021 CKD-EPI creatinine equation that estimates kidney function    without a race variable.     • BUN/Creatinine Ratio 01/31/2022 20  10 - 24 Final   • Sodium 01/31/2022 142  134 - 144 mmol/L Final   • Potassium 01/31/2022 4.6  3.5 - 5.2 mmol/L Final   • Chloride 01/31/2022 104  96 - 106 mmol/L Final   • Total CO2 01/31/2022 19 (A) 20 - 29 mmol/L Final   • Calcium 01/31/2022 10.3 (A) 8.6 - 10.2 mg/dL Final   • Total Protein 01/31/2022 7.2  6.0 - 8.5 g/dL Final   • Albumin 01/31/2022 4.9 (A) 3.7 - 4.7 g/dL Final   • Globulin 01/31/2022 2.3  1.5 - 4.5 g/dL Final   • A/G Ratio 01/31/2022 2.1  1.2 - 2.2 Final   • Total Bilirubin 01/31/2022 0.3  0.0 - 1.2 mg/dL Final   • Alkaline Phosphatase 01/31/2022 158 (A) 44 - 121 IU/L Final   • AST (SGOT) 01/31/2022 18  0 - 40 IU/L Final   • ALT (SGPT) 01/31/2022 14  0 - 44 IU/L Final   • Total Cholesterol 01/31/2022 194  100 - 199 mg/dL Final   • Triglycerides 01/31/2022 191 (A) 0 - 149 mg/dL Final   • HDL Cholesterol 01/31/2022 45  >39 mg/dL Final   • VLDL Cholesterol Gregory 01/31/2022 33  5 - 40 mg/dL Final   • LDL Chol Calc (NIH) 01/31/2022 116 (A) 0 - 99 mg/dL Final   • TSH 01/31/2022 0.027 (A) 0.450 - 4.500 uIU/mL Final   • Uric Acid 01/31/2022 4.0  " 3.8 - 8.4 mg/dL Final               Therapeutic target for gout patients: <6.0   • Free T4 01/31/2022 1.60  0.82 - 1.77 ng/dL Final         Assessment & Plan   Problems Addressed this Visit    None     Visit Diagnoses     Major depressive disorder, recurrent episode, moderate (HCC)    -  Primary    Relevant Medications    sertraline (ZOLOFT) 100 MG tablet    Generalized anxiety disorder        Relevant Medications    sertraline (ZOLOFT) 100 MG tablet      Diagnoses       Codes Comments    Major depressive disorder, recurrent episode, moderate (HCC)    -  Primary ICD-10-CM: F33.1  ICD-9-CM: 296.32     Generalized anxiety disorder     ICD-10-CM: F41.1  ICD-9-CM: 300.02           Visit Diagnoses:    ICD-10-CM ICD-9-CM   1. Major depressive disorder, recurrent episode, moderate (HCC)  F33.1 296.32   2. Generalized anxiety disorder  F41.1 300.02         GOALS:  Short Term Goals: Patient will be compliant with medication, and patient will have no significant medication related side effects.  Patient will be engaged in psychotherapy as indicated.  Patient will report subjective improvement of symptoms.  Long term goals: To stabilize mood and treat/improve subjective symptoms, the patient will stay out of the hospital, the patient will be at an optimal level of functioning, and the patient will take all medications as prescribed.  The patient/guardian verbalized understanding and agreement with goals that were mutually set.    RISK ASSESSMENT  Current harm-to-self risk is reported by pt as \"none.\"  Current [id number] risk is reported by pt as \"none.\"   No suicidal thoughts, intent, plan is appreciated by this provider on this date of exam.   No homicidal thoughts, intent, plan is appreciated by this provider on this date.    TREATMENT PLAN: Continue supportive psychotherapy efforts and medications as indicated.   Pharmacological and Non-Pharmacological treatment options discussed during today's visit. Patient/Guardian " acknowledged and verbally consented with current treatment plan and was educated on the importance of compliance with treatment and follow-up appointments.      MEDICATION ISSUES:  Discussed medication options and treatment plan of prescribed medication as well as the risks, benefits, any black box warnings, and side effects including potential falls, possible impaired driving, and metabolic adversities among others. Patient is agreeable to call the office with any worsening of symptoms or onset of side effects, or if any concerns or questions arise.  The contact information for the office is made available to the patient. Patient is agreeable to call 911 or go to the nearest ER should they begin having any SI/HI, or if any urgent concerns arise. No medication side effects or related complaints today.     1. Continue sertraline 200 mg daily, take with food.   2.  Continue lamotrigine 100 mg daily  3.  Continue BuSpar 30 mg daily    MEDS ORDERED DURING VISIT:  New Medications Ordered This Visit   Medications   • sertraline (ZOLOFT) 100 MG tablet     Sig: Take 2 tablets by mouth Every Morning.     Dispense:  180 tablet     Refill:  1       Return in about 5 weeks (around 6/27/2022) for Recheck, Video visit.               This document has been electronically signed by MONIKA Singh  May 23, 2022 17:33 EDT    Please note that portions of this note were completed with a voice recognition program. Efforts were made to edit dictation, but occasionally words are mistranscribed.

## 2022-06-27 ENCOUNTER — TELEMEDICINE (OUTPATIENT)
Dept: PSYCHIATRY | Facility: CLINIC | Age: 72
End: 2022-06-27

## 2022-06-27 DIAGNOSIS — F41.1 GENERALIZED ANXIETY DISORDER: Primary | ICD-10-CM

## 2022-06-27 DIAGNOSIS — F33.1 MAJOR DEPRESSIVE DISORDER, RECURRENT EPISODE, MODERATE: ICD-10-CM

## 2022-06-27 PROCEDURE — 99214 OFFICE O/P EST MOD 30 MIN: CPT | Performed by: NURSE PRACTITIONER

## 2022-06-27 RX ORDER — ALPRAZOLAM 0.5 MG/1
0.5 TABLET ORAL 2 TIMES DAILY PRN
Qty: 60 TABLET | Refills: 1 | Status: SHIPPED | OUTPATIENT
Start: 2022-06-27 | End: 2022-08-26

## 2022-06-27 NOTE — PROGRESS NOTES
"This provider is located at [facility], [address] [place] [address], [facility], [id number] using a secure [employer] Video Visit through [employer]. Patient is being seen remotely via telehealth at their home address in [place], and stated they are in a secure environment for this session. The patient's condition being diagnosed/treated is appropriate for telemedicine. The provider identified herself as well as her credentials.   The patient, and/or patients guardian, consent to be seen remotely, and when consent is given they understand that the consent allows for patient identifiable information to be sent to a third party as needed.   They may refuse to be seen remotely at any time. The electronic data is encrypted and password protected, and the patient and/or guardian has been advised of the potential risks to privacy not withstanding such measures.   PT Identifiers used: Name and .    You have chosen to receive care through a telehealth visit.  Do you consent to use a video/audio connection for your medical care today? Yes      Subjective   Kaiser Valente is a 72 y.o. male who presents today for follow up  For medication management.    Chief Complaint: \"Got some bad news\"    History of Present Illness:    Patient reports for appointment on time.  Reports above chief complaint.  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  Patient is very discouraged and met with treatment team and he is expecting a call this afternoon on their decision on how to proceed in treating this cancer.  He reports he has had a couple of other dizzy spells.  Having more anxiety, which would be expected with this type of news.  He did enjoy a trip to [place] to visit with family.  He has connected with his rabbi at the [employer], also with the [place], for spiritual counseling and encouragement.  Discussed as needed use of Xanax as he has used this in the past.  Patient reports just knowing " he has something in the house would be good.  He is not sure that he would utilize it, but certainly would welcome having it available.  He is trying to remain positive, but yet he is understandably stressed.  Previous PHQ-9 was 8, today is 6 previous EMMANUEL-7 was 6, today is 10.  Reports appetite is reasonable, sleep is good most of the time.             The following portions of the patient's history were reviewed and updated as appropriate: allergies, current medications, past family history, past medical history, past social history, past surgical history and problem list.    Past Psychiatric History:  Patient began treatment several years ago, has been going to the same psychiatrist for many years.  Diagnoses include anxiety, depression, posttraumatic stress disorder.  Reports he has been going to Dr. Sylvester Pretty in [facility].  Dr. Pretty is retiring.  He denies he is ever been admitted to a psychiatric hospital.  Denies any history of suicide attempts.  Denies any history of self harming behaviors.  Past medications include Prozac, Celexa, Seroquel, Xanax.    Past Medical History:  Past Medical History:   Diagnosis Date   • Anxiety    • Depression    • GERD (gastroesophageal reflux disease)    • Hypercalcemia    • Hypothyroidism    • Inverted T wave     PT HAD STRESS ECHO DONE 4 YR AGO AND WAS NORMAL   • Osteoarthritis     Knees and ankles   • Parathyroid disease (HCC)    • PONV (postoperative nausea and vomiting)    • PTSD (post-traumatic stress disorder)    • Sleep apnea     NO MACHINE     Social History:  Social History     Socioeconomic History   • Marital status:    • Number of children: 2   • Highest education level: Master's degree (e.g., MA, MS, Lisa, MEd, MSW, THONG)   Tobacco Use   • Smoking status: Never Smoker   • Smokeless tobacco: Never Used   Vaping Use   • Vaping Use: Never used   Substance and Sexual Activity   • Alcohol use: Yes     Comment: 1 DAILY DRINK   • Drug use: No   •  Sexual activity: Defer   Patient reports Sabianist lay, is active in the local Sabianist community.  Marriage, 48 years.  No history of  service.  No arrests or convictions.    Family History:  Family History   Problem Relation Age of Onset   • Breast cancer Mother    • Heart disease Father    • Hypertension Father    • Colon cancer Sister    • Anxiety disorder Sister    • Anxiety disorder Son        Past Surgical History:  Past Surgical History:   Procedure Laterality Date   • COLON SURGERY     • COLONOSCOPY  07/2014   • COLONOSCOPY N/A 12/24/2020    Procedure: COLONOSCOPY;  Surgeon: Geronimo Branch Jr., MD;  Location: [facility] ENDOSCOPY;  Service: General;  Laterality: N/A;  colon obstruction  post:   colon obstruction from external compression   • ENDOSCOPY      several years ago, normal   • EXPLORATORY LAPAROTOMY N/A 12/21/2020    Procedure: LAPAROTOMY EXPLORATORY WITH LOOP COLOSTOMY;  Surgeon: Geronimo Branch Jr., MD;  Location: [facility] OR;  Service: General;  Laterality: N/A;   • HEMORRHOIDECTOMY     • THYROIDECTOMY Left 2/3/2017    Procedure: Left inferior parathyroidectomy with intraoperative PTH monitoring;  Surgeon: Sarah Matos MD;  Location: [facility];  Service:    • WRIST FUSION Right        Problem List:  Patient Active Problem List   Diagnosis   • Acquired hypothyroidism   • Osteoarthritis of both knees   • Gastroesophageal reflux disease without esophagitis   • Mixed hyperlipidemia   • Monoclonal gammopathy of undetermined significance   • Hypercalcemia   • Chronic fatigue   • Thrombocytopenia (HCC)   • History of primary hyperparathyroidism   • Hypogonadism in male   • Toe pain, left   • Recurrent major depressive disorder, in partial remission (HCC)   • Idiopathic gout, unspecified ankle and foot   • Pseudogout of joint of right foot   • Chronic gout of left foot   • Hyperglycemia   • Benign prostatic hyperplasia without lower urinary tract symptoms   • Benign essential HTN   •  Colonic obstruction (HCC)   • Thrombocytopenia (HCC)   • Pancreatic adenocarcinoma (HCC)   • Chronic ITP (idiopathic thrombocytopenia) (HCC)   • Syncope   • Leukocytosis   • Acute blood loss anemia   • Arthritis   • Conduction disorder of the heart   • Colostomy in place (HCC)   • Disorder of thyroid   • Hypertensive disorder   • Irritable bowel syndrome   • Kidney disease   • Platelet disorder (HCC)   • Sleep apnea       Allergy:   Allergies   Allergen Reactions   • Codeine Nausea Only     nausea        Current Medications:   Current Outpatient Medications   Medication Sig Dispense Refill   • ALPRAZolam (Xanax) 0.5 MG tablet Take 1 tablet by mouth 2 (Two) Times a Day As Needed for Anxiety for up to 60 days. 60 tablet 1   • atorvastatin (LIPITOR) 10 MG tablet Take 1 tablet by mouth Daily. 90 tablet 3   • busPIRone (BUSPAR) 30 MG tablet Take 1 tablet by mouth Daily for 90 days. 90 tablet 0   • GLUCOSAMINE-CHONDROIT-VIT C-MN PO Take 1 tablet by mouth.     • lamoTRIgine (LaMICtal) 100 MG tablet Take one oral tablet daily 90 tablet 1   • levothyroxine (SYNTHROID, LEVOTHROID) 112 MCG tablet TAKE 1 TABLET BY MOUTH DAILY 90 tablet 1   • Omega-3 Fatty Acids (FISH OIL) 1000 MG capsule capsule Take 2,000 mg by mouth 2 (Two) Times a Day With Meals. HOLD PRIOR TO SURG     • potassium chloride (K-DUR,KLOR-CON) 20 MEQ CR tablet Take 20 mEq by mouth Daily.     • sertraline (ZOLOFT) 100 MG tablet Take 2 tablets by mouth Every Morning. 180 tablet 1   • vitamin D (ERGOCALCIFEROL) 1.25 MG (18986 UT) capsule capsule TAKE 1 CAPSULE BY MOUTH 2 TIMES A WEEK 26 capsule 2     No current facility-administered medications for this visit.       Review of Systems:    Review of Systems   Constitutional: Positive for fatigue.   Neurological: Positive for dizziness (Patient reported recent episodes of dizziness).   Psychiatric/Behavioral: Positive for stress. The patient is nervous/anxious.    All other systems reviewed and are  negative.        Physical Exam:   Physical Exam  Vitals reviewed.   Constitutional:       Appearance: Normal appearance.   HENT:      Head: Normocephalic.   Neurological:      Mental Status: He is alert.   Psychiatric:         Attention and Perception: Attention and perception normal.         Mood and Affect: Affect normal. Mood is anxious.         Speech: Speech normal.         Behavior: Behavior normal. Behavior is cooperative.         Thought Content: Thought content normal.         Cognition and Memory: Cognition and memory normal.         Judgment: Judgment normal.         Vitals:  There were no vitals taken for this visit. There is no height or weight on file to calculate BMI.  Due to extenuating circumstances and possible current health risks associated with the patient being present in a clinical setting (with current health restrictions in place in regards to possible COVID 19 transmission/exposure), the patient was seen remotely today via a MyChart Video Visit through [facility] and telephone encounter.  Unable to obtain vital signs due to nature of remote visit.  Height stated at 68 inches.  Weight stated at 183 pounds.    Last 3 Blood Pressure Readings:  BP Readings from Last 3 Encounters:   05/13/22 148/78   01/31/22 140/82   07/28/21 125/88       PHQ-9 Score:   PHQ-9 Total Score: (P) 6  PHQ-9 Depression Screening  Little interest or pleasure in doing things? (P) 0   Feeling down, depressed, or hopeless? (P) 0   Trouble falling or staying asleep, or sleeping too much? (P) 2   Feeling tired or having little energy? (P) 2   Poor appetite or overeating? (P) 1   Feeling bad about yourself - or that you are a failure or have let yourself or your family down? (P) 0   Trouble concentrating on things, such as reading the newspaper or watching television? (P) 1   Moving or speaking so slowly that other people could have noticed? Or the opposite - being so fidgety or restless that you have been moving around a lot more  than usual? (P) 0   Thoughts that you would be better off dead, or of hurting yourself in some way? (P) 0   PHQ-9 Total Score (P) 6   If you checked off any problems, how difficult have these problems made it for you to do your work, take care of things at home, or get along with other people? (P) Not difficult at all     PHQ-9 Total Score: (P) 6      Feeling nervous, anxious or on edge: (P) More than half the days  Not being able to stop or control worrying: (P) More than half the days  Worrying too much about different things: (P) Several days  Trouble Relaxing: (P) More than half the days  Being so restless that it is hard to sit still: (P) Several days  Feeling afraid as if something awful might happen: (P) Several days  Becoming easily annoyed or irritable: (P) Several days  EMMANUEL 7 Total Score: (P) 10  If you checked any problems, how difficult have these problems made it for you to do your work, take care of things at home, or get along with other people: (P) Somewhat difficult      PROMIS scale screening tool that patient filled out virtually reviewed by this APRN at today's encounter.      Mental Status Exam:   Hygiene:   good  Cooperation:  Cooperative  Eye Contact:  Good  Psychomotor Behavior:  Appropriate  Affect:  Full range  Mood: anxious  Hopelessness: Denies  Speech:  Normal  Thought Process:  Goal directed and Linear  Thought Content:  Normal  Suicidal:  None  Homicidal:  None  Hallucinations:  None  Delusion:  None  Memory:  Intact  Orientation:  Person, Place, Time and Situation  Reliability:  good  Insight:  Good  Judgement:  Good  Impulse Control:  Good  Physical/Medical Issues:  Yes Currently undergoing treatment for recurrent cancer.       Lab Results:   Admission on 05/13/2022, Discharged on 05/13/2022   Component Date Value Ref Range Status   • Glucose 05/13/2022 140 (A) 65 - 99 mg/dL Final   • BUN 05/13/2022 22  8 - 23 mg/dL Final   • Creatinine 05/13/2022 1.35 (A) 0.76 - 1.27 mg/dL Final   •  Sodium 05/13/2022 140  136 - 145 mmol/L Final   • Potassium 05/13/2022 3.8  3.5 - 5.2 mmol/L Final   • Chloride 05/13/2022 106  98 - 107 mmol/L Final   • CO2 05/13/2022 20.9 (A) 22.0 - 29.0 mmol/L Final   • Calcium 05/13/2022 8.7  8.6 - 10.5 mg/dL Final   • Total Protein 05/13/2022 6.7  6.0 - 8.5 g/dL Final   • Albumin 05/13/2022 3.70  3.50 - 5.20 g/dL Final   • ALT (SGPT) 05/13/2022 24  1 - 41 U/L Final   • AST (SGOT) 05/13/2022 26  1 - 40 U/L Final   • Alkaline Phosphatase 05/13/2022 114  39 - 117 U/L Final   • Total Bilirubin 05/13/2022 0.4  0.0 - 1.2 mg/dL Final   • Globulin 05/13/2022 3.0  gm/dL Final   • A/G Ratio 05/13/2022 1.2  g/dL Final   • BUN/Creatinine Ratio 05/13/2022 16.3  7.0 - 25.0 Final   • Anion Gap 05/13/2022 13.1  5.0 - 15.0 mmol/L Final   • eGFR 05/13/2022 55.1 (A) >60.0 mL/min/1.73 Final    National Kidney Foundation and American Society of Nephrology (ASN) Task Force recommended calculation based on the Chronic Kidney Disease Epidemiology Collaboration (CKD-EPI) equation refit without adjustment for race.   • Troponin T 05/13/2022 <0.010  0.000 - 0.030 ng/mL Final   • Troponin T 05/13/2022 <0.010  0.000 - 0.030 ng/mL Final   • proBNP 05/13/2022 805.0  0.0 - 900.0 pg/mL Final   • QT Interval 05/13/2022 418  ms Final   • WBC 05/13/2022 12.14 (A) 3.40 - 10.80 10*3/mm3 Final   • RBC 05/13/2022 3.53 (A) 4.14 - 5.80 10*6/mm3 Final   • Hemoglobin 05/13/2022 10.6 (A) 13.0 - 17.7 g/dL Final   • Hematocrit 05/13/2022 30.4 (A) 37.5 - 51.0 % Final   • MCV 05/13/2022 86.1  79.0 - 97.0 fL Final   • MCH 05/13/2022 30.0  26.6 - 33.0 pg Final   • MCHC 05/13/2022 34.9  31.5 - 35.7 g/dL Final   • RDW 05/13/2022 14.4  12.3 - 15.4 % Final   • RDW-SD 05/13/2022 44.0  37.0 - 54.0 fl Final   • MPV 05/13/2022 11.5  6.0 - 12.0 fL Final   • Platelets 05/13/2022 203  140 - 450 10*3/mm3 Final   • COVID19 05/13/2022 Not Detected  Not Detected - Ref. Range Final   • Neutrophil % 05/13/2022 93.0 (A) 42.7 - 76.0 % Final   •  Lymphocyte % 05/13/2022 5.0 (A) 19.6 - 45.3 % Final   • Monocyte % 05/13/2022 1.0 (A) 5.0 - 12.0 % Final   • Eosinophil % 05/13/2022 1.0  0.3 - 6.2 % Final   • Neutrophils Absolute 05/13/2022 11.29 (A) 1.70 - 7.00 10*3/mm3 Final   • Lymphocytes Absolute 05/13/2022 0.61 (A) 0.70 - 3.10 10*3/mm3 Final   • Monocytes Absolute 05/13/2022 0.12  0.10 - 0.90 10*3/mm3 Final   • Eosinophils Absolute 05/13/2022 0.12  0.00 - 0.40 10*3/mm3 Final   • RBC Morphology 05/13/2022 Normal  Normal Final   • WBC Morphology 05/13/2022 Normal  Normal Final   • Platelet Morphology 05/13/2022 Normal  Normal Final   Office Visit on 01/31/2022   Component Date Value Ref Range Status   • WBC 01/31/2022 10.3  3.4 - 10.8 x10E3/uL Final   • RBC 01/31/2022 4.63  4.14 - 5.80 x10E6/uL Final   • Hemoglobin 01/31/2022 13.4  13.0 - 17.7 g/dL Final   • Hematocrit 01/31/2022 40.5  37.5 - 51.0 % Final   • MCV 01/31/2022 88  79 - 97 fL Final   • MCH 01/31/2022 28.9  26.6 - 33.0 pg Final   • MCHC 01/31/2022 33.1  31.5 - 35.7 g/dL Final   • RDW 01/31/2022 14.6  11.6 - 15.4 % Final   • Platelets 01/31/2022 273  150 - 450 x10E3/uL Final   • Neutrophil Rel % 01/31/2022 55  Not Estab. % Final   • Lymphocyte Rel % 01/31/2022 30  Not Estab. % Final   • Monocyte Rel % 01/31/2022 7  Not Estab. % Final   • Eosinophil Rel % 01/31/2022 5  Not Estab. % Final   • Basophil Rel % 01/31/2022 2  Not Estab. % Final   • Neutrophils Absolute 01/31/2022 5.8  1.4 - 7.0 x10E3/uL Final   • Lymphocytes Absolute 01/31/2022 3.1  0.7 - 3.1 x10E3/uL Final   • Monocytes Absolute 01/31/2022 0.7  0.1 - 0.9 x10E3/uL Final   • Eosinophils Absolute 01/31/2022 0.5 (A) 0.0 - 0.4 x10E3/uL Final   • Basophils Absolute 01/31/2022 0.2  0.0 - 0.2 x10E3/uL Final   • Immature Granulocyte Rel % 01/31/2022 1  Not Estab. % Final   • Immature Grans Absolute 01/31/2022 0.1  0.0 - 0.1 x10E3/uL Final   • Glucose 01/31/2022 93  65 - 99 mg/dL Final   • BUN 01/31/2022 24  8 - 27 mg/dL Final   • Creatinine  01/31/2022 1.18  0.76 - 1.27 mg/dL Final   • eGFR Non  Am 01/31/2022 60  >59 mL/min/1.73 Final   • eGFR African Am 01/31/2022 70  >59 mL/min/1.73 Final    Comment: **In accordance with recommendations from the NKF-ASN Task force,**    [facility] the [facility] eGFR calculation to the    2021 CKD-EPI creatinine equation that estimates kidney function    without a race variable.     • BUN/Creatinine Ratio 01/31/2022 20  10 - 24 Final   • Sodium 01/31/2022 142  134 - 144 mmol/L Final   • Potassium 01/31/2022 4.6  3.5 - 5.2 mmol/L Final   • Chloride 01/31/2022 104  96 - 106 mmol/L Final   • Total CO2 01/31/2022 19 (A) 20 - 29 mmol/L Final   • Calcium 01/31/2022 10.3 (A) 8.6 - 10.2 mg/dL Final   • Total Protein 01/31/2022 7.2  6.0 - 8.5 g/dL Final   • Albumin 01/31/2022 4.9 (A) 3.7 - 4.7 g/dL Final   • Globulin 01/31/2022 2.3  1.5 - 4.5 g/dL Final   • A/G Ratio 01/31/2022 2.1  1.2 - 2.2 Final   • Total Bilirubin 01/31/2022 0.3  0.0 - 1.2 mg/dL Final   • Alkaline Phosphatase 01/31/2022 158 (A) 44 - 121 IU/L Final   • AST (SGOT) 01/31/2022 18  0 - 40 IU/L Final   • ALT (SGPT) 01/31/2022 14  0 - 44 IU/L Final   • Total Cholesterol 01/31/2022 194  100 - 199 mg/dL Final   • Triglycerides 01/31/2022 191 (A) 0 - 149 mg/dL Final   • HDL Cholesterol 01/31/2022 45  >39 mg/dL Final   • VLDL Cholesterol Gregory 01/31/2022 33  5 - 40 mg/dL Final   • LDL Chol Calc (NIH) 01/31/2022 116 (A) 0 - 99 mg/dL Final   • TSH 01/31/2022 0.027 (A) 0.450 - 4.500 uIU/mL Final   • Uric Acid 01/31/2022 4.0  3.8 - 8.4 mg/dL Final               Therapeutic target for gout patients: <6.0   • Free T4 01/31/2022 1.60  0.82 - 1.77 ng/dL Final         Assessment & Plan   Problems Addressed this Visit    None     Visit Diagnoses     Generalized anxiety disorder    -  Primary    Relevant Medications    ALPRAZolam (Xanax) 0.5 MG tablet    Major depressive disorder, recurrent episode, moderate (HCC)        Relevant Medications    ALPRAZolam  "(Xanax) 0.5 MG tablet      Diagnoses       Codes Comments    Generalized anxiety disorder    -  Primary ICD-10-CM: F41.1  ICD-9-CM: 300.02     Major depressive disorder, recurrent episode, moderate (HCC)     ICD-10-CM: F33.1  ICD-9-CM: 296.32           Visit Diagnoses:    ICD-10-CM ICD-9-CM   1. Generalized anxiety disorder  F41.1 300.02   2. Major depressive disorder, recurrent episode, moderate (HCC)  F33.1 296.32         GOALS:  Short Term Goals: Patient will be compliant with medication, and patient will have no significant medication related side effects.  Patient will be engaged in psychotherapy as indicated.  Patient will report subjective improvement of symptoms.  Long term goals: To stabilize mood and treat/improve subjective symptoms, the patient will stay out of the hospital, the patient will be at an optimal level of functioning, and the patient will take all medications as prescribed.  The patient/guardian verbalized understanding and agreement with goals that were mutually set.    RISK ASSESSMENT  Current harm-to-self risk is reported by pt as \"none.\"  Current [id number] risk is reported by pt as \"none.\"   No suicidal thoughts, intent, plan is appreciated by this provider on this date of exam.   No homicidal thoughts, intent, plan is appreciated by this provider on this date.    TREATMENT PLAN: Continue supportive psychotherapy efforts and medications as indicated.   Pharmacological and Non-Pharmacological treatment options discussed during today's visit. Patient/Guardian acknowledged and verbally consented with current treatment plan and was educated on the importance of compliance with treatment and follow-up appointments.      MEDICATION ISSUES:  Discussed medication options and treatment plan of prescribed medication as well as the risks, benefits, any black box warnings, and side effects including potential falls, possible impaired driving, and metabolic adversities among others. Patient is " agreeable to call the office with any worsening of symptoms or onset of side effects, or if any concerns or questions arise.  The contact information for the office is made available to the patient. Patient is agreeable to call 911 or go to the nearest ER should they begin having any SI/HI, or if any urgent concerns arise. No medication side effects or related complaints today.     1. Continue sertraline 200 mg daily, take with food.   2.  Continue lamotrigine 100 mg daily  3.  Continue BuSpar 30 mg daily  4.  Start as needed alprazolam as below    MEDS ORDERED DURING VISIT:  New Medications Ordered This Visit   Medications   • ALPRAZolam (Xanax) 0.5 MG tablet     Sig: Take 1 tablet by mouth 2 (Two) Times a Day As Needed for Anxiety for up to 60 days.     Dispense:  60 tablet     Refill:  1       Return in about 4 weeks (around 7/25/2022) for Recheck, Video visit.       This patient will not be seen for the in person visits due to the following exception(s): It would be a hardship for this patient to see a provider in person.    It is my professional opinion that that patient is at risk for disengagement with care that has been effective in managing his/her illness.             This document has been electronically signed by MONIKA Singh  June 28, 2022 08:05 EDT    Please note that portions of this note were completed with a voice recognition program. Efforts were made to edit dictation, but occasionally words are mistranscribed.

## 2022-06-30 RX ORDER — ATORVASTATIN CALCIUM 10 MG/1
10 TABLET, FILM COATED ORAL DAILY
Qty: 90 TABLET | Refills: 3 | Status: SHIPPED | OUTPATIENT
Start: 2022-06-30 | End: 2023-02-15

## 2022-06-30 NOTE — TELEPHONE ENCOUNTER
Rx Refill Note  Requested Prescriptions     Pending Prescriptions Disp Refills   • atorvastatin (LIPITOR) 10 MG tablet [Pharmacy Med Name: ATORVASTATIN 10MG TABLETS] 90 tablet 3     Sig: TAKE 1 TABLET BY MOUTH DAILY      Last office visit with prescribing clinician: 1/31/2022      Next office visit with prescribing clinician: 8/26/2022            Ca Joy MA  06/30/22, 11:32 EDT

## 2022-07-11 DIAGNOSIS — F41.1 GENERALIZED ANXIETY DISORDER: ICD-10-CM

## 2022-07-11 RX ORDER — BUSPIRONE HYDROCHLORIDE 30 MG/1
TABLET ORAL
Qty: 90 TABLET | Refills: 3 | Status: SHIPPED | OUTPATIENT
Start: 2022-07-11

## 2022-07-25 DIAGNOSIS — F33.1 MAJOR DEPRESSIVE DISORDER, RECURRENT EPISODE, MODERATE: ICD-10-CM

## 2022-07-25 RX ORDER — LAMOTRIGINE 100 MG/1
TABLET ORAL
Qty: 90 TABLET | Refills: 1 | Status: SHIPPED | OUTPATIENT
Start: 2022-07-25 | End: 2023-01-03 | Stop reason: SDUPTHER

## 2022-07-27 ENCOUNTER — TELEMEDICINE (OUTPATIENT)
Dept: PSYCHIATRY | Facility: CLINIC | Age: 72
End: 2022-07-27

## 2022-07-27 DIAGNOSIS — F33.1 MAJOR DEPRESSIVE DISORDER, RECURRENT EPISODE, MODERATE: Primary | ICD-10-CM

## 2022-07-27 DIAGNOSIS — F41.1 GENERALIZED ANXIETY DISORDER: ICD-10-CM

## 2022-07-27 PROCEDURE — 99214 OFFICE O/P EST MOD 30 MIN: CPT | Performed by: NURSE PRACTITIONER

## 2022-07-27 NOTE — PROGRESS NOTES
"This provider is located at [facility], [address] [place] [address], [facility], [id number] using a secure MyChart Video Visit through [employer]. Patient is being seen remotely via telehealth at their home address in [place], and stated they are in a secure environment for this session. The patient's condition being diagnosed/treated is appropriate for telemedicine. The provider identified herself as well as her credentials.   The patient, and/or patients guardian, consent to be seen remotely, and when consent is given they understand that the consent allows for patient identifiable information to be sent to a third party as needed.   They may refuse to be seen remotely at any time. The electronic data is encrypted and password protected, and the patient and/or guardian has been advised of the potential risks to privacy not withstanding such measures.   PT Identifiers used: Name and .    You have chosen to receive care through a telehealth visit.  Do you consent to use a video/audio connection for your medical care today? Yes      Subjective   K[facility] Valente is a 72 y.o. male who presents today for follow up  For medication management.    Chief Complaint: \"The ablation was successful\"    History of Present Illness:    Patient reports for appointment on time.  Reports above chief complaint.  States he had laparoscopic ablation for the cancer treatment.  Reports this was a successful surgery.  He is still undergoing chemo at least until November.  However the report from the oncologist he relays was positive.  He reports he gets very tired after infusions and will come home and take a long nap.  He has not utilized the alprazolam, but reports it is nice just to know that he has it available.  Previous PHQ-9 was 8, today is 3.  Previous EMMANUEL-7 was 10, today is 5.  He denies any side effects from medications.    The following portions of the patient's history were reviewed and updated as appropriate: allergies, current " medications, past family history, past medical history, past social history, past surgical history and problem list.    Past Psychiatric History:  Patient began treatment several years ago, has been going to the same psychiatrist for many years.  Diagnoses include anxiety, depression, posttraumatic stress disorder.  Reports he has been going to Dr. Sylvester Pretty in [place].  Dr. Pretty is retiring.  He denies he is ever been admitted to a psychiatric hospital.  Denies any history of suicide attempts.  Denies any history of self harming behaviors.  Past medications include Prozac, Celexa, Seroquel, Xanax.    Past Medical History:  Past Medical History:   Diagnosis Date   • Anxiety    • Depression    • GERD (gastroesophageal reflux disease)    • Hypercalcemia    • Hypothyroidism    • Inverted T wave     PT HAD STRESS ECHO DONE 4 YR AGO AND WAS NORMAL   • Osteoarthritis     Knees and ankles   • Parathyroid disease (HCC)    • PONV (postoperative nausea and vomiting)    • PTSD (post-traumatic stress disorder)    • Sleep apnea     NO MACHINE     Social History:  Social History     Socioeconomic History   • Marital status:    • Number of children: 2   • Highest education level: Master's degree (e.g., MA, MS, Lisa, MEd, MSW, THONG)   Tobacco Use   • Smoking status: Never Smoker   • Smokeless tobacco: Never Used   Vaping Use   • Vaping Use: Never used   Substance and Sexual Activity   • Alcohol use: Yes     Comment: 1 DAILY DRINK   • Drug use: No   • Sexual activity: Defer   Patient reports [employer] lay, is active in the local [employer] community.  Marriage, 48 years.  No history of  service.  No arrests or convictions.    Family History:  Family History   Problem Relation Age of Onset   • Breast cancer Mother    • Heart disease Father    • Hypertension Father    • Colon cancer Sister    • Anxiety disorder Sister    • Anxiety disorder Son        Past Surgical History:  Past Surgical History:   Procedure  Laterality Date   • COLON SURGERY     • COLONOSCOPY  07/2014   • COLONOSCOPY N/A 12/24/2020    Procedure: COLONOSCOPY;  Surgeon: Geronimo Branch Jr., MD;  Location: [facility] ENDOSCOPY;  Service: General;  Laterality: N/A;  colon obstruction  post:   colon obstruction from external compression   • ENDOSCOPY      several years ago, normal   • EXPLORATORY LAPAROTOMY N/A 12/21/2020    Procedure: LAPAROTOMY EXPLORATORY WITH LOOP COLOSTOMY;  Surgeon: Geronimo Branch Jr., MD;  Location: [facility] MAIN OR;  Service: General;  Laterality: N/A;   • HEMORRHOIDECTOMY     • THYROIDECTOMY Left 2/3/2017    Procedure: Left inferior parathyroidectomy with intraoperative PTH monitoring;  Surgeon: Sarah Matos MD;  Location: [facility] MAIN OR;  Service:    • WRIST FUSION Right        Problem List:  Patient Active Problem List   Diagnosis   • Acquired hypothyroidism   • Osteoarthritis of both knees   • Gastroesophageal reflux disease without esophagitis   • Mixed hyperlipidemia   • Monoclonal gammopathy of undetermined significance   • Hypercalcemia   • Chronic fatigue   • Thrombocytopenia ([facility])   • History of primary hyperparathyroidism   • Hypogonadism in male   • Toe pain, left   • Recurrent major depressive disorder, in partial remission ([facility])   • Idiopathic gout, unspecified ankle and foot   • Pseudogout of joint of right foot   • Chronic gout of left foot   • Hyperglycemia   • Benign prostatic hyperplasia without lower urinary tract symptoms   • Benign essential HTN   • Colonic obstruction ([facility])   • Thrombocytopenia ([facility])   • Pancreatic adenocarcinoma ([facility])   • Chronic ITP (idiopathic thrombocytopenia) ([facility])   • Syncope   • Leukocytosis   • Acute blood loss anemia   • Arthritis   • Conduction disorder of the heart   • Colostomy in place ([facility])   • Disorder of thyroid   • Hypertensive disorder   • Irritable bowel syndrome   • Kidney disease   • Platelet disorder ([facility])   • Sleep apnea       Allergy:   Allergies   Allergen Reactions    • Codeine Nausea Only     nausea        Current Medications:   Current Outpatient Medications   Medication Sig Dispense Refill   • atorvastatin (LIPITOR) 10 MG tablet TAKE 1 TABLET BY MOUTH DAILY 90 tablet 3   • busPIRone (BUSPAR) 30 MG tablet TAKE 1 TABLET DAILY 90 tablet 3   • GLUCOSAMINE-CHONDROIT-VIT C-MN PO Take 1 tablet by mouth.     • lamoTRIgine (LaMICtal) 100 MG tablet Take one oral tablet daily 90 tablet 1   • levothyroxine (SYNTHROID, LEVOTHROID) 112 MCG tablet TAKE 1 TABLET BY MOUTH DAILY 90 tablet 1   • Omega-3 Fatty Acids (FISH OIL) 1000 MG capsule capsule Take 2,000 mg by mouth 2 (Two) Times a Day With Meals. HOLD PRIOR TO SURG     • potassium chloride (K-DUR,KLOR-CON) 20 MEQ CR tablet Take 20 mEq by mouth Daily.     • sertraline (ZOLOFT) 100 MG tablet Take 2 tablets by mouth Every Morning. 180 tablet 1   • vitamin D (ERGOCALCIFEROL) 1.25 MG (43902 UT) capsule capsule TAKE 1 CAPSULE BY MOUTH 2 TIMES A WEEK 26 capsule 2   • ALPRAZolam (Xanax) 0.5 MG tablet Take 1 tablet by mouth 2 (Two) Times a Day As Needed for Anxiety for up to 60 days. 60 tablet 1     No current facility-administered medications for this visit.       Review of Systems:    Review of Systems   Constitutional: Positive for fatigue.   Neurological: Negative for dizziness ( ).   Psychiatric/Behavioral: Positive for stress.   All other systems reviewed and are negative.        Physical Exam:   Physical Exam  Vitals reviewed.   Constitutional:       Appearance: Normal appearance.   HENT:      Head: Normocephalic.   Neurological:      Mental Status: He is alert.   Psychiatric:         Attention and Perception: Attention and perception normal.         Mood and Affect: Mood and affect normal.         Speech: Speech normal.         Behavior: Behavior normal. Behavior is cooperative.         Thought Content: Thought content normal.         Cognition and Memory: Cognition and memory normal.         Judgment: Judgment normal.          Vitals:  There were no vitals taken for this visit. There is no height or weight on file to calculate BMI.  Due to extenuating circumstances and possible current health risks associated with the patient being present in a clinical setting (with current health restrictions in place in regards to possible COVID 19 transmission/exposure), the patient was seen remotely today via a MyChart Video Visit through [facility] and telephone encounter.  Unable to obtain vital signs due to nature of remote visit.  Height stated at 68 inches.  Weight stated at 183 pounds.    Last 3 Blood Pressure Readings:  BP Readings from Last 3 Encounters:   05/13/22 148/78   01/31/22 140/82   07/28/21 125/88       PHQ-9 Score:   PHQ-9 Total Score: (P) 3  PHQ-9 Depression Screening  Little interest or pleasure in doing things? (P) 0   Feeling down, depressed, or hopeless? (P) 0   Trouble falling or staying asleep, or sleeping too much? (P) 2   Feeling tired or having little energy? (P) 1   Poor appetite or overeating? (P) 0   Feeling bad about yourself - or that you are a failure or have let yourself or your family down? (P) 0   Trouble concentrating on things, such as reading the newspaper or watching television? (P) 0   Moving or speaking so slowly that other people could have noticed? Or the opposite - being so fidgety or restless that you have been moving around a lot more than usual? (P) 0   Thoughts that you would be better off dead, or of hurting yourself in some way? (P) 0   PHQ-9 Total Score (P) 3   If you checked off any problems, how difficult have these problems made it for you to do your work, take care of things at home, or get along with other people? (P) Not difficult at all     PHQ-9 Total Score: (P) 3      Feeling nervous, anxious or on edge: (P) Several days  Not being able to stop or control worrying: (P) More than half the days  Worrying too much about different things: (P) Several days  Trouble Relaxing: (P) Several  days  Being so restless that it is hard to sit still: (P) Not at all  Feeling afraid as if something awful might happen: (P) Not at all  Becoming easily annoyed or irritable: (P) Not at all  EMMANUEL 7 Total Score: (P) 5  If you checked any problems, how difficult have these problems made it for you to do your work, take care of things at home, or get along with other people: (P) Not difficult at all      PROMIS scale screening tool that patient filled out virtually reviewed by this APRN at today's encounter.      Mental Status Exam:   Hygiene:   good  Cooperation:  Cooperative  Eye Contact:  Good  Psychomotor Behavior:  Appropriate  Affect:  Full range  Mood: euthymic  Hopelessness: Denies  Speech:  Normal  Thought Process:  Goal directed and Linear  Thought Content:  Normal  Suicidal:  None  Homicidal:  None  Hallucinations:  None  Delusion:  None  Memory:  Intact  Orientation:  Person, Place, Time and Situation  Reliability:  good  Insight:  Good  Judgement:  Good  Impulse Control:  Good  Physical/Medical Issues:  Yes Currently undergoing treatment for recurrent cancer.       Lab Results:   Admission on 05/13/2022, Discharged on 05/13/2022   Component Date Value Ref Range Status   • Glucose 05/13/2022 140 (A) 65 - 99 mg/dL Final   • BUN 05/13/2022 22  8 - 23 mg/dL Final   • Creatinine 05/13/2022 1.35 (A) 0.76 - 1.27 mg/dL Final   • Sodium 05/13/2022 140  136 - 145 mmol/L Final   • Potassium 05/13/2022 3.8  3.5 - 5.2 mmol/L Final   • Chloride 05/13/2022 106  98 - 107 mmol/L Final   • CO2 05/13/2022 20.9 (A) 22.0 - 29.0 mmol/L Final   • Calcium 05/13/2022 8.7  8.6 - 10.5 mg/dL Final   • Total Protein 05/13/2022 6.7  6.0 - 8.5 g/dL Final   • Albumin 05/13/2022 3.70  3.50 - 5.20 g/dL Final   • ALT (SGPT) 05/13/2022 24  1 - 41 U/L Final   • AST (SGOT) 05/13/2022 26  1 - 40 U/L Final   • Alkaline Phosphatase 05/13/2022 114  39 - 117 U/L Final   • Total Bilirubin 05/13/2022 0.4  0.0 - 1.2 mg/dL Final   • Globulin 05/13/2022  3.0  gm/dL Final   • A/G Ratio 05/13/2022 1.2  g/dL Final   • BUN/Creatinine Ratio 05/13/2022 16.3  7.0 - 25.0 Final   • Anion Gap 05/13/2022 13.1  5.0 - 15.0 mmol/L Final   • eGFR 05/13/2022 55.1 (A) >60.0 mL/min/1.73 Final    National Kidney Foundation and American Society of Nephrology (ASN) Task Force recommended calculation based on the Chronic Kidney Disease Epidemiology Collaboration (CKD-EPI) equation refit without adjustment for race.   • Troponin T 05/13/2022 <0.010  0.000 - 0.030 ng/mL Final   • Troponin T 05/13/2022 <0.010  0.000 - 0.030 ng/mL Final   • proBNP 05/13/2022 805.0  0.0 - 900.0 pg/mL Final   • QT Interval 05/13/2022 418  ms Final   • WBC 05/13/2022 12.14 (A) 3.40 - 10.80 10*3/mm3 Final   • RBC 05/13/2022 3.53 (A) 4.14 - 5.80 10*6/mm3 Final   • Hemoglobin 05/13/2022 10.6 (A) 13.0 - 17.7 g/dL Final   • Hematocrit 05/13/2022 30.4 (A) 37.5 - 51.0 % Final   • MCV 05/13/2022 86.1  79.0 - 97.0 fL Final   • MCH 05/13/2022 30.0  26.6 - 33.0 pg Final   • MCHC 05/13/2022 34.9  31.5 - 35.7 g/dL Final   • RDW 05/13/2022 14.4  12.3 - 15.4 % Final   • RDW-SD 05/13/2022 44.0  37.0 - 54.0 fl Final   • MPV 05/13/2022 11.5  6.0 - 12.0 fL Final   • Platelets 05/13/2022 203  140 - 450 10*3/mm3 Final   • COVID19 05/13/2022 Not Detected  Not Detected - Ref. Range Final   • Neutrophil % 05/13/2022 93.0 (A) 42.7 - 76.0 % Final   • Lymphocyte % 05/13/2022 5.0 (A) 19.6 - 45.3 % Final   • Monocyte % 05/13/2022 1.0 (A) 5.0 - 12.0 % Final   • Eosinophil % 05/13/2022 1.0  0.3 - 6.2 % Final   • Neutrophils Absolute 05/13/2022 11.29 (A) 1.70 - 7.00 10*3/mm3 Final   • Lymphocytes Absolute 05/13/2022 0.61 (A) 0.70 - 3.10 10*3/mm3 Final   • Monocytes Absolute 05/13/2022 0.12  0.10 - 0.90 10*3/mm3 Final   • Eosinophils Absolute 05/13/2022 0.12  0.00 - 0.40 10*3/mm3 Final   • RBC Morphology 05/13/2022 Normal  Normal Final   • WBC Morphology 05/13/2022 Normal  Normal Final   • Platelet Morphology 05/13/2022 Normal  Normal Final    Office Visit on 01/31/2022   Component Date Value Ref Range Status   • WBC 01/31/2022 10.3  3.4 - 10.8 x10E3/uL Final   • RBC 01/31/2022 4.63  4.14 - 5.80 x10E6/uL Final   • Hemoglobin 01/31/2022 13.4  13.0 - 17.7 g/dL Final   • Hematocrit 01/31/2022 40.5  37.5 - 51.0 % Final   • MCV 01/31/2022 88  79 - 97 fL Final   • MCH 01/31/2022 28.9  26.6 - 33.0 pg Final   • MCHC 01/31/2022 33.1  31.5 - 35.7 g/dL Final   • RDW 01/31/2022 14.6  11.6 - 15.4 % Final   • Platelets 01/31/2022 273  150 - 450 x10E3/uL Final   • Neutrophil Rel % 01/31/2022 55  Not Estab. % Final   • Lymphocyte Rel % 01/31/2022 30  Not Estab. % Final   • Monocyte Rel % 01/31/2022 7  Not Estab. % Final   • Eosinophil Rel % 01/31/2022 5  Not Estab. % Final   • Basophil Rel % 01/31/2022 2  Not Estab. % Final   • Neutrophils Absolute 01/31/2022 5.8  1.4 - 7.0 x10E3/uL Final   • Lymphocytes Absolute 01/31/2022 3.1  0.7 - 3.1 x10E3/uL Final   • Monocytes Absolute 01/31/2022 0.7  0.1 - 0.9 x10E3/uL Final   • Eosinophils Absolute 01/31/2022 0.5 (A) 0.0 - 0.4 x10E3/uL Final   • Basophils Absolute 01/31/2022 0.2  0.0 - 0.2 x10E3/uL Final   • Immature Granulocyte Rel % 01/31/2022 1  Not Estab. % Final   • Immature Grans Absolute 01/31/2022 0.1  0.0 - 0.1 x10E3/uL Final   • Glucose 01/31/2022 93  65 - 99 mg/dL Final   • BUN 01/31/2022 24  8 - 27 mg/dL Final   • Creatinine 01/31/2022 1.18  0.76 - 1.27 mg/dL Final   • eGFR Non  Am 01/31/2022 60  >59 mL/min/1.73 Final   • eGFR African Am 01/31/2022 70  >59 mL/min/1.73 Final    Comment: **In accordance with recommendations from the NKF-ASN Task force,**    [facility] eGFR calculation to the    2021 CKD-EPI creatinine equation that estimates kidney function    without a race variable.     • BUN/Creatinine Ratio 01/31/2022 20  10 - 24 Final   • Sodium 01/31/2022 142  134 - 144 mmol/L Final   • Potassium 01/31/2022 4.6  3.5 - 5.2 mmol/L Final   • Chloride 01/31/2022 104  96 - 106  mmol/L Final   • Total CO2 01/31/2022 19 (A) 20 - 29 mmol/L Final   • Calcium 01/31/2022 10.3 (A) 8.6 - 10.2 mg/dL Final   • Total Protein 01/31/2022 7.2  6.0 - 8.5 g/dL Final   • Albumin 01/31/2022 4.9 (A) 3.7 - 4.7 g/dL Final   • Globulin 01/31/2022 2.3  1.5 - 4.5 g/dL Final   • A/G Ratio 01/31/2022 2.1  1.2 - 2.2 Final   • Total Bilirubin 01/31/2022 0.3  0.0 - 1.2 mg/dL Final   • Alkaline Phosphatase 01/31/2022 158 (A) 44 - 121 IU/L Final   • AST (SGOT) 01/31/2022 18  0 - 40 IU/L Final   • ALT (SGPT) 01/31/2022 14  0 - 44 IU/L Final   • Total Cholesterol 01/31/2022 194  100 - 199 mg/dL Final   • Triglycerides 01/31/2022 191 (A) 0 - 149 mg/dL Final   • HDL Cholesterol 01/31/2022 45  >39 mg/dL Final   • VLDL Cholesterol Gregory 01/31/2022 33  5 - 40 mg/dL Final   • LDL Chol Calc (NIH) 01/31/2022 116 (A) 0 - 99 mg/dL Final   • TSH 01/31/2022 0.027 (A) 0.450 - 4.500 uIU/mL Final   • Uric Acid 01/31/2022 4.0  3.8 - 8.4 mg/dL Final               Therapeutic target for gout patients: <6.0   • Free T4 01/31/2022 1.60  0.82 - 1.77 ng/dL Final         Assessment & Plan   Problems Addressed this Visit    None     Visit Diagnoses     Major depressive disorder, recurrent episode, moderate (HCC)    -  Primary    Generalized anxiety disorder          Diagnoses       Codes Comments    Major depressive disorder, recurrent episode, moderate (HCC)    -  Primary ICD-10-CM: F33.1  ICD-9-CM: 296.32     Generalized anxiety disorder     ICD-10-CM: F41.1  ICD-9-CM: 300.02           Visit Diagnoses:    ICD-10-CM ICD-9-CM   1. Major depressive disorder, recurrent episode, moderate (HCC)  F33.1 296.32   2. Generalized anxiety disorder  F41.1 300.02         GOALS:  Short Term Goals: Patient will be compliant with medication, and patient will have no significant medication related side effects.  Patient will be engaged in psychotherapy as indicated.  Patient will report subjective improvement of symptoms.  Long term goals: To stabilize mood and  "treat/improve subjective symptoms, the patient will stay out of the hospital, the patient will be at an optimal level of functioning, and the patient will take all medications as prescribed.  The patient/guardian verbalized understanding and agreement with goals that were mutually set.    RISK ASSESSMENT  Current harm-to-self risk is reported by pt as \"none.\"  Current [id number] risk is reported by pt as \"none.\"   No suicidal thoughts, intent, plan is appreciated by this provider on this date of exam.   No homicidal thoughts, intent, plan is appreciated by this provider on this date.    TREATMENT PLAN: Continue supportive psychotherapy efforts and medications as indicated.   Pharmacological and Non-Pharmacological treatment options discussed during today's visit. Patient/Guardian acknowledged and verbally consented with current treatment plan and was educated on the importance of compliance with treatment and follow-up appointments.      MEDICATION ISSUES:  Discussed medication options and treatment plan of prescribed medication as well as the risks, benefits, any black box warnings, and side effects including potential falls, possible impaired driving, and metabolic adversities among others. Patient is agreeable to call the office with any worsening of symptoms or onset of side effects, or if any concerns or questions arise.  The contact information for the office is made available to the patient. Patient is agreeable to call 911 or go to the nearest ER should they begin having any SI/HI, or if any urgent concerns arise. No medication side effects or related complaints today.     1. Continue sertraline 200 mg daily, take with food.   2.  Continue lamotrigine 100 mg daily  3.  Continue BuSpar 30 mg daily  4.  Utilize alprazolam as needed    MEDS ORDERED DURING VISIT:  No orders of the defined types were placed in this encounter.      Return in about 8 weeks (around 9/19/2022) for Recheck, Video visit.       This " patient will not be seen for the in person visits due to the following exception(s): It would be a hardship for this patient to see a provider in person.    It is my professional opinion that that patient is at risk for disengagement with care that has been effective in managing his/her illness.             This document has been electronically signed by MONIKA Singh  July 27, 2022 11:55 EDT    Please note that portions of this note were completed with a voice recognition program. Efforts were made to edit dictation, but occasionally words are mistranscribed.

## 2022-08-08 RX ORDER — LEVOTHYROXINE SODIUM 112 UG/1
112 TABLET ORAL DAILY
Qty: 90 TABLET | Refills: 1 | Status: SHIPPED | OUTPATIENT
Start: 2022-08-08 | End: 2023-02-13

## 2022-08-08 NOTE — TELEPHONE ENCOUNTER
Rx Refill Note  Requested Prescriptions     Pending Prescriptions Disp Refills   • levothyroxine (SYNTHROID, LEVOTHROID) 112 MCG tablet 90 tablet 1     Sig: Take 1 tablet by mouth Daily.      Last office visit with prescribing clinician: 1/31/2022      Next office visit with prescribing clinician: 8/26/2022            Portia Buchanan  08/08/22, 15:12 EDT

## 2022-08-25 DIAGNOSIS — F33.1 MAJOR DEPRESSIVE DISORDER, RECURRENT EPISODE, MODERATE: ICD-10-CM

## 2022-08-25 RX ORDER — SERTRALINE HYDROCHLORIDE 100 MG/1
200 TABLET, FILM COATED ORAL EVERY MORNING
Qty: 180 TABLET | Refills: 1 | Status: SHIPPED | OUTPATIENT
Start: 2022-08-25 | End: 2022-11-28

## 2022-08-26 ENCOUNTER — TELEMEDICINE (OUTPATIENT)
Dept: FAMILY MEDICINE CLINIC | Facility: CLINIC | Age: 72
End: 2022-08-26

## 2022-08-26 DIAGNOSIS — E55.9 VITAMIN D DEFICIENCY: ICD-10-CM

## 2022-08-26 DIAGNOSIS — C25.9 PANCREATIC ADENOCARCINOMA: ICD-10-CM

## 2022-08-26 DIAGNOSIS — Z00.00 MEDICARE ANNUAL WELLNESS VISIT, SUBSEQUENT: Primary | ICD-10-CM

## 2022-08-26 PROCEDURE — 1160F RVW MEDS BY RX/DR IN RCRD: CPT | Performed by: FAMILY MEDICINE

## 2022-08-26 PROCEDURE — G0439 PPPS, SUBSEQ VISIT: HCPCS | Performed by: FAMILY MEDICINE

## 2022-08-26 PROCEDURE — 1170F FXNL STATUS ASSESSED: CPT | Performed by: FAMILY MEDICINE

## 2022-08-26 RX ORDER — FAMOTIDINE 20 MG/1
TABLET, FILM COATED ORAL
COMMUNITY
Start: 2022-05-10 | End: 2023-03-03

## 2022-08-26 RX ORDER — ERGOCALCIFEROL 1.25 MG/1
50000 CAPSULE ORAL 2 TIMES WEEKLY
Qty: 26 CAPSULE | Refills: 2 | Status: SHIPPED | OUTPATIENT
Start: 2022-08-29

## 2022-08-26 NOTE — PROGRESS NOTES
The ABCs of the Annual Wellness Visit  Subsequent Medicare Wellness Visit    Chief Complaint   Patient presents with   • Medicare Wellness-subsequent      Subjective    History of Present Illness:  [facility] is a 72 y.o. male who presents for a Subsequent Medicare Wellness Visit.    You have chosen to receive care through a telehealth visit.  Do you consent to use a video/audio connection for your medical care today? Yes    Annual Medicare wellness visit.  Patient continues to be treated for metastatic pancreatic carcinoma.  You underwent an ablation and now undergoing chemotherapy.  He states he is tolerating it.  He is currently being treated.  He has avoided COVID.  He is fully vaccinated against COVID.  He is ready for flu shot soon.  He is not having pain issues.  He has close follow-up with his oncologist.  He has no concerns today other than needing some refills.      The following portions of the patient's history were reviewed and   updated as appropriate: allergies, current medications, past family history, past medical history, past social history, past surgical history and problem list.    Compared to one year ago, the patient feels his physical   health is worse.    Compared to one year ago, the patient feels his mental   health is the same.    Recent Hospitalizations:  He was admitted within the past 365 days at [facility].       Current Medical Providers:  Patient Care Team:  Jared Noriega MD as PCP - General (Family Medicine)  Armani Painting MD as Consulting Physician (Hematology and Oncology)  Yousuf Greene MD as Consulting Physician (Endocrinology)  Be Iraheta MD as Referring Physician (Internal Medicine)    Outpatient Medications Prior to Visit   Medication Sig Dispense Refill   • ALPRAZolam (Xanax) 0.5 MG tablet Take 1 tablet by mouth 2 (Two) Times a Day As Needed for Anxiety for up to 60 days. 60 tablet 1   • atorvastatin (LIPITOR) 10 MG tablet TAKE 1 TABLET BY MOUTH DAILY  90 tablet 3   • busPIRone (BUSPAR) 30 MG tablet TAKE 1 TABLET DAILY 90 tablet 3   • Diclofenac Sodium (VOLTAREN) 1 % gel gel   1 Application, Topical, Gel, QID, PRN for pain, # 100 Gram, 0 Refill(s)     • famotidine (PEPCID) 20 MG tablet   mg Tab, Oral, BID, 0 Refill(s)     • GLUCOSAMINE-CHONDROIT-VIT C-MN PO Take 1 tablet by mouth.     • lamoTRIgine (LaMICtal) 100 MG tablet Take one oral tablet daily 90 tablet 1   • levothyroxine (SYNTHROID, LEVOTHROID) 112 MCG tablet Take 1 tablet by mouth Daily. 90 tablet 1   • Omega-3 Fatty Acids (FISH OIL) 1000 MG capsule capsule Take 2,000 mg by mouth 2 (Two) Times a Day With Meals. HOLD PRIOR TO SURG     • potassium chloride (K-DUR,KLOR-CON) 20 MEQ CR tablet Take 20 mEq by mouth Daily.     • sertraline (ZOLOFT) 100 MG tablet Take 2 tablets by mouth Every Morning. 180 tablet 1   • vitamin D (ERGOCALCIFEROL) 1.25 MG (30769 UT) capsule capsule TAKE 1 CAPSULE BY MOUTH 2 TIMES A WEEK 26 capsule 2     No facility-administered medications prior to visit.       No opioid medication identified on active medication list. I have reviewed chart for other potential  high risk medication/s and harmful drug interactions in the elderly.          Aspirin is not on active medication list.  Aspirin use is not indicated based on review of current medical condition/s. Risk of harm outweighs potential benefits.  .    Patient Active Problem List   Diagnosis   • Acquired hypothyroidism   • Osteoarthritis of both knees   • Gastroesophageal reflux disease without esophagitis   • Mixed hyperlipidemia   • Monoclonal gammopathy of undetermined significance   • Hypercalcemia   • Chronic fatigue   • Thrombocytopenia (HCC)   • History of primary hyperparathyroidism   • Hypogonadism in male   • Toe pain, left   • Recurrent major depressive disorder, in partial remission (HCC)   • Idiopathic gout, unspecified ankle and foot   • Pseudogout of joint of right foot   • Chronic gout of left foot   • Hyperglycemia  "  • Benign prostatic hyperplasia without lower urinary tract symptoms   • Benign essential HTN   • Colonic obstruction (HCC)   • Thrombocytopenia (HCC)   • Pancreatic adenocarcinoma (HCC)   • Chronic ITP (idiopathic thrombocytopenia) (HCC)   • Syncope   • Leukocytosis   • Acute blood loss anemia   • Arthritis   • Conduction disorder of the heart   • Colostomy in place (HCC)   • Disorder of thyroid   • Hypertensive disorder   • Irritable bowel syndrome   • Kidney disease   • Platelet disorder (HCC)   • Sleep apnea     Advance Care Planning  Advance Directive is on file.  ACP discussion was held with the patient during this visit. Patient has an advance directive in EMR which is still valid.           Objective    There were no vitals filed for this visit.  Estimated body mass index is 26.26 kg/m² as calculated from the following:    Height as of 5/13/22: 177.8 cm (70\").    Weight as of 5/13/22: 83 kg (183 lb).    BMI is >= 25 and <30. (Overweight) The following options were offered after discussion;: none (medical contraindication)      Does the patient have evidence of cognitive impairment? No    Physical Exam  Constitutional:       Comments: He looks tired but nontoxic.  He has had some hair loss.  He is alert and oriented.  No respiratory distress.                 HEALTH RISK ASSESSMENT    Smoking Status:  Social History     Tobacco Use   Smoking Status Never Smoker   Smokeless Tobacco Never Used     Alcohol Consumption:  Social History     Substance and Sexual Activity   Alcohol Use Yes    Comment: 1 DAILY DRINK     Fall Risk Screen:    STEADI Fall Risk Assessment was completed, and patient is at MODERATE risk for falls. Assessment completed on:1/31/2022    Depression Screening:  PHQ-2/PHQ-9 Depression Screening 7/20/2022   Retired PHQ-9 Total Score 3   Retired Total Score 3   Little interest or pleasure in doing things Not at all   Feeling down, depressed, or hopeless Not at all   Trouble falling or staying " asleep, or sleeping too much More than half the days   Feeling tired or having little energy Several days   Poor appetite or overeating Not at all   Feeling bad about yourself - or that you are a failure or have let yourself or your family down Not at all   Trouble concentrating on things, such as reading the newspaper or watching television Not at all   Moving or speaking so slowly that other people could have noticed. Or the opposite - being so fidgety or restless that you have been moving around a lot more than usual Not at all   Thoughts that you would be better off dead, or of hurting yourself in some way Not at all   How difficult have these problems made it for you to do your work, take care of things at home, or get along with other people? Not difficult at all       Health Habits and Functional and Cognitive Screening:  Functional & Cognitive Status 8/26/2022   Do you have difficulty preparing food and eating? No   Do you have difficulty bathing yourself, getting dressed or grooming yourself? No   Do you have difficulty using the toilet? No   Do you have difficulty moving around from place to place? No   Do you have trouble with steps or getting out of a bed or a chair? No   Current Diet Well Balanced Diet   Dental Exam Not up to date   Eye Exam Up to date   Exercise (times per week) 1 times per week   Current Exercises Include Walking   Current Exercise Activities Include -   Do you need help using the phone?  No   Are you deaf or do you have serious difficulty hearing?  No   Do you need help with transportation? No   Do you need help shopping? No   Do you need help preparing meals?  No   Do you need help with housework?  No   Do you need help with laundry? No   Do you need help taking your medications? No   Do you need help managing money? No   Do you ever drive or ride in a car without wearing a seat belt? No   Have you felt unusual stress, anger or loneliness in the last month? Yes   Who do you live  with? Spouse   If you need help, do you have trouble finding someone available to you? No   Have you been bothered in the last four weeks by sexual problems? No   Do you have difficulty concentrating, remembering or making decisions? No       Age-appropriate Screening Schedule:  Refer to the list below for future screening recommendations based on patient's age, sex and/or medical conditions. Orders for these recommended tests are listed in the plan section. The patient has been provided with a written plan.    Health Maintenance   Topic Date Due   • INFLUENZA VACCINE  10/01/2022   • LIPID PANEL  01/31/2023   • TDAP/TD VACCINES (2 - Td or Tdap) 05/14/2030   • ZOSTER VACCINE  Completed              Assessment & Plan   CMS Preventative Services Quick Reference  Risk Factors Identified During Encounter  Immunizations Discussed/Encouraged (specific Immunizations; Influenza and COVID19  The above risks/problems have been discussed with the patient.  Follow up actions/plans if indicated are seen below in the Assessment/Plan Section.  Pertinent information has been shared with the patient in the After Visit Summary.    Diagnoses and all orders for this visit:    1. Medicare annual wellness visit, subsequent (Primary)    2. Vitamin D deficiency  -     vitamin D (ERGOCALCIFEROL) 1.25 MG (26187 UT) capsule capsule; Take 1 capsule by mouth 2 (Two) Times a Week.  Dispense: 26 capsule; Refill: 2    3. Pancreatic adenocarcinoma (HCC)      Annual Medicare wellness visit.  No carcinoma pancreas.  He is stable.  With regards to health maintenance, we discussed both the influenza vaccine and the upcoming COVID booster.  He is up-to-date on the pneumonia shots.  I will see him back in 6 months.  Sooner as needed.  He will continue to follow with oncology.    Follow Up:   No follow-ups on file.     An After Visit Summary and PPPS were made available to the patient.

## 2022-08-26 NOTE — PROGRESS NOTES
"Chief Complaint  Medicare Wellness-subsequent    Subjective        [facility] presents to [facility]  History of Present Illness        Objective   Vital Signs:  There were no vitals taken for this visit.  Estimated body mass index is 26.26 kg/m² as calculated from the following:    Height as of 5/13/22: 177.8 cm (70\").    Weight as of 5/13/22: 83 kg (183 lb).          Physical Exam   Result Review :                Assessment and Plan   There are no diagnoses linked to this encounter.         Follow Up   No follow-ups on file.  Patient was given instructions and counseling regarding his condition or for health maintenance advice. Please see specific information pulled into the AVS if appropriate.       "

## 2022-09-21 ENCOUNTER — TELEPHONE (OUTPATIENT)
Dept: FAMILY MEDICINE CLINIC | Facility: CLINIC | Age: 72
End: 2022-09-21

## 2022-11-03 ENCOUNTER — TELEPHONE (OUTPATIENT)
Dept: FAMILY MEDICINE CLINIC | Facility: CLINIC | Age: 72
End: 2022-11-03

## 2022-11-03 NOTE — TELEPHONE ENCOUNTER
CALLED AND [facility]W PT WHO STATED THAT HE IS TAKING 20MG OF XARELTO. HE STATES THAT  HAS GIVEN HIM SAMPLES BEFORE BECAUSE OF THE COST. WE CURRENTLY HAVE SAMPLES WOULD YOU LIKE TO GIVE THE PT SOME?

## 2022-11-03 NOTE — TELEPHONE ENCOUNTER
Caller: [facility]    Relationship: Self    Best call back number:     What is the best time to reach you:    Who are you requesting to speak with (clinical staff, provider,  specific staff member):     Do you know the name of the person who called:    What was the call regarding: PATIENT WANTS TO KNOW IF THE OFFICE HAS MORE SAMPLES OF XARELTO AS HE NEEDS MORE AND WANTS TO PICK THEM UP TOMORROW.     Do you require a callback: YES

## 2022-11-04 NOTE — TELEPHONE ENCOUNTER
I reviewed his care everywhere.  He is prescribed a blood thinner.  Okay for Xarelto but I am going to recommend a lower dose of Xarelto 10 mg a day because of his diminished kidney function on most recent lab work.  He may also want to just contact the doctor that is prescribing it and see if they have samples.    Okay for Xarelto 10 mg tablets samples from us today.  1 a day.  Follow-up with doctor who is prescribing.

## 2022-11-04 NOTE — TELEPHONE ENCOUNTER
Okay for Xarelto 10 mg tablets, not 20 mg.  Okay for samples today.  His kidney function was diminished last check over at [facility].  Should be on a lower dose for now.

## 2022-11-27 DIAGNOSIS — F33.1 MAJOR DEPRESSIVE DISORDER, RECURRENT EPISODE, MODERATE: ICD-10-CM

## 2022-11-28 RX ORDER — SERTRALINE HYDROCHLORIDE 100 MG/1
200 TABLET, FILM COATED ORAL EVERY MORNING
Qty: 90 TABLET | Refills: 1 | Status: SHIPPED | OUTPATIENT
Start: 2022-11-28 | End: 2023-02-23

## 2022-12-22 ENCOUNTER — TELEMEDICINE (OUTPATIENT)
Dept: PSYCHIATRY | Facility: CLINIC | Age: 72
End: 2022-12-22

## 2022-12-22 DIAGNOSIS — F41.1 GENERALIZED ANXIETY DISORDER: ICD-10-CM

## 2022-12-22 DIAGNOSIS — F33.1 MAJOR DEPRESSIVE DISORDER, RECURRENT EPISODE, MODERATE: Primary | ICD-10-CM

## 2022-12-22 PROCEDURE — 99214 OFFICE O/P EST MOD 30 MIN: CPT | Performed by: NURSE PRACTITIONER

## 2022-12-22 NOTE — PROGRESS NOTES
"This provider is located at [facility], [address] [place] [address], [facility], [address] using a secure MyChart Video Visit through [employer]. Patient is being seen remotely via telehealth at their home address in [place], and stated they are in a secure environment for this session. The patient's condition being diagnosed/treated is appropriate for telemedicine. The provider identified herself as well as her credentials.   The patient, and/or patients guardian, consent to be seen remotely, and when consent is given they understand that the consent allows for patient identifiable information to be sent to a third party as needed.   They may refuse to be seen remotely at any time. The electronic data is encrypted and password protected, and the patient and/or guardian has been advised of the potential risks to privacy not withstanding such measures.   PT Identifiers used: Name and .    You have chosen to receive care through a telehealth visit.  Do you consent to use a video/audio connection for your medical care today? Yes      Subjective   [facility] Valente is a 72 y.o. male who presents today for follow up  For medication management.    Chief Complaint: \"The ablation was successful\"    History of Present Illness:    History of Present Illness  Last encounter with undersigned was in 2022.  Patient has continued to undergo treatment for pancreatic cancer.  He has had complications from this including neuropathy of the feet.  He is also had some soreness of his mouth.  He is using a Magic mouthwash for that.  Has gabapentin prescribed for the neuropathy.  He has an appointment next Tuesday with his oncologist.  Currently he says his tumor markers are clear.  Informed undersigned that as of 2023 his assigned pharmacy will now be [facility].  He will no longer be using [facility].  His right foot is affected most from the neuropathy and he is currently unable to drive.  His spouse does drive him where he " needs to go.  He reports sleep sometimes is good sometimes not due to the neuropathy.  He has a lidocaine roll on that he uses that is helpful for the pain.  His last chemotherapy is scheduled for sometime in February.  He is trying to keep up a positive attitude but sometimes it is difficult.  He is not able to go and enjoy [employer] with his family and this has been disappointing.  He reports he does have a good support network with family and friends and his spiritual leaders.  He has not utilized the alprazolam and it is no longer appearing on his profile.  Reports current medications have been helpful for anxiety and depression.  He continues to take the Zoloft, primary care recently refilled this, and the lamotrigine and the BuSpar.       The following portions of the patient's history were reviewed and updated as appropriate: allergies, current medications, past family history, past medical history, past social history, past surgical history and problem list.    Past Psychiatric History:  Patient began treatment several years ago, has been going to the same psychiatrist for many years.  Diagnoses include anxiety, depression, posttraumatic stress disorder.  Reports he has been going to Dr. Sylvester Pretty in [facility].  Dr. Pretty is retiring.  He denies he is ever been admitted to a psychiatric hospital.  Denies any history of suicide attempts.  Denies any history of self harming behaviors.  Past medications include Prozac, Celexa, Seroquel, Xanax.    Past Medical History:  Past Medical History:   Diagnosis Date   • Anxiety    • Cancer (HCC) Pancreatic   • Depression    • GERD (gastroesophageal reflux disease)    • Hypercalcemia    • Hypothyroidism    • Inverted T wave     PT HAD STRESS ECHO DONE 4 YR AGO AND WAS NORMAL   • Osteoarthritis     Knees and ankles   • Parathyroid disease (HCC)    • Peripheral neuropathy Feet. From chemo   • PONV (postoperative nausea and vomiting)    • PTSD (post-traumatic  stress disorder)    • Sleep apnea     NO MACHINE     Social History:  Social History     Socioeconomic History   • Marital status:    • Number of children: 2   • Highest education level: Master's degree (e.g., MA, MS, Lisa, MEd, MSW, THONG)   Tobacco Use   • Smoking status: Never   • Smokeless tobacco: Never   Vaping Use   • Vaping Use: Never used   Substance and Sexual Activity   • Alcohol use: Not Currently     Comment: 1 DAILY DRINK   • Drug use: No   • Sexual activity: Defer   Patient reports Yazdanism lay, is active in the local Yazdanism community.  Marriage, 48 years.  No history of  service.  No arrests or convictions.    Family History:  Family History   Problem Relation Age of Onset   • Breast cancer Mother    • Heart disease Father    • Hypertension Father    • Colon cancer Sister    • Anxiety disorder Sister    • Anxiety disorder Son        Past Surgical History:  Past Surgical History:   Procedure Laterality Date   • COLON SURGERY     • COLONOSCOPY  07/2014   • COLONOSCOPY N/A 12/24/2020    Procedure: COLONOSCOPY;  Surgeon: Geronimo Branch Jr., MD;  Location: [facility] ENDOSCOPY;  Service: General;  Laterality: N/A;  colon obstruction  post:   colon obstruction from external compression   • ENDOSCOPY      several years ago, normal   • EXPLORATORY LAPAROTOMY N/A 12/21/2020    Procedure: LAPAROTOMY EXPLORATORY WITH LOOP COLOSTOMY;  Surgeon: Geronimo Branch Jr., MD;  Location: [facility] OR;  Service: General;  Laterality: N/A;   • FRACTURE SURGERY  Wrist   • HEMORRHOIDECTOMY     • THYROIDECTOMY Left 02/03/2017    Procedure: Left inferior parathyroidectomy with intraoperative PTH monitoring;  Surgeon: Sarah Matos MD;  Location: [facility] OR;  Service:    • WRIST FUSION Right        Problem List:  Patient Active Problem List   Diagnosis   • Acquired hypothyroidism   • Osteoarthritis of both knees   • Gastroesophageal reflux disease without esophagitis   • Mixed hyperlipidemia   • Monoclonal  gammopathy of undetermined significance   • Hypercalcemia   • Chronic fatigue   • Thrombocytopenia ([facility])   • History of primary hyperparathyroidism   • Hypogonadism in male   • Toe pain, left   • Recurrent major depressive disorder, in partial remission ([facility])   • Idiopathic gout, unspecified ankle and foot   • Pseudogout of joint of right foot   • Chronic gout of left foot   • Hyperglycemia   • Benign prostatic hyperplasia without lower urinary tract symptoms   • Benign essential HTN   • Colonic obstruction ([facility])   • Thrombocytopenia ([facility])   • Pancreatic adenocarcinoma ([facility])   • Chronic ITP (idiopathic thrombocytopenia) ([facility])   • Syncope   • Leukocytosis   • Acute blood loss anemia   • Arthritis   • Conduction disorder of the heart   • Colostomy in place ([facility])   • Disorder of thyroid   • Hypertensive disorder   • Irritable bowel syndrome   • Kidney disease   • Platelet disorder ([facility])   • Sleep apnea       Allergy:   Allergies   Allergen Reactions   • Codeine Nausea Only     nausea        Current Medications:   Current Outpatient Medications   Medication Sig Dispense Refill   • apixaban (ELIQUIS) 5 MG tablet tablet   See Instructions, Tab, 2 tabs PO BID x 7 days, then 1 tab PO BID.,, # 74 Tab, 0 Refill(s), Pharmacy: [id number], 177.8 cm, 09/13/22 11:07:00 EDT, CLINICALHEIGHT, 86, kg, 09/13/22 11:07:00 EDT, CLINICALWEIGHT     • atorvastatin (LIPITOR) 10 MG tablet TAKE 1 TABLET BY MOUTH DAILY 90 tablet 3   • busPIRone (BUSPAR) 30 MG tablet TAKE 1 TABLET DAILY 90 tablet 3   • Diclofenac Sodium (VOLTAREN) 1 % gel gel   1 Application, Topical, Gel, QID, PRN for pain, # 100 Gram, 0 Refill(s)     • famotidine (PEPCID) 20 MG tablet   mg Tab, Oral, BID, 0 Refill(s)     • GLUCOSAMINE-CHONDROIT-VIT C-MN PO Take 1 tablet by mouth.     • lamoTRIgine (LaMICtal) 100 MG tablet Take one oral tablet daily 90 tablet 1   • levothyroxine (SYNTHROID, LEVOTHROID) 112 MCG tablet Take 1 tablet by mouth Daily. 90 tablet 1   •  magic mouthwash oral suspension   Josi Magic Mouthwash, See Instructions, Swish and swallow 10 mL PO QID PRN for mouth pain  Diphenhydramine 120 mL Nystatin 120 mL Hydrocortisone 60 mg Lidocaine viscous 2% 30 mL, # 270 mL, 1 Refill(s), Pharmacy: [facility] #[id number], cm,...     • Omega-3 Fatty Acids (FISH OIL) 1000 MG capsule capsule Take 2,000 mg by mouth 2 (Two) Times a Day With Meals. HOLD PRIOR TO SURG     • polyethylene glycol (MIRALAX) 17 g packet MIX 1 PACKET IN WATER OR JUICE AND TAKE DAILY FOR 3 DAYS EACH CHEMOTHERAPY TREATMENT AND THEN AS NEEDED THERAFTER FOR CONSTIPATION     • potassium chloride (K-DUR,KLOR-CON) 20 MEQ CR tablet Take 20 mEq by mouth Daily.     • sertraline (ZOLOFT) 100 MG tablet TAKE 2 TABLETS BY MOUTH EVERY MORNING 90 tablet 1   • vitamin D (ERGOCALCIFEROL) 1.25 MG (24455 UT) capsule capsule Take 1 capsule by mouth 2 (Two) Times a Week. 26 capsule 2     No current facility-administered medications for this visit.       Review of Systems:    Review of Systems   Constitutional: Positive for fatigue.   Neurological: Negative for dizziness ( ).   Psychiatric/Behavioral: Positive for stress.   All other systems reviewed and are negative.        Physical Exam:   Physical Exam  Vitals reviewed.   Constitutional:       Appearance: Normal appearance.   HENT:      Head: Normocephalic.   Neurological:      Mental Status: He is alert.   Psychiatric:         Attention and Perception: Attention and perception normal.         Mood and Affect: Mood and affect normal.         Speech: Speech normal.         Behavior: Behavior normal. Behavior is cooperative.         Thought Content: Thought content normal.         Cognition and Memory: Cognition and memory normal.         Judgment: Judgment normal.         Vitals:  There were no vitals taken for this visit. There is no height or weight on file to calculate BMI.  Due to extenuating circumstances and possible current health risks associated with the  patient being present in a clinical setting (with current health restrictions in place in regards to possible COVID 19 transmission/exposure), the patient was seen remotely today via a MyChart Video Visit through [facility] and telephone encounter.  Unable to obtain vital signs due to nature of remote visit.  Height stated at 68 inches.  Weight stated at 188 pounds.    Last 3 Blood Pressure Readings:  BP Readings from Last 3 Encounters:   11/09/22 134/75   05/13/22 148/78   01/31/22 140/82       PHQ-9 Score:   PHQ-9 Total Score:    PHQ-9 Depression Screening  Little interest or pleasure in doing things?     Feeling down, depressed, or hopeless?     Trouble falling or staying asleep, or sleeping too much?     Feeling tired or having little energy?     Poor appetite or overeating?     Feeling bad about yourself - or that you are a failure or have let yourself or your family down?     Trouble concentrating on things, such as reading the newspaper or watching television?     Moving or speaking so slowly that other people could have noticed? Or the opposite - being so fidgety or restless that you have been moving around a lot more than usual?     Thoughts that you would be better off dead, or of hurting yourself in some way?     PHQ-9 Total Score     If you checked off any problems, how difficult have these problems made it for you to do your work, take care of things at home, or get along with other people?       PHQ-9 Total Score:               PROMIS scale screening tool that patient filled out virtually reviewed by this APRN at today's encounter.      Mental Status Exam:   Hygiene:   good  Cooperation:  Cooperative  Eye Contact:  Good  Psychomotor Behavior:  Appropriate  Affect:  Full range  Mood: euthymic  Hopelessness: Denies  Speech:  Normal  Thought Process:  Goal directed and Linear  Thought Content:  Normal  Suicidal:  None  Homicidal:  None  Hallucinations:  None  Delusion:  None  Memory:  Intact  Orientation:   Person, Place, Time and Situation  Reliability:  good  Insight:  Good  Judgement:  Good  Impulse Control:  Good  Physical/Medical Issues:  Yes Currently undergoing treatment for recurrent cancer.       Lab Results:   No visits with results within 6 Month(s) from this visit.   Latest known visit with results is:   Admission on 05/13/2022, Discharged on 05/13/2022   Component Date Value Ref Range Status   • Glucose 05/13/2022 140 (H)  65 - 99 mg/dL Final   • BUN 05/13/2022 22  8 - 23 mg/dL Final   • Creatinine 05/13/2022 1.35 (H)  0.76 - 1.27 mg/dL Final   • Sodium 05/13/2022 140  136 - 145 mmol/L Final   • Potassium 05/13/2022 3.8  3.5 - 5.2 mmol/L Final   • Chloride 05/13/2022 106  98 - 107 mmol/L Final   • CO2 05/13/2022 20.9 (L)  22.0 - 29.0 mmol/L Final   • Calcium 05/13/2022 8.7  8.6 - 10.5 mg/dL Final   • Total Protein 05/13/2022 6.7  6.0 - 8.5 g/dL Final   • Albumin 05/13/2022 3.70  3.50 - 5.20 g/dL Final   • ALT (SGPT) 05/13/2022 24  1 - 41 U/L Final   • AST (SGOT) 05/13/2022 26  1 - 40 U/L Final   • Alkaline Phosphatase 05/13/2022 114  39 - 117 U/L Final   • Total Bilirubin 05/13/2022 0.4  0.0 - 1.2 mg/dL Final   • Globulin 05/13/2022 3.0  gm/dL Final   • A/G Ratio 05/13/2022 1.2  g/dL Final   • BUN/Creatinine Ratio 05/13/2022 16.3  7.0 - 25.0 Final   • Anion Gap 05/13/2022 13.1  5.0 - 15.0 mmol/L Final   • eGFR 05/13/2022 55.1 (L)  >60.0 mL/min/1.73 Final    National Kidney Foundation and American Society of Nephrology (ASN) Task Force recommended calculation based on the Chronic Kidney Disease Epidemiology Collaboration (CKD-EPI) equation refit without adjustment for race.   • Troponin T 05/13/2022 <0.010  0.000 - 0.030 ng/mL Final   • Troponin T 05/13/2022 <0.010  0.000 - 0.030 ng/mL Final   • proBNP 05/13/2022 805.0  0.0 - 900.0 pg/mL Final   • QT Interval 05/13/2022 418  ms Final   • WBC 05/13/2022 12.14 (H)  3.40 - 10.80 10*3/mm3 Final   • RBC 05/13/2022 3.53 (L)  4.14 - 5.80 10*6/mm3 Final   •  Hemoglobin 05/13/2022 10.6 (L)  13.0 - 17.7 g/dL Final   • Hematocrit 05/13/2022 30.4 (L)  37.5 - 51.0 % Final   • MCV 05/13/2022 86.1  79.0 - 97.0 fL Final   • MCH 05/13/2022 30.0  26.6 - 33.0 pg Final   • MCHC 05/13/2022 34.9  31.5 - 35.7 g/dL Final   • RDW 05/13/2022 14.4  12.3 - 15.4 % Final   • RDW-SD 05/13/2022 44.0  37.0 - 54.0 fl Final   • MPV 05/13/2022 11.5  6.0 - 12.0 fL Final   • Platelets 05/13/2022 203  140 - 450 10*3/mm3 Final   • COVID19 05/13/2022 Not Detected  Not Detected - Ref. Range Final   • Neutrophil % 05/13/2022 93.0 (H)  42.7 - 76.0 % Final   • Lymphocyte % 05/13/2022 5.0 (L)  19.6 - 45.3 % Final   • Monocyte % 05/13/2022 1.0 (L)  5.0 - 12.0 % Final   • Eosinophil % 05/13/2022 1.0  0.3 - 6.2 % Final   • Neutrophils Absolute 05/13/2022 11.29 (H)  1.70 - 7.00 10*3/mm3 Final   • Lymphocytes Absolute 05/13/2022 0.61 (L)  0.70 - 3.10 10*3/mm3 Final   • Monocytes Absolute 05/13/2022 0.12  0.10 - 0.90 10*3/mm3 Final   • Eosinophils Absolute 05/13/2022 0.12  0.00 - 0.40 10*3/mm3 Final   • RBC Morphology 05/13/2022 Normal  Normal Final   • WBC Morphology 05/13/2022 Normal  Normal Final   • Platelet Morphology 05/13/2022 Normal  Normal Final         Assessment & Plan   Problems Addressed this Visit    None  Visit Diagnoses     Major depressive disorder, recurrent episode, moderate (HCC)    -  Primary    Generalized anxiety disorder          Diagnoses       Codes Comments    Major depressive disorder, recurrent episode, moderate (HCC)    -  Primary ICD-10-CM: F33.1  ICD-9-CM: 296.32     Generalized anxiety disorder     ICD-10-CM: F41.1  ICD-9-CM: 300.02           Visit Diagnoses:    ICD-10-CM ICD-9-CM   1. Major depressive disorder, recurrent episode, moderate (HCC)  F33.1 296.32   2. Generalized anxiety disorder  F41.1 300.02         GOALS:  Short Term Goals: Patient will be compliant with medication, and patient will have no significant medication related side effects.  Patient will be engaged in  "psychotherapy as indicated.  Patient will report subjective improvement of symptoms.  Long term goals: To stabilize mood and treat/improve subjective symptoms, the patient will stay out of the hospital, the patient will be at an optimal level of functioning, and the patient will take all medications as prescribed.  The patient/guardian verbalized understanding and agreement with goals that were mutually set.    RISK ASSESSMENT  Current harm-to-self risk is reported by pt as \"none.\"  Current [id number] risk is reported by pt as \"none.\"   No suicidal thoughts, intent, plan is appreciated by this provider on this date of exam.   No homicidal thoughts, intent, plan is appreciated by this provider on this date.    TREATMENT PLAN: Continue supportive psychotherapy efforts and medications as indicated.   Pharmacological and Non-Pharmacological treatment options discussed during today's visit. Patient/Guardian acknowledged and verbally consented with current treatment plan and was educated on the importance of compliance with treatment and follow-up appointments.      MEDICATION ISSUES:  Discussed medication options and treatment plan of prescribed medication as well as the risks, benefits, any black box warnings, and side effects including potential falls, possible impaired driving, and metabolic adversities among others. Patient is agreeable to call the office with any worsening of symptoms or onset of side effects, or if any concerns or questions arise.  The contact information for the office is made available to the patient. Patient is agreeable to call 911 or go to the nearest ER should they begin having any SI/HI, or if any urgent concerns arise. No medication side effects or related complaints today.     1. Continue sertraline 200 mg daily, take with food.   2.  Continue lamotrigine 100 mg daily  3.  Continue BuSpar 30 mg daily       MEDS ORDERED DURING VISIT:  No orders of the defined types were placed in this " encounter.  No refills needed this visit    Return in about 2 months (around 2/27/2023) for Recheck, Video visit.       This patient will not be seen for the in person visits due to the following exception(s): It would be a hardship for this patient to see a provider in person.    It is my professional opinion that that patient is at risk for disengagement with care that has been effective in managing his/her illness.             This document has been electronically signed by MONIKA Singh  December 22, 2022 16:08 EST    Please note that portions of this note were completed with a voice recognition program. Efforts were made to edit dictation, but occasionally words are mistranscribed.

## 2023-01-02 DIAGNOSIS — F33.1 MAJOR DEPRESSIVE DISORDER, RECURRENT EPISODE, MODERATE: ICD-10-CM

## 2023-01-03 DIAGNOSIS — F33.1 MAJOR DEPRESSIVE DISORDER, RECURRENT EPISODE, MODERATE: ICD-10-CM

## 2023-01-03 RX ORDER — LAMOTRIGINE 100 MG/1
TABLET ORAL
Qty: 90 TABLET | Refills: 1 | Status: SHIPPED | OUTPATIENT
Start: 2023-01-03

## 2023-01-03 RX ORDER — LAMOTRIGINE 100 MG/1
TABLET ORAL
Qty: 90 TABLET | Refills: 3 | OUTPATIENT
Start: 2023-01-03

## 2023-01-11 RX ORDER — OMEPRAZOLE 20 MG/1
20 CAPSULE, DELAYED RELEASE ORAL DAILY
Qty: 90 CAPSULE | Refills: 1 | Status: SHIPPED | OUTPATIENT
Start: 2023-01-11 | End: 2023-02-15

## 2023-02-08 ENCOUNTER — TELEPHONE (OUTPATIENT)
Dept: FAMILY MEDICINE CLINIC | Facility: CLINIC | Age: 73
End: 2023-02-08

## 2023-02-13 ENCOUNTER — TELEPHONE (OUTPATIENT)
Dept: FAMILY MEDICINE CLINIC | Facility: CLINIC | Age: 73
End: 2023-02-13

## 2023-02-13 RX ORDER — LEVOTHYROXINE SODIUM 112 UG/1
112 TABLET ORAL DAILY
Qty: 90 TABLET | Refills: 1 | Status: SHIPPED | OUTPATIENT
Start: 2023-02-13

## 2023-02-13 NOTE — TELEPHONE ENCOUNTER
Rx Refill Note  Requested Prescriptions     Pending Prescriptions Disp Refills   • levothyroxine (SYNTHROID, LEVOTHROID) 112 MCG tablet [Pharmacy Med Name: LEVOTHYROXINE 0.112MG (112MCG) TABS] 90 tablet 1     Sig: TAKE 1 TABLET BY MOUTH DAILY      Last office visit with prescribing clinician: 1/31/2022   Last telemedicine visit with prescribing clinician: 2/15/2023   Next office visit with prescribing clinician: 2/15/2023                         Would you like a call back once the refill request has been completed: [] Yes [] No    If the office needs to give you a call back, can they leave a voicemail: [] Yes [] No    Portia Buchanan  02/13/23, 07:43 EST

## 2023-02-13 NOTE — TELEPHONE ENCOUNTER
Caller: ANNA MALDONADO    Relationship: Home Health    Best call back number:[phone]    What was the call regarding: GEOVANNA IS CALLING TO LET DR NEWMAN KNOW HE HAS FINISHED HIS OCCUPATIONAL THERAPY EVALUATION AND IS NOW REQUESTING VERBAL ORDERS FOR OCCUPATIONAL THERAPY FOR 2X A WEEK FOR 3 WEEKS AND 1X A WEEK FOR 4 WEEKS.    PLEASE CALL AND ADVISE

## 2023-02-14 ENCOUNTER — TELEPHONE (OUTPATIENT)
Dept: FAMILY MEDICINE CLINIC | Facility: CLINIC | Age: 73
End: 2023-02-14
Payer: MEDICARE

## 2023-02-14 NOTE — TELEPHONE ENCOUNTER
[facility] calling to receive verbal orders for pt to receive Nursing, PT, and OT. She can be reached at [phone] to give orders. She said oprders can be left on voicemail.    Please advise

## 2023-02-15 ENCOUNTER — OFFICE VISIT (OUTPATIENT)
Dept: FAMILY MEDICINE CLINIC | Facility: CLINIC | Age: 73
End: 2023-02-15
Payer: MEDICARE

## 2023-02-15 VITALS
BODY MASS INDEX: 26.69 KG/M2 | HEIGHT: 70 IN | TEMPERATURE: 97.5 F | DIASTOLIC BLOOD PRESSURE: 70 MMHG | OXYGEN SATURATION: 95 % | HEART RATE: 75 BPM | SYSTOLIC BLOOD PRESSURE: 138 MMHG

## 2023-02-15 DIAGNOSIS — C25.9 PANCREATIC ADENOCARCINOMA: Primary | ICD-10-CM

## 2023-02-15 PROCEDURE — 99213 OFFICE O/P EST LOW 20 MIN: CPT | Performed by: FAMILY MEDICINE

## 2023-02-15 RX ORDER — ENOXAPARIN SODIUM 100 MG/ML
40 INJECTION SUBCUTANEOUS
COMMUNITY
Start: 2023-02-09 | End: 2023-03-11

## 2023-02-15 RX ORDER — PANTOPRAZOLE SODIUM 40 MG/1
TABLET, DELAYED RELEASE ORAL
COMMUNITY
Start: 2023-02-09

## 2023-02-15 RX ORDER — MOMETASONE FUROATE 1 MG/G
CREAM TOPICAL
COMMUNITY
Start: 2022-12-21 | End: 2023-03-03

## 2023-02-15 RX ORDER — LOPERAMIDE HYDROCHLORIDE 2 MG/1
TABLET ORAL
COMMUNITY
Start: 2022-09-27

## 2023-02-15 RX ORDER — OXYCODONE HYDROCHLORIDE 5 MG/1
TABLET ORAL
COMMUNITY
Start: 2023-02-09 | End: 2023-02-15 | Stop reason: SDUPTHER

## 2023-02-15 RX ORDER — LACTULOSE 10 G/15ML
SOLUTION ORAL
COMMUNITY
Start: 2023-02-14 | End: 2023-03-03

## 2023-02-15 RX ORDER — NIFEDIPINE 30 MG/1
1 TABLET, EXTENDED RELEASE ORAL DAILY
COMMUNITY
Start: 2023-02-09

## 2023-02-15 RX ORDER — OXYCODONE HYDROCHLORIDE 5 MG/1
5 TABLET ORAL EVERY 6 HOURS PRN
Qty: 30 TABLET | Refills: 0 | Status: SHIPPED | OUTPATIENT
Start: 2023-02-15

## 2023-02-15 RX ORDER — SENNOSIDES 8.6 MG
1 CAPSULE ORAL DAILY
COMMUNITY
Start: 2023-02-09

## 2023-02-15 RX ORDER — DEXAMETHASONE 4 MG/1
TABLET ORAL
COMMUNITY
Start: 2023-02-14

## 2023-02-15 RX ORDER — ONDANSETRON HYDROCHLORIDE 8 MG/1
TABLET, FILM COATED ORAL
COMMUNITY
Start: 2022-12-12

## 2023-02-15 NOTE — PROGRESS NOTES
"Chief Complaint  Hospital Follow Up Visit    Subjective        [facility] presents to [facility]  History of Present Illness     Follow-up hospitalization for bowel obstruction related to stage IV pancreatic adenocarcinoma.  He had a biliary obstruction.  It was surgically fixed.  He has been in close discussion and care with his oncologist.  They are discussing further chemotherapy.  They have also been in discussion with hospice.  He is currently able to do most ADLs.  He is using some oxycodone as needed.  Needs a refill.  We reviewed his medication.  Reconciled.    Objective   Vital Signs:  /70   Pulse 75   Temp 97.5 °F (36.4 °C) (Temporal)   Ht 177.8 cm (70\")   SpO2 95%   BMI 26.69 kg/m²   Estimated body mass index is 26.69 kg/m² as calculated from the following:    Height as of this encounter: 177.8 cm (70\").    Weight as of 11/9/22: 84.4 kg (186 lb).             Physical Exam  Constitutional:       Comments: He looks thin and pale.  No acute distress currently.   Cardiovascular:      Rate and Rhythm: Normal rate.   Pulmonary:      Effort: Pulmonary effort is normal.   Skin:     Coloration: Skin is pale.   Neurological:      Mental Status: He is oriented to person, place, and time.        Result Review :                   Assessment and Plan   Diagnoses and all orders for this visit:    1. Pancreatic adenocarcinoma (HCC) (Primary)    Other orders  -     oxyCODONE (ROXICODONE) 5 MG immediate release tablet; Take 1 tablet by mouth Every 6 (Six) Hours As Needed (cancer related pain).  Dispense: 30 tablet; Refill: 0    Hospital follow-up for pancreatic adenocarcinoma.  Long discussion with patient and family today about pros and cons of further treatment with chemotherapy.  Patient and wife aware I am not a content expert with regards to chemotherapy, but given the advanced stage of his disease, if he cannot tolerate the upcoming chemotherapy, they will likely have to " stop it.  In particular, the logistics of him having diarrhea and vomiting or other severe symptoms while bedbound may be problematic for his caregiver, his wife.  They do have hospice available if needed.  I will see him back in 6 weeks for a MyChart video visit.  I renewed his oxycodone today.  We discussed side effects including constipation.  He will store it in a safe place.         Follow Up   No follow-ups on file.  Patient was given instructions and counseling regarding his condition or for health maintenance advice. Please see specific information pulled into the AVS if appropriate.

## 2023-02-23 DIAGNOSIS — F33.1 MAJOR DEPRESSIVE DISORDER, RECURRENT EPISODE, MODERATE: ICD-10-CM

## 2023-02-23 RX ORDER — SERTRALINE HYDROCHLORIDE 100 MG/1
200 TABLET, FILM COATED ORAL EVERY MORNING
Qty: 90 TABLET | Refills: 1 | Status: SHIPPED | OUTPATIENT
Start: 2023-02-23

## 2023-02-23 NOTE — TELEPHONE ENCOUNTER
Rx Refill Note  Requested Prescriptions     Pending Prescriptions Disp Refills   • sertraline (ZOLOFT) 100 MG tablet [Pharmacy Med Name: SERTRALINE 100MG TABLETS] 90 tablet 1     Sig: TAKE 2 TABLETS BY MOUTH EVERY MORNING      Last office visit with prescribing clinician: 2/15/2023   Last telemedicine visit with prescribing clinician: 3/27/2023   Next office visit with prescribing clinician: 3/27/2023                         Would you like a call back once the refill request has been completed: [] Yes [] No    If the office needs to give you a call back, can they leave a voicemail: [] Yes [] No    Portia Buchanan  02/23/23, 07:45 EST

## 2023-03-01 NOTE — OUTREACH NOTE
Prep Survey      Responses   [facility] facility patient discharged from?  [facility]   Is LACE score < 7 ?  No   Emergency Room discharge w/ pulse ox?  No   Eligibility  [facility]   Date of Admission  12/21/20   Date of Discharge  12/29/20   Discharge Disposition  Home or Self Care   Discharge diagnosis  Exploratory lap with loop colostomy this visit   Does the patient have one of the following disease processes/diagnoses(primary or secondary)?  General Surgery   Does the patient have Home health ordered?  Yes   What is the Home health agency?   [facility]    Is there a DME ordered?  No   Prep survey completed?  Yes          Gissel Villagomez RN        
No

## 2023-03-03 ENCOUNTER — NURSE TRIAGE (OUTPATIENT)
Dept: CALL CENTER | Facility: HOSPITAL | Age: 73
End: 2023-03-03
Payer: MEDICARE

## 2023-03-03 ENCOUNTER — APPOINTMENT (OUTPATIENT)
Dept: GENERAL RADIOLOGY | Facility: HOSPITAL | Age: 73
End: 2023-03-03
Payer: MEDICARE

## 2023-03-03 ENCOUNTER — HOSPITAL ENCOUNTER (OUTPATIENT)
Facility: HOSPITAL | Age: 73
Setting detail: OBSERVATION
Discharge: HOME | End: 2023-03-06
Attending: EMERGENCY MEDICINE | Admitting: STUDENT IN AN ORGANIZED HEALTH CARE EDUCATION/TRAINING PROGRAM
Payer: MEDICARE

## 2023-03-03 ENCOUNTER — APPOINTMENT (OUTPATIENT)
Dept: CT IMAGING | Facility: HOSPITAL | Age: 73
End: 2023-03-03
Payer: MEDICARE

## 2023-03-03 DIAGNOSIS — R11.2 NAUSEA AND VOMITING, UNSPECIFIED VOMITING TYPE: ICD-10-CM
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
## 2023-03-03 DIAGNOSIS — C25.9 METASTASIS FROM PANCREATIC CANCER: ICD-10-CM

## 2023-03-03 DIAGNOSIS — R19.7 DIARRHEA, UNSPECIFIED TYPE: ICD-10-CM

## 2023-03-03 DIAGNOSIS — C79.9 METASTASIS FROM PANCREATIC CANCER: ICD-10-CM

## 2023-03-03 DIAGNOSIS — R53.1 GENERALIZED WEAKNESS: Primary | ICD-10-CM

## 2023-03-03 LAB
ALBUMIN SERPL-MCNC: 2.9 G/DL (ref 3.5–5.2)
ALBUMIN/GLOB SERPL: 0.8 G/DL
ALP SERPL-CCNC: 154 U/L (ref 39–117)
ALT SERPL W P-5'-P-CCNC: 24 U/L (ref 1–41)
ANION GAP SERPL CALCULATED.3IONS-SCNC: 10 MMOL/L (ref 5–15)
AST SERPL-CCNC: 28 U/L (ref 1–40)
BASOPHILS # BLD AUTO: 0.03 10*3/MM3 (ref 0–0.2)
BASOPHILS NFR BLD AUTO: 0.2 % (ref 0–1.5)
BILIRUB SERPL-MCNC: 1.1 MG/DL (ref 0–1.2)
BUN SERPL-MCNC: 20 MG/DL (ref 8–23)
BUN/CREAT SERPL: 16.5 (ref 7–25)
CALCIUM SPEC-SCNC: 8.4 MG/DL (ref 8.6–10.5)
CHLORIDE SERPL-SCNC: 100 MMOL/L (ref 98–107)
CO2 SERPL-SCNC: 24 MMOL/L (ref 22–29)
CREAT SERPL-MCNC: 1.21 MG/DL (ref 0.76–1.27)
DEPRECATED RDW RBC AUTO: 53.5 FL (ref 37–54)
EGFRCR SERPLBLD CKD-EPI 2021: 62.4 ML/MIN/1.73
EOSINOPHIL # BLD AUTO: 0.01 10*3/MM3 (ref 0–0.4)
EOSINOPHIL NFR BLD AUTO: 0.1 % (ref 0.3–6.2)
ERYTHROCYTE [DISTWIDTH] IN BLOOD BY AUTOMATED COUNT: 16.8 % (ref 12.3–15.4)
FLUAV SUBTYP SPEC NAA+PROBE: NOT DETECTED
FLUBV RNA ISLT QL NAA+PROBE: NOT DETECTED
GLOBULIN UR ELPH-MCNC: 3.7 GM/DL
GLUCOSE SERPL-MCNC: 114 MG/DL (ref 65–99)
HCT VFR BLD AUTO: 30.8 % (ref 37.5–51)
HGB BLD-MCNC: 9.8 G/DL (ref 13–17.7)
IMM GRANULOCYTES # BLD AUTO: 0.11 10*3/MM3 (ref 0–0.05)
IMM GRANULOCYTES NFR BLD AUTO: 0.6 % (ref 0–0.5)
LIPASE SERPL-CCNC: 10 U/L (ref 13–60)
LYMPHOCYTES # BLD AUTO: 1.26 10*3/MM3 (ref 0.7–3.1)
LYMPHOCYTES NFR BLD AUTO: 7.4 % (ref 19.6–45.3)
MCH RBC QN AUTO: 28.5 PG (ref 26.6–33)
MCHC RBC AUTO-ENTMCNC: 31.8 G/DL (ref 31.5–35.7)
MCV RBC AUTO: 89.5 FL (ref 79–97)
MONOCYTES # BLD AUTO: 0.86 10*3/MM3 (ref 0.1–0.9)
MONOCYTES NFR BLD AUTO: 5.1 % (ref 5–12)
NEUTROPHILS NFR BLD AUTO: 14.71 10*3/MM3 (ref 1.7–7)
NEUTROPHILS NFR BLD AUTO: 86.6 % (ref 42.7–76)
NRBC BLD AUTO-RTO: 0.1 /100 WBC (ref 0–0.2)
PLATELET # BLD AUTO: 374 10*3/MM3 (ref 140–450)
PMV BLD AUTO: 10.7 FL (ref 6–12)
POTASSIUM SERPL-SCNC: 3.6 MMOL/L (ref 3.5–5.2)
PROCALCITONIN SERPL-MCNC: 0.65 NG/ML (ref 0–0.25)
PROT SERPL-MCNC: 6.6 G/DL (ref 6–8.5)
RBC # BLD AUTO: 3.44 10*6/MM3 (ref 4.14–5.8)
SARS-COV-2 RNA RESP QL NAA+PROBE: NOT DETECTED
SODIUM SERPL-SCNC: 134 MMOL/L (ref 136–145)
WBC NRBC COR # BLD: 16.98 10*3/MM3 (ref 3.4–10.8)

## 2023-03-03 PROCEDURE — 83690 ASSAY OF LIPASE: CPT | Performed by: PHYSICIAN ASSISTANT

## 2023-03-03 PROCEDURE — G0378 HOSPITAL OBSERVATION PER HR: HCPCS

## 2023-03-03 PROCEDURE — 93005 ELECTROCARDIOGRAM TRACING: CPT | Performed by: PHYSICIAN ASSISTANT

## 2023-03-03 PROCEDURE — 84145 PROCALCITONIN (PCT): CPT | Performed by: HOSPITALIST

## 2023-03-03 PROCEDURE — 85025 COMPLETE CBC W/AUTO DIFF WBC: CPT | Performed by: PHYSICIAN ASSISTANT

## 2023-03-03 PROCEDURE — 36415 COLL VENOUS BLD VENIPUNCTURE: CPT

## 2023-03-03 PROCEDURE — 74177 CT ABD & PELVIS W/CONTRAST: CPT

## 2023-03-03 PROCEDURE — 71045 X-RAY EXAM CHEST 1 VIEW: CPT

## 2023-03-03 PROCEDURE — 87636 SARSCOV2 & INF A&B AMP PRB: CPT | Performed by: PHYSICIAN ASSISTANT

## 2023-03-03 PROCEDURE — C9803 HOPD COVID-19 SPEC COLLECT: HCPCS

## 2023-03-03 PROCEDURE — 80053 COMPREHEN METABOLIC PANEL: CPT | Performed by: PHYSICIAN ASSISTANT

## 2023-03-03 PROCEDURE — 25510000001 IOPAMIDOL 61 % SOLUTION: Performed by: EMERGENCY MEDICINE

## 2023-03-03 PROCEDURE — 93010 ELECTROCARDIOGRAM REPORT: CPT | Performed by: INTERNAL MEDICINE

## 2023-03-03 PROCEDURE — 99285 EMERGENCY DEPT VISIT HI MDM: CPT

## 2023-03-03 RX ORDER — SODIUM CHLORIDE 9 MG/ML
100 INJECTION, SOLUTION INTRAVENOUS CONTINUOUS
Status: DISCONTINUED | OUTPATIENT
Start: 2023-03-03 | End: 2023-03-05

## 2023-03-03 RX ORDER — ONDANSETRON 2 MG/ML
4 INJECTION INTRAMUSCULAR; INTRAVENOUS EVERY 6 HOURS PRN
Status: DISCONTINUED | OUTPATIENT
Start: 2023-03-03 | End: 2023-03-06 | Stop reason: HOSPADM

## 2023-03-03 RX ADMIN — SODIUM CHLORIDE 1000 ML: 9 INJECTION, SOLUTION INTRAVENOUS at 20:19

## 2023-03-03 RX ADMIN — IOPAMIDOL 85 ML: 612 INJECTION, SOLUTION INTRAVENOUS at 21:36

## 2023-03-03 NOTE — TELEPHONE ENCOUNTER
"Wife called. Patient having chemo for pancreatic cancer. Vomited today x1 and fell in bathroom. Gave juice in case it was blood sugar. Nausea has resolved with administration of Zofran. Sipping herbal tea and gatorade. No fever.     Reason for Disposition  • MILD or MODERATE vomiting (e.g., 1 - 5 times / day)    Additional Information  • Negative: Shock suspected (e.g., cold/pale/clammy skin, too weak to stand, low BP, rapid pulse)  • Negative: Difficult to awaken or acting confused (e.g., disoriented, slurred speech)  • Negative: Sounds like a life-threatening emergency to the triager  • Negative: Vomiting occurs only while coughing  • Negative: [1] Pregnant < 20 Weeks AND [2] nausea/vomiting began in early pregnancy (i.e., 4-8 weeks pregnant)  • Negative: Chest pain  • Negative: Headache is main symptom  • Negative: Vomiting (or Nausea) in a cancer patient who is currently (or recently) receiving chemotherapy or radiation therapy, or cancer patient who has metastatic or end-stage cancer and is receiving palliative care  • Negative: [1] Vomiting AND [2] contains red blood or black (\"coffee ground\") material  (Exception: few red streaks in vomit that only happened once)  • Negative: Severe pain in one eye  • Negative: Recent head injury (within last 3 days)  • Negative: Recent abdominal injury (within last 3 days)  • Negative: [1] Insulin-dependent diabetes (Type I) AND [2] glucose > 400 mg/dl (22 mmol/l)  • Negative: [1] Vomiting AND [2] hernia is more painful or swollen than usual  • Negative: [1] SEVERE vomiting (e.g., 6 or more times/day) AND [2] present > 8 hours (Exception: patient sounds well, is drinking liquids, does not sound dehydrated, and vomiting has lasted less than 24 hours)  • Negative: [1] MODERATE vomiting (e.g., 3 - 5 times/day) AND [2] age > 60 years  • Negative: Severe headache (e.g., excruciating) (Exception: similar to previous migraines)  • Negative: High-risk adult (e.g., diabetes mellitus, " "brain tumor, V-P shunt, hernia)  • Negative: [1] Drinking very little AND [2] dehydration suspected (e.g., no urine > 12 hours, very dry mouth, very lightheaded)  • Negative: Patient sounds very sick or weak to the triager  • Negative: [1] Vomiting AND [2] abdomen looks much more swollen than usual  • Negative: [1] Vomiting AND [2] contains bile (green color)  • Negative: [1] Constant abdominal pain AND [2] present > 2 hours  • Negative: [1] Fever > 103 F (39.4 C) AND [2] not able to get the fever down using Fever Care Advice  • Negative: [1] Fever > 101 F (38.3 C) AND [2] age > 60 years  • Negative: [1] Fever > 100.0 F (37.8 C) AND [2] bedridden (e.g., nursing home patient, CVA, chronic illness, recovering from surgery)  • Negative: [1] Fever > 100.0 F (37.8 C) AND [2] weak immune system (e.g., HIV positive, cancer chemo, splenectomy, organ transplant, chronic steroids)  • Negative: Taking any of the following medications: digoxin (Lanoxin), lithium, theophylline, phenytoin (Dilantin)  • Negative: [1] MILD or MODERATE vomiting AND [2] present > 48 hours (2 days) (Exception: mild vomiting with associated diarrhea)  • Negative: Fever present > 3 days (72 hours)  • Negative: Vomiting a prescription medication  • Negative: [1] MILD vomiting with diarrhea AND [2] present > 5 days  • Negative: Substance use (drug use) or unhealthy alcohol use, known or suspected  • Negative: Vomiting is a chronic symptom (recurrent or ongoing AND present > 4 weeks)  • Negative: [1] SEVERE vomiting (e.g., 6 or more times/day, vomits everything) BUT [2] hydrated    Answer Assessment - Initial Assessment Questions  1. VOMITING SEVERITY: \"How many times have you vomited in the past 24 hours?\"      - MILD:  1 - 2 times/day     - MODERATE: 3 - 5 times/day, decreased oral intake without significant weight loss or symptoms of dehydration     - SEVERE: 6 or more times/day, vomits everything or nearly everything, with significant weight loss, " "symptoms of dehydration       Mild x1 today  2. ONSET: \"When did the vomiting begin?\"       Today.   3. FLUIDS: \"What fluids or food have you vomited up today?\" \"Have you been able to keep any fluids down?\"      Not really and yesterday could only get an ensure in   4. ABDOMINAL PAIN: \"Are your having any abdominal pain?\" If yes : \"How bad is it and what does it feel like?\" (e.g., crampy, dull, intermittent, constant)       no  5. DIARRHEA: \"Is there any diarrhea?\" If Yes, ask: \"How many times today?\"       Loose BM but not diarrhea x1 this morning   6. CONTACTS: \"Is there anyone else in the family with the same symptoms?\"       na  7. CAUSE: \"What do you think is causing your vomiting?\"      Post chemo last tuesday  8. HYDRATION STATUS: \"Any signs of dehydration?\" (e.g., dry mouth [not only dry lips], too weak to stand) \"When did you last urinate?\"      Yes, still urinating   9. OTHER SYMPTOMS: \"Do you have any other symptoms?\" (e.g., fever, headache, vertigo, vomiting blood or coffee grounds, recent head injury)      no  10. PREGNANCY: \"Is there any chance you are pregnant?\" \"When was your last menstrual period?\"        no    Protocols used: VOMITING-ADULT-AH    "

## 2023-03-04 PROBLEM — C79.9 METASTASIS FROM PANCREATIC CANCER: Status: ACTIVE | Noted: 2023-03-04

## 2023-03-04 PROBLEM — R19.7 DIARRHEA: Status: ACTIVE | Noted: 2023-03-04

## 2023-03-04 PROBLEM — R11.2 NAUSEA AND VOMITING: Status: ACTIVE | Noted: 2023-03-04

## 2023-03-04 PROBLEM — C25.9 METASTASIS FROM PANCREATIC CANCER (HCC): Status: ACTIVE | Noted: 2023-03-04

## 2023-03-04 PROBLEM — D64.9 ANEMIA: Status: ACTIVE | Noted: 2023-03-04

## 2023-03-04 LAB
ADV 40+41 DNA STL QL NAA+NON-PROBE: NOT DETECTED
ALBUMIN SERPL-MCNC: 2.5 G/DL (ref 3.5–5.2)
ALBUMIN/GLOB SERPL: 0.8 G/DL
ALP SERPL-CCNC: 126 U/L (ref 39–117)
ALT SERPL W P-5'-P-CCNC: 23 U/L (ref 1–41)
ANION GAP SERPL CALCULATED.3IONS-SCNC: 7.5 MMOL/L (ref 5–15)
AST SERPL-CCNC: 25 U/L (ref 1–40)
ASTRO TYP 1-8 RNA STL QL NAA+NON-PROBE: NOT DETECTED
BACTERIA UR QL AUTO: ABNORMAL /HPF
BASOPHILS # BLD AUTO: 0.04 10*3/MM3 (ref 0–0.2)
BASOPHILS NFR BLD AUTO: 0.3 % (ref 0–1.5)
BILIRUB SERPL-MCNC: 0.9 MG/DL (ref 0–1.2)
BILIRUB UR QL STRIP: NEGATIVE
BUN SERPL-MCNC: 20 MG/DL (ref 8–23)
BUN/CREAT SERPL: 18.2 (ref 7–25)
C CAYETANENSIS DNA STL QL NAA+NON-PROBE: NOT DETECTED
C COLI+JEJ+UPSA DNA STL QL NAA+NON-PROBE: NOT DETECTED
C DIFF TOX GENS STL QL NAA+PROBE: NEGATIVE
CALCIUM SPEC-SCNC: 8.3 MG/DL (ref 8.6–10.5)
CHLORIDE SERPL-SCNC: 108 MMOL/L (ref 98–107)
CLARITY UR: CLEAR
CO2 SERPL-SCNC: 24.5 MMOL/L (ref 22–29)
COLOR UR: ABNORMAL
CREAT SERPL-MCNC: 1.1 MG/DL (ref 0.76–1.27)
CRYPTOSP DNA STL QL NAA+NON-PROBE: NOT DETECTED
D-LACTATE SERPL-SCNC: 0.9 MMOL/L (ref 0.5–2)
DEPRECATED RDW RBC AUTO: 51.9 FL (ref 37–54)
E HISTOLYT DNA STL QL NAA+NON-PROBE: NOT DETECTED
EAEC PAA PLAS AGGR+AATA ST NAA+NON-PRB: NOT DETECTED
EC STX1+STX2 GENES STL QL NAA+NON-PROBE: NOT DETECTED
EGFRCR SERPLBLD CKD-EPI 2021: 70 ML/MIN/1.73
EOSINOPHIL # BLD AUTO: 0.12 10*3/MM3 (ref 0–0.4)
EOSINOPHIL NFR BLD AUTO: 1 % (ref 0.3–6.2)
EPEC EAE GENE STL QL NAA+NON-PROBE: NOT DETECTED
ERYTHROCYTE [DISTWIDTH] IN BLOOD BY AUTOMATED COUNT: 16.8 % (ref 12.3–15.4)
ETEC LTA+ST1A+ST1B TOX ST NAA+NON-PROBE: NOT DETECTED
G LAMBLIA DNA STL QL NAA+NON-PROBE: NOT DETECTED
GLOBULIN UR ELPH-MCNC: 3.1 GM/DL
GLUCOSE SERPL-MCNC: 102 MG/DL (ref 65–99)
GLUCOSE UR STRIP-MCNC: NEGATIVE MG/DL
HCT VFR BLD AUTO: 25.9 % (ref 37.5–51)
HGB BLD-MCNC: 8.4 G/DL (ref 13–17.7)
HGB UR QL STRIP.AUTO: ABNORMAL
HYALINE CASTS UR QL AUTO: ABNORMAL /LPF
IMM GRANULOCYTES # BLD AUTO: 0.07 10*3/MM3 (ref 0–0.05)
IMM GRANULOCYTES NFR BLD AUTO: 0.6 % (ref 0–0.5)
KETONES UR QL STRIP: NEGATIVE
LEUKOCYTE ESTERASE UR QL STRIP.AUTO: NEGATIVE
LYMPHOCYTES # BLD AUTO: 1.72 10*3/MM3 (ref 0.7–3.1)
LYMPHOCYTES NFR BLD AUTO: 13.7 % (ref 19.6–45.3)
MCH RBC QN AUTO: 28.4 PG (ref 26.6–33)
MCHC RBC AUTO-ENTMCNC: 32.4 G/DL (ref 31.5–35.7)
MCV RBC AUTO: 87.5 FL (ref 79–97)
MONOCYTES # BLD AUTO: 1.22 10*3/MM3 (ref 0.1–0.9)
MONOCYTES NFR BLD AUTO: 9.7 % (ref 5–12)
NEUTROPHILS NFR BLD AUTO: 74.7 % (ref 42.7–76)
NEUTROPHILS NFR BLD AUTO: 9.41 10*3/MM3 (ref 1.7–7)
NITRITE UR QL STRIP: NEGATIVE
NOROVIRUS GI+II RNA STL QL NAA+NON-PROBE: NOT DETECTED
NRBC BLD AUTO-RTO: 0.2 /100 WBC (ref 0–0.2)
P SHIGELLOIDES DNA STL QL NAA+NON-PROBE: NOT DETECTED
PH UR STRIP.AUTO: 5.5 [PH] (ref 5–8)
PLATELET # BLD AUTO: 339 10*3/MM3 (ref 140–450)
PMV BLD AUTO: 10.7 FL (ref 6–12)
POTASSIUM SERPL-SCNC: 3.4 MMOL/L (ref 3.5–5.2)
PROCALCITONIN SERPL-MCNC: 0.73 NG/ML (ref 0–0.25)
PROT SERPL-MCNC: 5.6 G/DL (ref 6–8.5)
PROT UR QL STRIP: ABNORMAL
QT INTERVAL: 427 MS
RBC # BLD AUTO: 2.96 10*6/MM3 (ref 4.14–5.8)
RBC # UR STRIP: ABNORMAL /HPF
REF LAB TEST METHOD: ABNORMAL
RVA RNA STL QL NAA+NON-PROBE: NOT DETECTED
S ENT+BONG DNA STL QL NAA+NON-PROBE: NOT DETECTED
SAPO I+II+IV+V RNA STL QL NAA+NON-PROBE: NOT DETECTED
SHIGELLA SP+EIEC IPAH ST NAA+NON-PROBE: NOT DETECTED
SODIUM SERPL-SCNC: 140 MMOL/L (ref 136–145)
SP GR UR STRIP: >=1.03 (ref 1–1.03)
SQUAMOUS #/AREA URNS HPF: ABNORMAL /HPF
UROBILINOGEN UR QL STRIP: ABNORMAL
V CHOL+PARA+VUL DNA STL QL NAA+NON-PROBE: NOT DETECTED
V CHOLERAE DNA STL QL NAA+NON-PROBE: NOT DETECTED
WBC # UR STRIP: ABNORMAL /HPF
WBC NRBC COR # BLD: 12.58 10*3/MM3 (ref 3.4–10.8)
Y ENTEROCOL DNA STL QL NAA+NON-PROBE: NOT DETECTED

## 2023-03-04 PROCEDURE — 81001 URINALYSIS AUTO W/SCOPE: CPT | Performed by: PHYSICIAN ASSISTANT

## 2023-03-04 PROCEDURE — G0378 HOSPITAL OBSERVATION PER HR: HCPCS

## 2023-03-04 PROCEDURE — 99204 OFFICE O/P NEW MOD 45 MIN: CPT | Performed by: INTERNAL MEDICINE

## 2023-03-04 PROCEDURE — 97116 GAIT TRAINING THERAPY: CPT

## 2023-03-04 PROCEDURE — 99222 1ST HOSP IP/OBS MODERATE 55: CPT | Performed by: INTERNAL MEDICINE

## 2023-03-04 PROCEDURE — 25010000002 ENOXAPARIN PER 10 MG: Performed by: HOSPITALIST

## 2023-03-04 PROCEDURE — 96360 HYDRATION IV INFUSION INIT: CPT

## 2023-03-04 PROCEDURE — 83605 ASSAY OF LACTIC ACID: CPT | Performed by: PHYSICIAN ASSISTANT

## 2023-03-04 PROCEDURE — 87040 BLOOD CULTURE FOR BACTERIA: CPT | Performed by: PHYSICIAN ASSISTANT

## 2023-03-04 PROCEDURE — 85025 COMPLETE CBC W/AUTO DIFF WBC: CPT | Performed by: HOSPITALIST

## 2023-03-04 PROCEDURE — 97530 THERAPEUTIC ACTIVITIES: CPT

## 2023-03-04 PROCEDURE — 87493 C DIFF AMPLIFIED PROBE: CPT | Performed by: EMERGENCY MEDICINE

## 2023-03-04 PROCEDURE — 96361 HYDRATE IV INFUSION ADD-ON: CPT

## 2023-03-04 PROCEDURE — 80053 COMPREHEN METABOLIC PANEL: CPT | Performed by: HOSPITALIST

## 2023-03-04 PROCEDURE — 84145 PROCALCITONIN (PCT): CPT | Performed by: HOSPITALIST

## 2023-03-04 PROCEDURE — 87507 IADNA-DNA/RNA PROBE TQ 12-25: CPT | Performed by: EMERGENCY MEDICINE

## 2023-03-04 PROCEDURE — 97162 PT EVAL MOD COMPLEX 30 MIN: CPT

## 2023-03-04 RX ORDER — POTASSIUM CHLORIDE 1.5 G/1.77G
40 POWDER, FOR SOLUTION ORAL AS NEEDED
Status: DISCONTINUED | OUTPATIENT
Start: 2023-03-04 | End: 2023-03-06 | Stop reason: HOSPADM

## 2023-03-04 RX ORDER — MAGNESIUM SULFATE HEPTAHYDRATE 40 MG/ML
2 INJECTION, SOLUTION INTRAVENOUS AS NEEDED
Status: DISCONTINUED | OUTPATIENT
Start: 2023-03-04 | End: 2023-03-06 | Stop reason: HOSPADM

## 2023-03-04 RX ORDER — NIFEDIPINE 30 MG/1
30 TABLET, EXTENDED RELEASE ORAL DAILY
Status: DISCONTINUED | OUTPATIENT
Start: 2023-03-04 | End: 2023-03-06 | Stop reason: HOSPADM

## 2023-03-04 RX ORDER — LOPERAMIDE HYDROCHLORIDE 2 MG/1
2 CAPSULE ORAL 4 TIMES DAILY PRN
Status: DISCONTINUED | OUTPATIENT
Start: 2023-03-04 | End: 2023-03-06 | Stop reason: HOSPADM

## 2023-03-04 RX ORDER — OXYCODONE HYDROCHLORIDE 5 MG/1
5 TABLET ORAL EVERY 6 HOURS PRN
Status: DISCONTINUED | OUTPATIENT
Start: 2023-03-04 | End: 2023-03-06 | Stop reason: HOSPADM

## 2023-03-04 RX ORDER — PANTOPRAZOLE SODIUM 40 MG/1
40 TABLET, DELAYED RELEASE ORAL
Status: DISCONTINUED | OUTPATIENT
Start: 2023-03-04 | End: 2023-03-06 | Stop reason: HOSPADM

## 2023-03-04 RX ORDER — POTASSIUM CHLORIDE 750 MG/1
40 TABLET, FILM COATED, EXTENDED RELEASE ORAL AS NEEDED
Status: DISCONTINUED | OUTPATIENT
Start: 2023-03-04 | End: 2023-03-06 | Stop reason: HOSPADM

## 2023-03-04 RX ORDER — LAMOTRIGINE 100 MG/1
100 TABLET ORAL DAILY
Status: DISCONTINUED | OUTPATIENT
Start: 2023-03-04 | End: 2023-03-06 | Stop reason: HOSPADM

## 2023-03-04 RX ORDER — LEVOTHYROXINE SODIUM 112 UG/1
112 TABLET ORAL
Status: DISCONTINUED | OUTPATIENT
Start: 2023-03-04 | End: 2023-03-06 | Stop reason: HOSPADM

## 2023-03-04 RX ORDER — ENOXAPARIN SODIUM 100 MG/ML
40 INJECTION SUBCUTANEOUS
Status: DISCONTINUED | OUTPATIENT
Start: 2023-03-04 | End: 2023-03-06 | Stop reason: HOSPADM

## 2023-03-04 RX ORDER — ONDANSETRON 4 MG/1
4 TABLET, FILM COATED ORAL EVERY 6 HOURS PRN
Status: DISCONTINUED | OUTPATIENT
Start: 2023-03-04 | End: 2023-03-06 | Stop reason: HOSPADM

## 2023-03-04 RX ORDER — ERGOCALCIFEROL 1.25 MG/1
50000 CAPSULE ORAL 2 TIMES WEEKLY
Status: DISCONTINUED | OUTPATIENT
Start: 2023-03-06 | End: 2023-03-06 | Stop reason: HOSPADM

## 2023-03-04 RX ORDER — SERTRALINE HYDROCHLORIDE 100 MG/1
200 TABLET, FILM COATED ORAL EVERY MORNING
Status: DISCONTINUED | OUTPATIENT
Start: 2023-03-04 | End: 2023-03-06 | Stop reason: HOSPADM

## 2023-03-04 RX ORDER — MAGNESIUM SULFATE HEPTAHYDRATE 40 MG/ML
4 INJECTION, SOLUTION INTRAVENOUS AS NEEDED
Status: DISCONTINUED | OUTPATIENT
Start: 2023-03-04 | End: 2023-03-06 | Stop reason: HOSPADM

## 2023-03-04 RX ORDER — BUSPIRONE HYDROCHLORIDE 15 MG/1
30 TABLET ORAL DAILY
Status: DISCONTINUED | OUTPATIENT
Start: 2023-03-04 | End: 2023-03-06 | Stop reason: HOSPADM

## 2023-03-04 RX ADMIN — SODIUM CHLORIDE 100 ML/HR: 9 INJECTION, SOLUTION INTRAVENOUS at 01:11

## 2023-03-04 RX ADMIN — PANTOPRAZOLE SODIUM 40 MG: 40 TABLET, DELAYED RELEASE ORAL at 06:44

## 2023-03-04 RX ADMIN — BUSPIRONE HYDROCHLORIDE 30 MG: 15 TABLET ORAL at 09:07

## 2023-03-04 RX ADMIN — POTASSIUM CHLORIDE 40 MEQ: 750 TABLET, EXTENDED RELEASE ORAL at 14:43

## 2023-03-04 RX ADMIN — OXYCODONE HYDROCHLORIDE 5 MG: 5 TABLET ORAL at 01:37

## 2023-03-04 RX ADMIN — LEVOTHYROXINE SODIUM 112 MCG: 0.11 TABLET ORAL at 06:44

## 2023-03-04 RX ADMIN — LAMOTRIGINE 100 MG: 100 TABLET ORAL at 09:07

## 2023-03-04 RX ADMIN — POTASSIUM CHLORIDE 40 MEQ: 750 TABLET, EXTENDED RELEASE ORAL at 09:08

## 2023-03-04 RX ADMIN — SODIUM CHLORIDE 100 ML/HR: 9 INJECTION, SOLUTION INTRAVENOUS at 12:14

## 2023-03-04 RX ADMIN — NIFEDIPINE 30 MG: 30 TABLET, FILM COATED, EXTENDED RELEASE ORAL at 09:07

## 2023-03-04 RX ADMIN — SERTRALINE 200 MG: 100 TABLET, FILM COATED ORAL at 06:44

## 2023-03-04 RX ADMIN — OXYCODONE HYDROCHLORIDE 5 MG: 5 TABLET ORAL at 20:00

## 2023-03-04 RX ADMIN — ENOXAPARIN SODIUM 40 MG: 100 INJECTION SUBCUTANEOUS at 09:08

## 2023-03-04 NOTE — CONSULTS
Subjective     REASON FOR CONSULTATION:      Metastatic pancreatic cancer, followed by Dr. Powers at the [facility] and Dr. Torres s/p chemotherapy    Diarrhea  Neutrophilia  Provide an opinion on any further workup or treatment                             REQUESTING PHYSICIAN: Dr. Be Iraheta    RECORDS OBTAINED:  Records of the patients history including those obtained from the referring provider were reviewed and summarized in detail.    HISTORY OF PRESENT ILLNESS:  The patient is a 73 y.o. year old male who is here for an opinion about the above issue.    History of Present Illness     Patient is a 73-year-old gentleman who has history of metastatic pancreatic cancer which was initially diagnosed back in 2021.  He underwent Whipple procedure by Dr. Torres in January 2021.  Following that he received chemotherapy and was doing well until recently he was found to have recurrence in the liver.  He underwent ablation of the liver.  Subsequently he has progressed further and underwent biliary bypass surgery by Dr. Torres.  He was restarted on chemotherapy 10 days ago.  He was admitted with severe diarrhea and vomiting.He has decreased appetite.    He had leukocytosis.  His COVID and flu PCR were negative.  C. difficile was ordered.  He has been followed by Dr. Powers.  His alkaline phosphatase was mildly elevated to 154.  He was recently admitted January 24, 2023 to [facility].  He has history of MGUS, hypertension and GERD.  Initially presented in 2020 with obstructive hyperbilirubinemia and acute blood loss anemia.  He was diagnosed in December 2020 with pancreatic cancer.  He had a pancreatic tail mass and he underwent surgery January 19, 2021.  He then received for 4 FOLFIRINOX.    He then recurred April 2022 and since then has received chemotherapy.    In January 2022 he presented with obstructive jaundice.  He had ERCP attempted but scope could not be inserted due to duodenal  stricture.  He did have surgical oncology see him.  He underwent gastrojejunostomy and hepaticojejunostomy January 30 following which she had acute renal failure and pneumonia with a hemoglobin less than 7 requiring blood transfusion.  He had melanotic stools EGD showed significant bowel edema and they could not figure out the bleeding source.  Initially octreotide drip and PPI was given.  Patient was started on Lovenox prophylaxis.  His last CT January 6, 2023 showed interval development of multifocal patchy groundglass airspace disease throughout the lungs and multiple prominent mediastinal lymph nodes unchanged in size.  Heart is enlarged.  Minimal pericardial effusion.    CT abdomen pelvis February 2, 2023 showed liver containing hypoattenuating focus in the left hepatic lobe.  Measuring 3.3 cm.  This there was previous ablation of the tumor within the left hepatic lobe and the hypoattenuating focus within the hepatic lobe reflects a post therapeutic change which is similar to the previous MRI exam.    His CBC today is a hemoglobin of 8.4, white count of 12.58 and platelet of 339.  CMP is normal except potassium 3.4 and alkaline phosphatase minimally elevated at 126.    He continues to have diarrhea.  Procalcitonin was elevated to 0.65.  Check GI panel is pending urine analysis showed trace blood but negative nitrites and culture pending        Past Medical History:   Diagnosis Date   • Anxiety    • Cancer (HCC) Pancreatic   • Depression    • GERD (gastroesophageal reflux disease)    • Hypercalcemia    • Hypothyroidism    • Inverted T wave     PT HAD STRESS ECHO DONE 4 YR AGO AND WAS NORMAL   • Osteoarthritis     Knees and ankles   • Parathyroid disease (HCC)    • Peripheral neuropathy Feet. From chemo   • PONV (postoperative nausea and vomiting)    • PTSD (post-traumatic stress disorder)    • Sleep apnea     NO MACHINE        Past Surgical History:   Procedure Laterality Date   • COLON SURGERY     • COLONOSCOPY   07/2014   • COLONOSCOPY N/A 12/24/2020    Procedure: COLONOSCOPY;  Surgeon: Geronimo Branch Jr., MD;  Location: [facility] ENDOSCOPY;  Service: General;  Laterality: N/A;  colon obstruction  post:   colon obstruction from external compression   • ENDOSCOPY      several years ago, normal   • EXPLORATORY LAPAROTOMY N/A 12/21/2020    Procedure: LAPAROTOMY EXPLORATORY WITH LOOP COLOSTOMY;  Surgeon: Geronimo Branch Jr., MD;  Location: [facility] MAIN OR;  Service: General;  Laterality: N/A;   • FRACTURE SURGERY  Wrist   • HEMORRHOIDECTOMY     • THYROIDECTOMY Left 02/03/2017    Procedure: Left inferior parathyroidectomy with intraoperative PTH monitoring;  Surgeon: Sarah Matos MD;  Location: [facility] MAIN OR;  Service:    • WRIST FUSION Right         No current facility-administered medications on file prior to encounter.     Current Outpatient Medications on File Prior to Encounter   Medication Sig Dispense Refill   • busPIRone (BUSPAR) 30 MG tablet TAKE 1 TABLET DAILY 90 tablet 3   • dexamethasone (DECADRON) 4 MG tablet      • Diclofenac Sodium (VOLTAREN) 1 % gel gel   1 Application, Topical, Gel, QID, PRN for pain, # 100 Gram, 0 Refill(s)     • Enoxaparin Sodium (LOVENOX) 40 MG/0.4ML solution prefilled syringe syringe 40 mg.     • GLUCOSAMINE-CHONDROIT-VIT C-MN PO Take 1 tablet by mouth.     • lamoTRIgine (LaMICtal) 100 MG tablet Take one oral tablet daily 90 tablet 1   • levothyroxine (SYNTHROID, LEVOTHROID) 112 MCG tablet TAKE 1 TABLET BY MOUTH DAILY 90 tablet 1   • loperamide (IMODIUM A-D) 2 MG tablet   See Instructions, Tab, PRN for loose stool, take 2 tabs PO with first loose stool, then 1 tab after each loose stool  not to exceed 8 capsules, or 16 mg, in 24 hours, # 100 Tab, 0 Refill(s), Pharmacy: [facility] #[id number], 177.8 cm, 10/25/22 1...     • magic mouthwash oral suspension   Josi Magic Mouthwash, See Instructions, Swish and swallow 10 mL PO QID PRN for mouth pain  Diphenhydramine 120 mL Nystatin 120  mL Hydrocortisone 60 mg Lidocaine viscous 2% 30 mL, # 270 mL, 1 Refill(s), Pharmacy: [facility] #[id number], cm,...     • Melatonin 5 MG capsule Take  by mouth At Night As Needed.     • NIFEdipine XL (PROCARDIA XL) 30 MG 24 hr tablet Take 1 tablet by mouth Daily.     • Omega-3 Fatty Acids (FISH OIL) 1000 MG capsule capsule Take 2 capsules by mouth 2 (Two) Times a Day With Meals. HOLD PRIOR TO SURG     • ondansetron (ZOFRAN) 8 MG tablet      • oxyCODONE (ROXICODONE) 5 MG immediate release tablet Take 1 tablet by mouth Every 6 (Six) Hours As Needed (cancer related pain). 30 tablet 0   • pantoprazole (PROTONIX) 40 MG EC tablet TAKE 1 TABLET BY MOUTH BEFORE BREAKFAST AND AT BEDTIME     • polyethylene glycol (MIRALAX) 17 g packet MIX 1 PACKET IN WATER OR JUICE AND TAKE DAILY FOR 3 DAYS EACH CHEMOTHERAPY TREATMENT AND THEN AS NEEDED THERAFTER FOR CONSTIPATION     • potassium chloride (K-DUR,KLOR-CON) 20 MEQ CR tablet Take 1 tablet by mouth Daily.     • Sennosides (Senna) 8.6 MG capsule Take 1 tablet by mouth Daily.     • sertraline (ZOLOFT) 100 MG tablet TAKE 2 TABLETS BY MOUTH EVERY MORNING 90 tablet 1   • vitamin D (ERGOCALCIFEROL) 1.25 MG (75650 UT) capsule capsule Take 1 capsule by mouth 2 (Two) Times a Week. 26 capsule 2        ALLERGIES:    Allergies   Allergen Reactions   • Codeine Nausea Only     nausea        Social History     Socioeconomic History   • Marital status:    • Number of children: 2   • Highest education level: Master's degree (e.g., MA, MS, Lisa, MEd, MSW, THONG)   Tobacco Use   • Smoking status: Never   • Smokeless tobacco: Never   Vaping Use   • Vaping Use: Never used   Substance and Sexual Activity   • Alcohol use: Not Currently   • Drug use: No   • Sexual activity: Defer        Family History   Problem Relation Age of Onset   • Breast cancer Mother    • Heart disease Father    • Hypertension Father    • Colon cancer Sister    • Anxiety disorder Sister    • Anxiety disorder Son          Review of Systems   Constitutional: Negative for appetite change, chills, diaphoresis, fatigue, fever and unexpected weight change.   HENT: Negative for hearing loss, sore throat and trouble swallowing.    Respiratory: Negative for cough, chest tightness, shortness of breath and wheezing.    Cardiovascular: Negative for chest pain, palpitations and leg swelling.   Gastrointestinal: Positive for diarrhea. Negative for abdominal distention, abdominal pain, constipation, nausea and vomiting.   Genitourinary: Negative for dysuria, frequency, hematuria and urgency.   Musculoskeletal: Negative for joint swelling.        No muscle weakness.   Skin: Negative for rash and wound.   Neurological: Negative for seizures, syncope, speech difficulty, weakness, numbness and headaches.   Hematological: Negative for adenopathy. Does not bruise/bleed easily.   Psychiatric/Behavioral: Negative for behavioral problems, confusion and suicidal ideas.          Objective     Vitals:    03/03/23 2306 03/04/23 0048 03/04/23 0502 03/04/23 0846   BP: 142/85 122/74 130/75 145/79   BP Location:  Left arm Left arm Right arm   Patient Position:  Lying Lying Lying   Pulse: 88 77 67 68   Resp: 16 16 16 16   Temp:  99 °F (37.2 °C) 97.1 °F (36.2 °C) 97.6 °F (36.4 °C)   TempSrc:  Oral Oral Oral   SpO2: 92% 95% 96% 98%   Weight:  77.3 kg (170 lb 6.7 oz)     Height:         Current Status 11/15/2016   ECOG score 0       Physical Exam      CONSTITUTIONAL:  Vital signs reviewed.  Alert and oriented x3  No distress, looks comfortable.  EYES:  Conjunctivae and lids unremarkable.  Extraocular eye movements intact.  HEENT: No evidence of lymphadenopathy, no thyromegaly  EARS,NOSE,MOUTH,THROAT:  Ears and nose appear unremarkable.  Lips, teeth, gums appear unremarkable.  RESPIRATORY:  Normal respiratory effort.  No rales  or wheezing, clear.   CARDIOVASCULAR:  Regular rate and rhythm, no murmur  No significant lower extremity edema.  Abdomen: Soft nontender  positive bowel sounds no hepatosplenomegaly  SKIN: No wounds.  No rashes.  MUSCULOSKELETAL/EXTREMITIES: No clubbing or cyanosis.  No apparent unilateral weakness.  NEURO: CN 2-12 appear intact. No focal neurological deficits noted.  PSYCHIATRIC:  Normal judgment and insight.  Normal mood and affect.      RECENT LABS:  Hematology WBC   Date Value Ref Range Status   03/04/2023 12.58 (H) 3.40 - 10.80 10*3/mm3 Final     RBC   Date Value Ref Range Status   03/04/2023 2.96 (L) 4.14 - 5.80 10*6/mm3 Final     Hemoglobin   Date Value Ref Range Status   03/04/2023 8.4 (L) 13.0 - 17.7 g/dL Final     Hematocrit   Date Value Ref Range Status   03/04/2023 25.9 (L) 37.5 - 51.0 % Final     Platelets   Date Value Ref Range Status   03/04/2023 339 140 - 450 10*3/mm3 Final          Assessment & Plan      *Admitted with nausea vomiting and diarrhea following chemo  · Followed by Dr. Powers at [facility]  · C. difficile pending and all cultures pending    * Metastatic pancreatic adenocarcinoma: S/P diverting colostomy on 12/24/2020  • S/P surgical resection on 1/19/2021 by Dr. Torres with a distal pancreatectomy, splenectomy and colostomy takedown.  • Tumor was 6 cm with negative margins and no LN involvement.   • Patient is undergoing adjuvant chemotherapy with FOLFIRINOX. He received cycle #7 on 6/10/2021. Iriontecan was omitted due to GI side effects.  • Subsequently patient has had recurrence in the liver s/p ablation by Dr. Torres  • Given recurrence and progression patient has been on chemotherapy with Dr. Powers, do not have the details from Dr. Powers  • In January 2023 was admitted to the hospital with pneumonia at [facility].   • He is s/p hepaticojejunostomy  • Patient received liposomal irinotecan and 5-FU leucovorin last week which was cycle 1.  With Dr. Powers         *Leukocytosis with significant neutrophilia.  • WBC was 16,000 mg on his last chemotherapy treatment.  • Patient received  Neulasta on 6/11/2021.  • Patient, WBC count was 65,000 increased to 124,000 on 6/12/2021.  • WBC count is 108,000 today.  • No evidence of infection.  Blood cultures negative.  • Scans showed no evidence of infection.  • The leukocytosis and neutrophilia are therefore contributed to Neulasta.     *Anemia due to chemotherapy.  • Hemoglobin is 9.7 today.     *Syncopal event.  • ?  Volume depletion vs vasovagal.  • Patient had low blood pressure when evaluated by EMS.     Plan  · Continue supportive care with hydration  · Await C. difficile and GI panel  · Respiratory viral panel and COVID-negative  · We will try to get records from Dr. Powers's office    Zoey Enriquez MD

## 2023-03-04 NOTE — ED NOTES
"Pt arrives from home via [facility] #[id number].    EMS states \"Pt called out for NV&D for 3-4 days and the vomiting started today. Has cancer with multiple mets and he thinks symptoms are due to the cancer. Pt also has generalized weakness, and is ortho static with us.\"    Pt states \"I did fall earlier today, no injury maybe a bruise to my hip.\"    Patient was placed in face mask during first look triage.  Patient was wearing a face mask throughout encounter.  I wore personal protective equipment throughout the encounter.  Hand hygiene was performed before and after patient encounter.        "

## 2023-03-04 NOTE — H&P
[facility]   HISTORY AND PHYSICAL    Patient Name: Kaiser Valente  : 1950  MRN: [id number]  Primary Care Physician:  Jared Noriega MD  Date of admission: 3/3/2023    Subjective   Subjective     Chief Complaint: n/v/d    History of Present Illness     This is a 73-year-old gentleman with a history of metastatic pancreatic cancer first diagnosed approximately 2 years ago.  The patient underwent a Whipple with Dr. Rogers in 2021.  He received chemotherapy following that and was doing reasonably well until this year he was found to have recurrent in the liver.  He had a liver ablation.  And then in January of this year has had more progression of the cancer and underwent what sounds like biliary bypass surgery with Dr. Rogers.  And the patient was started on chemotherapy approximately 10 days ago.  He now comes to the hospital because over the last several weeks he has had decreased appetite but now with chemotherapy he is got diarrhea and vomiting.  He has no abdominal pain.  However when I am seeing him in the emergency room his diarrhea and vomiting have improved has had none for several hours now.    In the emergency king his white counts noted to be approximately 17,000 which is up from 13,000.  Patient's COVID and flu PCR negative.  C. difficile and stool PCR currently pending.  Patient did have a fall at home but not reporting any injury.      Review of Systems   Constitutional: Positive for activity change, appetite change and unexpected weight change.   HENT: Negative for congestion and rhinorrhea.    Eyes: Negative for visual disturbance.   Respiratory: Negative for chest tightness and shortness of breath.    Gastrointestinal: Positive for diarrhea, nausea and vomiting. Negative for abdominal distention.   Genitourinary: Negative for dysuria.   Musculoskeletal: Negative for arthralgias and back pain.   Psychiatric/Behavioral: Negative for agitation and confusion.        Personal  History     Past Medical History:   Diagnosis Date   • Anxiety    • Cancer (HCC) Pancreatic   • Depression    • GERD (gastroesophageal reflux disease)    • Hypercalcemia    • Hypothyroidism    • Inverted T wave     PT HAD STRESS ECHO DONE 4 YR AGO AND WAS NORMAL   • Osteoarthritis     Knees and ankles   • Parathyroid disease (HCC)    • Peripheral neuropathy Feet. From chemo   • PONV (postoperative nausea and vomiting)    • PTSD (post-traumatic stress disorder)    • Sleep apnea     NO MACHINE       Past Surgical History:   Procedure Laterality Date   • COLON SURGERY     • COLONOSCOPY  07/2014   • COLONOSCOPY N/A 12/24/2020    Procedure: COLONOSCOPY;  Surgeon: Geronimo Branch Jr., MD;  Location: [facility] ENDOSCOPY;  Service: General;  Laterality: N/A;  colon obstruction  post:   colon obstruction from external compression   • ENDOSCOPY      several years ago, normal   • EXPLORATORY LAPAROTOMY N/A 12/21/2020    Procedure: LAPAROTOMY EXPLORATORY WITH LOOP COLOSTOMY;  Surgeon: Geronimo Branch Jr., MD;  Location: [facility] OR;  Service: General;  Laterality: N/A;   • FRACTURE SURGERY  Wrist   • HEMORRHOIDECTOMY     • THYROIDECTOMY Left 02/03/2017    Procedure: Left inferior parathyroidectomy with intraoperative PTH monitoring;  Surgeon: Sarah Matos MD;  Location: [facility] OR;  Service:    • WRIST FUSION Right        Family History: family history includes Anxiety disorder in his sister and son; Breast cancer in his mother; Colon cancer in his sister; Heart disease in his father; Hypertension in his father. Otherwise pertinent FHx was reviewed and not pertinent to current issue.    Social History:  reports that he has never smoked. He has never used smokeless tobacco. He reports that he does not currently use alcohol. He reports that he does not use drugs.    Home Medications:  Diclofenac Sodium, Enoxaparin Sodium, Glucosamine-Chondroit-Vit C-Mn, Melatonin, NIFEdipine XL, Senna, busPIRone, dexamethasone, fish  oil, lamoTRIgine, levothyroxine, loperamide, magic mouthwash oral suspension, ondansetron, oxyCODONE, pantoprazole, polyethylene glycol, potassium chloride, sertraline, and vitamin D    Allergies:  Allergies   Allergen Reactions   • Codeine Nausea Only     nausea       Objective    Objective     Vitals:   Temp:  [98.3 °F (36.8 °C)] 98.3 °F (36.8 °C)  Heart Rate:  [71-80] 78  Resp:  [16-20] 16  BP: (150-166)/(77-97) 150/97    Physical Exam  Constitutional:       Appearance: Normal appearance.   HENT:      Head: Normocephalic and atraumatic.   Cardiovascular:      Rate and Rhythm: Normal rate and regular rhythm.      Heart sounds: No murmur heard.    No friction rub.   Pulmonary:      Effort: Pulmonary effort is normal.      Breath sounds: Normal breath sounds.   Abdominal:      General: Bowel sounds are normal. There is no distension.      Palpations: Abdomen is soft.      Tenderness: There is no abdominal tenderness.      Comments: Abdominal incision is clean and intact   Skin:     General: Skin is warm and dry.   Neurological:      Mental Status: He is alert.   Psychiatric:         Mood and Affect: Mood normal.         Behavior: Behavior normal.         Result Review    Result Review:  I have personally reviewed the results from the time of this admission to 3/3/2023 23:05 EST and agree with these findings:  []  Laboratory list / accordion  [x]  Microbiology  [x]  Radiology  [x]  EKG/Telemetry   [x]  Cardiology/Vascular   [x]  Pathology  []  Old records  []  Other:  Most notable findings include: elevated wbc      Assessment & Plan   Assessment / Plan     Brief Patient Summary:  Kaiser Valente is a 73 y.o. male who history of metastatic pancreatic cancer and comes in with nausea vomiting diarrhea likely related to chemo    Active Hospital Problems:  There are no active hospital problems to display for this patient.    Plan:   Nausea and vomiting and diarrhea likely chemo related but has had chronicly poor appetite  likely related to metastatic cancer  -stool pcr pending  - cdiff (no recent antibiotics) pending  -followed by Dr Powers from oncology and from a surgical standpoint by Dr. Rogers.  The family did talk to Dr. Powers's office tonight   -Gastroenterology consultation  -Liver enzymes normal except for alk phos 154    Metastatic Pancreatitic cancer  -poor appetite  -IV fluids    Anemia  -monitor    Elevated wbc  -blood culutres x2  -procal elevated, will recheck in the am   -lactic acid pending  -hold on antibiotics      DVT prophylaxis:  No DVT prophylaxis order currently exists.    CODE STATUS:       Admission Status:  I believe this patient meets observation status.    Be Iraheta MD

## 2023-03-04 NOTE — PLAN OF CARE
Goal Outcome Evaluation:  Plan of Care Reviewed With: patient, family           Outcome Evaluation: Pt is a 74 y/o M admitted to [facility] with c/o N&V, diarrhea, and generalized weakness that is likely chemo related as pt dx with metastatic pancreatic cancer. Pt received in bed upon arrival and agreeable to PT eval. Pt reports he lives with his spouse with 2+2 ANN and was using a RW for mobility prior to admission. Pt presents to PT with generalized weakness, decreased endurance, and impaired balance and functional mobility. Pt completed bed mobility this visit with SBA. Pt stood and ambulated 200' c RW requiring CGA/min A. Pt initially demonstrated good gait mechanics but as distance advanced pt fatigued and balance worsened with multiple LOB. Pt with c/o UE fatigue and forward flexed posture with cues provided for posture correction and safety. Pt returned to bed with all needs in reach at end of session. PT recommends home with 24/7 care and HHPT.

## 2023-03-04 NOTE — PLAN OF CARE
Goal Outcome Evaluation:  Plan of Care Reviewed With: patient, spouse        Progress: no change  Outcome Evaluation: Patient is a/o x4. Still having multiple loose stools, specimen sent for c.diff and GI panel all negative. Having some mild weakness but is unsteady while ambulating. Continue to work with PT. Had folfox last week for metastatic pancreatic cancer. All symptoms are believed to be related to chemo. IVF infusing. Plan is home in the next 1-2 days.

## 2023-03-04 NOTE — ED PROVIDER NOTES
MD ATTESTATION NOTE    The TORIBIO and I have discussed this patient's history, physical exam, and treatment plan.    I provided a substantive portion of the care of this patient. I personally performed the physical exam, in its entirety. The attached note describes my personal findings.      Kaiser Valente is a 73 y.o. male who presents to the ED c/o vomiting and diarrhea.  Denies having any associated abdominal pain this time.      On exam:  GENERAL: not distressed  HENT: nares patent  EYES: no scleral icterus  CV: regular rhythm, regular rate  RESPIRATORY: normal effort, clear to auscultation bilaterally  ABDOMEN: soft, nontender  MUSCULOSKELETAL: no deformity  NEURO: alert, moves all extremities, follows commands  SKIN: warm, dry    Labs  Recent Results (from the past 24 hour(s))   Comprehensive Metabolic Panel    Collection Time: 03/03/23  7:42 PM    Specimen: Blood   Result Value Ref Range    Glucose 114 (H) 65 - 99 mg/dL    BUN 20 8 - 23 mg/dL    Creatinine 1.21 0.76 - 1.27 mg/dL    Sodium 134 (L) 136 - 145 mmol/L    Potassium 3.6 3.5 - 5.2 mmol/L    Chloride 100 98 - 107 mmol/L    CO2 24.0 22.0 - 29.0 mmol/L    Calcium 8.4 (L) 8.6 - 10.5 mg/dL    Total Protein 6.6 6.0 - 8.5 g/dL    Albumin 2.9 (L) 3.5 - 5.2 g/dL    ALT (SGPT) 24 1 - 41 U/L    AST (SGOT) 28 1 - 40 U/L    Alkaline Phosphatase 154 (H) 39 - 117 U/L    Total Bilirubin 1.1 0.0 - 1.2 mg/dL    Globulin 3.7 gm/dL    A/G Ratio 0.8 g/dL    BUN/Creatinine Ratio 16.5 7.0 - 25.0    Anion Gap 10.0 5.0 - 15.0 mmol/L    eGFR 62.4 >60.0 mL/min/1.73   Lipase    Collection Time: 03/03/23  7:42 PM    Specimen: Blood   Result Value Ref Range    Lipase 10 (L) 13 - 60 U/L   CBC Auto Differential    Collection Time: 03/03/23  7:42 PM    Specimen: Blood   Result Value Ref Range    WBC 16.98 (H) 3.40 - 10.80 10*3/mm3    RBC 3.44 (L) 4.14 - 5.80 10*6/mm3    Hemoglobin 9.8 (L) 13.0 - 17.7 g/dL    Hematocrit 30.8 (L) 37.5 - 51.0 %    MCV 89.5 79.0 - 97.0 fL    MCH 28.5 26.6  - 33.0 pg    MCHC 31.8 31.5 - 35.7 g/dL    RDW 16.8 (H) 12.3 - 15.4 %    RDW-SD 53.5 37.0 - 54.0 fl    MPV 10.7 6.0 - 12.0 fL    Platelets 374 140 - 450 10*3/mm3    Neutrophil % 86.6 (H) 42.7 - 76.0 %    Lymphocyte % 7.4 (L) 19.6 - 45.3 %    Monocyte % 5.1 5.0 - 12.0 %    Eosinophil % 0.1 (L) 0.3 - 6.2 %    Basophil % 0.2 0.0 - 1.5 %    Immature Grans % 0.6 (H) 0.0 - 0.5 %    Neutrophils, Absolute 14.71 (H) 1.70 - 7.00 10*3/mm3    Lymphocytes, Absolute 1.26 0.70 - 3.10 10*3/mm3    Monocytes, Absolute 0.86 0.10 - 0.90 10*3/mm3    Eosinophils, Absolute 0.01 0.00 - 0.40 10*3/mm3    Basophils, Absolute 0.03 0.00 - 0.20 10*3/mm3    Immature Grans, Absolute 0.11 (H) 0.00 - 0.05 10*3/mm3    nRBC 0.1 0.0 - 0.2 /100 WBC   COVID-19 and FLU A/B PCR - Swab, Nasopharynx    Collection Time: 03/03/23  7:48 PM    Specimen: Nasopharynx; Swab   Result Value Ref Range    COVID19 Not Detected Not Detected - Ref. Range    Influenza A PCR Not Detected Not Detected    Influenza B PCR Not Detected Not Detected   ECG 12 Lead Other; generalized weakness    Collection Time: 03/03/23  8:07 PM   Result Value Ref Range    QT Interval 427 ms       Radiology  XR Chest 1 View    Result Date: 3/3/2023  Emergency portable view of the chest on 03/03/2023  CLINICAL HISTORY: Generalized weakness. Vomiting and diarrhea.  This is correlated to a prior portable chest x-ray from [facility] on 05/13/2022.  FINDINGS: There is a right internal jugular Mediport in place with its distal tip at the venoatrial junction, unchanged. The cardiomediastinal silhouette and the pulmonary vasculature are within normal limits. There is some linear stranding in the right perihilar region likely linear atelectasis or scarring. The lung volumes are low with some minimal by basilar atelectasis; otherwise, the lungs are clear and the costophrenic angles are sharp.      1. No definite active disease is seen in the chest. There are low lung volumes with  horizontal linear atelectasis or scarring at the lung bases and there is some linear stranding within the right perihilar region which is most likely linear atelectasis or scarring. 2. The right internal jugular Mediport tip is at the venoatrial junction.  This report was finalized on 3/3/2023 8:14 PM by Dr. Jan Amado M.D.        Medications given in the ED:  Medications   sodium chloride 0.9 % bolus 1,000 mL (0 mL Intravenous Stopped 3/3/23 2214)   iopamidol (ISOVUE-300) 61 % injection 85 mL (85 mL Intravenous Given 3/3/23 2136)       Orders placed during this visit:  Orders Placed This Encounter   Procedures   • COVID-19 and FLU A/B PCR - Swab, Nasopharynx   • Gastrointestinal Panel, PCR - Stool, Per Rectum   • Clostridioides difficile Toxin - Stool, Per Rectum   • Clostridioides difficile Toxin, PCR - Stool, Per Rectum   • XR Chest 1 View   • CT Abdomen Pelvis With Contrast   • Comprehensive Metabolic Panel   • Urinalysis With Microscopic If Indicated (No Culture) - Urine, Clean Catch   • Lipase   • CBC Auto Differential   • Pulse Oximetry, Continuous   • Cardiac Monitoring   • Monitor Blood Pressure   • Patient Isolation Contact Spore   • ECG 12 Lead Other; generalized weakness   • CBC & Differential       Medical Decision Making:  ED Course as of 03/05/23 2144   Fri Mar 03, 2023   2011 EKG independently interpreted by myself.  Time 8:07 PM.  Sinus rhythm.  Heart rate 71.  Normal axis.  Normal intervals.  No acute ST abnormality. [TD]   0141 I discussed the patient's overall care with Dr. Iraheta with A.  He is agreeable to admission. [AR]      ED Course User Index  [AR] Kenya Henry PA  [TD] Nick Rebollar II, MD       On medical chart review, I have reviewed the progress note by patient's PCP Dr. Jared Tomas.  Patient is currently being treated for pancreatic adenocarcinoma.  Family was considering the pros and cons of chemotherapy versus hospice.          PPE: Both the patient and I wore  a surgical mask throughout the entire patient encounter.     Diagnosis  Final diagnoses:   Generalized weakness   Diarrhea, unspecified type   Nausea and vomiting, unspecified vomiting type   Fall, initial encounter        Nick Rebollar II, MD  03/05/23 8929

## 2023-03-04 NOTE — PROGRESS NOTES
Name: Kaiser Valente ADMIT: 3/3/2023   : 1950  PCP: Jared Noriega MD    MRN: [id number] LOS: 0 days   AGE/SEX: 73 y.o. male  ROOM: [facility]     Subjective   Subjective   Patient states he feels much better than yesterday.  No further nausea or vomiting.  Continues to have some diarrhea.  Tolerated clear liquid diet this morning discussed advancing diet, patient agreeable.    Review of Systems   Constitutional: Negative for chills and fever.   Respiratory: Negative for cough and shortness of breath.    Cardiovascular: Negative for chest pain and palpitations.   Gastrointestinal: Positive for diarrhea. Negative for abdominal pain, nausea and vomiting.     As above     Objective   Objective   Vital Signs  Temp:  [97.1 °F (36.2 °C)-99 °F (37.2 °C)] 97.6 °F (36.4 °C)  Heart Rate:  [67-88] 71  Resp:  [16-20] 16  BP: (122-166)/(74-97) 137/78  SpO2:  [92 %-99 %] 93 %  on   ;   Device (Oxygen Therapy): room air  Body mass index is 24.45 kg/m².  Physical Exam  Constitutional:       Appearance: He is normal weight.   HENT:      Mouth/Throat:      Mouth: Mucous membranes are moist.      Pharynx: Oropharynx is clear.   Cardiovascular:      Rate and Rhythm: Normal rate and regular rhythm.      Pulses: Normal pulses.      Heart sounds: No murmur heard.  Pulmonary:      Effort: Pulmonary effort is normal. No respiratory distress.   Abdominal:      General: Abdomen is flat. There is no distension.      Palpations: Abdomen is soft.      Tenderness: There is no abdominal tenderness.   Musculoskeletal:         General: No swelling or deformity.   Skin:     General: Skin is warm and dry.   Neurological:      General: No focal deficit present.      Mental Status: He is alert. Mental status is at baseline.         Results Review     I reviewed the patient's new clinical results.  Results from last 7 days   Lab Units 23  0636 23   WBC 10*3/mm3 12.58* 16.98*   HEMOGLOBIN g/dL 8.4* 9.8*   PLATELETS 10*3/mm3  339 374     Results from last 7 days   Lab Units 03/04/23  0636 03/03/23  1942   SODIUM mmol/L 140 134*   POTASSIUM mmol/L 3.4* 3.6   CHLORIDE mmol/L 108* 100   CO2 mmol/L 24.5 24.0   BUN mg/dL 20 20   CREATININE mg/dL 1.10 1.21   GLUCOSE mg/dL 102* 114*   Estimated Creatinine Clearance: 63.4 mL/min (by C-G formula based on SCr of 1.1 mg/dL).  Results from last 7 days   Lab Units 03/04/23  0636 03/03/23  1942   ALBUMIN g/dL 2.5* 2.9*   BILIRUBIN mg/dL 0.9 1.1   ALK PHOS U/L 126* 154*   AST (SGOT) U/L 25 28   ALT (SGPT) U/L 23 24     Results from last 7 days   Lab Units 03/04/23  0636 03/03/23 1942   CALCIUM mg/dL 8.3* 8.4*   ALBUMIN g/dL 2.5* 2.9*     Results from last 7 days   Lab Units 03/04/23  0636 03/04/23  0003 03/03/23 1942   PROCALCITONIN ng/mL 0.73*  --  0.65*   LACTATE mmol/L  --  0.9  --      COVID19   Date Value Ref Range Status   03/03/2023 Not Detected Not Detected - Ref. Range Final     SARS-CoV-2, MADHAV   Date Value Ref Range Status   07/28/2022 NEGATIVE Negative Final     Comment:     The 2019-CoV rRT-PCR Assay is only for use under a Food and Drug Administration Emergency Use Authorization. The performance characteristics of the assay were verified by the Clinical Laboratory at [facility]. Results should be used in   conjunction with the patient's clinical symptoms, medical history, and other clinical/laboratory findings to determine an overall clinical diagnosis. Negative results do not preclude infection with SARS-CoV-2 (COVID-19).    Test parameters have not been validated for screening asymptomatic patients.     No results found for: HGBA1C, POCGLU    CT Abdomen Pelvis With Contrast  Narrative: CT OF THE ABDOMEN AND PELVIS WITH CONTRAST     HISTORY: Nausea, vomiting, and diarrhea     COMPARISON: 12/31/2020.     TECHNIQUE: Axial CT imaging was obtained through the abdomen and pelvis.  IV contrast was administered.     FINDINGS:  Unfortunately, this patient's most recent imaging is not  available for  comparison. Patient has a known history of pancreatic cancer and is  status post distal pancreatectomy and splenectomy. There is new  pneumobilia, and there are postsurgical changes of apparent  gastrojejunostomy and hepaticojejunostomy, which are new when compared  to prior report. Images through the lung bases demonstrate scarring and  atelectasis. There is an area of nodularity noted within the lingula,  measuring 8 mm. It may be an area of scarring, but attention to a  follow-up study in 3 months is suggested. There are trace bilateral  effusions, right greater than left, as well as some trace pericardial  fluid. Prominent cardiophrenic node measures 1.2 cm. There are some  low-attenuation lesions which are seen within the liver. The single  largest is seen within the left hepatic lobe, and measures approximately  4.2 x 4.3 cm. By prior report, this area measured 3.3 cm. There is no  evidence of obstruction at the gastrojejunostomy, although the distal  stomach somewhat thick-walled, and gastritis is not excluded. The  patient has a hypodense mass identified within the head of the pancreas,  measuring at least 2.1 x 1.9 cm. Previously, it was described as  measuring 2.9 x 1.3 cm The adrenal glands are unremarkable. The kidneys  enhance symmetrically. There is no hydronephrosis. Urinary bladder  appears somewhat thick-walled. Correlation with urinalysis and urine  cultures is recommended. Prostate gland is within normal limits. There  are multiple bladder diverticula. The patient now has moderate ascites.  This is a new finding when compared to prior report. There is  calcification of the aorta. There is some subcutaneous air within the  anterior abdominal wall. No acute osseous abnormalities are identified.     Impression:    1. Unfortunately, this patient's most recent prior imaging is not  available for comparison. In addition, the patient has evidently  undergone surgery since that imaging.  Patient is now status post  gastrojejunostomy and hepaticojejunostomy. There is no evidence of  obstruction related to this procedure. There is a somewhat thick-walled  appearance to the distal stomach, and gastritis is not excluded.  2. The patient does have moderate ascites. This is a new finding when  compared to prior imaging, according to prior imaging report.     Radiation dose reduction techniques were utilized, including automated  exposure control and exposure modulation based on body size.     This report was finalized on 3/3/2023 10:35 PM by Dr. Savannah Frost M.D.     XR Chest 1 View  Narrative: Emergency portable view of the chest on 03/03/2023     CLINICAL HISTORY: Generalized weakness. Vomiting and diarrhea.     This is correlated to a prior portable chest x-ray from [facility] on 05/13/2022.     FINDINGS: There is a right internal jugular Mediport in place with its  distal tip at the venoatrial junction, unchanged. The cardiomediastinal  silhouette and the pulmonary vasculature are within normal limits. There  is some linear stranding in the right perihilar region likely linear  atelectasis or scarring. The lung volumes are low with some minimal by  basilar atelectasis; otherwise, the lungs are clear and the costophrenic  angles are sharp.     Impression: 1. No definite active disease is seen in the chest. There are low lung  volumes with horizontal linear atelectasis or scarring at the lung bases  and there is some linear stranding within the right perihilar region  which is most likely linear atelectasis or scarring.  2. The right internal jugular Mediport tip is at the venoatrial  junction.     This report was finalized on 3/3/2023 8:14 PM by Dr. Jan Amado M.D.       I reviewed the patient's daily medications.  Scheduled Medications  busPIRone, 30 mg, Oral, Daily  Enoxaparin Sodium, 40 mg, Subcutaneous, Q24H  lamoTRIgine, 100 mg, Oral, Daily  levothyroxine, 112 mcg,  Oral, Q AM  NIFEdipine XL, 30 mg, Oral, Daily  pantoprazole, 40 mg, Oral, Q AM  sertraline, 200 mg, Oral, QAM  [START ON 3/6/2023] vitamin D, 50,000 Units, Oral, Once per day on Mon Thu    Infusions  sodium chloride, 100 mL/hr, Last Rate: 100 mL/hr (03/04/23 1214)    Diet  Diet: Liquid Diets; Full Liquid; Texture: Regular Texture (IDDSI 7); Fluid Consistency: Thin (IDDSI 0)         I have personally reviewed:  [x]  Laboratory   [x]  Microbiology   [x]  Radiology   [x]  EKG/Telemetry   []  Cardiology/Vascular   []  Pathology   [x]  Records     Assessment/Plan     Active Hospital Problems    Diagnosis  POA   • **Generalized weakness [R53.1]  Yes   • Nausea and vomiting [R11.2]  Unknown   • Diarrhea [R19.7]  Unknown   • Metastasis from pancreatic cancer (HCC) [C79.9, C25.9]  Unknown   • Anemia [D64.9]  Unknown   • Elevated WBC count [D72.829]  Unknown      Resolved Hospital Problems   No resolved problems to display.       73 y.o. male admitted with Generalized weakness.    Nausea and vomiting  Diarrhea  -This is likely chemo related  -CT abdomen showing gastrojejunostomy and hepaticojejunostomy.  No evidence of obstruction.  -Nausea vomiting has resolved and patient was able to finish his liquid breakfast  -Follows with Dr. Powers as an outpatient and Dr. Rogers  -Stool GI panel and C. difficile both negative.  -Advance diet as tolerated    Metastatic pancreatic cancer  Status post gastrojejunostomy and hepaticojejunostomy  -Oncology consulted, appreciate recommendations.  Discussed with oncology    Leukocytosis-likely secondary to nausea and vomiting.  Patient has been afebrile.  Has improved without antibiotics.  Procalcitonin mildly elevated, but this can be seen in cancer patients.  Continue to monitor off antibiotics.  Blood cultures pending.  UA not consistent with infection.    Chronic anemia-no active signs of bleeding.  We will continue to monitor.    · Lovenox 40 mg SC daily for DVT prophylaxis.  · Full  code.  · Discussed with patient, nursing staff and consulting provider.  · Anticipate discharge home in 1-2 days.      Tobi Garcia MD  [facility]  03/04/23  14:03 EST

## 2023-03-04 NOTE — ED NOTES
Nursing report ED to floor  K[facility] Valente  73 y.o.  male    HPI :   Chief Complaint   Patient presents with    Vomiting    Nausea    Weakness - Generalized    Diarrhea       Admitting doctor:   Be Iraheta MD    Admitting diagnosis:   The primary encounter diagnosis was Generalized weakness. Diagnoses of Diarrhea, unspecified type, Nausea and vomiting, unspecified vomiting type, and Fall, initial encounter were also pertinent to this visit.    Code status:   Current Code Status       Date Active Code Status Order ID Comments User Context       Prior            Allergies:   Codeine    Isolation:   Contact Spore    Intake and Output    Intake/Output Summary (Last 24 hours) at 3/3/2023 2342  Last data filed at 3/3/2023 2214  Gross per 24 hour   Intake 1100 ml   Output --   Net 1100 ml       Weight:       03/03/23  1920   Weight: 77.1 kg (170 lb)       Most recent vitals:   Vitals:    03/03/23 2106 03/03/23 2206 03/03/23 2236 03/03/23 2306   BP: 166/81 150/97 149/92 142/85   BP Location:       Patient Position:       Pulse: 71 78 83 88   Resp: 16 16  16   Temp:       TempSrc:       SpO2: 95% 92% 92% 92%   Weight:       Height:           Active LDAs/IV Access:   Lines, Drains & Airways       Active LDAs       Name Placement date Placement time Site Days    Colostomy LUQ 12/21/20 1954  LUQ  802    Single Lumen Implantable Port 04/29/22 Right Subclavian 04/29/22 1944  Subclavian  308                    Labs (abnormal labs have a star):   Labs Reviewed   COMPREHENSIVE METABOLIC PANEL - Abnormal; Notable for the following components:       Result Value    Glucose 114 (*)     Sodium 134 (*)     Calcium 8.4 (*)     Albumin 2.9 (*)     Alkaline Phosphatase 154 (*)     All other components within normal limits    Narrative:     GFR Normal >60  Chronic Kidney Disease <60  Kidney Failure <15    The GFR formula is only valid for adults with stable renal function between ages 18 and 70.   LIPASE - Abnormal; Notable for the  "following components:    Lipase 10 (*)     All other components within normal limits   CBC WITH AUTO DIFFERENTIAL - Abnormal; Notable for the following components:    WBC 16.98 (*)     RBC 3.44 (*)     Hemoglobin 9.8 (*)     Hematocrit 30.8 (*)     RDW 16.8 (*)     Neutrophil % 86.6 (*)     Lymphocyte % 7.4 (*)     Eosinophil % 0.1 (*)     Immature Grans % 0.6 (*)     Neutrophils, Absolute 14.71 (*)     Immature Grans, Absolute 0.11 (*)     All other components within normal limits   PROCALCITONIN - Abnormal; Notable for the following components:    Procalcitonin 0.65 (*)     All other components within normal limits    Narrative:     As a Marker for Sepsis (Non-Neonates):    1. <0.5 ng/mL represents a low risk of severe sepsis and/or septic shock.  2. >2 ng/mL represents a high risk of severe sepsis and/or septic shock.    As a Marker for Lower Respiratory Tract Infections that require antibiotic therapy:    PCT on Admission    Antibiotic Therapy       6-12 Hrs later    >0.5                Strongly Recommended  >0.25 - <0.5        Recommended   0.1 - 0.25          Discouraged              Remeasure/reassess PCT  <0.1                Strongly Discouraged     Remeasure/reassess PCT    As 28 day mortality risk marker: \"Change in Procalcitonin Result\" (>80% or <=80%) if Day 0 (or Day 1) and Day 4 values are available. Refer to http://www.readness.coms-pct-calculator.com    Change in PCT <=80%  A decrease of PCT levels below or equal to 80% defines a positive change in PCT test result representing a higher risk for 28-day all-cause mortality of patients diagnosed with severe sepsis for septic shock.    Change in PCT >80%  A decrease of PCT levels of more than 80% defines a negative change in PCT result representing a lower risk for 28-day all-cause mortality of patients diagnosed with severe sepsis or septic shock.      COVID-19 AND FLU A/B, NP SWAB IN TRANSPORT MEDIA 8-12 HR TAT - Normal    Narrative:     Fact sheet for " providers: https://www.fda.gov/media/387973/download    Fact sheet for patients: https://www.fda.gov/media/368559/download    Test performed by PCR.   GASTROINTESTINAL PANEL, PCR   CLOSTRIDIOIDES DIFFICILE TOXIN    Narrative:     The following orders were created for panel order Clostridioides difficile Toxin - Stool, Per Rectum.  Procedure                               Abnormality         Status                     ---------                               -----------         ------                     Clostridioides difficile...[[id number]]                                                   Please view results for these tests on the individual orders.   CLOSTRIDIOIDES DIFFICILE TOXIN, PCR   BLOOD CULTURE   BLOOD CULTURE   URINALYSIS W/ MICROSCOPIC IF INDICATED (NO CULTURE)   LACTIC ACID, PLASMA   CBC AND DIFFERENTIAL    Narrative:     The following orders were created for panel order CBC & Differential.  Procedure                               Abnormality         Status                     ---------                               -----------         ------                     CBC Auto Differential[[id number]]        Abnormal            Final result                 Please view results for these tests on the individual orders.       EKG:   ECG 12 Lead Other; generalized weakness   Preliminary Result   HEART RATE= 71  bpm   RR Interval= 845  ms   GA Interval= 175  ms   P Horizontal Axis= -8  deg   P Front Axis= 46  deg   QRSD Interval= 101  ms   QT Interval= 427  ms   QRS Axis= 57  deg   T Wave Axis= 36  deg   - NORMAL ECG -   Sinus rhythm   Electronically Signed By:    Date and Time of Study: 2023-03-03 20:07:58          Meds given in ED:   Medications   sodium chloride 0.9 % infusion (has no administration in time range)   ondansetron (ZOFRAN) injection 4 mg (has no administration in time range)   sodium chloride 0.9 % bolus 1,000 mL (0 mL Intravenous Stopped 3/3/23 2214)   iopamidol (ISOVUE-300) 61 % injection 85 mL (85  mL Intravenous Given 3/3/23 2136)       Imaging results:  CT Abdomen Pelvis With Contrast    Result Date: 3/3/2023   1. Unfortunately, this patient's most recent prior imaging is not available for comparison. In addition, the patient has evidently undergone surgery since that imaging. Patient is now status post gastrojejunostomy and hepaticojejunostomy. There is no evidence of obstruction related to this procedure. There is a somewhat thick-walled appearance to the distal stomach, and gastritis is not excluded. 2. The patient does have moderate ascites. This is a new finding when compared to prior imaging, according to prior imaging report.  Radiation dose reduction techniques were utilized, including automated exposure control and exposure modulation based on body size.  This report was finalized on 3/3/2023 10:35 PM by Dr. Savannah Frost M.D.      XR Chest 1 View    Result Date: 3/3/2023  1. No definite active disease is seen in the chest. There are low lung volumes with horizontal linear atelectasis or scarring at the lung bases and there is some linear stranding within the right perihilar region which is most likely linear atelectasis or scarring. 2. The right internal jugular Mediport tip is at the venoatrial junction.  This report was finalized on 3/3/2023 8:14 PM by Dr. Jan Amado M.D.       Ambulatory status:   -     Social issues:   Social History     Socioeconomic History    Marital status:     Number of children: 2    Highest education level: Master's degree (e.g., MA, MS, Lisa, MEd, MSW, THONG)   Tobacco Use    Smoking status: Never    Smokeless tobacco: Never   Vaping Use    Vaping Use: Never used   Substance and Sexual Activity    Alcohol use: Not Currently    Drug use: No    Sexual activity: Defer       NIH Stroke Scale:         Nikita Aleman RN  03/03/23 23:42 EST

## 2023-03-04 NOTE — ED PROVIDER NOTES
EMERGENCY DEPARTMENT ENCOUNTER    Room Number:  07/07  Date seen:  3/3/2023  PCP: Jared Noriega MD    HPI:  Chief Complaint: N/V/D    A complete HPI/ROS/PMH/PSH/SH/FH are unobtainable due to: n/a    Context: Kaiser Valente is a 73 y.o. male presenting to the emergency department complaining of nausea, vomiting and diarrhea.  The history is being obtained by the patient, his wife and by review of the medical chart.  The patient has a history of metastatic pancreatic cancer.  He is followed by [facility].  He is currently on a chemo regimen of liposomal irinotecan, fluorouracil and leucovorin.  He receives chemo every other Tuesday.  His last chemo treatment was 10 days ago.  He states that he has had progressive generalized weakness at home over the past 3 days.  He has had diarrhea for the past 3 days as well, as well as 1 episode of vomiting today.  He denies any loss of consciousness.  He does state that when he stands he feels generally weak and had a fall earlier today.  He denies hitting his head or any additional injury.  He does have a remote history of C. difficile, which required rounds of antibiotics to cure.    Medical Records Review:  Patient's lab results from 02/28/2023:    WBC - 13.7  RBC - 3.46  Hgb - 10.1  Hct - 31.0  Neut % - 85.6  Lymph % - 7.3  Abs Neut - 11.70    PAST MEDICAL HISTORY  Active Ambulatory Problems     Diagnosis Date Noted   • Acquired hypothyroidism 03/21/2016   • Osteoarthritis of both knees 03/21/2016   • Gastroesophageal reflux disease without esophagitis 03/21/2016   • Mixed hyperlipidemia 03/21/2016   • Monoclonal gammopathy of undetermined significance 09/26/2016   • Hypercalcemia 09/26/2016   • Chronic fatigue 10/11/2016   • Thrombocytopenia (HCC) 10/17/2016   • History of primary hyperparathyroidism 02/14/2017   • Hypogonadism in male 04/11/2017   • Toe pain, left 04/11/2017   • Recurrent major depressive disorder, in partial remission (HCC) 04/15/2017   •  Idiopathic gout, unspecified ankle and foot 04/16/2017   • Pseudogout of joint of right foot 05/19/2017   • Chronic gout of left foot 06/26/2017   • Hyperglycemia 03/29/2018   • Benign prostatic hyperplasia without lower urinary tract symptoms 03/29/2018   • Benign essential HTN 10/01/2018   • Colonic obstruction ([facility]) 12/21/2020   • Thrombocytopenia ([facility]) 12/22/2020   • Pancreatic adenocarcinoma ([facility]) 12/28/2020   • Chronic ITP (idiopathic thrombocytopenia) ([facility]) 12/29/2020   • Syncope 06/11/2021   • Leukocytosis 06/12/2021   • Acute blood loss anemia 01/21/2021   • Arthritis 02/24/2021   • Conduction disorder of the heart 02/24/2021   • Colostomy in place ([facility]) 02/24/2021   • Disorder of thyroid 02/24/2021   • Hypertensive disorder 02/24/2021   • Irritable bowel syndrome 02/24/2021   • Kidney disease 02/24/2021   • Platelet disorder ([facility]) 02/24/2021   • Sleep apnea 02/24/2021     Resolved Ambulatory Problems     Diagnosis Date Noted   • Abnormal EKG 03/21/2016   • Primary hyperparathyroidism ([facility]) 10/11/2016   • Parathyroid adenoma 02/03/2017   • Localized edema 04/11/2017   • Cellulitis of left lower extremity 04/15/2017   • Cellulitis of foot, left 04/15/2017   • Stage 3 chronic kidney disease (CMS/HCC) 10/16/2017   • Generalized abdominal pain 08/15/2018   • Pancreatic mass 12/21/2020     Past Medical History:   Diagnosis Date   • Anxiety    • Cancer (HCC) Pancreatic   • Depression    • GERD (gastroesophageal reflux disease)    • Hypothyroidism    • Inverted T wave    • Osteoarthritis    • Parathyroid disease (HCC)    • Peripheral neuropathy Feet. From chemo   • PONV (postoperative nausea and vomiting)    • PTSD (post-traumatic stress disorder)        PAST SURGICAL HISTORY  Past Surgical History:   Procedure Laterality Date   • COLON SURGERY     • COLONOSCOPY  07/2014   • COLONOSCOPY N/A 12/24/2020    Procedure: COLONOSCOPY;  Surgeon: Geronimo Branch Jr., MD;  Location: [facility] ENDOSCOPY;  Service: General;   Laterality: N/A;  colon obstruction  post:   colon obstruction from external compression   • ENDOSCOPY      several years ago, normal   • EXPLORATORY LAPAROTOMY N/A 12/21/2020    Procedure: LAPAROTOMY EXPLORATORY WITH LOOP COLOSTOMY;  Surgeon: Geronimo Branch Jr., MD;  Location: [facility];  Service: General;  Laterality: N/A;   • FRACTURE SURGERY  Wrist   • HEMORRHOIDECTOMY     • THYROIDECTOMY Left 02/03/2017    Procedure: Left inferior parathyroidectomy with intraoperative PTH monitoring;  Surgeon: Sarah Matos MD;  Location: [facility];  Service:    • WRIST FUSION Right        FAMILY HISTORY  Family History   Problem Relation Age of Onset   • Breast cancer Mother    • Heart disease Father    • Hypertension Father    • Colon cancer Sister    • Anxiety disorder Sister    • Anxiety disorder Son        SOCIAL HISTORY  Social History     Socioeconomic History   • Marital status:    • Number of children: 2   • Highest education level: Master's degree (e.g., MA, MS, Lisa, MEd, MSW, THONG)   Tobacco Use   • Smoking status: Never   • Smokeless tobacco: Never   Vaping Use   • Vaping Use: Never used   Substance and Sexual Activity   • Alcohol use: Not Currently   • Drug use: No   • Sexual activity: Defer       ALLERGIES  Codeine    REVIEW OF SYSTEMS  All systems reviewed and negative except for those discussed in HPI.     PHYSICAL EXAM  ED Triage Vitals [03/03/23 1920]   Temp Heart Rate Resp BP SpO2   98.3 °F (36.8 °C) 80 20 155/97 95 %      Temp src Heart Rate Source Patient Position BP Location FiO2 (%)   Oral Monitor Lying Right arm --       Physical Exam  Vitals and nursing note reviewed.   Constitutional:       Appearance: Normal appearance. He is ill-appearing.   HENT:      Head: Normocephalic and atraumatic.      Mouth/Throat:      Mouth: Mucous membranes are dry.   Eyes:      Extraocular Movements: Extraocular movements intact.      Pupils: Pupils are equal, round, and reactive to light.    Cardiovascular:      Rate and Rhythm: Normal rate and regular rhythm.   Pulmonary:      Effort: Pulmonary effort is normal.      Breath sounds: Normal breath sounds.   Abdominal:      General: There is no distension.      Palpations: Abdomen is soft.      Tenderness: There is no abdominal tenderness.   Musculoskeletal:      Cervical back: Normal range of motion and neck supple.   Neurological:      Mental Status: He is alert and oriented to person, place, and time. Mental status is at baseline.   Psychiatric:         Mood and Affect: Mood normal.         Behavior: Behavior normal.         Thought Content: Thought content normal.         Judgment: Judgment normal.       LAB RESULTS  Recent Results (from the past 24 hour(s))   Comprehensive Metabolic Panel    Collection Time: 03/03/23  7:42 PM    Specimen: Blood   Result Value Ref Range    Glucose 114 (H) 65 - 99 mg/dL    BUN 20 8 - 23 mg/dL    Creatinine 1.21 0.76 - 1.27 mg/dL    Sodium 134 (L) 136 - 145 mmol/L    Potassium 3.6 3.5 - 5.2 mmol/L    Chloride 100 98 - 107 mmol/L    CO2 24.0 22.0 - 29.0 mmol/L    Calcium 8.4 (L) 8.6 - 10.5 mg/dL    Total Protein 6.6 6.0 - 8.5 g/dL    Albumin 2.9 (L) 3.5 - 5.2 g/dL    ALT (SGPT) 24 1 - 41 U/L    AST (SGOT) 28 1 - 40 U/L    Alkaline Phosphatase 154 (H) 39 - 117 U/L    Total Bilirubin 1.1 0.0 - 1.2 mg/dL    Globulin 3.7 gm/dL    A/G Ratio 0.8 g/dL    BUN/Creatinine Ratio 16.5 7.0 - 25.0    Anion Gap 10.0 5.0 - 15.0 mmol/L    eGFR 62.4 >60.0 mL/min/1.73   Lipase    Collection Time: 03/03/23  7:42 PM    Specimen: Blood   Result Value Ref Range    Lipase 10 (L) 13 - 60 U/L   CBC Auto Differential    Collection Time: 03/03/23  7:42 PM    Specimen: Blood   Result Value Ref Range    WBC 16.98 (H) 3.40 - 10.80 10*3/mm3    RBC 3.44 (L) 4.14 - 5.80 10*6/mm3    Hemoglobin 9.8 (L) 13.0 - 17.7 g/dL    Hematocrit 30.8 (L) 37.5 - 51.0 %    MCV 89.5 79.0 - 97.0 fL    MCH 28.5 26.6 - 33.0 pg    MCHC 31.8 31.5 - 35.7 g/dL    RDW 16.8 (H)  12.3 - 15.4 %    RDW-SD 53.5 37.0 - 54.0 fl    MPV 10.7 6.0 - 12.0 fL    Platelets 374 140 - 450 10*3/mm3    Neutrophil % 86.6 (H) 42.7 - 76.0 %    Lymphocyte % 7.4 (L) 19.6 - 45.3 %    Monocyte % 5.1 5.0 - 12.0 %    Eosinophil % 0.1 (L) 0.3 - 6.2 %    Basophil % 0.2 0.0 - 1.5 %    Immature Grans % 0.6 (H) 0.0 - 0.5 %    Neutrophils, Absolute 14.71 (H) 1.70 - 7.00 10*3/mm3    Lymphocytes, Absolute 1.26 0.70 - 3.10 10*3/mm3    Monocytes, Absolute 0.86 0.10 - 0.90 10*3/mm3    Eosinophils, Absolute 0.01 0.00 - 0.40 10*3/mm3    Basophils, Absolute 0.03 0.00 - 0.20 10*3/mm3    Immature Grans, Absolute 0.11 (H) 0.00 - 0.05 10*3/mm3    nRBC 0.1 0.0 - 0.2 /100 WBC   COVID-19 and FLU A/B PCR - Swab, Nasopharynx    Collection Time: 03/03/23  7:48 PM    Specimen: Nasopharynx; Swab   Result Value Ref Range    COVID19 Not Detected Not Detected - Ref. Range    Influenza A PCR Not Detected Not Detected    Influenza B PCR Not Detected Not Detected   ECG 12 Lead Other; generalized weakness    Collection Time: 03/03/23  8:07 PM   Result Value Ref Range    QT Interval 427 ms     I ordered the above labs and reviewed the results.    RADIOLOGY  CT Abdomen Pelvis With Contrast    Result Date: 3/3/2023  CT OF THE ABDOMEN AND PELVIS WITH CONTRAST  HISTORY: Nausea, vomiting, and diarrhea  COMPARISON: 12/31/2020.  TECHNIQUE: Axial CT imaging was obtained through the abdomen and pelvis. IV contrast was administered.  FINDINGS: Unfortunately, this patient's most recent imaging is not available for comparison. Patient has a known history of pancreatic cancer and is status post distal pancreatectomy and splenectomy. There is new pneumobilia, and there are postsurgical changes of apparent gastrojejunostomy and hepaticojejunostomy, which are new when compared to prior report. Images through the lung bases demonstrate scarring and atelectasis. There is an area of nodularity noted within the lingula, measuring 8 mm. It may be an area of scarring,  but attention to a follow-up study in 3 months is suggested. There are trace bilateral effusions, right greater than left, as well as some trace pericardial fluid. Prominent cardiophrenic node measures 1.2 cm. There are some low-attenuation lesions which are seen within the liver. The single largest is seen within the left hepatic lobe, and measures approximately 4.2 x 4.3 cm. By prior report, this area measured 3.3 cm. There is no evidence of obstruction at the gastrojejunostomy, although the distal stomach somewhat thick-walled, and gastritis is not excluded. The patient has a hypodense mass identified within the head of the pancreas, measuring at least 2.1 x 1.9 cm. Previously, it was described as measuring 2.9 x 1.3 cm The adrenal glands are unremarkable. The kidneys enhance symmetrically. There is no hydronephrosis. Urinary bladder appears somewhat thick-walled. Correlation with urinalysis and urine cultures is recommended. Prostate gland is within normal limits. There are multiple bladder diverticula. The patient now has moderate ascites. This is a new finding when compared to prior report. There is calcification of the aorta. There is some subcutaneous air within the anterior abdominal wall. No acute osseous abnormalities are identified.       1. Unfortunately, this patient's most recent prior imaging is not available for comparison. In addition, the patient has evidently undergone surgery since that imaging. Patient is now status post gastrojejunostomy and hepaticojejunostomy. There is no evidence of obstruction related to this procedure. There is a somewhat thick-walled appearance to the distal stomach, and gastritis is not excluded. 2. The patient does have moderate ascites. This is a new finding when compared to prior imaging, according to prior imaging report.  Radiation dose reduction techniques were utilized, including automated exposure control and exposure modulation based on body size.  This report  was finalized on 3/3/2023 10:35 PM by Dr. Savannah Frost M.D.      XR Chest 1 View    Result Date: 3/3/2023  Emergency portable view of the chest on 03/03/2023  CLINICAL HISTORY: Generalized weakness. Vomiting and diarrhea.  This is correlated to a prior portable chest x-ray from [facility] on 05/13/2022.  FINDINGS: There is a right internal jugular Mediport in place with its distal tip at the venoatrial junction, unchanged. The cardiomediastinal silhouette and the pulmonary vasculature are within normal limits. There is some linear stranding in the right perihilar region likely linear atelectasis or scarring. The lung volumes are low with some minimal by basilar atelectasis; otherwise, the lungs are clear and the costophrenic angles are sharp.      1. No definite active disease is seen in the chest. There are low lung volumes with horizontal linear atelectasis or scarring at the lung bases and there is some linear stranding within the right perihilar region which is most likely linear atelectasis or scarring. 2. The right internal jugular Mediport tip is at the venoatrial junction.  This report was finalized on 3/3/2023 8:14 PM by Dr. Jan Amado M.D.      I ordered the above noted radiological studies. Reviewed by me in PACS.      PROCEDURES  Procedures  None    MEDICATIONS GIVEN IN ER  Medications   sodium chloride 0.9 % bolus 1,000 mL (0 mL Intravenous Stopped 3/3/23 2214)   iopamidol (ISOVUE-300) 61 % injection 85 mL (85 mL Intravenous Given 3/3/23 2136)       MEDICAL DECISION MAKING, PROGRESS, and CONSULTS  Vital signs and nursing notes have all been reviewed by me.    All laboratory results have been independently interpreted by me.      All radiology studies have been reviewed and independently interpreted by me and discussed with radiologist dictating the report.       Orders placed during this visit:  Orders Placed This Encounter   Procedures   • COVID-19 and FLU A/B PCR - Swab,  Nasopharynx   • Gastrointestinal Panel, PCR - Stool, Per Rectum   • Clostridioides difficile Toxin - Stool, Per Rectum   • Clostridioides difficile Toxin, PCR - Stool, Per Rectum   • Blood Culture - Blood,   • Blood Culture - Blood,   • XR Chest 1 View   • CT Abdomen Pelvis With Contrast   • Comprehensive Metabolic Panel   • Urinalysis With Microscopic If Indicated (No Culture) - Urine, Clean Catch   • Lipase   • CBC Auto Differential   • Lactic Acid, Plasma   • Procalcitonin   • Pulse Oximetry, Continuous   • Monitor Blood Pressure   • Discontinue Cardiac Monitoring   • [facility] (on-call MD unless specified) Details   • Patient Isolation Contact Spore   • ECG 12 Lead Other; generalized weakness   • Initiate Observation Status   • CBC & Differential       Differential diagnosis includes but is not limited to:  -Adverse reaction to chemotherapy  -Dehydration  -Influenza/COVID or other viral gastroenteritis    Independent interpretation of labs, radiology studies, and discussions with consultants: Dr. Iraheta with [facility]    Discussion below represents my analysis of pertinent findings related to patient's condition, differential diagnosis, treatment plan and final disposition:    The patient presents with generalized weakness and had a fall at home earlier today.  On clinical examination, he appears dry.  He has been having nausea, vomiting and diarrhea.  He is currently undergoing chemotherapy.  While the symptoms could certainly be side effects of his chemotherapy, the patient does have an elevated white count and a CT scan which mentions thickening of the urinary bladder.  At this time, we have been unable to obtain urinalysis.  We will plan to admit the patient to the hospitalist service for further hydration and in order to obtain a urinalysis and blood cultures - and potentially a stool sample.    ED Course as of 03/03/23 2313   Fri Mar 03, 2023   2011 EKG independently interpreted by myself.  Time 8:07 PM.  Sinus  rhythm.  Heart rate 71.  Normal axis.  Normal intervals.  No acute ST abnormality. [TD]   2302 I discussed the patient's overall care with Dr. Iraheta with A.  He is agreeable to admission. [AR]      ED Course User Index  [AR] Kenya Henry PA  [TD] Nick Rebollar II, MD            PPE: The patient was placed in a face mask in first look. Patient was wearing facemask when I entered the room and throughout our encounter. I wore full protective equipment throughout this patient encounter including a face mask, eye protection and gloves. Hand hygiene was performed before donning protective equipment and after removal when leaving the room.    DIAGNOSIS  Final diagnoses:   Generalized weakness   Diarrhea, unspecified type   Nausea and vomiting, unspecified vomiting type   Fall, initial encounter       DISPOSITION  ED Disposition     ED Disposition   Decision to Admit    Condition   --    Comment   Level of Care: Med/Surg [1]   Diagnosis: Generalized weakness [496050]   Admitting Physician: JOHN IRAHETA [[id number]]   Attending Physician: JOHN IRAHETA [[id number]]   Bed Request Comments: oncology floor - [address]             RX  Medications   sodium chloride 0.9 % bolus 1,000 mL (0 mL Intravenous Stopped 3/3/23 2214)   iopamidol (ISOVUE-300) 61 % injection 85 mL (85 mL Intravenous Given 3/3/23 2136)          Medication List      No changes were made to your prescriptions during this visit.         Latest Documented Vital Signs:  As of 23:13 EST  BP- 150/97 HR- 78 Temp- 98.3 °F (36.8 °C) (Oral) O2 sat- 92%    Provider Attestation:  I personally reviewed the past medical history, past surgical history, social history, family history, current medications and allergies as they appear in the chart. I reviewed the patient's history, physical, lab/imaging results and overall care with Dr. Rebollar who is in agreement with the patient's treatment plan.    EMR Dragon/Transcription disclaimer:  Dictated using Dragon  dictation    Provider note signed by:    Note Disclaimer: At [facility], we believe that sharing information builds trust and better relationships. You are receiving this note because you are receiving care at [facility] or recently visited. It is possible you will see health information before a provider has talked with you about it. This kind of information can be easy to misunderstand. To help you fully understand what it means for your health, we urge you to discuss this note with your provider.       Kenya Henry PA  03/03/23 [id number]

## 2023-03-04 NOTE — THERAPY EVALUATION
Patient Name: Kaiser Valente  : 1950    MRN: [id number]                              Today's Date: 3/4/2023       Admit Date: 3/3/2023    Visit Dx:     ICD-10-CM ICD-9-CM   1. Generalized weakness  R53.1 780.79   2. Diarrhea, unspecified type  R19.7 787.91   3. Nausea and vomiting, unspecified vomiting type  R11.2 787.01   4. Fall, initial encounter  W19.XXXA E888.9     Patient Active Problem List   Diagnosis   • Acquired hypothyroidism   • Osteoarthritis of both knees   • Gastroesophageal reflux disease without esophagitis   • Mixed hyperlipidemia   • Monoclonal gammopathy of undetermined significance   • Hypercalcemia   • Chronic fatigue   • Thrombocytopenia (HCC)   • History of primary hyperparathyroidism   • Hypogonadism in male   • Toe pain, left   • Recurrent major depressive disorder, in partial remission ([facility])   • Idiopathic gout, unspecified ankle and foot   • Pseudogout of joint of right foot   • Chronic gout of left foot   • Hyperglycemia   • Benign prostatic hyperplasia without lower urinary tract symptoms   • Benign essential HTN   • Colonic obstruction (HCC)   • Thrombocytopenia (HCC)   • Pancreatic adenocarcinoma (HCC)   • Chronic ITP (idiopathic thrombocytopenia) (HCC)   • Syncope   • Elevated WBC count   • Acute blood loss anemia   • Arthritis   • Conduction disorder of the heart   • Colostomy in place ([facility])   • Disorder of thyroid   • Hypertensive disorder   • Irritable bowel syndrome   • Kidney disease   • Platelet disorder (HCC)   • Sleep apnea   • Generalized weakness   • Nausea and vomiting   • Diarrhea   • Metastasis from pancreatic cancer (HCC)   • Anemia     Past Medical History:   Diagnosis Date   • Anxiety    • Cancer (HCC) Pancreatic   • Depression    • GERD (gastroesophageal reflux disease)    • Hypercalcemia    • Hypothyroidism    • Inverted T wave     PT HAD STRESS ECHO DONE 4 YR AGO AND WAS NORMAL   • Osteoarthritis     Knees and ankles   • Parathyroid disease (HCC)    •  Peripheral neuropathy Feet. From chemo   • PONV (postoperative nausea and vomiting)    • PTSD (post-traumatic stress disorder)    • Sleep apnea     NO MACHINE     Past Surgical History:   Procedure Laterality Date   • COLON SURGERY     • COLONOSCOPY  07/2014   • COLONOSCOPY N/A 12/24/2020    Procedure: COLONOSCOPY;  Surgeon: Geronimo Branch Jr., MD;  Location: [facility] ENDOSCOPY;  Service: General;  Laterality: N/A;  colon obstruction  post:   colon obstruction from external compression   • ENDOSCOPY      several years ago, normal   • EXPLORATORY LAPAROTOMY N/A 12/21/2020    Procedure: LAPAROTOMY EXPLORATORY WITH LOOP COLOSTOMY;  Surgeon: Geronimo Branch Jr., MD;  Location: [facility] MAIN OR;  Service: General;  Laterality: N/A;   • FRACTURE SURGERY  Wrist   • HEMORRHOIDECTOMY     • THYROIDECTOMY Left 02/03/2017    Procedure: Left inferior parathyroidectomy with intraoperative PTH monitoring;  Surgeon: Sarah Matos MD;  Location: [facility] MAIN OR;  Service:    • WRIST FUSION Right       General Information     Row Name 03/04/23 1537          Physical Therapy Time and Intention    Document Type evaluation  -CS     Mode of Treatment individual therapy;physical therapy  -CS     Row Name 03/04/23 1537          General Information    Patient Profile Reviewed yes  -CS     Prior Level of Function independent:;all household mobility;gait;transfer;bed mobility  RW for mobility  -CS     Existing Precautions/Restrictions fall  -CS     Barriers to Rehab medically complex  -CS     Row Name 03/04/23 1537          Living Environment    People in Home spouse  -CS     Row Name 03/04/23 1537          Home Main Entrance    Number of Stairs, Main Entrance four;other (see comments)  2 + 2  -CS     Stair Railings, Main Entrance railings safe and in good condition  -CS     Row Name 03/04/23 1537          Stairs Within Home, Primary    Number of Stairs, Within Home, Primary none  -CS     Row Name 03/04/23 1537          Cognition     Orientation Status (Cognition) oriented x 4  -CS     Row Name 03/04/23 1537          Safety Issues, Functional Mobility    Impairments Affecting Function (Mobility) balance;endurance/activity tolerance;strength  -CS           User Key  (r) = Recorded By, (t) = Taken By, (c) = Cosigned By    Initials Name Provider Type    CS Gemma Heredia, PT Physical Therapist               Mobility     Row Name 03/04/23 1537          Bed Mobility    Bed Mobility supine-sit;sit-supine  -CS     Supine-Sit [place] (Bed Mobility) standby assist  -CS     Sit-Supine [place] (Bed Mobility) standby assist  -CS     Assistive Device (Bed Mobility) head of bed elevated  -CS     Row Name 03/04/23 1537          Sit-Stand Transfer    Sit-Stand [place] (Transfers) contact guard  -CS     Assistive Device (Sit-Stand Transfers) walker, front-wheeled  -CS     Row Name 03/04/23 1537          Gait/Stairs (Locomotion)    [place] Level (Gait) contact guard;minimum assist (75% patient effort)  -CS     Assistive Device (Gait) walker, front-wheeled  -CS     Distance in Feet (Gait) 200'  -CS     Deviations/Abnormal Patterns (Gait) kimi decreased;gait speed decreased;stride length decreased  -CS     Bilateral Gait Deviations forward flexed posture  -CS     [place] Level (Stairs) not tested  -CS     Comment, (Gait/Stairs) worsened balance as pt fatigues especially with turns; cues for upright posture  -CS           User Key  (r) = Recorded By, (t) = Taken By, (c) = Cosigned By    Initials Name Provider Type    Gemma Christian, PT Physical Therapist               Obj/Interventions     Row Name 03/04/23 1539          Range of Motion Comprehensive    General Range of Motion bilateral lower extremity ROM WFL  -     Row Name 03/04/23 1539          Strength Comprehensive (MMT)    General Manual Muscle Testing (MMT) Assessment no strength deficits identified  -CS     Comment, General Manual Muscle Testing (MMT) Assessment B LE 4+/5  except B hip flexor 4/5  -CS     Row Name 03/04/23 1539          Balance    Balance Assessment sitting static balance;sitting dynamic balance;standing static balance;standing dynamic balance  -CS     Static Sitting Balance standby assist  -CS     Dynamic Sitting Balance standby assist  -CS     Position, Sitting Balance unsupported;sitting edge of bed  -CS     Static Standing Balance contact guard  -CS     Dynamic Standing Balance contact guard;minimal assist  -CS     Position/Device Used, Standing Balance supported;walker, front-wheeled  -CS           User Key  (r) = Recorded By, (t) = Taken By, (c) = Cosigned By    Initials Name Provider Type    CS Gemma Heredia, PT Physical Therapist               Goals/Plan     Row Name 03/04/23 1546          Bed Mobility Goal 1 (PT)    Activity/Assistive Device (Bed Mobility Goal 1, PT) bed mobility activities, all  -CS     [place] Level/Cues Needed (Bed Mobility Goal 1, PT) supervision required  -CS     Time Frame (Bed Mobility Goal 1, PT) 1 week  -CS     Row Name 03/04/23 1546          Transfer Goal 1 (PT)    Activity/Assistive Device (Transfer Goal 1, PT) sit-to-stand/stand-to-sit;bed-to-chair/chair-to-bed  -CS     [place] Level/Cues Needed (Transfer Goal 1, PT) standby assist  -CS     Time Frame (Transfer Goal 1, PT) 1 week  -     Row Name 03/04/23 1546          Gait Training Goal 1 (PT)    Activity/Assistive Device (Gait Training Goal 1, PT) gait (walking locomotion);assistive device use;decrease fall risk;improve balance and speed;increase endurance/gait distance  -CS     [place] Level (Gait Training Goal 1, PT) contact guard required  -CS     Distance (Gait Training Goal 1, PT) 200'  -CS     Time Frame (Gait Training Goal 1, PT) 1 week  -CS     Row Name 03/04/23 1546          Stairs Goal 1 (PT)    Activity/Assistive Device (Stairs Goal 1, PT) ascending stairs;descending stairs  -CS     [place] Level/Cues Needed (Stairs Goal 1, PT) contact guard  required  -CS     Number of Stairs (Stairs Goal 1, PT) 4  -CS     Time Frame (Stairs Goal 1, PT) 1 week  -CS           User Key  (r) = Recorded By, (t) = Taken By, (c) = Cosigned By    Initials Name Provider Type    CS Gemma Heredia, PT Physical Therapist               Clinical Impression     Row Name 03/04/23 1531          Pain    Pretreatment Pain Rating 0/10 - no pain  -CS     Posttreatment Pain Rating 0/10 - no pain  -CS     Row Name 03/04/23 1535          Plan of Care Review    Plan of Care Reviewed With patient;family  -CS     Outcome Evaluation Pt is a 74 y/o M admitted to [facility] with c/o N&V, diarrhea, and generalized weakness that is likely chemo related as pt dx with metastatic pancreatic cancer. Pt received in bed upon arrival and agreeable to PT eval. Pt reports he lives with his spouse with 2+2 ANN and was using a RW for mobility prior to admission. Pt presents to PT with generalized weakness, decreased endurance, and impaired balance and functional mobility. Pt completed bed mobility this visit with SBA. Pt stood and ambulated 200' c RW requiring CGA/min A. Pt initially demonstrated good gait mechanics but as distance advanced pt fatigued and balance worsened with multiple LOB. Pt with c/o UE fatigue and forward flexed posture with cues provided for posture correction and safety. Pt returned to bed with all needs in reach at end of session. PT recommends home with 24/7 care and HHPT.  -CS     Row Name 03/04/23 153          Therapy Assessment/Plan (PT)    Patient/Family Therapy Goals Statement (PT) to return home  -CS     Rehab Potential (PT) good, to achieve stated therapy goals  -CS     Criteria for Skilled Interventions Met (PT) yes;meets criteria  -CS     Therapy Frequency (PT) 3 times/wk  -CS     Row Name 03/04/23 1538          Positioning and Restraints    Pre-Treatment Position in bed  -CS     Post Treatment Position bed  -CS     In Bed notified nsg;fowlers;call light within reach;encouraged  to call for assist;exit alarm on  -CS           User Key  (r) = Recorded By, (t) = Taken By, (c) = Cosigned By    Initials Name Provider Type    Gemma Christian PT Physical Therapist               Outcome Measures     Row Name 03/04/23 1547 03/04/23 0908       How much help from another person do you currently need...    Turning from your back to your side while in flat bed without using bedrails? 4  -CS 4  -AT    Moving from lying on back to sitting on the side of a flat bed without bedrails? 4  -CS 4  -AT    Moving to and from a bed to a chair (including a wheelchair)? 3  -CS 3  -AT    Standing up from a chair using your arms (e.g., wheelchair, bedside chair)? 3  -CS 3  -AT    Climbing 3-5 steps with a railing? 2  -CS 2  -AT    To walk in hospital room? 3  -CS 3  -AT    AM-PAC 6 Clicks Score (PT) 19  -CS 19  -AT    Highest level of mobility 6 --> Walked 10 steps or more  -CS 6 --> Walked 10 steps or more  -AT    [facility] Name 03/04/23 1547          Functional Assessment    Outcome Measure Options AM-PAC 6 Clicks Basic Mobility (PT)  -CS           User Key  (r) = Recorded By, (t) = Taken By, (c) = Cosigned By    Initials Name Provider Type    AT Ariadne Oro RN Registered Nurse    Gemma Christian PT Physical Therapist                             Physical Therapy Education     Title: PT OT SLP Therapies (In Progress)     Topic: Physical Therapy (In Progress)     Point: Mobility training (Done)     Learning Progress Summary           Patient Acceptance, E,TB, VU,DU,NR by CS at 3/4/2023 1547   Family Acceptance, E,TB, VU,DU,NR by CS at 3/4/2023 1547                   Point: Home exercise program (Not Started)     Learner Progress:  Not documented in this visit.          Point: Body mechanics (Done)     Learning Progress Summary           Patient Acceptance, E,TB, VU,DU,NR by CS at 3/4/2023 1547   Family Acceptance, E,TB, VU,DU,NR by CS at 3/4/2023 1547                   Point: Precautions (Done)     Learning  Progress Summary           Patient Acceptance, E,TB, VU,DU,NR by CS at 3/4/2023 1547   Family Acceptance, E,TB, VU,DU,NR by  at 3/4/2023 1547                               User Key     Initials Effective Dates Name Provider Type Discipline     09/22/22 -  Gemma Heredia, PT Physical Therapist PT              PT Recommendation and Plan     Plan of Care Reviewed With: patient, family  Outcome Evaluation: Pt is a 72 y/o M admitted to [facility] with c/o N&V, diarrhea, and generalized weakness that is likely chemo related as pt dx with metastatic pancreatic cancer. Pt received in bed upon arrival and agreeable to PT eval. Pt reports he lives with his spouse with 2+2 ANN and was using a RW for mobility prior to admission. Pt presents to PT with generalized weakness, decreased endurance, and impaired balance and functional mobility. Pt completed bed mobility this visit with SBA. Pt stood and ambulated 200' c RW requiring CGA/min A. Pt initially demonstrated good gait mechanics but as distance advanced pt fatigued and balance worsened with multiple LOB. Pt with c/o UE fatigue and forward flexed posture with cues provided for posture correction and safety. Pt returned to bed with all needs in reach at end of session. PT recommends home with 24/7 care and HHPT.     Time Calculation:    PT Charges     Row Name 03/04/23 1547             Time Calculation    Start Time 1506  -CS      Stop Time 1535  -CS      Time Calculation (min) 29 min  -CS      PT Received On 03/04/23  -CS      PT - Next Appointment 03/06/23  -      PT Goal Re-Cert Due Date 03/11/23  -CS         Time Calculation- PT    Total Timed Code Minutes- PT 25 minute(s)  -CS         Timed Charges    96771 - Gait Training Minutes  12  -CS      15351 - PT Therapeutic Activity Minutes 13  -CS         Total Minutes    Timed Charges Total Minutes 25  -CS       Total Minutes 25  -CS            User Key  (r) = Recorded By, (t) = Taken By, (c) = Cosigned By    Initials Name  Provider Type    CS Gemma Heredia, PT Physical Therapist              Therapy Charges for Today     Code Description Service Date Service Provider Modifiers Qty    28352553482 HC GAIT TRAINING EA 15 MIN 3/4/2023 Gemma Heredia, PT GP 1    53077316492 HC PT THERAPEUTIC ACT EA 15 MIN 3/4/2023 Gemma Heredia, PT GP 1    42166420980 HC PT EVAL MOD COMPLEXITY 3 3/4/2023 Gemma Heredia, PT GP 1          PT G-Codes  Outcome Measure Options: AM-PAC 6 Clicks Basic Mobility (PT)  AM-PAC 6 Clicks Score (PT): 19  PT Discharge Summary  Anticipated Discharge Disposition (PT): home with 24/7 care, home with home health    Gemma Heredia PT  3/4/2023

## 2023-03-04 NOTE — PLAN OF CARE
Goal Outcome Evaluation:  Plan of Care Reviewed With: patient        Progress: no change       Pt admitted to [facility] from ED. C.diff rule out- waiting for pt to have a BM to send stool sample. Port in place. Dressing changed. Pt had fall at home prior to coming to hospital. Bruise on left hip from fall per pt report. Bed alarm in use for pt safety.

## 2023-03-05 LAB
ALBUMIN SERPL-MCNC: 2.4 G/DL (ref 3.5–5.2)
ALBUMIN/GLOB SERPL: 0.7 G/DL
ALP SERPL-CCNC: 124 U/L (ref 39–117)
ALT SERPL W P-5'-P-CCNC: 19 U/L (ref 1–41)
ANION GAP SERPL CALCULATED.3IONS-SCNC: 6 MMOL/L (ref 5–15)
AST SERPL-CCNC: 23 U/L (ref 1–40)
BASOPHILS # BLD AUTO: 0.06 10*3/MM3 (ref 0–0.2)
BASOPHILS NFR BLD AUTO: 0.5 % (ref 0–1.5)
BILIRUB SERPL-MCNC: 0.9 MG/DL (ref 0–1.2)
BUN SERPL-MCNC: 16 MG/DL (ref 8–23)
BUN/CREAT SERPL: 18 (ref 7–25)
CALCIUM SPEC-SCNC: 8 MG/DL (ref 8.6–10.5)
CHLORIDE SERPL-SCNC: 109 MMOL/L (ref 98–107)
CO2 SERPL-SCNC: 24 MMOL/L (ref 22–29)
CREAT SERPL-MCNC: 0.89 MG/DL (ref 0.76–1.27)
DEPRECATED RDW RBC AUTO: 52.6 FL (ref 37–54)
EGFRCR SERPLBLD CKD-EPI 2021: 89.4 ML/MIN/1.73
EOSINOPHIL # BLD AUTO: 0.31 10*3/MM3 (ref 0–0.4)
EOSINOPHIL NFR BLD AUTO: 2.4 % (ref 0.3–6.2)
ERYTHROCYTE [DISTWIDTH] IN BLOOD BY AUTOMATED COUNT: 16.7 % (ref 12.3–15.4)
GLOBULIN UR ELPH-MCNC: 3.4 GM/DL
GLUCOSE SERPL-MCNC: 98 MG/DL (ref 65–99)
HCT VFR BLD AUTO: 25.2 % (ref 37.5–51)
HGB BLD-MCNC: 8.3 G/DL (ref 13–17.7)
IMM GRANULOCYTES # BLD AUTO: 0.08 10*3/MM3 (ref 0–0.05)
IMM GRANULOCYTES NFR BLD AUTO: 0.6 % (ref 0–0.5)
LYMPHOCYTES # BLD AUTO: 1.52 10*3/MM3 (ref 0.7–3.1)
LYMPHOCYTES NFR BLD AUTO: 11.8 % (ref 19.6–45.3)
MCH RBC QN AUTO: 29.2 PG (ref 26.6–33)
MCHC RBC AUTO-ENTMCNC: 32.9 G/DL (ref 31.5–35.7)
MCV RBC AUTO: 88.7 FL (ref 79–97)
MONOCYTES # BLD AUTO: 1.14 10*3/MM3 (ref 0.1–0.9)
MONOCYTES NFR BLD AUTO: 8.9 % (ref 5–12)
NEUTROPHILS NFR BLD AUTO: 75.8 % (ref 42.7–76)
NEUTROPHILS NFR BLD AUTO: 9.77 10*3/MM3 (ref 1.7–7)
NRBC BLD AUTO-RTO: 0.1 /100 WBC (ref 0–0.2)
PLATELET # BLD AUTO: 346 10*3/MM3 (ref 140–450)
PMV BLD AUTO: 11 FL (ref 6–12)
POTASSIUM SERPL-SCNC: 3.7 MMOL/L (ref 3.5–5.2)
PROT SERPL-MCNC: 5.8 G/DL (ref 6–8.5)
RBC # BLD AUTO: 2.84 10*6/MM3 (ref 4.14–5.8)
SODIUM SERPL-SCNC: 139 MMOL/L (ref 136–145)
WBC NRBC COR # BLD: 12.88 10*3/MM3 (ref 3.4–10.8)

## 2023-03-05 PROCEDURE — 96361 HYDRATE IV INFUSION ADD-ON: CPT

## 2023-03-05 PROCEDURE — 25010000002 ENOXAPARIN PER 10 MG: Performed by: HOSPITALIST

## 2023-03-05 PROCEDURE — 99231 SBSQ HOSP IP/OBS SF/LOW 25: CPT | Performed by: INTERNAL MEDICINE

## 2023-03-05 PROCEDURE — 85025 COMPLETE CBC W/AUTO DIFF WBC: CPT | Performed by: INTERNAL MEDICINE

## 2023-03-05 PROCEDURE — 80053 COMPREHEN METABOLIC PANEL: CPT | Performed by: INTERNAL MEDICINE

## 2023-03-05 PROCEDURE — G0378 HOSPITAL OBSERVATION PER HR: HCPCS

## 2023-03-05 RX ADMIN — PANTOPRAZOLE SODIUM 40 MG: 40 TABLET, DELAYED RELEASE ORAL at 06:33

## 2023-03-05 RX ADMIN — OXYCODONE HYDROCHLORIDE 5 MG: 5 TABLET ORAL at 20:08

## 2023-03-05 RX ADMIN — BUSPIRONE HYDROCHLORIDE 30 MG: 15 TABLET ORAL at 08:39

## 2023-03-05 RX ADMIN — ENOXAPARIN SODIUM 40 MG: 100 INJECTION SUBCUTANEOUS at 08:39

## 2023-03-05 RX ADMIN — LEVOTHYROXINE SODIUM 112 MCG: 0.11 TABLET ORAL at 06:33

## 2023-03-05 RX ADMIN — NIFEDIPINE 30 MG: 30 TABLET, FILM COATED, EXTENDED RELEASE ORAL at 08:39

## 2023-03-05 RX ADMIN — SERTRALINE 200 MG: 100 TABLET, FILM COATED ORAL at 06:33

## 2023-03-05 RX ADMIN — LAMOTRIGINE 100 MG: 100 TABLET ORAL at 08:39

## 2023-03-05 NOTE — PROGRESS NOTES
Name: Kaiser Valente ADMIT: 3/3/2023   : 1950  PCP: Jared Noriega MD    MRN: [id number] LOS: 0 days   AGE/SEX: 73 y.o. male  ROOM: [facility]     Subjective   Subjective   Patient ate breakfast well today.  Continue GI soft diet.  No nausea or vomiting.  Patient has some diarrhea but does not want Imodium as is given him constipation in the past.  Patient without power, his wife and son stayed at a [facility] hotel last night but have checked out.    Review of Systems  Constitutional: Negative for chills and fever.   Respiratory: Negative for cough and shortness of breath.    Cardiovascular: Negative for chest pain and palpitations.   Gastrointestinal: Positive for diarrhea. Negative for abdominal pain, nausea and vomiting.      Objective   Objective   Vital Signs  Temp:  [97 °F (36.1 °C)-98.5 °F (36.9 °C)] 97 °F (36.1 °C)  Heart Rate:  [69-83] 80  Resp:  [14-18] 16  BP: (124-156)/(70-80) 124/70  SpO2:  [91 %-96 %] 94 %  on   ;   Device (Oxygen Therapy): room air  Body mass index is 24.45 kg/m².  Physical Exam  Constitutional:       Appearance: He is normal weight.   HENT:      Mouth/Throat:      Mouth: Mucous membranes are moist.      Pharynx: Oropharynx is clear.   Cardiovascular:      Rate and Rhythm: Normal rate and regular rhythm.      Pulses: Normal pulses.      Heart sounds: No murmur heard.  Pulmonary:      Effort: Pulmonary effort is normal. No respiratory distress.   Abdominal:      General: Abdomen is flat. There is no distension.      Palpations: Abdomen is soft.      Tenderness: There is no abdominal tenderness.   Musculoskeletal:         General: No swelling or deformity.   Skin:     General: Skin is warm and dry.   Neurological:      General: No focal deficit present.      Mental Status: He is alert. Mental status is at baseline.    Results Review     I reviewed the patient's new clinical results.  Results from last 7 days   Lab Units 23  0630 23  0636 23   WBC  10*3/mm3 12.88* 12.58* 16.98*   HEMOGLOBIN g/dL 8.3* 8.4* 9.8*   PLATELETS 10*3/mm3 346 339 374     Results from last 7 days   Lab Units 03/05/23 0630 03/04/23  0636 03/03/23 1942   SODIUM mmol/L 139 140 134*   POTASSIUM mmol/L 3.7 3.4* 3.6   CHLORIDE mmol/L 109* 108* 100   CO2 mmol/L 24.0 24.5 24.0   BUN mg/dL 16 20 20   CREATININE mg/dL 0.89 1.10 1.21   GLUCOSE mg/dL 98 102* 114*   Estimated Creatinine Clearance: 78.4 mL/min (by C-G formula based on SCr of 0.89 mg/dL).  Results from last 7 days   Lab Units 03/05/23  0630 03/04/23 0636 03/03/23 1942   ALBUMIN g/dL 2.4* 2.5* 2.9*   BILIRUBIN mg/dL 0.9 0.9 1.1   ALK PHOS U/L 124* 126* 154*   AST (SGOT) U/L 23 25 28   ALT (SGPT) U/L 19 23 24     Results from last 7 days   Lab Units 03/05/23  0630 03/04/23  0636 03/03/23 1942   CALCIUM mg/dL 8.0* 8.3* 8.4*   ALBUMIN g/dL 2.4* 2.5* 2.9*     Results from last 7 days   Lab Units 03/04/23  0636 03/04/23  0003 03/03/23 1942   PROCALCITONIN ng/mL 0.73*  --  0.65*   LACTATE mmol/L  --  0.9  --      COVID19   Date Value Ref Range Status   03/03/2023 Not Detected Not Detected - Ref. Range Final     SARS-CoV-2, MADHAV   Date Value Ref Range Status   07/28/2022 NEGATIVE Negative Final     Comment:     The 2019-CoV rRT-PCR Assay is only for use under a Food and Drug Administration Emergency Use Authorization. The performance characteristics of the assay were verified by the Clinical Laboratory at [facility]. Results should be used in   conjunction with the patient's clinical symptoms, medical history, and other clinical/laboratory findings to determine an overall clinical diagnosis. Negative results do not preclude infection with SARS-CoV-2 (COVID-19).    Test parameters have not been validated for screening asymptomatic patients.     No results found for: HGBA1C, POCGLU    CT Abdomen Pelvis With Contrast  Narrative: CT OF THE ABDOMEN AND PELVIS WITH CONTRAST     HISTORY: Nausea, vomiting, and diarrhea     COMPARISON:  12/31/2020.     TECHNIQUE: Axial CT imaging was obtained through the abdomen and pelvis.  IV contrast was administered.     FINDINGS:  Unfortunately, this patient's most recent imaging is not available for  comparison. Patient has a known history of pancreatic cancer and is  status post distal pancreatectomy and splenectomy. There is new  pneumobilia, and there are postsurgical changes of apparent  gastrojejunostomy and hepaticojejunostomy, which are new when compared  to prior report. Images through the lung bases demonstrate scarring and  atelectasis. There is an area of nodularity noted within the lingula,  measuring 8 mm. It may be an area of scarring, but attention to a  follow-up study in 3 months is suggested. There are trace bilateral  effusions, right greater than left, as well as some trace pericardial  fluid. Prominent cardiophrenic node measures 1.2 cm. There are some  low-attenuation lesions which are seen within the liver. The single  largest is seen within the left hepatic lobe, and measures approximately  4.2 x 4.3 cm. By prior report, this area measured 3.3 cm. There is no  evidence of obstruction at the gastrojejunostomy, although the distal  stomach somewhat thick-walled, and gastritis is not excluded. The  patient has a hypodense mass identified within the head of the pancreas,  measuring at least 2.1 x 1.9 cm. Previously, it was described as  measuring 2.9 x 1.3 cm The adrenal glands are unremarkable. The kidneys  enhance symmetrically. There is no hydronephrosis. Urinary bladder  appears somewhat thick-walled. Correlation with urinalysis and urine  cultures is recommended. Prostate gland is within normal limits. There  are multiple bladder diverticula. The patient now has moderate ascites.  This is a new finding when compared to prior report. There is  calcification of the aorta. There is some subcutaneous air within the  anterior abdominal wall. No acute osseous abnormalities are identified.      Impression:    1. Unfortunately, this patient's most recent prior imaging is not  available for comparison. In addition, the patient has evidently  undergone surgery since that imaging. Patient is now status post  gastrojejunostomy and hepaticojejunostomy. There is no evidence of  obstruction related to this procedure. There is a somewhat thick-walled  appearance to the distal stomach, and gastritis is not excluded.  2. The patient does have moderate ascites. This is a new finding when  compared to prior imaging, according to prior imaging report.     Radiation dose reduction techniques were utilized, including automated  exposure control and exposure modulation based on body size.     This report was finalized on 3/3/2023 10:35 PM by Dr. Savannah Frost M.D.     XR Chest 1 View  Narrative: Emergency portable view of the chest on 03/03/2023     CLINICAL HISTORY: Generalized weakness. Vomiting and diarrhea.     This is correlated to a prior portable chest x-ray from [facility] on 05/13/2022.     FINDINGS: There is a right internal jugular Mediport in place with its  distal tip at the venoatrial junction, unchanged. The cardiomediastinal  silhouette and the pulmonary vasculature are within normal limits. There  is some linear stranding in the right perihilar region likely linear  atelectasis or scarring. The lung volumes are low with some minimal by  basilar atelectasis; otherwise, the lungs are clear and the costophrenic  angles are sharp.     Impression: 1. No definite active disease is seen in the chest. There are low lung  volumes with horizontal linear atelectasis or scarring at the lung bases  and there is some linear stranding within the right perihilar region  which is most likely linear atelectasis or scarring.  2. The right internal jugular Mediport tip is at the venoatrial  junction.     This report was finalized on 3/3/2023 8:14 PM by Dr. Jan Amado M.D.       I reviewed the  patient's daily medications.  Scheduled Medications  busPIRone, 30 mg, Oral, Daily  Enoxaparin Sodium, 40 mg, Subcutaneous, Q24H  lamoTRIgine, 100 mg, Oral, Daily  levothyroxine, 112 mcg, Oral, Q AM  NIFEdipine XL, 30 mg, Oral, Daily  pantoprazole, 40 mg, Oral, Q AM  sertraline, 200 mg, Oral, QAM  [START ON 3/6/2023] vitamin D, 50,000 Units, Oral, Once per day on Mon Thu    Infusions   Diet  Diet: Gastrointestinal Diets; Fiber-Restricted, Low Irritant; Texture: Regular Texture (IDDSI 7); Fluid Consistency: Thin (IDDSI 0)         I have personally reviewed:  [x]  Laboratory   [x]  Microbiology   [x]  Radiology   [x]  EKG/Telemetry   []  Cardiology/Vascular   []  Pathology   []  Records     Assessment/Plan     Active Hospital Problems    Diagnosis  POA   • **Generalized weakness [R53.1]  Yes   • Nausea and vomiting [R11.2]  Unknown   • Diarrhea [R19.7]  Unknown   • Metastasis from pancreatic cancer (HCC) [C79.9, C25.9]  Unknown   • Anemia [D64.9]  Unknown   • Elevated WBC count [D72.829]  Unknown      Resolved Hospital Problems   No resolved problems to display.       73 y.o. male admitted with Generalized weakness.    Nausea and vomiting  Diarrhea  -This is likely chemo related  -CT abdomen showing gastrojejunostomy and hepaticojejunostomy.  No evidence of obstruction.  -Nausea vomiting has resolved and patient was able to finish his liquid breakfast  -Follows with Dr. Powers as an outpatient and Dr. Rogers  -Stool GI panel and C. difficile both negative.  -Advance diet as tolerated  -Discussed with GI, and safe for discharge from further point of view.     Metastatic pancreatic cancer  Status post gastrojejunostomy and hepaticojejunostomy  -Oncology consulted, appreciate recommendations.       Leukocytosis-likely secondary to nausea and vomiting.  Stable. Patient has been afebrile.  Has improved without antibiotics.  Procalcitonin mildly elevated, but this can be seen in cancer patients.  Continue to monitor off  antibiotics.  Blood cultures pending.  UA not consistent with infection.     Chronic anemia-no active signs of bleeding.  We will continue to monitor.     • Lovenox 40 mg SC daily for DVT prophylaxis.  • Full code.  • Discussed with patient, nursing staff and consulting provider.  • Anticipate discharge home with home health tomorrow.  If patient does not have return of power plan for respite bed tomorrow.          Tobi Garcia MD  [facility]  03/05/23  13:21 EST

## 2023-03-05 NOTE — CONSULTS
[facility]  Initial Inpatient Consult Note    Referring Provider: Dr. Iraheta    Reason for Consultation: Nausea vomiting and diarrhea    Subjective     History of present illness:    73 y.o. male with history of metastatic pancreatic adenocarcinoma whom we are asked to see for nausea vomiting and diarrhea.  He has had prior gastrojejunostomy and hepaticojejunostomy performed by Dr. Torres.   He was treated with chemotherapy following surgery and was recently found to have recurrence in the liver.  He underwent ablation of the liver mets.  He was restarted on chemotherapy 10 days ago.  He was admitted with severe diarrhea and vomiting.    Received liposomal irinotecan and 5-FU leucovorin last week for his first cycle.  He has had some GI symptoms with previous chemotherapy.  He reports tolerating a full liquid diet today.  Feels stronger since he has been rehydrated.  He is still somewhat weaker than his baseline.  Continues to have diarrhea-he cannot really say whether that is improved or not.  Having some abdominal pain which comes and goes.    GI PCR panel and C. difficile are negative.    Past Medical History:  Past Medical History:   Diagnosis Date   • Anxiety    • Cancer (HCC) Pancreatic   • Depression    • GERD (gastroesophageal reflux disease)    • Hypercalcemia    • Hypothyroidism    • Inverted T wave     PT HAD STRESS ECHO DONE 4 YR AGO AND WAS NORMAL   • Osteoarthritis     Knees and ankles   • Parathyroid disease (HCC)    • Peripheral neuropathy Feet. From chemo   • PONV (postoperative nausea and vomiting)    • PTSD (post-traumatic stress disorder)    • Sleep apnea     NO MACHINE     Past Surgical History:  Past Surgical History:   Procedure Laterality Date   • COLON SURGERY     • COLONOSCOPY  07/2014   • COLONOSCOPY N/A 12/24/2020    Procedure: COLONOSCOPY;  Surgeon: Geronimo Branch Jr., MD;  Location: [facility] ENDOSCOPY;  Service: General;  Laterality: N/A;  colon  obstruction  post:   colon obstruction from external compression   • ENDOSCOPY      several years ago, normal   • EXPLORATORY LAPAROTOMY N/A 12/21/2020    Procedure: LAPAROTOMY EXPLORATORY WITH LOOP COLOSTOMY;  Surgeon: Geronimo Branch Jr., MD;  Location: [facility];  Service: General;  Laterality: N/A;   • FRACTURE SURGERY  Wrist   • HEMORRHOIDECTOMY     • THYROIDECTOMY Left 02/03/2017    Procedure: Left inferior parathyroidectomy with intraoperative PTH monitoring;  Surgeon: Sarah Maots MD;  Location: [facility];  Service:    • WRIST FUSION Right       Social History:   Social History     Tobacco Use   • Smoking status: Never   • Smokeless tobacco: Never   Substance Use Topics   • Alcohol use: Not Currently      Family History:  Family History   Problem Relation Age of Onset   • Breast cancer Mother    • Heart disease Father    • Hypertension Father    • Colon cancer Sister    • Anxiety disorder Sister    • Anxiety disorder Son        Home Meds:  Medications Prior to Admission   Medication Sig Dispense Refill Last Dose   • busPIRone (BUSPAR) 30 MG tablet TAKE 1 TABLET DAILY 90 tablet 3 3/2/2023   • dexamethasone (DECADRON) 4 MG tablet       • Diclofenac Sodium (VOLTAREN) 1 % gel gel   1 Application, Topical, Gel, QID, PRN for pain, # 100 Gram, 0 Refill(s)      • Enoxaparin Sodium (LOVENOX) 40 MG/0.4ML solution prefilled syringe syringe 40 mg.   3/2/2023   • GLUCOSAMINE-CHONDROIT-VIT C-MN PO Take 1 tablet by mouth.   3/2/2023   • lamoTRIgine (LaMICtal) 100 MG tablet Take one oral tablet daily 90 tablet 1 3/3/2023   • levothyroxine (SYNTHROID, LEVOTHROID) 112 MCG tablet TAKE 1 TABLET BY MOUTH DAILY 90 tablet 1 3/3/2023   • loperamide (IMODIUM A-D) 2 MG tablet   See Instructions, Tab, PRN for loose stool, take 2 tabs PO with first loose stool, then 1 tab after each loose stool  not to exceed 8 capsules, or 16 mg, in 24 hours, # 100 Tab, 0 Refill(s), Pharmacy: [id number], 177.8 cm,  10/25/22 1...      • magic mouthwash oral suspension   Josi Magic Mouthwash, See Instructions, Swish and swallow 10 mL PO QID PRN for mouth pain  Diphenhydramine 120 mL Nystatin 120 mL Hydrocortisone 60 mg Lidocaine viscous 2% 30 mL, # 270 mL, 1 Refill(s), Pharmacy: [facility] #[id number], cm,...      • Melatonin 5 MG capsule Take  by mouth At Night As Needed.      • NIFEdipine XL (PROCARDIA XL) 30 MG 24 hr tablet Take 1 tablet by mouth Daily.   3/3/2023   • Omega-3 Fatty Acids (FISH OIL) 1000 MG capsule capsule Take 2 capsules by mouth 2 (Two) Times a Day With Meals. HOLD PRIOR TO SURG   3/3/2023   • ondansetron (ZOFRAN) 8 MG tablet    3/3/2023   • oxyCODONE (ROXICODONE) 5 MG immediate release tablet Take 1 tablet by mouth Every 6 (Six) Hours As Needed (cancer related pain). 30 tablet 0    • pantoprazole (PROTONIX) 40 MG EC tablet TAKE 1 TABLET BY MOUTH BEFORE BREAKFAST AND AT BEDTIME   3/3/2023   • polyethylene glycol (MIRALAX) 17 g packet MIX 1 PACKET IN WATER OR JUICE AND TAKE DAILY FOR 3 DAYS EACH CHEMOTHERAPY TREATMENT AND THEN AS NEEDED THERAFTER FOR CONSTIPATION      • potassium chloride (K-DUR,KLOR-CON) 20 MEQ CR tablet Take 1 tablet by mouth Daily.   3/3/2023   • Sennosides (Senna) 8.6 MG capsule Take 1 tablet by mouth Daily.      • sertraline (ZOLOFT) 100 MG tablet TAKE 2 TABLETS BY MOUTH EVERY MORNING 90 tablet 1 3/3/2023   • vitamin D (ERGOCALCIFEROL) 1.25 MG (36445 UT) capsule capsule Take 1 capsule by mouth 2 (Two) Times a Week. 26 capsule 2 Past Week     Current Meds:   busPIRone, 30 mg, Oral, Daily  Enoxaparin Sodium, 40 mg, Subcutaneous, Q24H  lamoTRIgine, 100 mg, Oral, Daily  levothyroxine, 112 mcg, Oral, Q AM  NIFEdipine XL, 30 mg, Oral, Daily  pantoprazole, 40 mg, Oral, Q AM  sertraline, 200 mg, Oral, QAM  [START ON 3/6/2023] vitamin D, 50,000 Units, Oral, Once per day on Mon Thu      Allergies:  Allergies   Allergen Reactions   • Codeine Nausea Only     nausea     Review of  Systems  Pertinent items are noted in HPI, all other systems reviewed and negative     Objective     Vital Signs  Temp:  [97.1 °F (36.2 °C)-99 °F (37.2 °C)] 98.5 °F (36.9 °C)  Heart Rate:  [67-88] 74  Resp:  [14-16] 16  BP: (122-166)/(74-97) 156/79  Physical Exam:  General Appearance:    Alert, cooperative, in no acute distress   Head:    Normocephalic, without obvious abnormality, atraumatic   Eyes:          conjunctivae and sclerae normal, no   icterus   Throat:   no thrush, oral mucosa moist   Neck:   Supple, no adenopathy   Lungs:     Clear to auscultation bilaterally    Heart:    Regular rhythm and normal rate    Chest Wall:    No abnormalities observed   Abdomen:     Soft, nondistended, nontender; normal bowel sounds   Extremities:   no edema, no redness   Skin:   No bruising or rash   Psychiatric:  normal mood and insight     Results Review:   I reviewed the patient's new clinical results.    Results from last 7 days   Lab Units 03/04/23  0636 03/03/23 1942   WBC 10*3/mm3 12.58* 16.98*   HEMOGLOBIN g/dL 8.4* 9.8*   HEMATOCRIT % 25.9* 30.8*   PLATELETS 10*3/mm3 339 374     Results from last 7 days   Lab Units 03/04/23  0636 03/03/23  1942   SODIUM mmol/L 140 134*   POTASSIUM mmol/L 3.4* 3.6   CHLORIDE mmol/L 108* 100   CO2 mmol/L 24.5 24.0   BUN mg/dL 20 20   CREATININE mg/dL 1.10 1.21   CALCIUM mg/dL 8.3* 8.4*   BILIRUBIN mg/dL 0.9 1.1   ALK PHOS U/L 126* 154*   ALT (SGPT) U/L 23 24   AST (SGOT) U/L 25 28   GLUCOSE mg/dL 102* 114*         Lab Results   Lab Value Date/Time    LIPASE 10 (L) 03/03/2023 1942    LIPASE 57 (H) 01/24/2023 1401    LIPASE 34 12/21/2020 1015    LIPASE 23 08/09/2018 1259       Radiology:  CT Abdomen Pelvis With Contrast   Final Result       1. Unfortunately, this patient's most recent prior imaging is not   available for comparison. In addition, the patient has evidently   undergone surgery since that imaging. Patient is now status post   gastrojejunostomy and hepaticojejunostomy.  There is no evidence of   obstruction related to this procedure. There is a somewhat thick-walled   appearance to the distal stomach, and gastritis is not excluded.   2. The patient does have moderate ascites. This is a new finding when   compared to prior imaging, according to prior imaging report.       Radiation dose reduction techniques were utilized, including automated   exposure control and exposure modulation based on body size.       This report was finalized on 3/3/2023 10:35 PM by Dr. Savannah Frost M.D.          XR Chest 1 View   Final Result   1. No definite active disease is seen in the chest. There are low lung   volumes with horizontal linear atelectasis or scarring at the lung bases   and there is some linear stranding within the right perihilar region   which is most likely linear atelectasis or scarring.   2. The right internal jugular Mediport tip is at the venoatrial   junction.       This report was finalized on 3/3/2023 8:14 PM by Dr. Jan Amado M.D.              Assessment & Plan   Active Hospital Problems    Diagnosis    • **Generalized weakness    • Nausea and vomiting    • Diarrhea    • Metastasis from pancreatic cancer (HCC)    • Anemia    • Elevated WBC count        Assessment:  1. Nausea vomiting and diarrhea-secondary to recent chemotherapy  2. Metastatic pancreatic cancer  3. Ascites  4. Leukocytosis  5. Anemia    Plan:  · Stool studies negative for infection  · Continue aggressive management of his nausea, IV fluid rehydration  · Advance diet as tolerated  · Antidiarrheals as needed      I discussed the patients findings and my recommendations with patient.    Isela Pierce MD

## 2023-03-05 NOTE — NURSING NOTE
Patient doing well today.  2 episodes of diarrhea today - no imodium given per request of patient due to constipation issues in past.  IV fluids discontinued.  C-diff & Covid negative - order in epic to discontinue patient isolation.  Plan for discharge tomorrow.  VSS.

## 2023-03-05 NOTE — PLAN OF CARE
Problem: Adult Inpatient Plan of Care  Goal: Plan of Care Review  Outcome: Ongoing, Progressing  Goal: Patient-Specific Goal (Individualized)  Outcome: Ongoing, Progressing  Goal: Absence of Hospital-Acquired Illness or Injury  Outcome: Ongoing, Progressing  Intervention: Identify and Manage Fall Risk  Recent Flowsheet Documentation  Taken 3/5/2023 0627 by Mackenzie Tuttle RN  Safety Promotion/Fall Prevention: safety round/check completed  Taken 3/5/2023 0425 by Mackenzie Tuttle RN  Safety Promotion/Fall Prevention: safety round/check completed  Taken 3/5/2023 0230 by Mackenzie Tuttle RN  Safety Promotion/Fall Prevention: safety round/check completed  Taken 3/5/2023 0030 by Mackenzie Tuttle RN  Safety Promotion/Fall Prevention:   activity supervised   assistive device/personal items within reach   clutter free environment maintained   safety round/check completed  Taken 3/4/2023 2230 by Mackenzie Tuttle RN  Safety Promotion/Fall Prevention:   activity supervised   assistive device/personal items within reach   clutter free environment maintained   safety round/check completed  Taken 3/4/2023 2050 by Mackenzie Tuttle RN  Safety Promotion/Fall Prevention:   activity supervised   assistive device/personal items within reach   clutter free environment maintained   fall prevention program maintained   safety round/check completed  Intervention: Prevent Infection  Recent Flowsheet Documentation  Taken 3/5/2023 0627 by Mackenzie Tuttle RN  Infection Prevention: rest/sleep promoted  Taken 3/5/2023 0425 by Mackenzie Tuttle RN  Infection Prevention: rest/sleep promoted  Taken 3/5/2023 0230 by Mackenzie Tuttle RN  Infection Prevention: rest/sleep promoted  Taken 3/5/2023 0030 by Mackenzie Tuttle RN  Infection Prevention: rest/sleep promoted  Taken 3/4/2023 2230 by Mackenzie Tuttle RN  Infection Prevention:   hand hygiene promoted   rest/sleep promoted  Taken 3/4/2023 2050 by Mackenzie Tuttle RN  Infection Prevention:   hand hygiene  promoted   rest/sleep promoted  Goal: Optimal Comfort and Wellbeing  Outcome: Ongoing, Progressing  Intervention: Monitor Pain and Promote Comfort  Recent Flowsheet Documentation  Taken 3/4/2023 2050 by Mackenzie Tuttle RN  Pain Management Interventions: see MAR  Intervention: Provide Person-Centered Care  Recent Flowsheet Documentation  Taken 3/4/2023 2050 by Mackenzie Tuttle RN  Trust Relationship/Rapport:   care explained   choices provided   thoughts/feelings acknowledged  Goal: Readiness for Transition of Care  Outcome: Ongoing, Progressing     Problem: Fall Injury Risk  Goal: Absence of Fall and Fall-Related Injury  Outcome: Ongoing, Progressing  Intervention: Identify and Manage Contributors  Recent Flowsheet Documentation  Taken 3/5/2023 0627 by Mackenzie Tuttle RN  Medication Review/Management: medications reviewed  Taken 3/5/2023 0425 by Mackenzie Tuttle RN  Medication Review/Management: medications reviewed  Taken 3/5/2023 0230 by Mackenzie Tuttle RN  Medication Review/Management: medications reviewed  Taken 3/5/2023 0030 by Mackenzie Tuttle RN  Medication Review/Management: medications reviewed  Taken 3/4/2023 2230 by Mackenzie Tuttle RN  Medication Review/Management: medications reviewed  Taken 3/4/2023 2050 by Mackenzie Tuttle RN  Medication Review/Management: medications reviewed  Intervention: Promote Injury-Free Environment  Recent Flowsheet Documentation  Taken 3/5/2023 0627 by Mackenzie Tuttle RN  Safety Promotion/Fall Prevention: safety round/check completed  Taken 3/5/2023 0425 by Mackenzie Tuttle RN  Safety Promotion/Fall Prevention: safety round/check completed  Taken 3/5/2023 0230 by Mackenzie Tuttle RN  Safety Promotion/Fall Prevention: safety round/check completed  Taken 3/5/2023 0030 by Mackenzie Tuttle RN  Safety Promotion/Fall Prevention:   activity supervised   assistive device/personal items within reach   clutter free environment maintained   safety round/check completed  Taken 3/4/2023 2230 by  Marry, Mackenzie, RN  Safety Promotion/Fall Prevention:   activity supervised   assistive device/personal items within reach   clutter free environment maintained   safety round/check completed  Taken 3/4/2023 2050 by Mackenzie Tuttle RN  Safety Promotion/Fall Prevention:   activity supervised   assistive device/personal items within reach   clutter free environment maintained   fall prevention program maintained   safety round/check completed     Problem: Anemia (Chemotherapy Effects)  Goal: Anemia Symptom Improvement  Outcome: Ongoing, Progressing  Intervention: Monitor and Manage Anemia  Recent Flowsheet Documentation  Taken 3/5/2023 0627 by Mackenzie Tuttle RN  Safety Promotion/Fall Prevention: safety round/check completed  Taken 3/5/2023 0425 by Mackenzie Tuttle RN  Safety Promotion/Fall Prevention: safety round/check completed  Taken 3/5/2023 0230 by Mackenzie Tuttle RN  Safety Promotion/Fall Prevention: safety round/check completed  Taken 3/5/2023 0030 by Mackenzie Tuttle RN  Safety Promotion/Fall Prevention:   activity supervised   assistive device/personal items within reach   clutter free environment maintained   safety round/check completed  Taken 3/4/2023 2230 by Mackenzie Tuttle RN  Safety Promotion/Fall Prevention:   activity supervised   assistive device/personal items within reach   clutter free environment maintained   safety round/check completed  Taken 3/4/2023 2050 by Mackenzie Tuttle RN  Safety Promotion/Fall Prevention:   activity supervised   assistive device/personal items within reach   clutter free environment maintained   fall prevention program maintained   safety round/check completed     Problem: Urinary Bleeding Risk or Actual (Chemotherapy Effects)  Goal: Absence of Hematuria  Outcome: Ongoing, Progressing  Intervention: Prevent or Manage Hemorrhagic Cystitis  Recent Flowsheet Documentation  Taken 3/4/2023 2050 by Mackenzie Tuttle RN  Pain Management Interventions: see MAR     Problem: Nausea  and Vomiting (Chemotherapy Effects)  Goal: Fluid and Electrolyte Balance  Outcome: Ongoing, Progressing     Problem: Neurotoxicity (Chemotherapy Effects)  Goal: Neurotoxicity Symptom Control  Outcome: Ongoing, Progressing     Problem: Neutropenia (Chemotherapy Effects)  Goal: Absence of Infection  Outcome: Ongoing, Progressing  Intervention: Prevent Infection and Maximize Resistance  Recent Flowsheet Documentation  Taken 3/5/2023 0627 by Mackenzie Tuttle RN  Infection Prevention: rest/sleep promoted  Taken 3/5/2023 0425 by Mackenzie Tuttle RN  Infection Prevention: rest/sleep promoted  Taken 3/5/2023 0230 by Mackenzie Tuttle RN  Infection Prevention: rest/sleep promoted  Taken 3/5/2023 0030 by Mackenzie Tuttle RN  Infection Prevention: rest/sleep promoted  Taken 3/4/2023 2230 by Mackenzie Tuttle RN  Infection Prevention:   hand hygiene promoted   rest/sleep promoted  Taken 3/4/2023 2050 by Mackenzie Tuttle RN  Infection Prevention:   hand hygiene promoted   rest/sleep promoted     Problem: Oral Mucositis (Chemotherapy Effects)  Goal: Improved Oral Mucous Membrane Integrity  Outcome: Ongoing, Progressing     Problem: Thrombocytopenia Bleeding Risk (Chemotherapy Effects)  Goal: Absence of Bleeding  Outcome: Ongoing, Progressing   Goal Outcome Evaluation:  Plan of Care Reviewed With: patient        Progress: no change

## 2023-03-05 NOTE — PROGRESS NOTES
Subjective .     REASONS FOR FOLLOWUP:      1. Metastatic pancreatic cancer, followed by Dr. Powers at the [facility] and Dr. Torres s/p chemotherapy    2.  Diarrhea, nausea and vomiting     Interval history:  March 5, 2023:  All stool studies have been negative including C. difficile.  Currently continuing hydration.  Hemoglobin 8.3 white count of 12.88 and a platelet of 346.  Patient is afebrile and vital signs are stable  Continues to have diarrheal stools.  Not ready to be discharged      HISTORY OF PRESENT ILLNESS:    Patient is a 73-year-old gentleman who has history of metastatic pancreatic cancer which was initially diagnosed back in 2021.  He underwent Whipple procedure by Dr. Torres in January 2021.  Following that he received chemotherapy and was doing well until recently he was found to have recurrence in the liver.  He underwent ablation of the liver.  Subsequently he has progressed further and underwent biliary bypass surgery by Dr. Torres.  He was restarted on chemotherapy 10 days ago.  He was admitted with severe diarrhea and vomiting.He has decreased appetite.     He had leukocytosis.  His COVID and flu PCR were negative.  C. difficile was ordered.  He has been followed by Dr. Powers.  His alkaline phosphatase was mildly elevated to 154.  He was recently admitted January 24, 2023 to [facility].  He has history of MGUS, hypertension and GERD.  Initially presented in 2020 with obstructive hyperbilirubinemia and acute blood loss anemia.  He was diagnosed in December 2020 with pancreatic cancer.  He had a pancreatic tail mass and he underwent surgery January 19, 2021.  He then received for 4 FOLFIRINOX.     He then recurred April 2022 and since then has received chemotherapy.     In January 2022 he presented with obstructive jaundice.  He had ERCP attempted but scope could not be inserted due to duodenal stricture.  He did have surgical oncology see him.  He underwent  gastrojejunostomy and hepaticojejunostomy January 30 following which she had acute renal failure and pneumonia with a hemoglobin less than 7 requiring blood transfusion.  He had melanotic stools EGD showed significant bowel edema and they could not figure out the bleeding source.  Initially octreotide drip and PPI was given.  Patient was started on Lovenox prophylaxis.  His last CT January 6, 2023 showed interval development of multifocal patchy groundglass airspace disease throughout the lungs and multiple prominent mediastinal lymph nodes unchanged in size.  Heart is enlarged.  Minimal pericardial effusion.     CT abdomen pelvis February 2, 2023 showed liver containing hypoattenuating focus in the left hepatic lobe.  Measuring 3.3 cm.  This there was previous ablation of the tumor within the left hepatic lobe and the hypoattenuating focus within the hepatic lobe reflects a post therapeutic change which is similar to the previous MRI exam.     His CBC today is a hemoglobin of 8.4, white count of 12.58 and platelet of 339.  CMP is normal except potassium 3.4 and alkaline phosphatase minimally elevated at 126.     He continues to have diarrhea.  Procalcitonin was elevated to 0.65.  Check GI panel is pending urine analysis showed trace blood but negative nitrites and culture pending  Past Medical History:   Diagnosis Date   • Anxiety    • Cancer (HCC) Pancreatic   • Depression    • GERD (gastroesophageal reflux disease)    • Hypercalcemia    • Hypothyroidism    • Inverted T wave     PT HAD STRESS ECHO DONE 4 YR AGO AND WAS NORMAL   • Osteoarthritis     Knees and ankles   • Parathyroid disease (HCC)    • Peripheral neuropathy Feet. From chemo   • PONV (postoperative nausea and vomiting)    • PTSD (post-traumatic stress disorder)    • Sleep apnea     NO MACHINE       ONCOLOGIC HISTORY:  (History from previous dates can be found in the separate document.)    No current facility-administered medications on file prior to  encounter.     Current Outpatient Medications on File Prior to Encounter   Medication Sig Dispense Refill   • busPIRone (BUSPAR) 30 MG tablet TAKE 1 TABLET DAILY 90 tablet 3   • dexamethasone (DECADRON) 4 MG tablet      • Diclofenac Sodium (VOLTAREN) 1 % gel gel   1 Application, Topical, Gel, QID, PRN for pain, # 100 Gram, 0 Refill(s)     • Enoxaparin Sodium (LOVENOX) 40 MG/0.4ML solution prefilled syringe syringe 40 mg.     • GLUCOSAMINE-CHONDROIT-VIT C-MN PO Take 1 tablet by mouth.     • lamoTRIgine (LaMICtal) 100 MG tablet Take one oral tablet daily 90 tablet 1   • levothyroxine (SYNTHROID, LEVOTHROID) 112 MCG tablet TAKE 1 TABLET BY MOUTH DAILY 90 tablet 1   • loperamide (IMODIUM A-D) 2 MG tablet   See Instructions, Tab, PRN for loose stool, take 2 tabs PO with first loose stool, then 1 tab after each loose stool  not to exceed 8 capsules, or 16 mg, in 24 hours, # 100 Tab, 0 Refill(s), Pharmacy: [facility][id number], 177.8 cm, 10/25/22 1...     • magic mouthwash oral suspension   Josi Magic Mouthwash, See Instructions, Swish and swallow 10 mL PO QID PRN for mouth pain  Diphenhydramine 120 mL Nystatin 120 mL Hydrocortisone 60 mg Lidocaine viscous 2% 30 mL, # 270 mL, 1 Refill(s), Pharmacy: [facility] #[id number], cm,...     • Melatonin 5 MG capsule Take  by mouth At Night As Needed.     • NIFEdipine XL (PROCARDIA XL) 30 MG 24 hr tablet Take 1 tablet by mouth Daily.     • Omega-3 Fatty Acids (FISH OIL) 1000 MG capsule capsule Take 2 capsules by mouth 2 (Two) Times a Day With Meals. HOLD PRIOR TO SURG     • ondansetron (ZOFRAN) 8 MG tablet      • oxyCODONE (ROXICODONE) 5 MG immediate release tablet Take 1 tablet by mouth Every 6 (Six) Hours As Needed (cancer related pain). 30 tablet 0   • pantoprazole (PROTONIX) 40 MG EC tablet TAKE 1 TABLET BY MOUTH BEFORE BREAKFAST AND AT BEDTIME     • polyethylene glycol (MIRALAX) 17 g packet MIX 1 PACKET IN WATER OR JUICE AND TAKE DAILY FOR 3 DAYS EACH CHEMOTHERAPY  TREATMENT AND THEN AS NEEDED THERAFTER FOR CONSTIPATION     • potassium chloride (K-DUR,KLOR-CON) 20 MEQ CR tablet Take 1 tablet by mouth Daily.     • Sennosides (Senna) 8.6 MG capsule Take 1 tablet by mouth Daily.     • sertraline (ZOLOFT) 100 MG tablet TAKE 2 TABLETS BY MOUTH EVERY MORNING 90 tablet 1   • vitamin D (ERGOCALCIFEROL) 1.25 MG (80397 UT) capsule capsule Take 1 capsule by mouth 2 (Two) Times a Week. 26 capsule 2       ALLERGIES:     Allergies   Allergen Reactions   • Codeine Nausea Only     nausea       Social History     Socioeconomic History   • Marital status:    • Number of children: 2   • Highest education level: Master's degree (e.g., MA, MS, Lisa, MEd, MSW, THONG)   Tobacco Use   • Smoking status: Never   • Smokeless tobacco: Never   Vaping Use   • Vaping Use: Never used   Substance and Sexual Activity   • Alcohol use: Not Currently   • Drug use: No   • Sexual activity: Defer         Cancer-related family history includes Breast cancer in his mother; Colon cancer in his sister.     Review of Systems  A comprehensive 14 point review of systems was performed and was negative except as mentioned.    Objective      Vitals:    03/04/23 1528 03/04/23 1928 03/05/23 0430 03/05/23 0759   BP: 146/80 156/79 129/74 124/70   BP Location: Right arm Left arm Left arm    Patient Position: Lying Lying Lying    Pulse: 69 74 83 80   Resp: 14 16 18 16   Temp: 97.6 °F (36.4 °C) 98.5 °F (36.9 °C) 98 °F (36.7 °C) 97 °F (36.1 °C)   TempSrc: Oral Oral Oral Oral   SpO2: 96% 95% 91% 94%   Weight:       Height:         Current Status 11/15/2016   ECOG score 0       Physical Exam      CONSTITUTIONAL:  Vital signs reviewed.  Alert and oriented x3  No distress, looks comfortable.  EYES:  Conjunctivae and lids unremarkable.  Extraocular eye movements intact.  HEENT: No evidence of lymphadenopathy, no thyromegaly  EARS,NOSE,MOUTH,THROAT:  Ears and nose appear unremarkable.  Lips, teeth, gums appear  unremarkable.  RESPIRATORY:  Normal respiratory effort.  No rales  or wheezing, clear.   CARDIOVASCULAR:  Regular rate and rhythm, no murmur  No significant lower extremity edema.  Abdomen: Soft nontender positive bowel sounds no hepatosplenomegaly  SKIN: No wounds.  No rashes.  MUSCULOSKELETAL/EXTREMITIES: No clubbing or cyanosis.  No apparent unilateral weakness.  NEURO: CN 2-12 appear intact. No focal neurological deficits noted.  PSYCHIATRIC:  Normal judgment and insight.  Normal mood and affect.      RECENT LABS:  Hematology WBC   Date Value Ref Range Status   03/05/2023 12.88 (H) 3.40 - 10.80 10*3/mm3 Final     RBC   Date Value Ref Range Status   03/05/2023 2.84 (L) 4.14 - 5.80 10*6/mm3 Final     Hemoglobin   Date Value Ref Range Status   03/05/2023 8.3 (L) 13.0 - 17.7 g/dL Final     Hematocrit   Date Value Ref Range Status   03/05/2023 25.2 (L) 37.5 - 51.0 % Final     Platelets   Date Value Ref Range Status   03/05/2023 346 140 - 450 10*3/mm3 Final        Assessment & Plan   Admitted with nausea vomiting and diarrhea following chemo  • Followed by Dr. Powers at [facility]  • C. difficile pending and all cultures pending     * Metastatic pancreatic adenocarcinoma: S/P diverting colostomy on 12/24/2020  • S/P surgical resection on 1/19/2021 by Dr. Torres with a distal pancreatectomy, splenectomy and colostomy takedown.  • Tumor was 6 cm with negative margins and no LN involvement.   • Patient is undergoing adjuvant chemotherapy with FOLFIRINOX. He received cycle #7 on 6/10/2021. Iriontecan was omitted due to GI side effects.  • Subsequently patient has had recurrence in the liver s/p ablation by Dr. Torres  • Given recurrence and progression patient has been on chemotherapy with Dr. Powers, do not have the details from Dr. Powers  • In January 2023 was admitted to the hospital with pneumonia at [facility].   • He is s/p hepaticojejunostomy  • Patient received liposomal irinotecan  and 5-FU leucovorin last week which was cycle 1.  With Dr. Powers           *Leukocytosis with significant neutrophilia.  • WBC was 16,000 mg on his last chemotherapy treatment.  • Patient received Neulasta on 6/11/2021.  • Patient, WBC count was 65,000 increased to 124,000 on 6/12/2021.  • WBC count is 108,000 today.  • No evidence of infection.  Blood cultures negative.  • Scans showed no evidence of infection.  • The leukocytosis and neutrophilia are therefore contributed to Neulasta.     *Anemia due to chemotherapy.  • Hemoglobin is 9.7 today.     *Syncopal event.  • ?  Volume depletion vs vasovagal.  • Patient had low blood pressure when evaluated by EMS.     Plan  • Continue supportive care with hydration  • Await C. difficile and GI panel  • Respiratory viral panel and COVID-negative  • We will try to get records from Dr. Powers's office    Patient is improving.  We will sign off and patient is to follow-up with Dr. Powers at the [facility]  Please schedule follow-up with Dr. Powers in 1 week of discharge    We will sign off    Zoey Enriquez MD                Cc:  Be Iraheta MD
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
## 2023-03-05 NOTE — DISCHARGE PLACEMENT REQUEST
"Kaiser Valente (73 y.o. Male)     Date of Birth   1950    Social Security Number       Address   [address] [place] [address]    Home Phone   [phone]    MRN   [id number]       Buddhism   Orthodoxy    Marital Status                               Admission Date   3/3/23    Admission Type   Emergency    Admitting Provider   Be Iraheta MD    Attending Provider   Tobi Garcia MD    Department, Room/Bed   [address], P395/1       Discharge Date       Discharge Disposition   Home or Self Care    Discharge Destination                               Attending Provider: Tobi Garcia MD    Allergies: Codeine    Isolation: Spore   Infection: COVID (rule out) (03/03/23), C.difficile (rule out) (03/03/23)   Code Status: CPR    Ht: 177.8 cm (70\")   Wt: 77.3 kg (170 lb 6.7 oz)    Admission Cmt: None   Principal Problem: Generalized weakness [R53.1]                 Active Insurance as of 3/3/2023     Primary Coverage     Payor Plan Insurance Group Employer/Plan Group    MEDICARE MEDICARE A & B      Payor Plan Address Payor Plan Phone Number Payor Plan Fax Number Effective Dates    [address] [phone]  8/1/2001 - None Entered    [facility] [id number]       Subscriber Name Subscriber Birth Date Member ID       KAISER VALENTE 1950 [id number]           Secondary Coverage     Payor Plan Insurance Group Employer/Plan Group    AARP MC SUP AARP HEALTH CARE OPTIONS PLAN F     Payor Plan Address Payor Plan Phone Number Payor Plan Fax Number Effective Dates    [facility] [phone]  1/1/2016 - None Entered    [address]       [facility] [id number]       Subscriber Name Subscriber Birth Date Member ID       KAISER VALENTE 1950 [id number]                 Emergency Contacts      (Rel.) Home Phone Work Phone Mobile Phone    Tri Valente (Spouse) [phone] -- [phone]              "
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
## 2023-03-06 ENCOUNTER — READMISSION MANAGEMENT (OUTPATIENT)
Dept: CALL CENTER | Facility: HOSPITAL | Age: 73
End: 2023-03-06
Payer: MEDICARE

## 2023-03-06 VITALS
RESPIRATION RATE: 16 BRPM | BODY MASS INDEX: 24.4 KG/M2 | HEART RATE: 74 BPM | TEMPERATURE: 98.4 F | OXYGEN SATURATION: 96 % | WEIGHT: 170.42 LBS | DIASTOLIC BLOOD PRESSURE: 81 MMHG | SYSTOLIC BLOOD PRESSURE: 141 MMHG | HEIGHT: 70 IN

## 2023-03-06 LAB
ANION GAP SERPL CALCULATED.3IONS-SCNC: 9.5 MMOL/L (ref 5–15)
BUN SERPL-MCNC: 16 MG/DL (ref 8–23)
BUN/CREAT SERPL: 14.7 (ref 7–25)
CALCIUM SPEC-SCNC: 8.3 MG/DL (ref 8.6–10.5)
CHLORIDE SERPL-SCNC: 105 MMOL/L (ref 98–107)
CO2 SERPL-SCNC: 21.5 MMOL/L (ref 22–29)
CREAT SERPL-MCNC: 1.09 MG/DL (ref 0.76–1.27)
DEPRECATED RDW RBC AUTO: 53.4 FL (ref 37–54)
EGFRCR SERPLBLD CKD-EPI 2021: 70.8 ML/MIN/1.73
ERYTHROCYTE [DISTWIDTH] IN BLOOD BY AUTOMATED COUNT: 16.8 % (ref 12.3–15.4)
GLUCOSE SERPL-MCNC: 103 MG/DL (ref 65–99)
HCT VFR BLD AUTO: 27.7 % (ref 37.5–51)
HGB BLD-MCNC: 9.2 G/DL (ref 13–17.7)
MCH RBC QN AUTO: 29.6 PG (ref 26.6–33)
MCHC RBC AUTO-ENTMCNC: 33.2 G/DL (ref 31.5–35.7)
MCV RBC AUTO: 89.1 FL (ref 79–97)
PLATELET # BLD AUTO: 430 10*3/MM3 (ref 140–450)
PMV BLD AUTO: 11.3 FL (ref 6–12)
POTASSIUM SERPL-SCNC: 3.8 MMOL/L (ref 3.5–5.2)
RBC # BLD AUTO: 3.11 10*6/MM3 (ref 4.14–5.8)
SODIUM SERPL-SCNC: 136 MMOL/L (ref 136–145)
WBC NRBC COR # BLD: 13.69 10*3/MM3 (ref 3.4–10.8)

## 2023-03-06 PROCEDURE — 25010000002 ENOXAPARIN PER 10 MG: Performed by: HOSPITALIST

## 2023-03-06 PROCEDURE — G0378 HOSPITAL OBSERVATION PER HR: HCPCS

## 2023-03-06 PROCEDURE — 85027 COMPLETE CBC AUTOMATED: CPT | Performed by: STUDENT IN AN ORGANIZED HEALTH CARE EDUCATION/TRAINING PROGRAM

## 2023-03-06 PROCEDURE — 25010000002 HEPARIN LOCK FLUSH PER 10 UNITS: Performed by: STUDENT IN AN ORGANIZED HEALTH CARE EDUCATION/TRAINING PROGRAM

## 2023-03-06 PROCEDURE — 80048 BASIC METABOLIC PNL TOTAL CA: CPT | Performed by: STUDENT IN AN ORGANIZED HEALTH CARE EDUCATION/TRAINING PROGRAM

## 2023-03-06 RX ORDER — SODIUM CHLORIDE 0.9 % (FLUSH) 0.9 %
10 SYRINGE (ML) INJECTION EVERY 12 HOURS SCHEDULED
Status: DISCONTINUED | OUTPATIENT
Start: 2023-03-06 | End: 2023-03-06 | Stop reason: HOSPADM

## 2023-03-06 RX ORDER — HEPARIN SODIUM (PORCINE) LOCK FLUSH IV SOLN 100 UNIT/ML 100 UNIT/ML
5 SOLUTION INTRAVENOUS AS NEEDED
Status: DISCONTINUED | OUTPATIENT
Start: 2023-03-06 | End: 2023-03-06 | Stop reason: HOSPADM

## 2023-03-06 RX ORDER — SODIUM CHLORIDE 0.9 % (FLUSH) 0.9 %
20 SYRINGE (ML) INJECTION AS NEEDED
Status: DISCONTINUED | OUTPATIENT
Start: 2023-03-06 | End: 2023-03-06 | Stop reason: HOSPADM

## 2023-03-06 RX ORDER — SODIUM CHLORIDE 9 MG/ML
40 INJECTION, SOLUTION INTRAVENOUS AS NEEDED
Status: DISCONTINUED | OUTPATIENT
Start: 2023-03-06 | End: 2023-03-06 | Stop reason: HOSPADM

## 2023-03-06 RX ORDER — SODIUM CHLORIDE 0.9 % (FLUSH) 0.9 %
10 SYRINGE (ML) INJECTION AS NEEDED
Status: DISCONTINUED | OUTPATIENT
Start: 2023-03-06 | End: 2023-03-06 | Stop reason: HOSPADM

## 2023-03-06 RX ADMIN — PANTOPRAZOLE SODIUM 40 MG: 40 TABLET, DELAYED RELEASE ORAL at 06:16

## 2023-03-06 RX ADMIN — ERGOCALCIFEROL 50000 UNITS: 1.25 CAPSULE ORAL at 09:01

## 2023-03-06 RX ADMIN — HEPARIN 500 UNITS: 100 SYRINGE at 11:05

## 2023-03-06 RX ADMIN — BUSPIRONE HYDROCHLORIDE 30 MG: 15 TABLET ORAL at 09:00

## 2023-03-06 RX ADMIN — SERTRALINE 200 MG: 100 TABLET, FILM COATED ORAL at 06:17

## 2023-03-06 RX ADMIN — ENOXAPARIN SODIUM 40 MG: 100 INJECTION SUBCUTANEOUS at 09:00

## 2023-03-06 RX ADMIN — NIFEDIPINE 30 MG: 30 TABLET, FILM COATED, EXTENDED RELEASE ORAL at 09:01

## 2023-03-06 RX ADMIN — LEVOTHYROXINE SODIUM 112 MCG: 0.11 TABLET ORAL at 06:16

## 2023-03-06 RX ADMIN — LAMOTRIGINE 100 MG: 100 TABLET ORAL at 09:00
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
## 2023-03-06 NOTE — PLAN OF CARE
Goal Outcome Evaluation:  Plan of Care Reviewed With: patient        Progress: improving  Outcome Evaluation: VSS.  A&Ox4.  Ad josselyn with walker.  No BM this shift.  Lee Ann x 1 given for pain.  Patient looking forward to going home today with family.

## 2023-03-07 ENCOUNTER — TRANSITIONAL CARE MANAGEMENT TELEPHONE ENCOUNTER (OUTPATIENT)
Dept: CALL CENTER | Facility: HOSPITAL | Age: 73
End: 2023-03-07
Payer: MEDICARE

## 2023-03-07 NOTE — OUTREACH NOTE
Call Center TCM Note    Flowsheet Row Responses   [facility] patient discharged from? [place]   Does the patient have one of the following disease processes/diagnoses(primary or secondary)? Other   TCM attempt successful? Yes   Call start time 1436   Call end time 1439   Discharge diagnosis generalized weakness, Metastasis from pancreatic cancer on chemo   Meds reviewed with patient/caregiver? Yes   Does the patient have all medications ordered at discharge? N/A   What is the Home health agency?  [facility]    Has home health visited the patient within 72 hours of discharge? Yes   Psychosocial issues? No   Did the patient receive a copy of their discharge instructions? Yes   Nursing interventions Reviewed instructions with patient   What is the patient's perception of their health status since discharge? Improving   Is the patient/caregiver able to teach back signs and symptoms related to disease process for when to call PCP? Yes   Is the patient/caregiver able to teach back signs and symptoms related to disease process for when to call 911? Yes   Is the patient/caregiver able to teach back the hierarchy of who to call/visit for symptoms/problems? PCP, Specialist, Home health nurse, Urgent Care, ED, 911 Yes   If the patient is a current smoker, are they able to teach back resources for cessation? Not a smoker   TCM call completed? Yes   Wrap up additional comments D/C DX: generalized weakness, Metastasis from pancreatic cancer on chemo - Pt feeling fair, much better than before admit. No changes to current meds at d/c. [facility]  will be following. PCP Dr Noriega has no appts within TCM time frame. Pt is sched for reg 6 mth ov on 03/27/2023   Call end time 1439          Rosalba Giles MA    3/7/2023, 15:07 EST

## 2023-03-07 NOTE — OUTREACH NOTE
Prep Survey    Flowsheet Row Responses   [facility] patient discharged from? [place]   Is LACE score < 7 ? No   Eligibility [facility]   Date of Admission 03/03/23   Date of Discharge 03/06/23   Discharge Disposition Home-Health Care Sv   Discharge diagnosis generalized weakness, Metastasis from pancreatic cancer on chemo   Does the patient have one of the following disease processes/diagnoses(primary or secondary)? Other   Does the patient have Home health ordered? Yes   What is the Home health agency?  [facility]    Is there a DME ordered? No   Prep survey completed? Yes          Moni ABERNATHY - Registered Nurse

## 2023-03-07 NOTE — CASE MANAGEMENT/SOCIAL WORK
Case Management Discharge Note      Final Note: Patient discharged home with [facility].         Selected Continued Care - Discharged on 3/6/2023 Admission date: 3/3/2023 - Discharge disposition: Home or Self Care    Destination    No services have been selected for the patient.              Durable Medical Equipment    No services have been selected for the patient.              Dialysis/Infusion    No services have been selected for the patient.              Home Medical Care     Service Provider Selected Services Address Phone Fax Patient Preferred    [facility] [id number]  [place] [address] [phone] --          Therapy    No services have been selected for the patient.              Community Resources    No services have been selected for the patient.              Community & DME    No services have been selected for the patient.                       Final Discharge Disposition Code: 06 - home with home health care

## 2023-03-08 ENCOUNTER — TELEPHONE (OUTPATIENT)
Dept: FAMILY MEDICINE CLINIC | Facility: CLINIC | Age: 73
End: 2023-03-08
Payer: MEDICARE

## 2023-03-08 NOTE — TELEPHONE ENCOUNTER
TALA Damon with [facility] she was assisting with the bedside commode. 30 min after she left  pt wife called her and stated that [facility] had a fall in the bathroom. No head injury the EMS was called and pt denied trip to er.

## 2023-03-08 NOTE — TELEPHONE ENCOUNTER
TONIA A NURSE WITH THE PT CALLED TO UPDATE [facility] THAT THE PT HAD ANOTHER FALL TODAY. SHE JUST WANTED TO GO OVER SOME INFORMATION ABOUT THE PT AND UPDATE DR NEWMAN. PLEASE CALL HER BACK [phone].     PLEASE ADVISE. THANK YOU.

## 2023-03-09 ENCOUNTER — TELEPHONE (OUTPATIENT)
Dept: FAMILY MEDICINE CLINIC | Facility: CLINIC | Age: 73
End: 2023-03-09
Payer: MEDICARE

## 2023-03-09 LAB
BACTERIA SPEC AEROBE CULT: NORMAL
BACTERIA SPEC AEROBE CULT: NORMAL

## 2023-03-09 NOTE — TELEPHONE ENCOUNTER
Crys from [facility] called to report [facility] had fell yesterday in the bathroom between the toilet and tub. EMS came to assist pt up because he was weak. Checked pt out and no injuries.

## 2023-03-09 NOTE — TELEPHONE ENCOUNTER
RECEIVED A MESSAGE THAT THIS PT NEEDED TO BE SCHEDULED FOR A HOSPITAL FU. PT HAS [facility] VIDEO VISIT SCHEDULED FOR 3/27 FOR A 6 WEEK FU. IS IT OKAY FOR PT TO HAVE HOSPITAL FU DURING THAT APPT OR DOES HE NEED AN EARLIER APPT WITHIN THE 2 WEEK TIME FRAME OF BEING RELEASED FROM THE HOSPITAL?     PLEASE ADVISE AND I WILL CALL THE PT. THANK YOU.

## 2023-03-15 ENCOUNTER — READMISSION MANAGEMENT (OUTPATIENT)
Dept: CALL CENTER | Facility: HOSPITAL | Age: 73
End: 2023-03-15
Payer: MEDICARE

## 2023-03-15 NOTE — OUTREACH NOTE
Medical Week 2 Survey    Flowsheet Row Responses   [facility] patient discharged from? [place]   Does the patient have one of the following disease processes/diagnoses(primary or secondary)? Other   Week 2 attempt successful? Yes   Call start time 1307   Discharge diagnosis generalized weakness, Metastasis from pancreatic cancer on chemo   Call end time 1309   Meds reviewed with patient/caregiver? Yes   Is the patient having any side effects they believe may be caused by any medication additions or changes? No   Does the patient have all medications ordered at discharge? N/A   Is the patient taking all medications as directed (includes completed medication regime)? Yes   Comments regarding appointments MyChart video visit with PCP 3/27/23 @ 2:30pm.   Does the patient have a primary care provider?  Yes   Does the patient have an appointment with their PCP within 7 days of discharge? Greater than 7 days   Nursing Interventions Verified appointment date/time/provider   Has the patient kept scheduled appointments due by today? N/A   What is the Home health agency?  [facility]    Has home health visited the patient within 72 hours of discharge? Yes   Psychosocial issues? No   Did the patient receive a copy of their discharge instructions? Yes   Nursing interventions Reviewed instructions with patient   What is the patient's perception of their health status since discharge? Same   Is the patient/caregiver able to teach back signs and symptoms related to disease process for when to call PCP? Yes   Is the patient/caregiver able to teach back signs and symptoms related to disease process for when to call 911? Yes   Is the patient/caregiver able to teach back the hierarchy of who to call/visit for symptoms/problems? PCP, Specialist, Home health nurse, Urgent Care, ED, 911 Yes   If the patient is a current smoker, are they able to teach back resources for cessation? Not a smoker   Week 2 Call Completed? Yes   Is the  patient interested in additional calls from an ambulatory ?  NOTE:  applies to high risk patients requiring additional follow-up. No   Wrap up additional comments Brief call, home health with patient. He reports doing the same. Advised any worsening symptoms go to ED, any questions contact PCP, pt. v/u.           Anna MILLER - Registered Nurse

## 2023-03-20 ENCOUNTER — TELEPHONE (OUTPATIENT)
Dept: FAMILY MEDICINE CLINIC | Facility: CLINIC | Age: 73
End: 2023-03-20
Payer: MEDICARE

## 2023-03-20 NOTE — TELEPHONE ENCOUNTER
PT SON KM CALLED AND WANTED DR NEWMAN TO KNOW THAT PT WAS DIAGNOSED WITH STAGE 4 CANCER AND IS DETERIORATING. THEY WANTED TO DISCUSS PALLIATIVE CARE. [facility] PTS ONCOLOGIST AND SON STATES THEY ARE HAVING TROUBLE MOVING THE PT AND NOW HAVING TO LIFT HIM. THEY WANTED TO DISCUSS PALLIATIVE CARE WITH DR NEWMAN AT NEXT VIDEO VISIT ON 3/27. INFORMED PT SON WE COULD GET HIM IN WITH A DIFFERENT PROVIDER FOR THIS WEEK BUT THEY WOULD LIKE TO SEE DR NEWMAN. THEY WILL WAIT UNTIL 3/27 FOR VIDEO VISIT AND CALL BACK IF PT GETS ANY WORSE AND NEEDS TO BE SEEN THIS WEEK. THANK YOU.

## 2023-03-24 ENCOUNTER — READMISSION MANAGEMENT (OUTPATIENT)
Dept: CALL CENTER | Facility: HOSPITAL | Age: 73
End: 2023-03-24
Payer: MEDICARE

## 2023-03-24 NOTE — OUTREACH NOTE
Medical Week 3 Survey    Flowsheet Row Responses   [facility] patient discharged from? [place]   Does the patient have one of the following disease processes/diagnoses(primary or secondary)? Other   Week 3 attempt successful? Yes   Call start time 0955   Revoke Change in health status-moved to LTC/SNF/Hospice  [Wife reports Hospice called in . Wife also asked about  pallitive care program. number to hub given. ]   Call end time 0958   Discharge diagnosis generalized weakness, Metastasis from pancreatic cancer on chemo   Person spoke with today (if not patient) and relationship Wife    Week 3 Call Completed? Yes          MANNY DELGADO - Registered Nurse

## 2023-03-27 ENCOUNTER — TELEMEDICINE (OUTPATIENT)
Dept: FAMILY MEDICINE CLINIC | Facility: CLINIC | Age: 73
End: 2023-03-27
Payer: MEDICARE

## 2023-03-27 DIAGNOSIS — C25.9 PANCREATIC ADENOCARCINOMA: Primary | ICD-10-CM

## 2023-03-27 PROCEDURE — 99213 OFFICE O/P EST LOW 20 MIN: CPT | Performed by: FAMILY MEDICINE

## 2023-03-27 NOTE — PROGRESS NOTES
"Chief Complaint  HOSPARUS CARE    Subjective        [facility] presents to [facility]  History of Present Illness     Coronavirus pandemic telehealth visit.  Good audio and video connection.  Patient gave informed consent through the [employer] check in process.    Patient is now on hospice care.  Metastatic pancreatic cancer normal.  He has ascites.  Clear liquids only at this point.  He is on oxycodone 5 mg as needed prescribed by the hospice physician.  He is going to a skilled nursing facility tomorrow.  They are interested in palliative care.  They were told he does not qualify.  Pain is currently controlled p.o. meds.  No uncomfortable constipation.  Some dyspnea.  He is on O2.  He is bedbound.  Needs full-time nursing care.      Objective   Vital Signs:  There were no vitals taken for this visit.  Estimated body mass index is 24.45 kg/m² as calculated from the following:    Height as of 3/3/23: 177.8 cm (70\").    Weight as of 3/4/23: 77.3 kg (170 lb 6.7 oz).       BMI is within normal parameters. No other follow-up for BMI required.      Physical Exam  Constitutional:       Comments: Patient is respondent, but looks extremely ill.  He has an obvious ascites.  He looks jaundiced.  No respiratory distress at this time.  Appears moderately comfortable from a pain standpoint.        Result Review :                   Assessment and Plan   Diagnoses and all orders for this visit:    1. Pancreatic adenocarcinoma (HCC) (Primary)      Metastatic pancreatic cancer.  Hospice care.  Reviewed his medication.  Counseling given.  I was in contact with the medical director for the inpatient palliative care.  Unfortunately [facility] does not meet criteria for admission at this time.  However if things were to change the family was instructed to contact me and I can help arrange admission.  Otherwise he will continue to follow with hospice at the skilled nursing facility.  And they will contact " me with any questions otherwise.     I spent 18 minutes caring for [facility] on this date of service. This time includes time spent by me in the following activities:preparing for the visit, obtaining and/or reviewing a separately obtained history, performing a medically appropriate examination and/or evaluation , counseling and educating the patient/family/caregiver, referring and communicating with other health care professionals , documenting information in the medical record and care coordination  Follow Up   No follow-ups on file.  Patient was given instructions and counseling regarding his condition or for health maintenance advice. Please see specific information pulled into the AVS if appropriate.

## 2023-08-17 NOTE — TELEPHONE ENCOUNTER
Rx Refill Note  Requested Prescriptions     Pending Prescriptions Disp Refills   • sertraline (ZOLOFT) 100 MG tablet [Pharmacy Med Name: SERTRALINE 100MG TABLETS] 90 tablet 1     Sig: TAKE 2 TABLETS BY MOUTH EVERY MORNING      Last office visit with prescribing clinician: 1/31/2022      Next office visit with prescribing clinician: Visit date not found            Portia Buchanan  11/28/22, 10:32 EST   Detail Level: Detailed Detail Level: Zone Sunscreen Recommendations: otc broad spectrum sunscreen with minimum spf of 30 or higher Sunscreen Recommendations: otc broad spectrum sunscreen with minimum spf of 30

## 2023-12-18 NOTE — TELEPHONE ENCOUNTER
Caller: MARITZA    Relationship: [facility]    Best call back number: [phone]    What orders are you requesting (i.e. lab or imaging): HOME HEALTH NURSING, PT AND OT INITIAL EVAL     Additional notes: PLEASE CONTACT [employer]    Atrial fib

## 2024-05-28 NOTE — PROGRESS NOTES
[facility] - Outpatient Rehabilitation and Therapy [address] [place] [id number] office: [phone] fax: [phone]       Physical Therapy: TREATMENT/PROGRESS NOTE   Patient: Donna Peralta (44 y.o. female)   Examination Date: 2024   :  1979 MRN: [id number]   Visit #: 4   Insurance Allowable Auth Needed   40 []Yes    [x]No    Insurance: Payor: UMR / Plan: UMR / Product Type: *No Product type* /   Insurance ID: [id number] - (Commercial)  Secondary Insurance (if applicable):    Treatment Diagnosis:     ICD-10-CM    1. Right hip pain  M25.551          Medical Diagnosis:  Right hip pain [M25.551]   Referring Physician: Gabriela Carrizales MD  PCP: Irene Mi MD     Plan of care signed (Y/N):     Date of Patient follow up with Physician:      Progress Report/POC: NO  POC update due: (10 visits /OR AUTH LIMITS, whichever is less)  2024                                             Precautions/ Contra-indications:           Latex allergy:  NO  Pacemaker:    NO  Contraindications for Manipulation: None  Date of Surgery: n/a  Other:    Red Flags:  None    C-SSRS Triggered by Intake questionnaire:   Patient answered 'NO' to both behavioral questions on intake.  No further screening warranted    Preferred Language for Healthcare:   [x] English       [] other:    SUBJECTIVE EXAMINATION     Patient stated complaint: Pt states hip is feeling good. Was tired after last session but no increase in pain. States walking and work are doing better, still has catch at end range rotation motion.        Test used Initial score  2024   Pain Summary VAS 2-7 0/10   Functional questionnaire LEFS 43/80    Other:                OBJECTIVE EXAMINATION     24  ROM/Strength: (Blank cells denote NT)      Mvmt (norm) AROM L AROM R Notes PROM L PROM R Notes     LUMBAR Flex (90)         Ext (25)         SB (25)          Rotation (30)             HIP Flex (120) 129 125        Abd (45)  "Young Valente is a 69 y.o. male is here for a follow up for hypothyroidism, htn, hyperlipidemia, hypogonadism, and hypercalcemia. LAb review. Pt denies any issues or concerns. /72   Ht 177.8 cm (70\")   Wt 93.4 kg (206 lb)   BMI 29.56 kg/m²   Allergies   Allergen Reactions   • Codeine      nausea       Current Outpatient Medications:   •  allopurinol (ZYLOPRIM) 100 MG tablet, TAKE 1 TABLET BY MOUTH EVERY DAY, Disp: 30 tablet, Rfl: 0  •  aspirin 81 MG tablet, Take 81 mg by mouth Every Night. HOLD PRIOR TO SURG, Disp: , Rfl:   •  atorvastatin (LIPITOR) 10 MG tablet, TAKE 1 TABLET BY MOUTH EVERY DAY, Disp: 90 tablet, Rfl: 1  •  busPIRone (BUSPAR) 10 MG tablet, Take 10 mg by mouth 4 (Four) Times a Day., Disp: , Rfl:   •  glucosamine-chondroitin 500-400 MG capsule capsule, Take 1 capsule by mouth 2 (Two) Times a Day With Meals. HOLD PRIOR TO SURG, Disp: , Rfl:   •  lamoTRIgine (LaMICtal) 100 MG tablet, Take 150 mg by mouth Daily., Disp: , Rfl:   •  levothyroxine (SYNTHROID, LEVOTHROID) 137 MCG tablet, TAKE 1 TABLET BY MOUTH EVERY DAY, Disp: 90 tablet, Rfl: 0  •  lisinopril (PRINIVIL,ZESTRIL) 10 MG tablet, TAKE 1 TABLET BY MOUTH DAILY, Disp: 30 tablet, Rfl: 0  •  Omega-3 Fatty Acids (FISH OIL) 1000 MG capsule capsule, Take 4,000 mg by mouth Daily With Breakfast. HOLD PRIOR TO SURG, Disp: , Rfl:   •  omeprazole (priLOSEC) 20 MG capsule, TAKE ONE CAPSULE BY MOUTH DAILY, Disp: 30 capsule, Rfl: 5  •  QUEtiapine (SEROquel) 25 MG tablet, Take 25 mg by mouth 3 (Three) Times a Day., Disp: , Rfl:   •  sertraline (ZOLOFT) 100 MG tablet, Take 200 mg by mouth Every Morning., Disp: , Rfl:   •  TURMERIC PO, Take 1 capsule by mouth 2 (Two) Times a Day. HOLD PRIOR TO SURG , Disp: , Rfl:   •  vitamin D (ERGOCALCIFEROL) 69310 units capsule capsule, TAKE 1 CAPSULE BY MOUTH TWICE A WEEK, Disp: 26 capsule, Rfl: 0  •  Syringe/Needle, Disp, (B-D 3CC LUER-JACY SYR 23GX1\") 23G X 1\" 3 ML misc, For testosterone injection once " every 10 days., Disp: 10 each, Rfl: 2      History of Present Illness this is a 69-year-old gentleman known patient with hypothyroidism as well as hypogonadism with hypertension and dyslipidemia and vitamin D deficiency as well as hyperuricemia.  Over the course of last 6 months he has had no significant health problem for which to go to the ER or hospital.    The following portions of the patient's history were reviewed and updated as appropriate: allergies, current medications, past family history, past medical history, past social history, past surgical history and problem list.    Review of Systems   Constitutional: Negative for fatigue.   HENT: Negative for trouble swallowing.    Eyes: Negative for visual disturbance.   Cardiovascular: Negative for leg swelling.   Endocrine: Negative for polyuria.   Skin: Negative for wound.   Neurological: Negative for numbness.       Objective   Physical Exam   Constitutional: He is oriented to person, place, and time. He appears well-developed and well-nourished. No distress.   Somewhat pale and chronically ill looking   HENT:   Head: Normocephalic and atraumatic.   Right Ear: External ear normal.   Nose: Nose normal.   Mouth/Throat: Oropharynx is clear and moist. No oropharyngeal exudate.   Eyes: Conjunctivae and EOM are normal. Pupils are equal, round, and reactive to light. Right eye exhibits no discharge. Left eye exhibits no discharge. No scleral icterus.   Neck: Normal range of motion. Neck supple. No JVD present. No tracheal deviation present. No thyromegaly present.   Cardiovascular: Normal rate, regular rhythm, normal heart sounds and intact distal pulses. Exam reveals no gallop and no friction rub.   No murmur heard.  Pulmonary/Chest: Effort normal and breath sounds normal. No stridor. No respiratory distress. He has no wheezes. He has no rales. He exhibits no tenderness.   Abdominal: Soft. Bowel sounds are normal. He exhibits no distension and no mass. There is no  tenderness. There is no rebound and no guarding. No hernia.   Musculoskeletal: Normal range of motion. He exhibits no edema, tenderness or deformity.   Healed the scar of the open reduction of a broken right wrist in the medial aspect of right lower forearm.   Lymphadenopathy:     He has no cervical adenopathy.   Neurological: He is alert and oriented to person, place, and time. He has normal reflexes. He displays normal reflexes. No cranial nerve deficit or sensory deficit. He exhibits normal muscle tone. Coordination normal.   Skin: Skin is warm and dry. No rash noted. He is not diaphoretic. No erythema. No pallor.   Psychiatric: He has a normal mood and affect. His behavior is normal. Judgment and thought content normal.   Nursing note and vitals reviewed.        Assessment/Plan   Diagnoses and all orders for this visit:    Acquired hypothyroidism  -     T3, Free; Future  -     T4 & TSH ([facility]); Future  -     T4, Free; Future  -     TestT+TestF+SHBG; Future  -     Uric Acid; Future  -     Vitamin D 25 Hydroxy; Future  -     PSA DIAGNOSTIC; Future  -     Lipid Panel; Future  -     Hemoglobin A1c; Future  -     C-Peptide; Future  -     Comprehensive Metabolic Panel; Future  -     Hemoglobin & Hematocrit, Blood; Future    Hypogonadism in male  -     T3, Free; Future  -     T4 & TSH ([facility]); Future  -     T4, Free; Future  -     TestT+TestF+SHBG; Future  -     Uric Acid; Future  -     Vitamin D 25 Hydroxy; Future  -     PSA DIAGNOSTIC; Future  -     Lipid Panel; Future  -     Hemoglobin A1c; Future  -     C-Peptide; Future  -     Comprehensive Metabolic Panel; Future  -     Hemoglobin & Hematocrit, Blood; Future    Mixed hyperlipidemia  -     T3, Free; Future  -     T4 & TSH ([facility]); Future  -     T4, Free; Future  -     TestT+TestF+SHBG; Future  -     Uric Acid; Future  -     Vitamin D 25 Hydroxy; Future  -     PSA DIAGNOSTIC; Future  -     Lipid Panel; Future  -     Hemoglobin A1c; Future  -     C-Peptide;  Future  -     Comprehensive Metabolic Panel; Future  -     Hemoglobin & Hematocrit, Blood; Future    Benign essential HTN  -     T3, Free; Future  -     T4 & TSH ([facility]); Future  -     T4, Free; Future  -     TestT+TestF+SHBG; Future  -     Uric Acid; Future  -     Vitamin D 25 Hydroxy; Future  -     PSA DIAGNOSTIC; Future  -     Lipid Panel; Future  -     Hemoglobin A1c; Future  -     C-Peptide; Future  -     Comprehensive Metabolic Panel; Future  -     Hemoglobin & Hematocrit, Blood; Future    Benign prostatic hyperplasia without lower urinary tract symptoms  -     T3, Free; Future  -     T4 & TSH ([facility]); Future  -     T4, Free; Future  -     TestT+TestF+SHBG; Future  -     Uric Acid; Future  -     Vitamin D 25 Hydroxy; Future  -     PSA DIAGNOSTIC; Future  -     Lipid Panel; Future  -     Hemoglobin A1c; Future  -     C-Peptide; Future  -     Comprehensive Metabolic Panel; Future  -     Hemoglobin & Hematocrit, Blood; Future    History of primary hyperparathyroidism  -     T3, Free; Future  -     T4 & TSH ([facility]); Future  -     T4, Free; Future  -     TestT+TestF+SHBG; Future  -     Uric Acid; Future  -     Vitamin D 25 Hydroxy; Future  -     PSA DIAGNOSTIC; Future  -     Lipid Panel; Future  -     Hemoglobin A1c; Future  -     C-Peptide; Future  -     Comprehensive Metabolic Panel; Future  -     Hemoglobin & Hematocrit, Blood; Future    Hyperglycemia  -     T3, Free; Future  -     T4 & TSH ([facility]); Future  -     T4, Free; Future  -     TestT+TestF+SHBG; Future  -     Uric Acid; Future  -     Vitamin D 25 Hydroxy; Future  -     PSA DIAGNOSTIC; Future  -     Lipid Panel; Future  -     Hemoglobin A1c; Future  -     C-Peptide; Future  -     Comprehensive Metabolic Panel; Future  -     Hemoglobin & Hematocrit, Blood; Future    Vitamin D deficiency  -     vitamin D (ERGOCALCIFEROL) 91686 units capsule capsule; Take 1 capsule by mouth 2 (Two) Times a Week.  -     T3, Free; Future  -     T4 & TSH ([facility]);  "Future  -     T4, Free; Future  -     TestT+TestF+SHBG; Future  -     Uric Acid; Future  -     Vitamin D 25 Hydroxy; Future  -     PSA DIAGNOSTIC; Future  -     Lipid Panel; Future  -     Hemoglobin A1c; Future  -     C-Peptide; Future  -     Comprehensive Metabolic Panel; Future  -     Hemoglobin & Hematocrit, Blood; Future    Other orders  -     Syringe/Needle, Disp, (B-D 3CC LUER-JACY SYR 23GX1\") 23G X 1\" 3 ML misc; For testosterone injection once every 10 days.  -     levothyroxine (SYNTHROID, LEVOTHROID) 137 MCG tablet; Take 1 tablet by mouth Daily.  -     atorvastatin (LIPITOR) 10 MG tablet; Take 1 tablet by mouth Daily.  -     allopurinol (ZYLOPRIM) 100 MG tablet; Take 1 tablet by mouth Daily.        In summary I saw and examined this 69-year-old gentleman for above-mentioned problems.  I reviewed his laboratory evaluation of May 6, 2019 and provided him with a hard copy of it.  Aside from his very low testosterone he is otherwise clinically and metabolically stable and therefore we will go ahead and continue all his current prescriptions.  He feels conflicted because his primary care provider feels that taking testosterone increases his risk of heart attack and yet he feels very low energy and somewhat tired and depressed.  I suggested that he needs to do a lot of research and once he comes to a conclusion one way or the other to let me know.  At this time I am not prescribing any testosterone.  He will see MsGilberto Vaishali De La Cruz in 6 months or sooner if needed with laboratory evaluation prior to each office visit.         "

## (undated) DEVICE — APPL CHLORAPREP W/TINT 26ML ORNG

## (undated) DEVICE — PENCL E/S ULTRAVAC TELESCP NOSE HOLSTR 10FT

## (undated) DEVICE — ADHS SKIN DERMABOND TOP ADVANCED

## (undated) DEVICE — TOTAL TRAY, 16FR 10ML SIL FOLEY, URN: Brand: MEDLINE

## (undated) DEVICE — GLV SURG SENSICARE GREEN W/ALOE PF LF 6.5 STRL

## (undated) DEVICE — NDL HYPO PRECISIONGLIDE REG 25G 1 1/2

## (undated) DEVICE — Device

## (undated) DEVICE — THE TORRENT IRRIGATION SCOPE CONNECTOR IS USED WITH THE TORRENT IRRIGATION TUBING TO PROVIDE IRRIGATION FLUIDS SUCH AS STERILE WATER DURING GASTROINTESTINAL ENDOSCOPIC PROCEDURES WHEN USED IN CONJUNCTION WITH AN IRRIGATION PUMP (OR ELECTROSURGICAL UNIT).: Brand: TORRENT

## (undated) DEVICE — STPLR SKIN VISISTAT WD 35CT

## (undated) DEVICE — SUT SILK 2/0 SH CR5 18IN C0125

## (undated) DEVICE — SUT SILK 2/0 TIES 18IN A185H

## (undated) DEVICE — ELECTRD BLD EDGE/INSUL1P 2.4X5.1MM STRL

## (undated) DEVICE — ANTIBACTERIAL UNDYED BRAIDED (POLYGLACTIN 910), SYNTHETIC ABSORBABLE SUTURE: Brand: COATED VICRYL

## (undated) DEVICE — SUT PDS 1 CT1 36IN Z347H

## (undated) DEVICE — LN SMPL CO2 SHTRM SD STREAM W/M LUER

## (undated) DEVICE — DRSNG TELFA PAD NONADH STR 1S 3X4IN

## (undated) DEVICE — KT ORCA ORCAPOD DISP STRL

## (undated) DEVICE — CATH FOL COUDE TIEMAN 2WY 14F 5CC

## (undated) DEVICE — PENCL ES MEGADINE EZ/CLEAN BUTN W/HOLSTR 10FT

## (undated) DEVICE — CANN O2 ETCO2 FITS ALL CONN CO2 SMPL A/ 7IN DISP LF

## (undated) DEVICE — SUT SILK 3/0 TIES 18IN A184H

## (undated) DEVICE — GLV SURG BIOGEL LTX PF 6

## (undated) DEVICE — MAGNETIC DRAPE: Brand: DEVON

## (undated) DEVICE — DISPOSABLE BIPOLAR FORCEPS 4" (10.2CM) JEWELERS, STRAIGHT 0.4MM TIP AND 12 FT. (3.6M) CABLE: Brand: KIRWAN

## (undated) DEVICE — IRRIGATOR BULB ASEPTO 60CC STRL

## (undated) DEVICE — SUT ETHLN 2/0 PS 18IN 585H

## (undated) DEVICE — SUT SILK 3/0 SH CR5 18IN C0135

## (undated) DEVICE — SENSR O2 OXIMAX FNGR A/ 18IN NONSTR

## (undated) DEVICE — TRAP FLD MINIVAC MEGADYNE 100ML

## (undated) DEVICE — UNDYED BRAIDED (POLYGLACTIN 910), SYNTHETIC ABSORBABLE SUTURE: Brand: COATED VICRYL

## (undated) DEVICE — ADAPT CLN BIOGUARD AIR/H2O DISP

## (undated) DEVICE — PK PROC MAJ 40

## (undated) DEVICE — DRN PENRS RO SILCNE 1/4X12IN LF STRL

## (undated) DEVICE — TUBING, SUCTION, 1/4" X 10', STRAIGHT: Brand: MEDLINE

## (undated) DEVICE — APPL CHLORAPREP HI/LITE 26ML ORNG

## (undated) DEVICE — GLV SURG PREMIERPRO ORTHO LTX PF SZ7.5 BRN

## (undated) DEVICE — PK ENT MAJ 40